# Patient Record
Sex: FEMALE | Race: WHITE | NOT HISPANIC OR LATINO | Employment: FULL TIME | URBAN - METROPOLITAN AREA
[De-identification: names, ages, dates, MRNs, and addresses within clinical notes are randomized per-mention and may not be internally consistent; named-entity substitution may affect disease eponyms.]

---

## 2017-02-27 ENCOUNTER — GENERIC CONVERSION - ENCOUNTER (OUTPATIENT)
Dept: OTHER | Facility: OTHER | Age: 37
End: 2017-02-27

## 2017-03-13 ENCOUNTER — ALLSCRIPTS OFFICE VISIT (OUTPATIENT)
Dept: OTHER | Facility: OTHER | Age: 37
End: 2017-03-13

## 2017-03-13 DIAGNOSIS — R41.3 OTHER AMNESIA: ICD-10-CM

## 2017-03-13 DIAGNOSIS — Z00.00 ENCOUNTER FOR GENERAL ADULT MEDICAL EXAMINATION WITHOUT ABNORMAL FINDINGS: ICD-10-CM

## 2017-03-23 ENCOUNTER — TRANSCRIBE ORDERS (OUTPATIENT)
Dept: ADMINISTRATIVE | Facility: HOSPITAL | Age: 37
End: 2017-03-23

## 2017-03-23 DIAGNOSIS — R41.3 MEMORY LOSS: Primary | ICD-10-CM

## 2017-03-28 ENCOUNTER — HOSPITAL ENCOUNTER (OUTPATIENT)
Dept: CT IMAGING | Facility: CLINIC | Age: 37
Discharge: HOME | End: 2017-03-28
Payer: COMMERCIAL

## 2017-03-28 ENCOUNTER — GENERIC CONVERSION - ENCOUNTER (OUTPATIENT)
Dept: OTHER | Facility: OTHER | Age: 37
End: 2017-03-28

## 2017-03-28 DIAGNOSIS — R41.3 OTHER AMNESIA: ICD-10-CM

## 2017-03-28 LAB
A/G RATIO (HISTORICAL): 1.7 (ref 1.2–2.2)
ALBUMIN SERPL BCP-MCNC: 4.5 G/DL (ref 3.5–5.5)
ALP SERPL-CCNC: 58 IU/L (ref 39–117)
ALT SERPL W P-5'-P-CCNC: 33 IU/L (ref 0–32)
AST SERPL W P-5'-P-CCNC: 32 IU/L (ref 0–40)
BASOPHILS # BLD AUTO: 0 X10E3/UL (ref 0–0.2)
BASOPHILS # BLD AUTO: 1 %
BILIRUB SERPL-MCNC: 0.6 MG/DL (ref 0–1.2)
BUN SERPL-MCNC: 18 MG/DL (ref 6–20)
BUN/CREA RATIO (HISTORICAL): 27 (ref 8–20)
CALCIUM SERPL-MCNC: 9.3 MG/DL (ref 8.7–10.2)
CHLORIDE SERPL-SCNC: 100 MMOL/L (ref 96–106)
CHOLEST SERPL-MCNC: 171 MG/DL (ref 100–199)
CO2 SERPL-SCNC: 25 MMOL/L (ref 18–29)
CREAT SERPL-MCNC: 0.67 MG/DL (ref 0.57–1)
DEPRECATED RDW RBC AUTO: 13.9 % (ref 12.3–15.4)
EGFR AFRICAN AMERICAN (HISTORICAL): 129 ML/MIN/1.73
EGFR-AMERICAN CALC (HISTORICAL): 112 ML/MIN/1.73
EOSINOPHIL # BLD AUTO: 0 X10E3/UL (ref 0–0.4)
EOSINOPHIL # BLD AUTO: 1 %
GLUCOSE SERPL-MCNC: 80 MG/DL (ref 65–99)
HCT VFR BLD AUTO: 37.6 % (ref 34–46.6)
HDLC SERPL-MCNC: 73 MG/DL
HGB BLD-MCNC: 12.4 G/DL (ref 11.1–15.9)
IMM.GRANULOCYTES (CD4/8) (HISTORICAL): 0 %
IMM.GRANULOCYTES (CD4/8) (HISTORICAL): 0 X10E3/UL (ref 0–0.1)
LDL/HDL RATIO (HISTORICAL): 1.2 RATIO UNITS (ref 0–3.2)
LDLC SERPL CALC-MCNC: 89 MG/DL (ref 0–99)
LYMPHOCYTES # BLD AUTO: 1.6 X10E3/UL (ref 0.7–3.1)
LYMPHOCYTES # BLD AUTO: 43 %
MCH RBC QN AUTO: 30 PG (ref 26.6–33)
MCHC RBC AUTO-ENTMCNC: 33 G/DL (ref 31.5–35.7)
MCV RBC AUTO: 91 FL (ref 79–97)
MONOCYTES # BLD AUTO: 0.2 X10E3/UL (ref 0.1–0.9)
MONOCYTES (HISTORICAL): 6 %
NEUTROPHILS # BLD AUTO: 1.7 X10E3/UL (ref 1.4–7)
NEUTROPHILS # BLD AUTO: 49 %
PLATELET # BLD AUTO: 188 X10E3/UL (ref 150–379)
POTASSIUM SERPL-SCNC: 4.7 MMOL/L (ref 3.5–5.2)
RBC (HISTORICAL): 4.13 X10E6/UL (ref 3.77–5.28)
SODIUM SERPL-SCNC: 138 MMOL/L (ref 134–144)
TOT. GLOBULIN, SERUM (HISTORICAL): 2.6 G/DL (ref 1.5–4.5)
TOTAL PROTEIN (HISTORICAL): 7.1 G/DL (ref 6–8.5)
TRIGL SERPL-MCNC: 47 MG/DL (ref 0–149)
VLDLC SERPL CALC-MCNC: 9 MG/DL (ref 5–40)
WBC # BLD AUTO: 3.6 X10E3/UL (ref 3.4–10.8)

## 2017-03-28 PROCEDURE — 70450 CT HEAD/BRAIN W/O DYE: CPT

## 2017-03-29 ENCOUNTER — GENERIC CONVERSION - ENCOUNTER (OUTPATIENT)
Dept: OTHER | Facility: OTHER | Age: 37
End: 2017-03-29

## 2017-03-29 LAB — TSH SERPL DL<=0.05 MIU/L-ACNC: 1.02 UIU/ML (ref 0.45–4.5)

## 2017-09-18 ENCOUNTER — GENERIC CONVERSION - ENCOUNTER (OUTPATIENT)
Dept: OTHER | Facility: OTHER | Age: 37
End: 2017-09-18

## 2017-12-02 ENCOUNTER — OFFICE VISIT (OUTPATIENT)
Dept: URGENT CARE | Facility: CLINIC | Age: 37
End: 2017-12-02
Payer: COMMERCIAL

## 2017-12-02 PROCEDURE — 99213 OFFICE O/P EST LOW 20 MIN: CPT

## 2017-12-05 NOTE — PROGRESS NOTES
Assessment  1  Acute URI (465 9) (J06 9)    Discussion/Summary  Discussion Summary: At least 6 glasses of water or juice a day  Steam treatments by bending over the sink are helpful  Afrin nasal spray for facial congestion  Vaporizer bedroom  Motrin or Aleve for fever chills or aches  Chief Complaint    1  Cold Symptoms  Chief Complaint Free Text Note Form: For 1 5 weeks c/o sinus pressure headaches productive cough and post nasal drip  Taking OTC medications with no relief  History of Present Illness  HPI: See    Hospital Based Practices Required Assessment:  Pain Assessment  the patient states they do not have pain  (on a scale of 0 to 10, the patient rates the pain at 0 )  Abuse And Domestic Violence Screen   Yes, the patient is safe at home  -- The patient states no one is hurting them  Depression And Suicide Screen  No, the patient has not had thoughts of hurting themself  No, the patient has not felt depressed in the past 7 days  Prefered Language is  [place]  Primary Language is  English  Review of Systems  Focused-Female:  ENT: as noted in HPI  Respiratory: as noted in HPI  Active Problems  1  Amnesia (780 93) (R41 3)   2  Chronic pain syndrome (338 4) (G89 4)   3  Depressive disorder (311) (F32 9)   4  Irritable bowel syndrome (564 1) (K58 9)   5  Postlaminectomy syndrome, lumbar (722 83) (M96 1)    Past Medical History  1  History of Anxiety disorder (300 00) (F41 9)   2  History of Bronchitis (490) (J40)   3  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)   4  History of Chest pain (786 50) (R07 9)   5  History of Conjunctivitis (372 30) (H10 9)   6  History of Elbow pain (719 42) (M25 529)   7  History of being hospitalized (V13 9) (Z92 89)   8  History of Leg pain (729 5) (M79 606)   9  History of Malrotation cecum (751 4) (Q43 3)   10  History of Muscle contraction headache (307 81) (G44 209)   11  History of Sciatica, right   12   History of Viral syndrome (079 99) (B34 9)  Active Problems And Past Medical History Reviewed: The active problems and past medical history were reviewed and updated today  Family History  Father    1  Family history of hypertension (V17 49) (Z82 49)  Brother    2  Family history of epilepsy (V17 2) (Z82 0)  Grandmother    3  Family history of Heart attack   4  Family history of Scleroderma   5  Family history of Stroke Syndrome (V17 1)  Grandfather    6  Family history of Heart attack  Family History    7  Family history of Cardiac disease    Social History   · Activities   · Cultural background   · Dental care, regularly   · Educational Level - Has High School Diploma   · Former smoker (H84 82) (X52 414)   · Lives with family   · Living Situation: Supportive and safe   · Marital History - Currently    · Never Drank Alcohol   · Never Used Drugs   · No advance directives (V49 89) (Z78 9)   · Occupation:   · Primary spoken language English   · Sexually Active  Social History Reviewed: The social history was reviewed and updated today  Surgical History    1  History of Back Surgery   2  History of  Section   3  History of Cholecystectomy   4  History of Gallbladder Surgery   5  History of Laparoscopic Appendectomy   6  History of Surgical Lysis Of Intestinal Adhesions    Current Meds   1  ALPRAZolam 0 5 MG Oral Tablet; TAKE 1 TABLET TWICE DAILY AS NEEDED; Therapy: 08GJV4786 to (Evaluate:2016); Last Rx:84Xwz1911 Ordered   2  DULoxetine HCl - 30 MG Oral Capsule Delayed Release Particles; TAKE 1 CAPSULE Daily take with 60 mg capsule TOTAL DAILY DOSE :90 mg; Therapy: 63VOU7893 to (Evaluate:46Ten9365)  Requested for: 13GRK2545; Last Rx:2017 Ordered   3  DULoxetine HCl - 60 MG Oral Capsule Delayed Release Particles; take 1 capsule by mouth once daily; Therapy: 56GMH8470 to (Evaluate:2018)  Requested for: 19SIS2756; Last Rx:69Tge8476 Ordered   4  Multi-Vitamin Oral Tablet;  Therapy: (Recorded:2016) to Recorded    Allergies    1  Biaxin XL TB24   2  Effexor TABS   3  Sulfa Drugs    Vitals  Signs   Recorded: 93Zxd1187 03:36PM   Temperature: 98 6 F  Heart Rate: 72  Respiration: 18  Systolic: 905  Diastolic: 65  O2 Saturation: 99    Physical Exam   Eyes  Conjunctiva and lids: No swelling, erythema or discharge  Ears, Nose, Mouth, and Throat  External inspection of ears and nose: Normal    Otoscopic examination: Tympanic membranes translucent with normal light reflex  Canals patent without erythema  Nasal mucosa, septum, and turbinates: Normal without edema or erythema  Oropharynx: Normal with no erythema, edema, exudate or lesions  Pulmonary  Auscultation of lungs: Clear to auscultation  Cardiovascular  Auscultation of heart: Normal rate and rhythm, normal S1 and S2, without murmurs         Signatures   Electronically signed by : VIELKA Gamboa ; Dec  2 2017  3:57PM EST                       (Author)

## 2018-01-11 NOTE — RESULT NOTES
Verified Results  * CT HEAD WO CONTRAST [id number] 08:15AM Cristy Nichols Order Number: [id number]    - Patient Instructions: To schedule this appointment, please contact Central Scheduling at [phone]  Test Name Result Flag Reference   CT HEAD WO CONTRAST (Report)     CT BRAIN - WITHOUT CONTRAST     INDICATION: Dizzy spells for the last few weeks  COMPARISON: 10/15/2013     TECHNIQUE: CT examination of the brain was performed  In addition to axial images, coronal reformatted images were created and submitted for interpretation  Radiation dose length product (DLP) for this visit: 1007 32 mGy-cm   This examination, like all CT scans performed in the [facility], was performed utilizing techniques to minimize radiation dose exposure, including the use of    iterative reconstruction and automated exposure control  IMAGE QUALITY: Diagnostic  FINDINGS:      PARENCHYMA: No intracranial mass, mass effect or midline shift  No CT signs of acute infarction  There is no parenchymal hemorrhage  VENTRICLES AND EXTRA-AXIAL SPACES: Normal for patient's age  VISUALIZED ORBITS AND PARANASAL SINUSES: Unremarkable  CALVARIUM AND EXTRACRANIAL SOFT TISSUES:  Normal        IMPRESSION:     No acute intracranial abnormality  Workstation performed: [id number]     Signed by:    Sandra Crowley MD   3/28/17

## 2018-01-13 NOTE — RESULT NOTES
Verified Results  (1) CBC/PLT/DIFF [id number] 08:48AM Marsha Moseley     Test Name Result Flag Reference   WBC 3 6 x10E3/uL  3 4-10 8   RBC 4 13 x10E6/uL  3 77-5 28   Hemoglobin 12 4 g/dL  11 1-15 9   Hematocrit 37 6 %  34 0-46  6   MCV 91 fL  79-97   MCH 30 0 pg  26 6-33 0   MCHC 33 0 g/dL  31 5-35 7   RDW 13 9 %  12 3-15 4   Platelets 371 A27L1/BK  150-379   Neutrophils 49 %     Lymphs 43 %     Monocytes 6 %     Eos 1 %     Basos 1 %     Neutrophils (Absolute) 1 7 x10E3/uL  1 4-7 0   Lymphs (Absolute) 1 6 x10E3/uL  0 7-3 1   Monocytes(Absolute) 0 2 x10E3/uL  0 1-0 9   Eos (Absolute) 0 0 x10E3/uL  0 0-0 4   Baso (Absolute) 0 0 x10E3/uL  0 0-0 2   Immature Granulocytes 0 %     Immature Grans (Abs) 0 0 x10E3/uL  0 0-0 1

## 2018-01-17 NOTE — PROGRESS NOTES
Assessment    1  Encounter for preventive health examination (V70 0) (Z00 00)   2  Depressive disorder (311) (F32 9)   3  Amnesia (780 93) (R41 3)    Plan  Amnesia    · * CT HEAD WO CONTRAST; Status:Need Information - Financial Authorization; Requested for:13Mar2017;    · EEG AWAKE (ROUTINE); Status:Hold For - Scheduling; Requested for:13Mar2017; Health Maintenance    · (1) CBC/PLT/DIFF; Status:Active; Requested for:13Mar2017;    · (1) COMPREHENSIVE METABOLIC PANEL; Status:Active; Requested for:13Mar2017;    · (1) LIPID PANEL FASTING W DIRECT LDL REFLEX; Status:Active; Requested  for:13Mar2017;    · (1) TSH; Status:Active; Requested for:13Mar2017;     Discussion/Summary    Studies ordered to eval for neuro problems, CT head and EEG ? absence seizures    no changes to regimen  The patient was counseled regarding instructions for management  Possible side effects of new medications were reviewed with the patient/guardian today  Chief Complaint  PT here for a yearly check up  -requesting lab slip  History of Present Illness  , Adult Female: The patient is being seen for a health maintenance evaluation  The last health maintenance visit was 1 yr year(s) ago  General Health: The patient's health since the last visit is described as good  Lifestyle:  She consumes a diverse and healthy diet  She exercises regularly  She does not use tobacco    Screening:   HPI: feeling pretty good in general  some occ bouts of depression  has had episodes of "loss of consciousness"     does feel as if xanax helps for anxiety  Review of Systems    Constitutional: No fever, no chills, feels well, no tiredness, no recent weight gain or weight loss  Eyes: No complaints of eye pain, no red eyes, no eyesight problems, no discharge, no dry eyes, no itching of eyes  ENT: no complaints of earache, no loss of hearing, no nose bleeds, no nasal discharge, no sore throat, no hoarseness     Cardiovascular: No complaints of slow heart rate, no fast heart rate, no chest pain, no palpitations, no leg claudication, no lower extremity edema  Respiratory: No complaints of shortness of breath, no wheezing, no cough, no SOB on exertion, no orthopnea, no PND  Gastrointestinal: No complaints of abdominal pain, no constipation, no nausea or vomiting, no diarrhea, no bloody stools  Genitourinary: No complaints of dysuria, no incontinence, no pelvic pain, no dysmenorrhea, no vaginal discharge or bleeding  Musculoskeletal: No complaints of arthralgias, no myalgias, no joint swelling or stiffness, no limb pain or swelling  Integumentary: No complaints of skin rash or lesions, no itching, no skin wounds, no breast pain or lump  Neurological: No complaints of headache, no confusion, no convulsions, no numbness, no dizziness or fainting, no tingling, no limb weakness, no difficulty walking  Psychiatric: Not suicidal, no sleep disturbance, no anxiety or depression, no change in personality, no emotional problems  Endocrine: No complaints of proptosis, no hot flashes, no muscle weakness, no deepening of the voice, no feelings of weakness  Hematologic/Lymphatic: No complaints of swollen glands, no swollen glands in the neck, does not bleed easily, does not bruise easily  Active Problems    1  Chronic pain syndrome (338 4) (G89 4)   2  Depressive disorder (311) (F32 9)   3  Irritable bowel syndrome (564 1) (K58 9)   4   Postlaminectomy syndrome, lumbar (722 83) (M96 1)    Past Medical History    · History of Anxiety disorder (300 00) (F41 9)   · History of Bronchitis (490) (J40)   · 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)   · History of Chest pain (786 50) (R07 9)   · History of Conjunctivitis (372 30) (H10 9)   · History of Elbow pain (719 42) (M25 529)   · History of being hospitalized (V13 9) (Z92 89)   · History of Leg pain (729 5) (M79 606)   · History of Malrotation cecum (751 4) (Q43 3)   · History of Muscle contraction headache (307 81) (G44 209)   · History of Sciatica, right   · History of Viral syndrome (079 99) (B34 9)    Surgical History    · History of Back Surgery   · History of  Section   · History of Cholecystectomy   · History of Gallbladder Surgery   · History of Laparoscopic Appendectomy   · History of Surgical Lysis Of Intestinal Adhesions    Family History  Father    · Family history of hypertension (V17 49) (Z82 49)  Brother    · Family history of epilepsy (V17 2) (Z82 0)  Grandmother    · Family history of Heart attack   · Family history of Scleroderma   · Family history of Stroke Syndrome (V17 1)  Grandfather    · Family history of Heart attack  Family History    · Family history of Cardiac disease    Social History    · Activities   · Participates in activities outside the home-yes, sedentary/light      Participates in activities inside the home-yes, Moderate   · Cultural background   · NON-   · Dental care, regularly   · Educational Level - Has [employer] Diploma   · Former smoker (S71 85) (L01 840)   · QUIT IN    · Lives with family   · Living Situation: Supportive and safe   · Marital History - Currently    · Never Drank Alcohol   · Never Used Drugs   · No advance directives (V49 89) (Z78 9)   · Occupation:   · hairstylist   · Primary spoken language English   · Sexually Active    Current Meds   1  ALPRAZolam 0 5 MG Oral Tablet; TAKE 1 TABLET TWICE DAILY AS NEEDED; Therapy: 32QVF7900 to (Evaluate:2016); Last Rx:2015 Ordered   2  DULoxetine HCl - 30 MG Oral Capsule Delayed Release Particles; take 1 capsule by   mouth once daily WITH 60MG  TO MAKE 90 MG; Therapy: 92IMI0506 to (Evaluate:65Zmd4606)  Requested for: 30Syk4483; Last   Rx:17Bff1306 Ordered   3  DULoxetine HCl - 60 MG Oral Capsule Delayed Release Particles; take 1 capsule by   mouth once daily; Therapy: 18RJR7209 to (Evaluate:2017)  Requested for: 98Zpy2570; Last   Rx:40Jux5757 Ordered   4  Multi-Vitamin Oral Tablet; Therapy: (Recorded:57Mlu8477) to Recorded    Allergies    1  Biaxin XL TB24   2  Effexor TABS   3  Sulfa Drugs    Vitals   Recorded: 72CPF7911 06:23PM   Heart Rate 72   Systolic 461, LUE, Sitting   Diastolic 68, LUE, Sitting   Height 5 ft 5 in   Weight 136 lb    BMI Calculated 22 63   BSA Calculated 1 68     Physical Exam    Constitutional   General appearance: No acute distress, well appearing and well nourished  Eyes   Conjunctiva and lids: No swelling, erythema or discharge  Pupils and irises: Equal, round, reactive to light  Ophthalmoscopic examination: Normal fundi and optic discs  Ears, Nose, Mouth, and Throat   External inspection of ears and nose: Normal     Otoscopic examination: Tympanic membranes translucent with normal light reflex  Canals patent without erythema  Hearing: Normal     Nasal mucosa, septum, and turbinates: Normal without edema or erythema  Lips, teeth, and gums: Normal, good dentition  Oropharynx: Normal with no erythema, edema, exudate or lesions  Neck   Neck: Supple, symmetric, trachea midline, no masses  Thyroid: Normal, no thyromegaly  Pulmonary   Respiratory effort: No increased work of breathing or signs of respiratory distress  Percussion of chest: Normal     Palpation of chest: Normal     Auscultation of lungs: Clear to auscultation  Cardiovascular   Palpation of heart: Normal PMI, no thrills  Auscultation of heart: Normal rate and rhythm, normal S1 and S2, no murmurs  Carotid pulses: 2+ bilaterally  Abdominal aorta: Normal     Femoral pulses: 2+ bilaterally  Pedal pulses: 2+ bilaterally  Examination of extremities for edema and/or varicosities: Normal     Chest   Breasts: Normal, no dimpling or skin changes appreciated  Palpation of breasts and axillae: Normal, no masses palpated  Abdomen   Abdomen: Non-tender, no masses  Liver and spleen: No hepatomegaly or splenomegaly      Examination for hernias: No hernia appreciated  Anus, perineum, and rectum: Normal sphincter tone, no masses, no prolapse  Stool sample for occult blood: Negative  Genitourinary   External genitalia and vagina: Normal, no lesions appreciated  Urethra: Normal, no discharge  Bladder: Not distended, no tenderness  Cervix: Normal, no lesions, Pap obtained  Uterus: Normal size, no tenderness, no masses  Adnexa/Parametria: Normal, no masses or tenderness  Lymphatic   Palpation of lymph nodes in neck: No lymphadenopathy  Palpation of lymph nodes in axillae: No lymphadenopathy  Palpation of lymph nodes in groin: No lymphadenopathy  Palpation of lymph nodes in other areas: No lymphadenopathy  Musculoskeletal   Gait and station: Normal     Digits and nails: Normal without clubbing or cyanosis  Joints, bones, and muscles: Normal     Range of motion: Normal     Stability: Normal     Muscle strength/tone: Normal     Skin   Skin and subcutaneous tissue: Normal without rashes or lesions  Palpation of skin and subcutaneous tissue: Normal turgor  Neurologic   Cranial nerves: Cranial nerves II-XII intact  Reflexes: 2+ and symmetric  Sensation: No sensory loss  Psychiatric   Judgment and insight: Normal     Orientation to person, place, and time: Normal     Recent and remote memory: Intact      Mood and affect: Normal        Signatures   Electronically signed by : VIELKA Hidalgo ; Mar 13 2017  6:48PM EST                       (Author)

## 2018-01-22 VITALS
WEIGHT: 136 LBS | DIASTOLIC BLOOD PRESSURE: 68 MMHG | SYSTOLIC BLOOD PRESSURE: 118 MMHG | HEIGHT: 65 IN | BODY MASS INDEX: 22.66 KG/M2 | HEART RATE: 72 BPM

## 2018-01-23 VITALS
OXYGEN SATURATION: 99 % | HEART RATE: 72 BPM | TEMPERATURE: 98.6 F | DIASTOLIC BLOOD PRESSURE: 65 MMHG | SYSTOLIC BLOOD PRESSURE: 125 MMHG | RESPIRATION RATE: 18 BRPM

## 2018-02-15 DIAGNOSIS — F32.A DEPRESSION, UNSPECIFIED DEPRESSION TYPE: Primary | ICD-10-CM

## 2018-02-15 RX ORDER — DULOXETIN HYDROCHLORIDE 30 MG/1
CAPSULE, DELAYED RELEASE ORAL
Qty: 30 CAPSULE | Refills: 1 | Status: SHIPPED | OUTPATIENT
Start: 2018-02-15 | End: 2018-04-16 | Stop reason: SDUPTHER

## 2018-03-14 ENCOUNTER — TRANSCRIBE ORDERS (OUTPATIENT)
Dept: FAMILY MEDICINE CLINIC | Facility: HOSPITAL | Age: 38
End: 2018-03-14

## 2018-03-14 DIAGNOSIS — L98.9 SKIN LESION: Primary | ICD-10-CM

## 2018-04-15 PROBLEM — Z00.00 HEALTH CARE MAINTENANCE: Status: ACTIVE | Noted: 2018-04-15

## 2018-04-15 PROBLEM — F41.9 ANXIETY: Status: ACTIVE | Noted: 2018-04-15

## 2018-04-15 PROBLEM — F32.A DEPRESSION: Status: ACTIVE | Noted: 2018-04-15

## 2018-04-15 RX ORDER — ALPRAZOLAM 0.5 MG/1
1 TABLET ORAL 2 TIMES DAILY PRN
COMMUNITY
Start: 2015-12-14 | End: 2018-08-09

## 2018-04-15 RX ORDER — DULOXETIN HYDROCHLORIDE 60 MG/1
CAPSULE, DELAYED RELEASE ORAL
Refills: 0 | COMMUNITY
Start: 2018-03-20 | End: 2018-08-09

## 2018-04-15 RX ORDER — DULOXETIN HYDROCHLORIDE 30 MG/1
CAPSULE, DELAYED RELEASE ORAL
COMMUNITY
Start: 2015-12-14 | End: 2018-08-09

## 2018-04-15 NOTE — PROGRESS NOTES
Assessment/Plan:    Anxiety  Stable on meds    Depression  Stable on meds    Patient Active Problem List   Diagnosis    Anxiety    Depression     No orders of the defined types were placed in this encounter  Diagnoses and all orders for this visit:    Anxiety    Depression, unspecified depression type    Health care maintenance    Other orders  -     ALPRAZolam (XANAX) 0 5 mg tablet; Take 1 tablet by mouth 2 (two) times a day as needed  -     DULoxetine (CYMBALTA) 30 mg delayed release capsule; Take by mouth  -     DULoxetine (CYMBALTA) 60 mg delayed release capsule;   -     CBC and differential; Future  -     Comprehensive metabolic panel; Future  -     Lipid Panel with Direct LDL reflex; Future  -     TSH, 3rd generation; Future          Subjective:      Patient ID: Pat Lafleur is a 43 y o  female  Here for [facility]  Wants to taper dymbalta to 60 mg daiy  The following portions of the patient's history were reviewed and updated as appropriate: allergies, current medications, past family history, past medical history, past social history, past surgical history and problem list     Review of Systems   Constitutional: Negative for fatigue and fever  HENT: Negative for hearing loss  Eyes: Negative for visual disturbance  Respiratory: Negative for cough, chest tightness, shortness of breath and wheezing  Cardiovascular: Negative for chest pain, palpitations and leg swelling  Gastrointestinal: Negative for abdominal pain, diarrhea and nausea  Genitourinary: Negative for dysuria and hematuria  Musculoskeletal: Negative for arthralgias  Neurological: Negative for dizziness, numbness and headaches  Psychiatric/Behavioral: Negative for confusion and dysphoric mood  All other systems reviewed and are negative          Objective:      Current Outpatient Prescriptions:     ALPRAZolam (XANAX) 0 5 mg tablet, Take 1 tablet by mouth 2 (two) times a day as needed, Disp: , Rfl:     DULoxetine (CYMBALTA) 30 mg delayed release capsule, Take by mouth, Disp: , Rfl:     DULoxetine (CYMBALTA) 30 mg delayed release capsule, take 1 capsule by mouth once daily (WITH 60 MG  FOR TOTAL DAILY DOSE OF 90 MG ), Disp: 30 capsule, Rfl: 1    DULoxetine (CYMBALTA) 60 mg delayed release capsule, , Disp: , Rfl: 0    HYDROcodone-acetaminophen (NORCO) 5-325 mg per tablet, Take 1-2 tablets by mouth every 6 (six) hours as needed for moderate pain for up to 31 doses  Max Daily Amount: 8 tablets, Disp: 30 tablet, Rfl: 0     Physical Exam   Constitutional: She is oriented to person, place, and time  She appears well-developed and well-nourished  Eyes: Pupils are equal, round, and reactive to light  Neck: Normal range of motion  Neck supple  No thyromegaly present  Cardiovascular: Normal rate, regular rhythm, normal heart sounds and intact distal pulses  No murmur heard  Pulmonary/Chest: Effort normal and breath sounds normal  She has no wheezes  She has no rales  Abdominal: Soft  Bowel sounds are normal  There is no tenderness  Musculoskeletal: Normal range of motion  She exhibits no edema, tenderness or deformity  Lymphadenopathy:     She has no cervical adenopathy  Neurological: She is alert and oriented to person, place, and time  She has normal reflexes  Skin: Skin is warm and dry  Psychiatric: She has a normal mood and affect  Vitals reviewed

## 2018-04-16 ENCOUNTER — OFFICE VISIT (OUTPATIENT)
Dept: FAMILY MEDICINE CLINIC | Facility: HOSPITAL | Age: 38
End: 2018-04-16
Payer: COMMERCIAL

## 2018-04-16 VITALS
SYSTOLIC BLOOD PRESSURE: 110 MMHG | HEART RATE: 75 BPM | DIASTOLIC BLOOD PRESSURE: 70 MMHG | HEIGHT: 65 IN | BODY MASS INDEX: 22.74 KG/M2 | WEIGHT: 136.5 LBS | OXYGEN SATURATION: 99 %

## 2018-04-16 DIAGNOSIS — Z00.00 HEALTH CARE MAINTENANCE: ICD-10-CM

## 2018-04-16 DIAGNOSIS — F32.A DEPRESSION, UNSPECIFIED DEPRESSION TYPE: ICD-10-CM

## 2018-04-16 DIAGNOSIS — F41.9 ANXIETY: Primary | ICD-10-CM

## 2018-04-16 PROCEDURE — 99395 PREV VISIT EST AGE 18-39: CPT | Performed by: INTERNAL MEDICINE

## 2018-04-16 RX ORDER — DULOXETIN HYDROCHLORIDE 30 MG/1
CAPSULE, DELAYED RELEASE ORAL
Qty: 30 CAPSULE | Refills: 1 | Status: SHIPPED | OUTPATIENT
Start: 2018-04-16 | End: 2018-06-21 | Stop reason: SDUPTHER

## 2018-04-16 NOTE — PATIENT INSTRUCTIONS
Your visit today was an annual wellness visit  At this visit the doctor discusses with you routine preventive health measures, reviews your medication history, reviews your lab test and other screenings, or orders the appropriate tests needed  Immunizations are also reviewed and updated if needed  This is the time to be sure that you are doing everything you should to stay healthy! This visit is generally not considered the time to address any new issues or to make changes to the regimen for your chronic conditions unless this is felt to be important by the doctor  If tests or screenings have been ordered, please be sure to obtain them in a timely fashion and the office will contact you with results  If desired, can taper cymbalta to 60 mg daily for 1 month, then 60 mg every other day for 1 month, then 30 mg daiy for 1 month, then 30 mg every other day for 1 month, then 30 mg every 4th day for one month, then stop

## 2018-05-01 LAB
ALBUMIN SERPL-MCNC: 4.2 G/DL (ref 3.5–5.5)
ALBUMIN/GLOB SERPL: 1.7 {RATIO} (ref 1.2–2.2)
ALP SERPL-CCNC: 47 IU/L (ref 39–117)
ALT SERPL-CCNC: 25 IU/L (ref 0–32)
AST SERPL-CCNC: 22 IU/L (ref 0–40)
BASOPHILS # BLD AUTO: 0 X10E3/UL (ref 0–0.2)
BASOPHILS NFR BLD AUTO: 1 %
BILIRUB SERPL-MCNC: 0.7 MG/DL (ref 0–1.2)
BUN SERPL-MCNC: 17 MG/DL (ref 6–20)
BUN/CREAT SERPL: 23 (ref 9–23)
CALCIUM SERPL-MCNC: 8.9 MG/DL (ref 8.7–10.2)
CHLORIDE SERPL-SCNC: 102 MMOL/L (ref 96–106)
CHOLEST SERPL-MCNC: 157 MG/DL (ref 100–199)
CO2 SERPL-SCNC: 24 MMOL/L (ref 18–29)
CREAT SERPL-MCNC: 0.74 MG/DL (ref 0.57–1)
EOSINOPHIL # BLD AUTO: 0.1 X10E3/UL (ref 0–0.4)
EOSINOPHIL NFR BLD AUTO: 2 %
ERYTHROCYTE [DISTWIDTH] IN BLOOD BY AUTOMATED COUNT: 13.6 % (ref 12.3–15.4)
GLOBULIN SER-MCNC: 2.5 G/DL (ref 1.5–4.5)
GLUCOSE SERPL-MCNC: 88 MG/DL (ref 65–99)
HCT VFR BLD AUTO: 36.7 % (ref 34–46.6)
HDLC SERPL-MCNC: 68 MG/DL
HGB BLD-MCNC: 11.6 G/DL (ref 11.1–15.9)
IMM GRANULOCYTES # BLD: 0 X10E3/UL (ref 0–0.1)
IMM GRANULOCYTES NFR BLD: 0 %
LDLC SERPL CALC-MCNC: 78 MG/DL (ref 0–99)
LDLC/HDLC SERPL: 1.1 RATIO (ref 0–3.2)
LYMPHOCYTES # BLD AUTO: 1.7 X10E3/UL (ref 0.7–3.1)
LYMPHOCYTES NFR BLD AUTO: 44 %
MCH RBC QN AUTO: 29.4 PG (ref 26.6–33)
MCHC RBC AUTO-ENTMCNC: 31.6 G/DL (ref 31.5–35.7)
MCV RBC AUTO: 93 FL (ref 79–97)
MONOCYTES # BLD AUTO: 0.4 X10E3/UL (ref 0.1–0.9)
MONOCYTES NFR BLD AUTO: 11 %
NEUTROPHILS # BLD AUTO: 1.6 X10E3/UL (ref 1.4–7)
NEUTROPHILS NFR BLD AUTO: 42 %
PLATELET # BLD AUTO: 173 X10E3/UL (ref 150–379)
POTASSIUM SERPL-SCNC: 4.5 MMOL/L (ref 3.5–5.2)
PROT SERPL-MCNC: 6.7 G/DL (ref 6–8.5)
RBC # BLD AUTO: 3.94 X10E6/UL (ref 3.77–5.28)
SL AMB EGFR AFRICAN AMERICAN: 118 ML/MIN/1.73
SL AMB EGFR NON AFRICAN AMERICAN: 102 ML/MIN/1.73
SL AMB VLDL CHOLESTEROL CALC: 11 MG/DL (ref 5–40)
SODIUM SERPL-SCNC: 140 MMOL/L (ref 134–144)
TRIGL SERPL-MCNC: 53 MG/DL (ref 0–149)
TSH SERPL DL<=0.005 MIU/L-ACNC: 1.58 UIU/ML (ref 0.45–4.5)
WBC # BLD AUTO: 3.7 X10E3/UL (ref 3.4–10.8)

## 2018-06-21 ENCOUNTER — TELEPHONE (OUTPATIENT)
Dept: FAMILY MEDICINE CLINIC | Facility: HOSPITAL | Age: 38
End: 2018-06-21

## 2018-06-21 DIAGNOSIS — F32.A DEPRESSION, UNSPECIFIED DEPRESSION TYPE: ICD-10-CM

## 2018-06-21 RX ORDER — DULOXETIN HYDROCHLORIDE 30 MG/1
CAPSULE, DELAYED RELEASE ORAL
Qty: 30 CAPSULE | Refills: 1 | Status: SHIPPED | OUTPATIENT
Start: 2018-06-21 | End: 2018-08-09

## 2018-06-21 NOTE — TELEPHONE ENCOUNTER
Called PT - the Cymbalta is capsules - she was wondering if she could do 30 mg every other day - please advise

## 2018-06-21 NOTE — TELEPHONE ENCOUNTER
Left message for PT - OK to do every other day - and that she could stop after 3 weeks if she desires

## 2018-08-09 ENCOUNTER — OFFICE VISIT (OUTPATIENT)
Dept: OBGYN CLINIC | Facility: CLINIC | Age: 38
End: 2018-08-09
Payer: COMMERCIAL

## 2018-08-09 ENCOUNTER — APPOINTMENT (OUTPATIENT)
Dept: RADIOLOGY | Facility: CLINIC | Age: 38
End: 2018-08-09
Payer: COMMERCIAL

## 2018-08-09 VITALS
HEART RATE: 70 BPM | SYSTOLIC BLOOD PRESSURE: 123 MMHG | BODY MASS INDEX: 23.39 KG/M2 | DIASTOLIC BLOOD PRESSURE: 72 MMHG | WEIGHT: 137 LBS | HEIGHT: 64 IN

## 2018-08-09 DIAGNOSIS — M25.522 PAIN IN LEFT ELBOW: ICD-10-CM

## 2018-08-09 DIAGNOSIS — M77.12 LEFT LATERAL EPICONDYLITIS: Primary | ICD-10-CM

## 2018-08-09 PROCEDURE — 99203 OFFICE O/P NEW LOW 30 MIN: CPT | Performed by: ORTHOPAEDIC SURGERY

## 2018-08-09 PROCEDURE — 73080 X-RAY EXAM OF ELBOW: CPT

## 2018-08-09 RX ORDER — DIPHENOXYLATE HYDROCHLORIDE AND ATROPINE SULFATE 2.5; .025 MG/1; MG/1
1 TABLET ORAL DAILY
COMMUNITY

## 2018-08-09 NOTE — PROGRESS NOTES
Assessment:     1  Left lateral epicondylitis        Plan:  The patient was seen and examined by Dr Chalino Marino and myself  Problem List Items Addressed This Visit        Musculoskeletal and Integument    Left lateral epicondylitis - Primary     Findings and treatment options were discussed the patient  X-rays were reviewed with the patient  Discussed condition and prognosis  Recommend formal occupational therapy  She was prescribed a wrist brace to use at work along with the elbow strap  She is to continue taking anti-inflammatories  Discussed importance of modifying her activities  She is to avoid lifting with that arm, especially with the palm down  Discussed that this condition can take 9-12 months to resolve  Also briefly discussed that if she is not feeling improvement with conservative treatment, she may need to cut back her hours as a  to rest her elbow  She is to come back for follow-up if there is no improvement in 2-3 months  All questions were answered to her satisfaction  Relevant Orders    XR elbow 3+ vw left    Ambulatory referral to Occupational Therapy    Durable Medical Equipment         Subjective:     Patient ID: Fredy De León is a 43 y o  female  Chief Complaint: This is a right-hand-dominant 26-year-old female complaining of left elbow pain for the past 3 months  The pain began after doing repetitive yard work  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  She has had no improvement over the past month  The pain is localized over the lateral aspect of the elbow and radiates down her forearm  She tried using an elbow strap with no relief  She has been taking NSAIDs intermittently with minimal relief  No other treatment  She denies any history of similar symptoms in that elbow  Patient intake form was reviewed today         Allergy:  Allergies   Allergen Reactions    Biaxin [Clarithromycin] GI Intolerance    Sulfa Antibiotics     Venlafaxine GI Intolerance and Vomiting     Category: Allergy; Medications:  all current active meds have been reviewed  Past Medical History:  Past Medical History:   Diagnosis Date    Anxiety disorder     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HCC)     Malrotation cecum     Muscle contraction headache     Sciatica, right side      Past Surgical History:  Past Surgical History:   Procedure Laterality Date    APPENDECTOMY      extensive lysis of adhesions, MAYURI's procedure (divisions of MAYURI's bands) detorsion of vulvulus, widening of mesenteric pedicle, cecopeexy, appendectomy       BACK SURGERY Right 2013    SF: L4-L5 hemilaminectomy for discectomy  Use of the microscope for microdissection, Use og 40mg of depo-medrol though thee exiting right L5 nerve root  Onset:13     SECTION      x2    CHOLECYSTECTOMY      GALLBLADDER SURGERY      GV, Onset:     LAPAROSCOPIC LYSIS INTESTINAL ADHESIONS      onset: 16    LUMBAR DISC SURGERY      [place] LAP,DIAGNOSTIC ABDOMEN N/A 2016    Procedure: LAPAROSCOPY DIAGNOSTIC, EXTENSIVE LYSIS OF ADHESIONS, MAYURI'S PROCEDURE(DIVISION OF MAYURI'S BANDS,DETORSION OF VOLVULUS, WIDENING OF MESENTERIC PEDICLE, CECOPEXY, APPENDECTOMY); Surgeon: Sofi Ayala MD;  [facility];  Service: General    TUBAL LIGATION       Family History:  Family History   Problem Relation Age of Onset    Hypertension Father     Seizures Brother         epilepsy    Heart disease Family     Heart attack Other     Scleroderma Other     Stroke Other 78        stroke syndrome    Heart attack Other      Social History:  History   Alcohol Use No     History   Drug Use No     History   Smoking Status    Former Smoker    Quit date:    Smokeless Tobacco    Never Used     Review of Systems   Constitutional: Negative  HENT: Negative  Eyes: Negative  Respiratory: Negative  Cardiovascular: Negative  Gastrointestinal: Negative      Endocrine: Negative  Genitourinary: Negative  Musculoskeletal: Positive for arthralgias, joint swelling and myalgias  Skin: Negative  Allergic/Immunologic: Negative  Neurological: Negative  Hematological: Negative  Psychiatric/Behavioral: Negative  Objective:  BP Readings from Last 1 Encounters:   08/09/18 123/72      Wt Readings from Last 1 Encounters:   08/09/18 62 1 kg (137 lb)      BMI:   Estimated body mass index is 23 52 kg/m² as calculated from the following:    Height as of this encounter: 5' 4" (1 626 m)  Weight as of this encounter: 62 1 kg (137 lb)  BSA:   Estimated body surface area is 1 67 meters squared as calculated from the following:    Height as of this encounter: 5' 4" (1 626 m)  Weight as of this encounter: 62 1 kg (137 lb)  Physical Exam   Constitutional: She is oriented to person, place, and time  She appears well-developed  HENT:   Head: Normocephalic and atraumatic  Eyes: EOM are normal  Pupils are equal, round, and reactive to light  Neck: Neck supple  Neurological: She is alert and oriented to person, place, and time  Skin: Skin is warm  Psychiatric: She has a normal mood and affect  Nursing note and vitals reviewed  Right Elbow Exam   Right elbow exam is normal       Left Elbow Exam     Tenderness   The patient is experiencing tenderness in the lateral epicondyle  Range of Motion   The patient has normal left elbow ROM  Muscle Strength   The patient has normal left elbow strength  Tests Varus: negative  Valgus: negative  Tinel's Sign (cubital tunnel): negative    Other   Erythema: absent  Scars: absent  Sensation: normal  Pulse: present    Comments:  Pain in the elbow with resisted wrist extension            I have personally reviewed pertinent films in PACS  X-rays left elbow reveal no abnormalities

## 2018-08-09 NOTE — ASSESSMENT & PLAN NOTE
Findings and treatment options were discussed the patient  X-rays were reviewed with the patient  Discussed condition and prognosis  Recommend formal occupational therapy  She was prescribed a wrist brace to use at work along with the elbow strap  She is to continue taking anti-inflammatories  Discussed importance of modifying her activities  She is to avoid lifting with that arm, especially with the palm down  Discussed that this condition can take 9-12 months to resolve  Also briefly discussed that if she is not feeling improvement with conservative treatment, she may need to cut back her hours as a  to rest her elbow  She is to come back for follow-up if there is no improvement in 2-3 months  All questions were answered to her satisfaction

## 2018-08-27 ENCOUNTER — OFFICE VISIT (OUTPATIENT)
Dept: FAMILY MEDICINE CLINIC | Facility: HOSPITAL | Age: 38
End: 2018-08-27
Payer: COMMERCIAL

## 2018-08-27 VITALS
SYSTOLIC BLOOD PRESSURE: 122 MMHG | HEART RATE: 80 BPM | HEIGHT: 64 IN | BODY MASS INDEX: 23.64 KG/M2 | WEIGHT: 138.5 LBS | OXYGEN SATURATION: 99 % | DIASTOLIC BLOOD PRESSURE: 68 MMHG

## 2018-08-27 DIAGNOSIS — F32.A DEPRESSION, UNSPECIFIED DEPRESSION TYPE: Primary | ICD-10-CM

## 2018-08-27 PROBLEM — Z00.00 HEALTH CARE MAINTENANCE: Status: RESOLVED | Noted: 2018-04-15 | Resolved: 2018-08-27

## 2018-08-27 PROBLEM — M77.12 LEFT LATERAL EPICONDYLITIS: Status: RESOLVED | Noted: 2018-08-09 | Resolved: 2018-08-27

## 2018-08-27 PROCEDURE — 99213 OFFICE O/P EST LOW 20 MIN: CPT | Performed by: INTERNAL MEDICINE

## 2018-08-27 PROCEDURE — 3008F BODY MASS INDEX DOCD: CPT | Performed by: INTERNAL MEDICINE

## 2018-08-27 RX ORDER — DULOXETIN HYDROCHLORIDE 30 MG/1
CAPSULE, DELAYED RELEASE ORAL
COMMUNITY
Start: 2018-06-21 | End: 2018-08-27

## 2018-08-27 RX ORDER — DULOXETIN HYDROCHLORIDE 30 MG/1
30 CAPSULE, DELAYED RELEASE ORAL DAILY
Qty: 30 CAPSULE | Refills: 3 | Status: SHIPPED | OUTPATIENT
Start: 2018-08-27 | End: 2018-12-26 | Stop reason: SDUPTHER

## 2018-08-27 NOTE — PATIENT INSTRUCTIONS
You were seen for a follow up of chronic medical problems today  Be sure to make any changes to your medication as discussed by your doctor  If blood tests or other testing was ordered, be sure to obtain this at the time requested by the doctor  Our office will call you with the results of your tests once they arrive in our office  Resume cymbalta at 30 mg daily  Can increase to 60 mg is needed  Re evluate in 2 months  Trial of melatonin for sleep

## 2018-08-27 NOTE — PROGRESS NOTES
Assessment/Plan:    Depression  Trying to taper off cymbalta and having SE of anxiety  After discussion, will resume cymbalta at 30 mg daily  Can us xanax prn until this is effective  Patient Active Problem List   Diagnosis    Anxiety    Depression     No orders of the defined types were placed in this encounter  Diagnoses and all orders for this visit:    Depression, unspecified depression type  -     DULoxetine (CYMBALTA) 30 mg delayed release capsule; Take 1 capsule (30 mg total) by mouth daily    Other orders  -     Discontinue: DULoxetine (CYMBALTA) 30 mg delayed release capsule; Take 1 tab every other day -prn          Subjective:      Patient ID: Bard Lopez is a 43 y o  female  SE of anxiety as she tapers SSRI med  She has been off this med for about 2 weeks  Having anxiety and some crying  The following portions of the patient's history were reviewed and updated as appropriate: allergies, current medications, past family history, past medical history, past social history, past surgical history and problem list     Review of Systems   Constitutional: Negative for fatigue and fever  HENT: Negative for hearing loss  Eyes: Negative for visual disturbance  Respiratory: Negative for cough, chest tightness, shortness of breath and wheezing  Cardiovascular: Negative for chest pain, palpitations and leg swelling  Gastrointestinal: Negative for abdominal pain, diarrhea and nausea  Genitourinary: Negative for dysuria and hematuria  Musculoskeletal: Negative for arthralgias  Neurological: Negative for dizziness, numbness and headaches  Psychiatric/Behavioral: Negative for confusion and dysphoric mood  All other systems reviewed and are negative          Objective:      Current Outpatient Prescriptions:     multivitamin (THERAGRAN) TABS, Take 1 tablet by mouth daily, Disp: , Rfl:     DULoxetine (CYMBALTA) 30 mg delayed release capsule, Take 1 capsule (30 mg total) by mouth daily, Disp: 30 capsule, Rfl: 3     Physical Exam   Constitutional: She is oriented to person, place, and time  She appears well-developed and well-nourished  Eyes: Pupils are equal, round, and reactive to light  Neck: Normal range of motion  Neck supple  No thyromegaly present  Cardiovascular: Normal rate, regular rhythm, normal heart sounds and intact distal pulses  No murmur heard  Pulmonary/Chest: Effort normal and breath sounds normal  She has no wheezes  She has no rales  Abdominal: Soft  Bowel sounds are normal  There is no tenderness  Musculoskeletal: Normal range of motion  She exhibits no edema, tenderness or deformity  Lymphadenopathy:     She has no cervical adenopathy  Neurological: She is alert and oriented to person, place, and time  She has normal reflexes  Skin: Skin is warm and dry  Psychiatric: She has a normal mood and affect  Nursing note and vitals reviewed

## 2018-08-27 NOTE — ASSESSMENT & PLAN NOTE
Trying to taper off cymbalta and having SE of anxiety  After discussion, will resume cymbalta at 30 mg daily  Can us xanax prn until this is effective

## 2018-09-25 ENCOUNTER — TRANSCRIBE ORDERS (OUTPATIENT)
Dept: NEUROSURGERY | Facility: CLINIC | Age: 38
End: 2018-09-25

## 2018-09-25 ENCOUNTER — TRANSCRIBE ORDERS (OUTPATIENT)
Dept: FAMILY MEDICINE CLINIC | Facility: HOSPITAL | Age: 38
End: 2018-09-25

## 2018-09-25 DIAGNOSIS — M54.50 LOW BACK PAIN: Primary | ICD-10-CM

## 2018-09-25 DIAGNOSIS — M51.26 OTHER INTERVERTEBRAL DISC DISPLACEMENT, LUMBAR REGION: ICD-10-CM

## 2018-09-25 DIAGNOSIS — M54.16 RADICULOPATHY OF LUMBAR REGION: ICD-10-CM

## 2018-09-25 DIAGNOSIS — G89.29 OTHER CHRONIC PAIN: ICD-10-CM

## 2018-09-25 DIAGNOSIS — M54.50 LOWER BACK PAIN: Primary | ICD-10-CM

## 2018-10-11 ENCOUNTER — OFFICE VISIT (OUTPATIENT)
Dept: OBGYN CLINIC | Facility: CLINIC | Age: 38
End: 2018-10-11
Payer: COMMERCIAL

## 2018-10-11 VITALS
WEIGHT: 138.8 LBS | BODY MASS INDEX: 23.7 KG/M2 | DIASTOLIC BLOOD PRESSURE: 70 MMHG | SYSTOLIC BLOOD PRESSURE: 105 MMHG | HEART RATE: 69 BPM | HEIGHT: 64 IN

## 2018-10-11 DIAGNOSIS — G57.02 PIRIFORMIS SYNDROME OF LEFT SIDE: Primary | ICD-10-CM

## 2018-10-11 PROCEDURE — 99213 OFFICE O/P EST LOW 20 MIN: CPT | Performed by: ORTHOPAEDIC SURGERY

## 2018-10-11 NOTE — PROGRESS NOTES
Assessment:     1  Piriformis syndrome of left side        Plan:  The patient was seen and examined by Dr Love Flower and myself  Problem List Items Addressed This Visit        Nervous and Auditory    Piriformis syndrome of left side - Primary     Findings and treatment options were discussed with the patient  Recommend continuing physical therapy and massage therapy since it is providing relief  Discussed importance of doing stretches on consistent basis on her own, especially after working out  Continue he and NSAIDs as needed  If she is not feeling improvement the next 6-8 weeks, she is to return for follow-up  All questions were answered to patient's satisfaction  Subjective:     Patient ID: Ar Cabral is a 43 y o  female  Chief Complaint: This is a 21-year-old female complaining of left-sided buttock pain for the past month  She states she 1st felt some pain in her lumbar spine when she got up from a seating position from her couch 1 month ago  The pain then localized the left buttock with some pain radiating down the posterior aspect of her thigh to her knee  She does have occasional numbness and tingling radiating down her left leg as well  She has been going to physical therapy and massage therapy and has felt improvement  She has most of her pain when she lays down at night  She has a history of a lumbar discectomy by Dr Benigno Flowers for right-sided radiculopathy in the past that helped  No other treatment  We last saw her for left elbow lateral epicondylitis and she has been attending occupational therapy  Symptoms have resolved  Allergy:  Allergies   Allergen Reactions    Biaxin [Clarithromycin] GI Intolerance and Other (See Comments)    Sulfa Antibiotics     Sulfasalazine     Venlafaxine GI Intolerance, Vomiting and Other (See Comments)     Category: Allergy; Category: Allergy;       Medications:  all current active meds have been reviewed  Past Medical History:  Past Medical History:   Diagnosis Date    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[address] )      Past Surgical History:  Past Surgical History:   Procedure Laterality Date    APPENDECTOMY      extensive lysis of adhesions, MAYURI's procedure (divisions of MAYURI's bands) detorsion of vulvulus, widening of mesenteric pedicle, cecopeexy, appendectomy       BACK SURGERY Right 2013    SF: L4-L5 hemilaminectomy for discectomy  Use of the microscope for microdissection, Use og 40mg of depo-medrol though thee exiting right L5 nerve root  Onset:13     SECTION      x2    CHOLECYSTECTOMY      GALLBLADDER SURGERY      GV, Onset:     LAPAROSCOPIC LYSIS INTESTINAL ADHESIONS      onset: 16    LUMBAR DISC SURGERY      [place] LAP,DIAGNOSTIC ABDOMEN N/A 2016    Procedure: LAPAROSCOPY DIAGNOSTIC, EXTENSIVE LYSIS OF ADHESIONS, MAYURI'S PROCEDURE(DIVISION OF MAYURI'S BANDS,DETORSION OF VOLVULUS, WIDENING OF MESENTERIC PEDICLE, CECOPEXY, APPENDECTOMY); Surgeon: Shona Man MD;  Location: [facility] OR;  Service: General    TUBAL LIGATION       Family History:  Family History   Problem Relation Age of Onset    Hypertension Father     Seizures Brother         epilepsy    Heart disease Family     Heart attack Other     Scleroderma Other     Stroke Other 78        stroke syndrome    Heart attack Other      Social History:  History   Alcohol Use No     History   Drug Use No     History   Smoking Status    Former Smoker    Quit date:    Smokeless Tobacco    Never Used     Review of Systems   Constitutional: Negative  HENT: Negative  Eyes: Negative  Respiratory: Negative  Cardiovascular: Negative  Gastrointestinal: Negative  Endocrine: Negative  Genitourinary: Negative  Musculoskeletal: Positive for back pain and myalgias  Skin: Negative  Allergic/Immunologic: Negative  Neurological: Positive for numbness  Hematological: Negative  Psychiatric/Behavioral: Negative            Objective:  BP Readings from Last 1 Encounters:   10/11/18 105/70      Wt Readings from Last 1 Encounters:   10/11/18 63 kg (138 lb 12 8 oz)      BMI:   Estimated body mass index is 23 82 kg/m² as calculated from the following:    Height as of this encounter: 5' 4" (1 626 m)  Weight as of this encounter: 63 kg (138 lb 12 8 oz)  BSA:   Estimated body surface area is 1 68 meters squared as calculated from the following:    Height as of this encounter: 5' 4" (1 626 m)  Weight as of this encounter: 63 kg (138 lb 12 8 oz)  Physical Exam   Constitutional: She is oriented to person, place, and time  She appears well-developed  HENT:   Head: Normocephalic and atraumatic  Eyes: Conjunctivae and EOM are normal    Neck: Neck supple  Neurological: She is alert and oriented to person, place, and time  Skin: Skin is warm  Psychiatric: She has a normal mood and affect  Nursing note and vitals reviewed  Back Exam     Tenderness   Back tenderness location: Left piriformis  Range of Motion   The patient has normal back ROM  Muscle Strength   The patient has normal back strength  Tests   Straight leg raise right: negative  Straight leg raise left: negativeLeft straight leg raise test: Feel stretching over posterior thigh      Reflexes   Patellar: normal  Achilles: normal    Other   Toe Walk: normal  Heel Walk: normal  Sensation: normal  Gait: normal   Erythema: no back redness  Scars: present

## 2018-10-11 NOTE — ASSESSMENT & PLAN NOTE
Findings and treatment options were discussed with the patient  Recommend continuing physical therapy and massage therapy since it is providing relief  Discussed importance of doing stretches on consistent basis on her own, especially after working out  Continue he and NSAIDs as needed  If she is not feeling improvement the next 6-8 weeks, she is to return for follow-up  All questions were answered to patient's satisfaction

## 2018-10-15 ENCOUNTER — TELEPHONE (OUTPATIENT)
Dept: NEUROSURGERY | Facility: CLINIC | Age: 38
End: 2018-10-15

## 2018-10-22 ENCOUNTER — OFFICE VISIT (OUTPATIENT)
Dept: FAMILY MEDICINE CLINIC | Facility: HOSPITAL | Age: 38
End: 2018-10-22
Payer: COMMERCIAL

## 2018-10-22 VITALS
BODY MASS INDEX: 23.73 KG/M2 | HEART RATE: 84 BPM | WEIGHT: 139 LBS | SYSTOLIC BLOOD PRESSURE: 102 MMHG | HEIGHT: 64 IN | DIASTOLIC BLOOD PRESSURE: 68 MMHG

## 2018-10-22 DIAGNOSIS — F32.A DEPRESSION, UNSPECIFIED DEPRESSION TYPE: Primary | ICD-10-CM

## 2018-10-22 DIAGNOSIS — F41.9 ANXIETY: ICD-10-CM

## 2018-10-22 PROCEDURE — 99213 OFFICE O/P EST LOW 20 MIN: CPT | Performed by: INTERNAL MEDICINE

## 2018-10-22 PROCEDURE — 3008F BODY MASS INDEX DOCD: CPT | Performed by: INTERNAL MEDICINE

## 2018-10-22 PROCEDURE — 1036F TOBACCO NON-USER: CPT | Performed by: INTERNAL MEDICINE

## 2018-10-22 NOTE — PROGRESS NOTES
Subjective:   Chief Complaint   Patient presents with    Follow-up     feeling well on Cymbalta  Patient ID: Diego Mcburney is a 43 y o  female  Here for a foloow up  Doing much better on low dose cymbalta  Encouraged to continue on this  The following portions of the patient's history were reviewed and updated as appropriate: allergies, current medications, past family history, past medical history, past social history, past surgical history and problem list     Review of Systems   Constitutional: Negative for fatigue and fever  HENT: Negative for hearing loss  Eyes: Negative for visual disturbance  Respiratory: Negative for cough, chest tightness, shortness of breath and wheezing  Cardiovascular: Negative for chest pain, palpitations and leg swelling  Gastrointestinal: Negative for abdominal pain, diarrhea and nausea  Genitourinary: Negative for dysuria and hematuria  Musculoskeletal: Negative for arthralgias  Neurological: Negative for dizziness, numbness and headaches  Psychiatric/Behavioral: Negative for confusion and dysphoric mood  All other systems reviewed and are negative  Current Outpatient Prescriptions on File Prior to Visit   Medication Sig Dispense Refill    DULoxetine (CYMBALTA) 30 mg delayed release capsule Take 1 capsule (30 mg total) by mouth daily 30 capsule 3    multivitamin (THERAGRAN) TABS Take 1 tablet by mouth daily       No current facility-administered medications on file prior to visit  Objective:  Vitals:    10/22/18 0957   BP: 102/68   Pulse: 84   Weight: 63 kg (139 lb)   Height: 5' 4" (1 626 m)      Physical Exam   Constitutional: She is oriented to person, place, and time  She appears well-developed and well-nourished  Eyes: Pupils are equal, round, and reactive to light  Neck: Normal range of motion  Neck supple  No thyromegaly present     Cardiovascular: Normal rate, regular rhythm, normal heart sounds and intact distal pulses  No murmur heard  Pulmonary/Chest: Effort normal and breath sounds normal  She has no wheezes  She has no rales  Abdominal: Soft  Bowel sounds are normal  There is no tenderness  Musculoskeletal: Normal range of motion  She exhibits no edema, tenderness or deformity  Lymphadenopathy:     She has no cervical adenopathy  Neurological: She is alert and oriented to person, place, and time  She has normal reflexes  Skin: Skin is warm and dry  Psychiatric: She has a normal mood and affect  Nursing note and vitals reviewed  Assessment/Plan:    No problem-specific Assessment & Plan notes found for this encounter         Diagnoses and all orders for this visit:    Depression, unspecified depression type    Anxiety

## 2018-12-26 DIAGNOSIS — F32.A DEPRESSION, UNSPECIFIED DEPRESSION TYPE: ICD-10-CM

## 2018-12-27 RX ORDER — DULOXETIN HYDROCHLORIDE 30 MG/1
CAPSULE, DELAYED RELEASE ORAL
Qty: 30 CAPSULE | Refills: 3 | Status: SHIPPED | OUTPATIENT
Start: 2018-12-27 | End: 2019-04-22

## 2019-04-22 ENCOUNTER — OFFICE VISIT (OUTPATIENT)
Dept: FAMILY MEDICINE CLINIC | Facility: HOSPITAL | Age: 39
End: 2019-04-22
Payer: COMMERCIAL

## 2019-04-22 VITALS
SYSTOLIC BLOOD PRESSURE: 100 MMHG | HEIGHT: 64 IN | BODY MASS INDEX: 23.73 KG/M2 | HEART RATE: 75 BPM | WEIGHT: 139 LBS | DIASTOLIC BLOOD PRESSURE: 74 MMHG

## 2019-04-22 DIAGNOSIS — F41.9 ANXIETY: Primary | ICD-10-CM

## 2019-04-22 DIAGNOSIS — Z00.00 HEALTH CARE MAINTENANCE: ICD-10-CM

## 2019-04-22 DIAGNOSIS — F32.A DEPRESSION, UNSPECIFIED DEPRESSION TYPE: ICD-10-CM

## 2019-04-22 PROBLEM — G57.02 PIRIFORMIS SYNDROME OF LEFT SIDE: Status: RESOLVED | Noted: 2018-10-11 | Resolved: 2019-04-22

## 2019-04-22 PROCEDURE — 99214 OFFICE O/P EST MOD 30 MIN: CPT | Performed by: INTERNAL MEDICINE

## 2019-04-22 PROCEDURE — 3008F BODY MASS INDEX DOCD: CPT | Performed by: INTERNAL MEDICINE

## 2019-04-22 PROCEDURE — 1036F TOBACCO NON-USER: CPT | Performed by: INTERNAL MEDICINE

## 2019-04-22 RX ORDER — DULOXETIN HYDROCHLORIDE 60 MG/1
60 CAPSULE, DELAYED RELEASE ORAL DAILY
Qty: 90 CAPSULE | Refills: 3 | Status: SHIPPED | OUTPATIENT
Start: 2019-04-22 | End: 2019-07-22 | Stop reason: ALTCHOICE

## 2019-04-22 RX ORDER — ALPRAZOLAM 0.25 MG/1
0.25 TABLET ORAL DAILY PRN
Qty: 20 TABLET | Refills: 1 | Status: SHIPPED | OUTPATIENT
Start: 2019-04-22 | End: 2019-07-22 | Stop reason: ALTCHOICE

## 2019-04-23 DIAGNOSIS — F32.A DEPRESSION, UNSPECIFIED DEPRESSION TYPE: ICD-10-CM

## 2019-04-23 RX ORDER — DULOXETIN HYDROCHLORIDE 30 MG/1
CAPSULE, DELAYED RELEASE ORAL
Qty: 30 CAPSULE | Refills: 3 | Status: SHIPPED | OUTPATIENT
Start: 2019-04-23 | End: 2019-08-18 | Stop reason: SDUPTHER

## 2019-04-30 ENCOUNTER — TELEPHONE (OUTPATIENT)
Dept: FAMILY MEDICINE CLINIC | Facility: HOSPITAL | Age: 39
End: 2019-04-30

## 2019-05-21 LAB
ALBUMIN SERPL-MCNC: 4.3 G/DL (ref 3.5–5.5)
ALBUMIN/GLOB SERPL: 2 {RATIO} (ref 1.2–2.2)
ALP SERPL-CCNC: 46 IU/L (ref 39–117)
ALT SERPL-CCNC: 21 IU/L (ref 0–32)
AST SERPL-CCNC: 22 IU/L (ref 0–40)
BASOPHILS # BLD AUTO: 0 X10E3/UL (ref 0–0.2)
BASOPHILS NFR BLD AUTO: 1 %
BILIRUB SERPL-MCNC: 0.6 MG/DL (ref 0–1.2)
BUN SERPL-MCNC: 17 MG/DL (ref 6–24)
BUN/CREAT SERPL: 24 (ref 9–23)
CALCIUM SERPL-MCNC: 9.2 MG/DL (ref 8.7–10.2)
CHLORIDE SERPL-SCNC: 103 MMOL/L (ref 96–106)
CHOLEST SERPL-MCNC: 159 MG/DL (ref 100–199)
CO2 SERPL-SCNC: 23 MMOL/L (ref 20–29)
CREAT SERPL-MCNC: 0.72 MG/DL (ref 0.57–1)
EOSINOPHIL # BLD AUTO: 0 X10E3/UL (ref 0–0.4)
EOSINOPHIL NFR BLD AUTO: 1 %
ERYTHROCYTE [DISTWIDTH] IN BLOOD BY AUTOMATED COUNT: 13.3 % (ref 12.3–15.4)
GLOBULIN SER-MCNC: 2.1 G/DL (ref 1.5–4.5)
GLUCOSE SERPL-MCNC: 89 MG/DL (ref 65–99)
HCT VFR BLD AUTO: 37.1 % (ref 34–46.6)
HDLC SERPL-MCNC: 66 MG/DL
HGB BLD-MCNC: 12.1 G/DL (ref 11.1–15.9)
IMM GRANULOCYTES # BLD: 0 X10E3/UL (ref 0–0.1)
IMM GRANULOCYTES NFR BLD: 0 %
LDLC SERPL CALC-MCNC: 84 MG/DL (ref 0–99)
LDLC/HDLC SERPL: 1.3 RATIO (ref 0–3.2)
LYMPHOCYTES # BLD AUTO: 1.9 X10E3/UL (ref 0.7–3.1)
LYMPHOCYTES NFR BLD AUTO: 45 %
MCH RBC QN AUTO: 29.7 PG (ref 26.6–33)
MCHC RBC AUTO-ENTMCNC: 32.6 G/DL (ref 31.5–35.7)
MCV RBC AUTO: 91 FL (ref 79–97)
MONOCYTES # BLD AUTO: 0.4 X10E3/UL (ref 0.1–0.9)
MONOCYTES NFR BLD AUTO: 9 %
NEUTROPHILS # BLD AUTO: 1.8 X10E3/UL (ref 1.4–7)
NEUTROPHILS NFR BLD AUTO: 44 %
PLATELET # BLD AUTO: 202 X10E3/UL (ref 150–450)
POTASSIUM SERPL-SCNC: 4.5 MMOL/L (ref 3.5–5.2)
PROT SERPL-MCNC: 6.4 G/DL (ref 6–8.5)
RBC # BLD AUTO: 4.07 X10E6/UL (ref 3.77–5.28)
SL AMB EGFR AFRICAN AMERICAN: 121 ML/MIN/1.73
SL AMB EGFR NON AFRICAN AMERICAN: 105 ML/MIN/1.73
SL AMB VLDL CHOLESTEROL CALC: 9 MG/DL (ref 5–40)
SODIUM SERPL-SCNC: 138 MMOL/L (ref 134–144)
TRIGL SERPL-MCNC: 47 MG/DL (ref 0–149)
TSH SERPL DL<=0.005 MIU/L-ACNC: 1.43 UIU/ML (ref 0.45–4.5)
WBC # BLD AUTO: 4.1 X10E3/UL (ref 3.4–10.8)

## 2019-07-22 ENCOUNTER — OFFICE VISIT (OUTPATIENT)
Dept: FAMILY MEDICINE CLINIC | Facility: HOSPITAL | Age: 39
End: 2019-07-22
Payer: COMMERCIAL

## 2019-07-22 VITALS
DIASTOLIC BLOOD PRESSURE: 68 MMHG | WEIGHT: 138 LBS | SYSTOLIC BLOOD PRESSURE: 128 MMHG | HEIGHT: 64 IN | BODY MASS INDEX: 23.56 KG/M2 | HEART RATE: 86 BPM | RESPIRATION RATE: 16 BRPM

## 2019-07-22 DIAGNOSIS — F41.9 ANXIETY: Primary | ICD-10-CM

## 2019-07-22 DIAGNOSIS — F32.A DEPRESSION, UNSPECIFIED DEPRESSION TYPE: ICD-10-CM

## 2019-07-22 DIAGNOSIS — F33.2 SEVERE EPISODE OF RECURRENT MAJOR DEPRESSIVE DISORDER, WITHOUT PSYCHOTIC FEATURES (HCC): ICD-10-CM

## 2019-07-22 PROBLEM — F33.9 DEPRESSION, MAJOR, RECURRENT (HCC): Status: ACTIVE | Noted: 2018-04-15

## 2019-07-22 PROCEDURE — 99213 OFFICE O/P EST LOW 20 MIN: CPT | Performed by: PHYSICIAN ASSISTANT

## 2019-07-22 RX ORDER — CITALOPRAM 10 MG/1
10 TABLET ORAL DAILY
Qty: 30 TABLET | Refills: 5 | Status: SHIPPED | OUTPATIENT
Start: 2019-07-22 | End: 2019-07-26

## 2019-07-22 RX ORDER — DOXYCYCLINE HYCLATE 150 MG/1
TABLET, DELAYED RELEASE ORAL
Refills: 0 | COMMUNITY
Start: 2019-06-03 | End: 2019-07-22 | Stop reason: ALTCHOICE

## 2019-07-22 RX ORDER — CLONAZEPAM 0.5 MG/1
0.5 TABLET ORAL 2 TIMES DAILY PRN
Qty: 30 TABLET | Refills: 2 | Status: SHIPPED | OUTPATIENT
Start: 2019-07-22 | End: 2019-08-12

## 2019-07-22 NOTE — PROGRESS NOTES
Assessment/Plan:         Diagnoses and all orders for this visit:    Anxiety  -     clonazePAM (KlonoPIN) 0 5 mg tablet; Take 1 tablet (0 5 mg total) by mouth 2 (two) times a day as needed for seizures  -     citalopram (CeleXA) 10 mg tablet; Take 1 tablet (10 mg total) by mouth daily    Depression, major, recurrent  -     clonazePAM (KlonoPIN) 0 5 mg tablet; Take 1 tablet (0 5 mg total) by mouth 2 (two) times a day as needed for seizures  -     citalopram (CeleXA) 10 mg tablet; Take 1 tablet (10 mg total) by mouth daily          Subjective:      Patient ID: Brett Granger is a 35 y o  female  35year old white female presents with c/o increased anxiety, worse past few weeks;  feels heavy in chest area, could not breath last week  Random, middle of the night, panic attacks, mood fluctuations  Was on vacation last week, has a camper at [facility], hates being alone, was alone at the beach with kids, but caused  Increased anxiety  Patient has been a , very busy schedule;  Trying to take more vacations  Recently purchased CBD oil, takes Xanax on occasion  Presents with daughter  Review of Systems   Constitutional: Positive for appetite change  Respiratory: Positive for shortness of breath  Negative for cough  Gets SOB with stress/anxiety  Gastrointestinal: Positive for abdominal pain and nausea  Negative for constipation, diarrhea and vomiting  Musculoskeletal: Positive for back pain, myalgias, neck pain and neck stiffness  Negative for arthralgias  Psychiatric/Behavioral: Positive for agitation, decreased concentration, dysphoric mood and suicidal ideas  Negative for self-injury and sleep disturbance  The patient is nervous/anxious  Earlier in the week, had suicidal thoughts, tired of feeling the way patient does  No plan              Objective:      /68   Pulse 86   Resp 16   Ht 5' 4" (1 626 m)   Wt 62 6 kg (138 lb)   BMI 23 69 kg/m² Physical Exam   Constitutional: She is oriented to person, place, and time  She appears well-developed and well-nourished  No distress  Cardiovascular: Normal rate, regular rhythm and normal heart sounds  Pulmonary/Chest: Effort normal and breath sounds normal  No stridor  No respiratory distress  She has no wheezes  She has no rales  Musculoskeletal: Normal range of motion  She exhibits no edema, tenderness or deformity  Neurological: She is alert and oriented to person, place, and time  Skin: She is not diaphoretic  Psychiatric: Her behavior is normal  Judgment and thought content normal    Crying, frustrated, upset  Nursing note and vitals reviewed

## 2019-07-26 ENCOUNTER — OFFICE VISIT (OUTPATIENT)
Dept: FAMILY MEDICINE CLINIC | Facility: HOSPITAL | Age: 39
End: 2019-07-26
Payer: COMMERCIAL

## 2019-07-26 VITALS
HEART RATE: 98 BPM | DIASTOLIC BLOOD PRESSURE: 68 MMHG | HEIGHT: 64 IN | SYSTOLIC BLOOD PRESSURE: 132 MMHG | WEIGHT: 137.4 LBS | BODY MASS INDEX: 23.46 KG/M2

## 2019-07-26 DIAGNOSIS — F41.0 PANIC ATTACKS: ICD-10-CM

## 2019-07-26 DIAGNOSIS — F41.9 ANXIETY: Primary | ICD-10-CM

## 2019-07-26 PROCEDURE — 99214 OFFICE O/P EST MOD 30 MIN: CPT | Performed by: INTERNAL MEDICINE

## 2019-07-26 RX ORDER — DULOXETIN HYDROCHLORIDE 60 MG/1
60 CAPSULE, DELAYED RELEASE ORAL DAILY
Qty: 30 CAPSULE | Refills: 5 | Status: SHIPPED | OUTPATIENT
Start: 2019-07-26 | End: 2020-01-13 | Stop reason: SDUPTHER

## 2019-07-26 RX ORDER — ALPRAZOLAM 0.5 MG/1
TABLET ORAL
COMMUNITY
End: 2019-07-26

## 2019-07-26 NOTE — PROGRESS NOTES
Assessment/Plan:       Diagnoses and all orders for this visit:    Anxiety  -     DULoxetine (CYMBALTA) 60 mg delayed release capsule; Take 1 capsule (60 mg total) by mouth daily This will be used with 30mg CAP    Panic attacks  -     Ambulatory referral to [facility]; Future    Other orders  -     Discontinue: ALPRAZolam (XANAX) 0 5 mg tablet; Take by mouth daily at bedtime as needed for anxiety          All of the above diagnoses have been assessed  Additional COMMENTS/PLAN: Will refer to [facility] for counseling  Will go back to 90 mg Cymbalta and use Klonopin for breakthrough sxs  I have stopped the Celexa that was prescribed by my [de-identified] assistant  I spent approximately 30 minutes mostly of counseling  Subjective:      Patient ID: Ja Bains is a 35 y o  female  HPI     Has been dealing with panic attacks for number years  Has been working with Dr Barajas   Has been primarily on Cymbalta, up to as high as 90 milligrams daily but has taper down to 30 milligrams daily  She has had some decompensation recently and was in the office earlier this week  Has been using breakthrough this Xanax for symptoms  There has been a change in her medicines adding citalopram and Klonopin, which she has not done  Has been on these meds for 15 years and mother was Bipolar  The following portions of the patient's history were revised and updated as appropriate: Problem list, allergies, med list, FH, SH, Past medical and surgical histories      Current Outpatient Medications   Medication Sig Dispense Refill    DULoxetine (CYMBALTA) 30 mg delayed release capsule take 1 capsule by mouth once daily 30 capsule 3    multivitamin (THERAGRAN) TABS Take 1 tablet by mouth daily      BACILLUS COAGULANS-INULIN PO Take by mouth      clonazePAM (KlonoPIN) 0 5 mg tablet Take 1 tablet (0 5 mg total) by mouth 2 (two) times a day as needed for seizures (Patient not taking: Reported on 7/26/2019) 30 tablet 2    DULoxetine (CYMBALTA) 60 mg delayed release capsule Take 1 capsule (60 mg total) by mouth daily This will be used with 30mg CAP 30 capsule 5     No current facility-administered medications for this visit  Review of Systems   Constitutional: Negative  Objective:    /68   Pulse 98   Ht 5' 4" (1 626 m)   Wt 62 3 kg (137 lb 6 4 oz)   BMI 23 58 kg/m²     BP Readings from Last 3 Encounters:   07/26/19 132/68   07/22/19 128/68   04/22/19 100/74                  Wt Readings from Last 3 Encounters:   07/26/19 62 3 kg (137 lb 6 4 oz)   07/22/19 62 6 kg (138 lb)   04/22/19 63 kg (139 lb)         Physical Exam   Cardiovascular: Normal rate and regular rhythm  Pulmonary/Chest: Effort normal and breath sounds normal    Musculoskeletal: She exhibits no edema  Vitals reviewed  No visits with results within 2 Week(s) from this visit     Latest known visit with results is:   Office Visit on 04/22/2019   Component Date Value Ref Range Status    White Blood Cell Count 05/20/2019 4 1  3 4 - 10 8 x10E3/uL Final    Red Blood Cell Count 05/20/2019 4 07  3 77 - 5 28 x10E6/uL Final    Hemoglobin 05/20/2019 12 1  11 1 - 15 9 g/dL Final    HCT 05/20/2019 37 1  34 0 - 46 6 % Final    MCV 05/20/2019 91  79 - 97 fL Final    MCH 05/20/2019 29 7  26 6 - 33 0 pg Final    MCHC 05/20/2019 32 6  31 5 - 35 7 g/dL Final    RDW 05/20/2019 13 3  12 3 - 15 4 % Final    Platelet Count 15/40/1167 202  150 - 450 x10E3/uL Final                  **Please note reference interval change**    Neutrophils 05/20/2019 44  Not Estab  % Final    Lymphocytes 05/20/2019 45  Not Estab  % Final    Monocytes 05/20/2019 9  Not Estab  % Final    Eosinophils 05/20/2019 1  Not Estab  % Final    Basophils PCT 05/20/2019 1  Not Estab  % Final    Neutrophils (Absolute) 05/20/2019 1 8  1 4 - 7 0 x10E3/uL Final    Lymphocytes (Absolute) 05/20/2019 1 9  0 7 - 3 1 x10E3/uL Final    Monocytes (Absolute) 05/20/2019 0 4 0 1 - 0 9 x10E3/uL Final    Eosinophils (Absolute) 05/20/2019 0 0  0 0 - 0 4 x10E3/uL Final    Basophils ABS 05/20/2019 0 0  0 0 - 0 2 x10E3/uL Final    Immature Granulocytes 05/20/2019 0  Not Estab  % Final    Immature Granulocytes (Absolute) 05/20/2019 0 0  0 0 - 0 1 x10E3/uL Final    Glucose, Random 05/20/2019 89  65 - 99 mg/dL Final    BUN 05/20/2019 17  6 - 24 mg/dL Final    Creatinine 05/20/2019 0 72  0 57 - 1 00 mg/dL Final    eGFR Non African American 05/20/2019 105  >59 mL/min/1 73 Final    eGFR  05/20/2019 121  >59 mL/min/1 73 Final    SL AMB BUN/CREATININE RATIO 05/20/2019 24* 9 - 23 Final    Sodium 05/20/2019 138  134 - 144 mmol/L Final    Potassium 05/20/2019 4 5  3 5 - 5 2 mmol/L Final    Chloride 05/20/2019 103  96 - 106 mmol/L Final    CO2 05/20/2019 23  20 - 29 mmol/L Final    CALCIUM 05/20/2019 9 2  8 7 - 10 2 mg/dL Final    Protein, Total 05/20/2019 6 4  6 0 - 8 5 g/dL Final    Albumin 05/20/2019 4 3  3 5 - 5 5 g/dL Final    Globulin, Total 05/20/2019 2 1  1 5 - 4 5 g/dL Final    Albumin/Globulin Ratio 05/20/2019 2 0  1 2 - 2 2 Final    TOTAL BILIRUBIN 05/20/2019 0 6  0 0 - 1 2 mg/dL Final    Alk Phos Isoenzymes 05/20/2019 46  39 - 117 IU/L Final    AST 05/20/2019 22  0 - 40 IU/L Final    ALT 05/20/2019 21  0 - 32 IU/L Final    Cholesterol, Total 05/20/2019 159  100 - 199 mg/dL Final    Triglycerides 05/20/2019 47  0 - 149 mg/dL Final    HDL 05/20/2019 66  >39 mg/dL Final    VLDL Cholesterol Calculated 05/20/2019 9  5 - 40 mg/dL Final    LDL Direct 05/20/2019 84  0 - 99 mg/dL Final    LDl/HDL Ratio 05/20/2019 1 3  0 0 - 3 2 ratio Final    Comment:                                     LDL/HDL Ratio                                              Men  Women                                1/2 Avg  Risk  1 0    1 5                                    Avg Risk  3 6    3 2                                 2X Avg  Risk  6 2    5 0                                 3X Avg Risk  8 0    6 1      TSH 05/20/2019 1 430  0 450 - 4 500 uIU/mL Final         Fuad Harris MD

## 2019-07-26 NOTE — PATIENT INSTRUCTIONS
Stop the citalopram    Use Klonopin for breakthrough symptoms that his Xanax      Go to 60 milligrams of Cymbalta for 1 week then 90 milligrams

## 2019-08-11 ENCOUNTER — TELEPHONE (OUTPATIENT)
Dept: OTHER | Facility: OTHER | Age: 39
End: 2019-08-11

## 2019-08-11 NOTE — TELEPHONE ENCOUNTER
Letty Irving 1980  CONFIDENTIALTY NOTICE: This fax transmission is intended only for the addressee  It contains information that is legally privileged,  confidential or otherwise protected from use or disclosure  If you are not the intended recipient, you are strictly prohibited from reviewing,  disclosing, copying using or disseminating any of this information or taking any action in reliance on or regarding this information  If you have  received this fax in error, please notify us immediately by telephone so that we can arrange for its return to us  Page:   Call Id: [id number]  Health Call  Standard Call Report  Health Call  Patient Name: Letty Irving  Gender: Female  : 1980  Age: 35 Y 6 M 8 D  Return Phone  Number: [phone] (Home)  Address: [address] [place] [address]/[facility]/Zip: [address] [place] [id number]  Practice Name: [facility]  Practice Charged:  Physician:  Consuelo Marr Name:  Relationship To  Patient: Self  Return Phone Number: [phone] (Home)  Presenting Problem: "My anxiety is very bad today "  Service Type: Triage  Charged Service 1: Hanna Oliveira U  38  Name and  Number:  Nurse Assessment  Nurse: Mariama Lubin RN, Balaji Everett Date/Time: 2019 9:51:22 AM  Type of assessment required:  ---General (Adult or Child)  Duration of Current S/S  ---Ongoing  Location/Radiation  ---Anxiety  Temperature (F) and route:  ---Denies fever  Symptom Specific Meds (Dose/Time):  ---Cymbalta 90 mg / LD: 0900  Other S/S  ---Woke up this morning feeling very anxious  This has been an ongoing issue  States  she just came back from vacation for about a week now and all of a sudden felt anxious  all over again  States she hates being away from her   Is always afraid that  something could or is gonna happen to her  Denies the feeling of wanting to hurt  herself  Has started to feel sick to her stomach and nauseated due to the anxiety    Pain Scale on scale of 1-10, 10 being the worst:  ---No pain  Symptom progression:  ---worse  Intake and Output  Corinne Kulp 1980  CONFIDENTIALTY NOTICE: This fax transmission is intended only for the addressee  It contains information that is legally privileged,  confidential or otherwise protected from use or disclosure  If you are not the intended recipient, you are strictly prohibited from reviewing,  disclosing, copying using or disseminating any of this information or taking any action in reliance on or regarding this information  If you have  received this fax in error, please notify us immediately by telephone so that we can arrange for its return to us  Page: 2 of 2  Call Id: [id number]  Nurse Assessment  ---WNL / WNL  LMP/ Pregnancy:  ---n/a  Breastfeeding  ---No  Last Exam/Treatment:  ---7/26/2019 in the office for anxiety  Protocols  Protocol Title Nurse Date/Time  Anxiety and Panic Attack AMERICO Grewal, Gisele Brittle 8/11/2019 9:53:02 AM  Question Caller Affirmed  Disp  Time Disposition Final User  8/11/2019 10:12:59 AM [facility] [address], RN, Gisele Brittle  8/11/2019 10:13:24 AM RN Triaged Yes Nova Saha RN, [facility] Advice Given Per Protocol  SEE PHYSICIAN WITHIN 24 HOURS: * IF OFFICE WILL BE OPEN: You need to be seen within the next 24 hours  Call your  doctor when the office opens, and make an appointment  ALTERNATE DISPOSITION - CALL MENTAL HEALTH PROFESSIONAL  WITHIN 24 HOURS: If patient has a private psychiatrist, psychologist or counselor, recommend the caller speak with this mental health  professional in the next 24 hours  ALTERNATE DISPOSITION - COUNSELING SERVICES: If appropriate and available, recommend  caller seek mental health services at a local, outpatient clinic or center  Phone number: [phone]  HEALTHY LIFESTYLE BASICS:  * Sleep - Try to get sufficient amount of sleep  Most people need 7-8 hours of sleep each night   * Regular exercise will improve your  overall health, improve your mood, and is a simple method to reduce stress  * Eat a balanced healthy diet  AVOID TRIGGERS OF  ANXIETY: * Limit your alcohol consumption to no more than 2 drinks a day  After dinner drinking causes insomnia 3-4 hours after  falling asleep  * For smokers - Stop or reduce your smoking (Reason: nicotine is a stimulant) * Avoid diet pills (Reason: they act as  stimulants ) * Talk to your doctor before taking any herbal supplements (Reason: some have side effects) CALL BACK IF: * You feel  like harming yourself * You become worse  CARE ADVICE given per Anxiety and Panic Attack (Adult) guideline  Caller Understands: Yes  Caller Disagree/Comply: Comply  PreDisposition: Unsure  Comments  User: Khadijah Stanford RN Date/Time: 8/11/2019 10:13:20 AM  Already has an appointment scheduled for tomorrow with Dr Joanna Rudd

## 2019-08-12 ENCOUNTER — OFFICE VISIT (OUTPATIENT)
Dept: FAMILY MEDICINE CLINIC | Facility: HOSPITAL | Age: 39
End: 2019-08-12
Payer: COMMERCIAL

## 2019-08-12 VITALS
HEART RATE: 91 BPM | DIASTOLIC BLOOD PRESSURE: 70 MMHG | WEIGHT: 134.4 LBS | SYSTOLIC BLOOD PRESSURE: 120 MMHG | BODY MASS INDEX: 22.94 KG/M2 | HEIGHT: 64 IN

## 2019-08-12 DIAGNOSIS — F41.9 ANXIETY: ICD-10-CM

## 2019-08-12 DIAGNOSIS — F33.2 SEVERE EPISODE OF RECURRENT MAJOR DEPRESSIVE DISORDER, WITHOUT PSYCHOTIC FEATURES (HCC): Primary | ICD-10-CM

## 2019-08-12 DIAGNOSIS — R11.0 NAUSEA: ICD-10-CM

## 2019-08-12 PROBLEM — Z00.00 HEALTH CARE MAINTENANCE: Status: RESOLVED | Noted: 2019-04-22 | Resolved: 2019-08-12

## 2019-08-12 PROCEDURE — 99214 OFFICE O/P EST MOD 30 MIN: CPT | Performed by: INTERNAL MEDICINE

## 2019-08-12 RX ORDER — ALPRAZOLAM 0.5 MG/1
TABLET ORAL
Qty: 21 TABLET | Refills: 0 | Status: SHIPPED | OUTPATIENT
Start: 2019-08-12 | End: 2022-02-26 | Stop reason: SDUPTHER

## 2019-08-12 RX ORDER — BUSPIRONE HYDROCHLORIDE 5 MG/1
5 TABLET ORAL 2 TIMES DAILY
Qty: 60 TABLET | Refills: 5 | Status: SHIPPED | OUTPATIENT
Start: 2019-08-12 | End: 2019-09-17

## 2019-08-12 NOTE — PATIENT INSTRUCTIONS
At your visit today a new medication was started  Please fill the prescription as soon as able and start taking the new medication as discussed  If you have side effects which are significant, call our office for instructions or stop taking the medication until discussing with the doctor or nurse  This medication is intended to help address your symptoms or improve a medical condition  You should always allow a little time for complete effectiveness  If you have been asked to follow up with a visit in the near future, please schedule this soon  Add buspar twice a day and continue cymbalta  Can use xanax on as needed basis

## 2019-08-12 NOTE — PROGRESS NOTES
Subjective:   Chief Complaint   Patient presents with    Follow-up     anxiety is no better        Patient ID: Himanshu Dumont is a 35 y o  female  Still having issues with panic and anxiety  Did see dr Kendall Leija and was referred to [facility] for behavioral health counselling    Continues on cymbalta 90 mg for past 3 weeks and clonazepam feels too strong  The following portions of the patient's history were reviewed and updated as appropriate: allergies, current medications, past family history, past medical history, past social history, past surgical history and problem list     Review of Systems   Constitutional: Negative for fatigue and fever  HENT: Negative for hearing loss  Eyes: Negative for visual disturbance  Respiratory: Negative for cough, chest tightness, shortness of breath and wheezing  Cardiovascular: Negative for chest pain, palpitations and leg swelling  Gastrointestinal: Negative for abdominal pain, diarrhea and nausea  Genitourinary: Negative for dysuria and hematuria  Musculoskeletal: Negative for arthralgias  Neurological: Negative for dizziness, numbness and headaches  Psychiatric/Behavioral: Positive for agitation and dysphoric mood  Negative for confusion  The patient is nervous/anxious  All other systems reviewed and are negative          Current Outpatient Medications on File Prior to Visit   Medication Sig Dispense Refill    DULoxetine (CYMBALTA) 30 mg delayed release capsule take 1 capsule by mouth once daily 30 capsule 3    DULoxetine (CYMBALTA) 60 mg delayed release capsule Take 1 capsule (60 mg total) by mouth daily This will be used with 30mg CAP 30 capsule 5    multivitamin (THERAGRAN) TABS Take 1 tablet by mouth daily      BACILLUS COAGULANS-INULIN PO Take by mouth      clonazePAM (KlonoPIN) 0 5 mg tablet Take 1 tablet (0 5 mg total) by mouth 2 (two) times a day as needed for seizures (Patient not taking: Reported on 7/26/2019) 30 tablet 2     No current facility-administered medications on file prior to visit  Objective:  Vitals:    08/12/19 1029   BP: 120/70   Pulse: 91   Weight: 61 kg (134 lb 6 4 oz)   Height: 5' 4" (1 626 m)      Physical Exam   Constitutional: She is oriented to person, place, and time  She appears well-developed and well-nourished  Eyes: Pupils are equal, round, and reactive to light  Neck: Normal range of motion  Neck supple  No thyromegaly present  Cardiovascular: Normal rate, regular rhythm, normal heart sounds and intact distal pulses  No murmur heard  Pulmonary/Chest: Effort normal and breath sounds normal  She has no wheezes  She has no rales  Abdominal: Soft  Bowel sounds are normal  There is no tenderness  Musculoskeletal: Normal range of motion  She exhibits no edema, tenderness or deformity  Lymphadenopathy:     She has no cervical adenopathy  Neurological: She is alert and oriented to person, place, and time  She has normal reflexes  Skin: Skin is warm and dry  Psychiatric: She has a normal mood and affect  Nursing note and vitals reviewed  Assessment/Plan:    No problem-specific Assessment & Plan notes found for this encounter         Diagnoses and all orders for this visit:    Severe episode of recurrent major depressive disorder, without psychotic features ([facility] [address] )    Anxiety

## 2019-08-13 ENCOUNTER — APPOINTMENT (EMERGENCY)
Dept: RADIOLOGY | Facility: HOSPITAL | Age: 39
End: 2019-08-13
Payer: COMMERCIAL

## 2019-08-13 ENCOUNTER — HOSPITAL ENCOUNTER (EMERGENCY)
Facility: HOSPITAL | Age: 39
Discharge: HOME | End: 2019-08-14
Attending: EMERGENCY MEDICINE | Admitting: EMERGENCY MEDICINE
Payer: COMMERCIAL

## 2019-08-13 DIAGNOSIS — R07.89 ATYPICAL CHEST PAIN: Primary | ICD-10-CM

## 2019-08-13 LAB
ALBUMIN SERPL BCP-MCNC: 3.8 G/DL (ref 3.5–5)
ALBUMIN SERPL-MCNC: 4.3 G/DL (ref 3.5–5.5)
ALBUMIN/GLOB SERPL: 1.7 {RATIO} (ref 1.2–2.2)
ALP SERPL-CCNC: 51 IU/L (ref 39–117)
ALP SERPL-CCNC: 51 U/L (ref 46–116)
ALT SERPL W P-5'-P-CCNC: 19 U/L (ref 12–78)
ALT SERPL-CCNC: 15 IU/L (ref 0–32)
ANION GAP SERPL CALCULATED.3IONS-SCNC: 7 MMOL/L (ref 4–13)
AST SERPL W P-5'-P-CCNC: 16 U/L (ref 5–45)
AST SERPL-CCNC: 17 IU/L (ref 0–40)
BASOPHILS # BLD AUTO: 0 X10E3/UL (ref 0–0.2)
BASOPHILS # BLD AUTO: 0.03 THOUSANDS/ΜL (ref 0–0.1)
BASOPHILS NFR BLD AUTO: 1 %
BASOPHILS NFR BLD AUTO: 1 % (ref 0–1)
BILIRUB SERPL-MCNC: 0.4 MG/DL (ref 0–1.2)
BILIRUB SERPL-MCNC: 0.5 MG/DL (ref 0.2–1)
BUN SERPL-MCNC: 17 MG/DL (ref 5–25)
BUN SERPL-MCNC: 17 MG/DL (ref 6–24)
BUN/CREAT SERPL: 25 (ref 9–23)
CALCIUM SERPL-MCNC: 9 MG/DL (ref 8.3–10.1)
CALCIUM SERPL-MCNC: 9.2 MG/DL (ref 8.7–10.2)
CHLORIDE SERPL-SCNC: 103 MMOL/L (ref 100–108)
CHLORIDE SERPL-SCNC: 103 MMOL/L (ref 96–106)
CO2 SERPL-SCNC: 28 MMOL/L (ref 20–29)
CO2 SERPL-SCNC: 31 MMOL/L (ref 21–32)
CREAT SERPL-MCNC: 0.69 MG/DL (ref 0.57–1)
CREAT SERPL-MCNC: 0.75 MG/DL (ref 0.6–1.3)
DEPRECATED D DIMER PPP: <270 NG/ML (FEU)
EOSINOPHIL # BLD AUTO: 0 X10E3/UL (ref 0–0.4)
EOSINOPHIL # BLD AUTO: 0.04 THOUSAND/ΜL (ref 0–0.61)
EOSINOPHIL NFR BLD AUTO: 0 %
EOSINOPHIL NFR BLD AUTO: 1 % (ref 0–6)
ERYTHROCYTE [DISTWIDTH] IN BLOOD BY AUTOMATED COUNT: 12.4 % (ref 11.6–15.1)
ERYTHROCYTE [DISTWIDTH] IN BLOOD BY AUTOMATED COUNT: 13.1 % (ref 12.3–15.4)
GFR SERPL CREATININE-BSD FRML MDRD: 100 ML/MIN/1.73SQ M
GLOBULIN SER-MCNC: 2.6 G/DL (ref 1.5–4.5)
GLUCOSE SERPL-MCNC: 92 MG/DL (ref 65–99)
GLUCOSE SERPL-MCNC: 97 MG/DL (ref 65–140)
HCG INTACT+B SERPL-ACNC: <1 MIU/ML
HCT VFR BLD AUTO: 36.6 % (ref 34–46.6)
HCT VFR BLD AUTO: 36.8 % (ref 34.8–46.1)
HGB BLD-MCNC: 11.8 G/DL (ref 11.1–15.9)
HGB BLD-MCNC: 11.8 G/DL (ref 11.5–15.4)
IMM GRANULOCYTES # BLD AUTO: 0.01 THOUSAND/UL (ref 0–0.2)
IMM GRANULOCYTES # BLD: 0 X10E3/UL (ref 0–0.1)
IMM GRANULOCYTES NFR BLD AUTO: 0 % (ref 0–2)
IMM GRANULOCYTES NFR BLD: 0 %
LYMPHOCYTES # BLD AUTO: 1.49 THOUSANDS/ΜL (ref 0.6–4.47)
LYMPHOCYTES # BLD AUTO: 1.8 X10E3/UL (ref 0.7–3.1)
LYMPHOCYTES NFR BLD AUTO: 28 % (ref 14–44)
LYMPHOCYTES NFR BLD AUTO: 33 %
MAGNESIUM SERPL-MCNC: 1.7 MG/DL (ref 1.6–2.6)
MCH RBC QN AUTO: 29.2 PG (ref 26.6–33)
MCH RBC QN AUTO: 29.7 PG (ref 26.8–34.3)
MCHC RBC AUTO-ENTMCNC: 32.1 G/DL (ref 31.4–37.4)
MCHC RBC AUTO-ENTMCNC: 32.2 G/DL (ref 31.5–35.7)
MCV RBC AUTO: 91 FL (ref 79–97)
MCV RBC AUTO: 93 FL (ref 82–98)
MONOCYTES # BLD AUTO: 0.41 THOUSAND/ΜL (ref 0.17–1.22)
MONOCYTES # BLD AUTO: 0.5 X10E3/UL (ref 0.1–0.9)
MONOCYTES NFR BLD AUTO: 8 % (ref 4–12)
MONOCYTES NFR BLD AUTO: 9 %
NEUTROPHILS # BLD AUTO: 3.2 X10E3/UL (ref 1.4–7)
NEUTROPHILS # BLD AUTO: 3.27 THOUSANDS/ΜL (ref 1.85–7.62)
NEUTROPHILS NFR BLD AUTO: 57 %
NEUTS SEG NFR BLD AUTO: 62 % (ref 43–75)
NRBC BLD AUTO-RTO: 0 /100 WBCS
PLATELET # BLD AUTO: 193 THOUSANDS/UL (ref 149–390)
PLATELET # BLD AUTO: 225 X10E3/UL (ref 150–450)
PMV BLD AUTO: 11.4 FL (ref 8.9–12.7)
POTASSIUM SERPL-SCNC: 4.1 MMOL/L (ref 3.5–5.2)
POTASSIUM SERPL-SCNC: 4.2 MMOL/L (ref 3.5–5.3)
PROT SERPL-MCNC: 6.9 G/DL (ref 6.4–8.2)
PROT SERPL-MCNC: 6.9 G/DL (ref 6–8.5)
RBC # BLD AUTO: 3.97 MILLION/UL (ref 3.81–5.12)
RBC # BLD AUTO: 4.04 X10E6/UL (ref 3.77–5.28)
SL AMB EGFR AFRICAN AMERICAN: 126 ML/MIN/1.73
SL AMB EGFR NON AFRICAN AMERICAN: 109 ML/MIN/1.73
SODIUM SERPL-SCNC: 141 MMOL/L (ref 134–144)
SODIUM SERPL-SCNC: 141 MMOL/L (ref 136–145)
TROPONIN I SERPL-MCNC: <0.02 NG/ML
TSH SERPL DL<=0.005 MIU/L-ACNC: 0.59 UIU/ML (ref 0.45–4.5)
WBC # BLD AUTO: 5.25 THOUSAND/UL (ref 4.31–10.16)
WBC # BLD AUTO: 5.5 X10E3/UL (ref 3.4–10.8)

## 2019-08-13 PROCEDURE — 99283 EMERGENCY DEPT VISIT LOW MDM: CPT | Performed by: EMERGENCY MEDICINE

## 2019-08-13 PROCEDURE — 96360 HYDRATION IV INFUSION INIT: CPT

## 2019-08-13 PROCEDURE — 71045 X-RAY EXAM CHEST 1 VIEW: CPT

## 2019-08-13 PROCEDURE — 85379 FIBRIN DEGRADATION QUANT: CPT | Performed by: EMERGENCY MEDICINE

## 2019-08-13 PROCEDURE — 99285 EMERGENCY DEPT VISIT HI MDM: CPT

## 2019-08-13 PROCEDURE — 85025 COMPLETE CBC W/AUTO DIFF WBC: CPT | Performed by: EMERGENCY MEDICINE

## 2019-08-13 PROCEDURE — 83735 ASSAY OF MAGNESIUM: CPT | Performed by: EMERGENCY MEDICINE

## 2019-08-13 PROCEDURE — 36415 COLL VENOUS BLD VENIPUNCTURE: CPT | Performed by: EMERGENCY MEDICINE

## 2019-08-13 PROCEDURE — 84484 ASSAY OF TROPONIN QUANT: CPT | Performed by: EMERGENCY MEDICINE

## 2019-08-13 PROCEDURE — 80053 COMPREHEN METABOLIC PANEL: CPT | Performed by: EMERGENCY MEDICINE

## 2019-08-13 RX ORDER — ASPIRIN 81 MG/1
324 TABLET, CHEWABLE ORAL ONCE
Status: COMPLETED | OUTPATIENT
Start: 2019-08-13 | End: 2019-08-13

## 2019-08-13 RX ORDER — ALPRAZOLAM 0.25 MG/1
0.25 TABLET ORAL ONCE
Status: COMPLETED | OUTPATIENT
Start: 2019-08-13 | End: 2019-08-13

## 2019-08-13 RX ADMIN — ALPRAZOLAM 0.25 MG: 0.25 TABLET ORAL at 21:00

## 2019-08-13 RX ADMIN — SODIUM CHLORIDE 1000 ML: 0.9 INJECTION, SOLUTION INTRAVENOUS at 21:10

## 2019-08-13 RX ADMIN — ASPIRIN 81 MG 324 MG: 81 TABLET ORAL at 21:00

## 2019-08-14 VITALS
SYSTOLIC BLOOD PRESSURE: 116 MMHG | HEIGHT: 64 IN | HEART RATE: 67 BPM | RESPIRATION RATE: 19 BRPM | BODY MASS INDEX: 22.96 KG/M2 | WEIGHT: 134.48 LBS | TEMPERATURE: 97.6 F | DIASTOLIC BLOOD PRESSURE: 58 MMHG | OXYGEN SATURATION: 97 %

## 2019-08-14 LAB — TROPONIN I SERPL-MCNC: <0.02 NG/ML

## 2019-08-14 NOTE — ED PROVIDER NOTES
History  Chief Complaint   Patient presents with    Chest Pain     chest pain began 1915  pain in center of chest and radiates towards back  pt also being treated for anxiety and panic attacks  took 1/2 xanax prior to ed arrival     34 yo F presents to ED with central sharp CP that started while she was at work ()  No heavy exertion  Has had dull pain before with her panic attacks, but never anything like this  Felt sweaty and nauseaus coming over  Took xanax to try to help, didn't help  + family history in grandparents, nobody young  Former smoker  No drugs/etoh  No leg pain/swelling/estrogens/travel  History provided by:  Patient and medical records   used: No    Chest Pain   Pain location:  L chest  Pain quality: sharp    Pain radiates to:  Upper back  Pain radiates to the back: yes    Pain severity:  Moderate  Onset quality:  Sudden  Timing:  Constant  Progression:  Unchanged  Chronicity:  New  Context: stress    Relieved by:  Nothing  Worsened by:  Deep breathing  Associated symptoms: shortness of breath    Associated symptoms: no abdominal pain, no back pain, no cough, no dizziness, no dysphagia, no fatigue, no fever, no headache, no nausea, no palpitations and not vomiting    Risk factors: no prior DVT/PE        Prior to Admission Medications   Prescriptions Last Dose Informant Patient Reported? Taking?    ALPRAZolam (XANAX) 0 5 mg tablet   No No   Sig: Take one tablet q12 h prn anxiety   DULoxetine (CYMBALTA) 30 mg delayed release capsule   No No   Sig: take 1 capsule by mouth once daily   DULoxetine (CYMBALTA) 60 mg delayed release capsule   No No   Sig: Take 1 capsule (60 mg total) by mouth daily This will be used with 30mg CAP   busPIRone (BUSPAR) 5 mg tablet   No No   Sig: Take 1 tablet (5 mg total) by mouth 2 (two) times a day for 30 days   multivitamin (THERAGRAN) TABS   Yes No   Sig: Take 1 tablet by mouth daily      Facility-Administered Medications: None Past Medical History:   Diagnosis Date    Anxiety     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[address] )        Past Surgical History:   Procedure Laterality Date    APPENDECTOMY      extensive lysis of adhesions, MAYURI's procedure (divisions of MAYURI's bands) detorsion of vulvulus, widening of mesenteric pedicle, cecopeexy, appendectomy       BACK SURGERY Right 2013    SF: L4-L5 hemilaminectomy for discectomy  Use of the microscope for microdissection, Use og 40mg of depo-medrol though thee exiting right L5 nerve root  Onset:13     SECTION      x2    CHOLECYSTECTOMY      GALLBLADDER SURGERY      [facility], Onset:     LAPAROSCOPIC LYSIS INTESTINAL ADHESIONS      onset: 15    LUMBAR DISC SURGERY      NM LAP,DIAGNOSTIC ABDOMEN N/A 2016    Procedure: LAPAROSCOPY DIAGNOSTIC, EXTENSIVE LYSIS OF ADHESIONS, MAYURI'S PROCEDURE(DIVISION OF MAYURI'S BANDS,DETORSION OF VOLVULUS, WIDENING OF MESENTERIC PEDICLE, CECOPEXY, APPENDECTOMY); Surgeon: Ulices Hill MD;  Location: [facility] OR;  Service: General    TUBAL LIGATION         Family History   Problem Relation Age of Onset    Hypertension Father     Seizures Brother         epilepsy    Heart disease Family     Heart attack Other     Scleroderma Other     Stroke Other 78        stroke syndrome    Heart attack Other     Mental illness Mother         bipolar/anxiety    Substance Abuse Neg Hx      I have reviewed and agree with the history as documented  Social History     Tobacco Use    Smoking status: Former Smoker     Last attempt to quit: 2009     Years since quitting: 10 6    Smokeless tobacco: Never Used   Substance Use Topics    Alcohol use: Not Currently     Frequency: Monthly or less     Drinks per session: 1 or 2     Binge frequency: Never    Drug use: No        Review of Systems   Constitutional: Negative for appetite change, chills, fatigue and fever     HENT: Negative for congestion, ear pain, rhinorrhea, sore throat, trouble swallowing and voice change  Eyes: Negative for pain and visual disturbance  Respiratory: Positive for shortness of breath  Negative for cough and chest tightness  Cardiovascular: Positive for chest pain  Negative for palpitations and leg swelling  Gastrointestinal: Negative for abdominal pain, blood in stool, constipation, diarrhea, nausea and vomiting  Genitourinary: Negative for difficulty urinating and hematuria  Musculoskeletal: Negative for back pain, neck pain and neck stiffness  Skin: Negative for rash  Neurological: Negative for dizziness, syncope, speech difficulty, light-headedness and headaches  Psychiatric/Behavioral: Negative for confusion and suicidal ideas  Physical Exam  Physical Exam   Constitutional: She is oriented to person, place, and time  She appears well-developed and well-nourished  No distress  HENT:   Head: Normocephalic and atraumatic  Right Ear: External ear normal    Left Ear: External ear normal    Nose: Nose normal    Mouth/Throat: Oropharynx is clear and moist    Eyes: Pupils are equal, round, and reactive to light  Conjunctivae and EOM are normal  Right eye exhibits no discharge  Left eye exhibits no discharge  No scleral icterus  Neck: Normal range of motion  Neck supple  No tracheal deviation present  Cardiovascular: Normal rate, regular rhythm, normal heart sounds and intact distal pulses  Exam reveals no gallop and no friction rub  No murmur heard  Pulmonary/Chest: Effort normal and breath sounds normal  No stridor  No respiratory distress  She exhibits no tenderness  Abdominal: Soft  Bowel sounds are normal  There is no tenderness  There is no rebound and no guarding  Musculoskeletal: Normal range of motion  She exhibits no edema or deformity  Right lower leg: She exhibits no tenderness and no edema  Left lower leg: She exhibits no tenderness and no edema  Lymphadenopathy:     She has no cervical adenopathy     Neurological: She is alert and oriented to person, place, and time  No cranial nerve deficit or sensory deficit  Coordination normal    Skin: Skin is warm and dry  No rash noted  She is not diaphoretic  Psychiatric: She has a normal mood and affect  Her behavior is normal    Nursing note and vitals reviewed        Vital Signs  ED Triage Vitals [08/13/19 2008]   Temperature Pulse Respirations Blood Pressure SpO2   97 6 °F (36 4 °C) (!) 107 18 138/68 100 %      Temp Source Heart Rate Source Patient Position - Orthostatic VS BP Location FiO2 (%)   Tympanic Monitor Sitting Left arm --      Pain Score       8           Vitals:    08/13/19 2200 08/13/19 2215 08/13/19 2330 08/13/19 2345   BP: 124/61 114/58 116/58    Pulse: 78 64 62 63   Patient Position - Orthostatic VS:             Visual Acuity      ED Medications  Medications   sodium chloride 0 9 % bolus 1,000 mL (0 mL Intravenous Stopped 8/13/19 2229)   aspirin chewable tablet 324 mg (324 mg Oral Given 8/13/19 2100)   ALPRAZolam (XANAX) tablet 0 25 mg (0 25 mg Oral Given 8/13/19 2100)       Diagnostic Studies  Results Reviewed     Procedure Component Value Units Date/Time    Troponin I [[id number]]  (Normal) Collected:  08/13/19 2320    Lab Status:  Final result Specimen:  Blood from Arm, Right Updated:  08/14/19 0008     Troponin I <0 02 ng/mL     D-dimer, quantitative [[id number]]  (Normal) Collected:  08/13/19 2112    Lab Status:  Final result Specimen:  Blood from Arm, Right Updated:  08/13/19 2223     D-Dimer, Quant <270 ng/ml (FEU)     Narrative:       No clots    Troponin I [[id number]]  (Normal) Collected:  08/13/19 2112    Lab Status:  Final result Specimen:  Blood from Arm, Right Updated:  08/13/19 2145     Troponin I <0 02 ng/mL     Comprehensive metabolic panel [[id number]] Collected:  08/13/19 2112    Lab Status:  Final result Specimen:  Blood from Arm, Right Updated:  08/13/19 2142     Sodium 141 mmol/L      Potassium 4 2 mmol/L      Chloride 103 mmol/L      CO2 31 mmol/L ANION GAP 7 mmol/L      BUN 17 mg/dL      Creatinine 0 75 mg/dL      Glucose 97 mg/dL      Calcium 9 0 mg/dL      AST 16 U/L      ALT 19 U/L      Alkaline Phosphatase 51 U/L      Total Protein 6 9 g/dL      Albumin 3 8 g/dL      Total Bilirubin 0 50 mg/dL      eGFR 100 ml/min/1 73sq m     Narrative:       National Kidney Disease Foundation guidelines for Chronic Kidney Disease (CKD):     Stage 1 with normal or high GFR (GFR > 90 mL/min/1 73 square meters)    Stage 2 Mild CKD (GFR = 60-89 mL/min/1 73 square meters)    Stage 3A Moderate CKD (GFR = 45-59 mL/min/1 73 square meters)    Stage 3B Moderate CKD (GFR = 30-44 mL/min/1 73 square meters)    Stage 4 Severe CKD (GFR = 15-29 mL/min/1 73 square meters)    Stage 5 End Stage CKD (GFR <15 mL/min/1 73 square meters)  Note: GFR calculation is accurate only with a steady state creatinine    Magnesium [[id number]]  (Normal) Collected:  08/13/19 2112    Lab Status:  Final result Specimen:  Blood from Arm, Right Updated:  08/13/19 2142     Magnesium 1 7 mg/dL     CBC and differential [[id number]] Collected:  08/13/19 2112    Lab Status:  Final result Specimen:  Blood from Arm, Right Updated:  08/13/19 2121     WBC 5 25 Thousand/uL      RBC 3 97 Million/uL      Hemoglobin 11 8 g/dL      Hematocrit 36 8 %      MCV 93 fL      MCH 29 7 pg      MCHC 32 1 g/dL      RDW 12 4 %      MPV 11 4 fL      Platelets 017 Thousands/uL      nRBC 0 /100 WBCs      Neutrophils Relative 62 %      Immat GRANS % 0 %      Lymphocytes Relative 28 %      Monocytes Relative 8 %      Eosinophils Relative 1 %      Basophils Relative 1 %      Neutrophils Absolute 3 27 Thousands/µL      Immature Grans Absolute 0 01 Thousand/uL      Lymphocytes Absolute 1 49 Thousands/µL      Monocytes Absolute 0 41 Thousand/µL      Eosinophils Absolute 0 04 Thousand/µL      Basophils Absolute 0 03 Thousands/µL                  XR chest 1 view portable   ED Interpretation by Yemi Santo MD (08/13 2137)   No acute findings  Final Result by Eliu Ibarra MD (08/13 2201)      No acute cardiopulmonary disease  Workstation performed: [id number]                    Procedures  ECG 12 Lead Documentation Only  Date/Time: 8/13/2019 8:28 PM  Performed by: Angel Resendiz MD  Authorized by: Angel Resendiz MD     Indications / Diagnosis:  Cp  ECG reviewed by me, the ED Provider: yes    Patient location:  ED  Previous ECG:     Previous ECG:  Unavailable    Comparison to cardiac monitor: Yes    Interpretation:     Interpretation: normal    Rate:     ECG rate:  70    ECG rate assessment: normal    Rhythm:     Rhythm: sinus rhythm    Ectopy:     Ectopy: none    QRS:     QRS axis:  Normal    QRS intervals:  Normal  Conduction:     Conduction: normal    ST segments:     ST segments:  Normal  T waves:     T waves: normal             ED Course  ED Course as of Aug 14 0018   Tue Aug 13, 2019   2202 Labs benign, dimer pend  2223 Dimer neg        2226 Pt feeling improved, cp resolved  Will check 2nd trop, if neg, d/c home to f/u with PCP  Wed Aug 14, 2019   0015 2nd trop neg  Pt still feels well  Will d/c home with rx for stress test  RTED instructions given  Pt agrees with plan               HEART Risk Score      Most Recent Value   History  0 Filed at: 08/13/2019 2133   ECG  0 Filed at: 08/13/2019 2133   Age  0 Filed at: 08/13/2019 2133   Risk Factors  1 Filed at: 08/13/2019 2133   Troponin  0 Filed at: 08/13/2019 2133   Heart Score Risk Calculator   History  0 Filed at: 08/13/2019 2133   ECG  0 Filed at: 08/13/2019 2133   Age  0 Filed at: 08/13/2019 2133   Risk Factors  1 Filed at: 08/13/2019 2133   Troponin  0 Filed at: 08/13/2019 2133   HEART Score  1 Filed at: 08/13/2019 2133   HEART Score  1 Filed at: 08/13/2019 2133                      [place]' Criteria for PE      Most Recent Value   Arthur' Criteria for PE   Clinical signs and symptoms of DVT  0 Filed at: 08/13/2019 2135   PE is primary diagnosis or equally likely  0 Filed at: 08/13/2019 2135   HR >100  1 5 Filed at: 08/13/2019 2135   Immobilization at least 3 days or Surgery in the previous 4 weeks  0 Filed at: 08/13/2019 2135   Previous, objectively diagnosed PE or DVT  0 Filed at: 08/13/2019 2135   Hemoptysis  0 Filed at: 08/13/2019 2135   Malignancy with treatment within 6 months or palliative  0 Filed at: 08/13/2019 2135   Wells' Criteria Total  1 5 Filed at: 08/13/2019 2135            [facility]  Number of Diagnoses or Management Options  Atypical chest pain: new and requires workup     Amount and/or Complexity of Data Reviewed  Clinical lab tests: ordered and reviewed  Tests in the radiology section of CPT®: ordered and reviewed  Tests in the medicine section of CPT®: ordered and reviewed  Review and summarize past medical records: yes  Independent visualization of images, tracings, or specimens: yes    Risk of Complications, Morbidity, and/or Mortality  Presenting problems: moderate  Diagnostic procedures: low  Management options: low    Patient Progress  Patient progress: improved      Disposition  Final diagnoses:   Atypical chest pain     Time reflects when diagnosis was documented in both MDM as applicable and the Disposition within this note     Time User Action Codes Description Comment    8/14/2019 12:12 AM Anat Ear Add [R07 89] Atypical chest pain       ED Disposition     ED Disposition Condition Date/Time Comment    Discharge Stable Wed Aug 14, 2019 12:12 AM Samir Breaux discharge to home/self care              Follow-up Information     Follow up With Specialties Details Why Contact Info Additional Information    Flory Perdomo MD Internal Medicine Schedule an appointment as soon as possible for a visit    Jud[facility]   [id number] [address] Emergency Department Emergency Medicine  If symptoms worsen [facility]  [address] [place] [address] [address] ED, [phone] [address], [place], [address]          Patient's Medications   Discharge Prescriptions    No medications on file     Outpatient Discharge Orders   Stress test only, exercise   Standing Status: Future Standing Exp   Date: 08/14/23       ED Provider  Electronically Signed by           Kandi Schirmer, MD  08/14/19 5971

## 2019-08-14 NOTE — ED NOTES
Pt discharged from ED after reviewing instructions  Pt verbalized understanding of follow up care for worsening chest pain and denies having chest pain at departure        Thomas Ramirez RN  08/14/19 0027

## 2019-08-15 ENCOUNTER — VBI (OUTPATIENT)
Dept: ADMINISTRATIVE | Facility: OTHER | Age: 39
End: 2019-08-15

## 2019-08-16 NOTE — TELEPHONE ENCOUNTER
Ja Bains    ED Visit Information     Ed visit date: 8/13/2019  Diagnosis Description: Atypical chest pain  In Network? Yes [facility]  Discharge status: Home  Discharged with meds ? No  Number of ED visits to date: 1  ED Severity:n/a     Outreach Information    Outreach successful: Yes 1  Date letter mailed:n/a  Date Kamron Diaz Coordination    Follow up appointment with pcp: yes Qa scheduled appt 8/19/2019  Transportation issues ? No    Value [employer] type:  7 Day Outreach  [facility] PCP:  Yes  Transportation:  Friend/Family Transport  Called PCP first?:  No  Feels able to call PCP for urgent problems ?:  Yes  Understands what emergencies can be handled by PCP ?:  Yes  Ever any problems getting appointment with PCP for minor emergency/urgency problems?:  No  Practice Contacted Patient ?:  No  Pt had ED follow up with pcp/staff ?:  No    Seen for follow-up out of network ?:  No  Reason Patient went to ED instead of Urgent Care or PCP?:  Perceived Severity of Illness  Urgent care Education?:  No  08/15/2019 02:00 PM Phone ([facility]) Hu Reyes (Self) [phone] (H)   Left Message  Unable to reach patient regarding recent ED visit on 8/13 for Atypical chest pain  Patient was discharged without medication and advised to follow up with PCP  2nd attempt will be made on 8/16 08/16/2019 09:58 AM Phone ([facility]) Hu Reyes (Self) [phone] (H)  Return Call  Personal communication with patient regarding recent ED visit on 8/13 for Atypical chest pain  Patient was discharged without medication and advised to follow up with PCP  Patient stated that she is not feeling better  She was recently seen by PCP and understand that medication given takes time but requested schedule a follow up appt with Dr Karl Pal  I was able to schedule her on 8/19 at 1015 a m  Patient was advised to arrive 15 min prior to appt time per office protocol   Patient does not meet [facility] criteria  She is aware of PCP after hour on-call service due to use in the past  She noted that she spoken with a very knowledge nurse that was able help calm her  Patient is aware of her nearest [address] [place] [address] urgent care facility and what conditions may be treated there

## 2019-08-18 DIAGNOSIS — F32.A DEPRESSION, UNSPECIFIED DEPRESSION TYPE: ICD-10-CM

## 2019-08-19 ENCOUNTER — OFFICE VISIT (OUTPATIENT)
Dept: FAMILY MEDICINE CLINIC | Facility: HOSPITAL | Age: 39
End: 2019-08-19
Payer: COMMERCIAL

## 2019-08-19 VITALS
SYSTOLIC BLOOD PRESSURE: 100 MMHG | DIASTOLIC BLOOD PRESSURE: 64 MMHG | WEIGHT: 135 LBS | OXYGEN SATURATION: 99 % | HEART RATE: 76 BPM | BODY MASS INDEX: 23.05 KG/M2 | HEIGHT: 64 IN

## 2019-08-19 DIAGNOSIS — F41.0 PANIC ATTACKS: Primary | ICD-10-CM

## 2019-08-19 DIAGNOSIS — K44.9 SLIDING HIATAL HERNIA: ICD-10-CM

## 2019-08-19 DIAGNOSIS — F41.9 ANXIETY: ICD-10-CM

## 2019-08-19 PROCEDURE — 3008F BODY MASS INDEX DOCD: CPT | Performed by: INTERNAL MEDICINE

## 2019-08-19 PROCEDURE — 1036F TOBACCO NON-USER: CPT | Performed by: INTERNAL MEDICINE

## 2019-08-19 PROCEDURE — 99214 OFFICE O/P EST MOD 30 MIN: CPT | Performed by: INTERNAL MEDICINE

## 2019-08-19 RX ORDER — DULOXETIN HYDROCHLORIDE 30 MG/1
CAPSULE, DELAYED RELEASE ORAL
Qty: 30 CAPSULE | Refills: 3 | Status: SHIPPED | OUTPATIENT
Start: 2019-08-19 | End: 2019-12-12 | Stop reason: SDUPTHER

## 2019-08-19 NOTE — PROGRESS NOTES
Subjective:   Chief Complaint   Patient presents with    Follow-up     ER/Anxiety, chest pains         Patient ID: Keven Adams is a 35 y o  female  Follow up after ER visit with severe panic attack  Symptoms seem likely related to hiatal hernia  Now resolved and feeling better  The following portions of the patient's history were reviewed and updated as appropriate: allergies, current medications, past family history, past medical history, past social history, past surgical history and problem list     Review of Systems   Respiratory: Negative for chest tightness and shortness of breath  Cardiovascular: Positive for palpitations  Negative for chest pain  Neurological: Positive for light-headedness and headaches  Psychiatric/Behavioral: Positive for agitation and dysphoric mood  The patient is nervous/anxious  All other systems reviewed and are negative  Current Outpatient Medications on File Prior to Visit   Medication Sig Dispense Refill    ALPRAZolam (XANAX) 0 5 mg tablet Take one tablet q12 h prn anxiety 21 tablet 0    busPIRone (BUSPAR) 5 mg tablet Take 1 tablet (5 mg total) by mouth 2 (two) times a day for 30 days 60 tablet 5    DULoxetine (CYMBALTA) 30 mg delayed release capsule take 1 capsule by mouth once daily 30 capsule 3    DULoxetine (CYMBALTA) 60 mg delayed release capsule Take 1 capsule (60 mg total) by mouth daily This will be used with 30mg CAP 30 capsule 5    multivitamin (THERAGRAN) TABS Take 1 tablet by mouth daily      Probiotic Product (PROBIOTIC-10 PO) Take by mouth       No current facility-administered medications on file prior to visit  Objective:  Vitals:    08/19/19 1010   BP: 100/64   Pulse: 76   SpO2: 99%   Weight: 61 2 kg (135 lb)   Height: 5' 4" (1 626 m)      Physical Exam   Constitutional: She is oriented to person, place, and time  She appears well-developed and well-nourished  Eyes: Pupils are equal, round, and reactive to light  Neck: Normal range of motion  Neck supple  No thyromegaly present  Cardiovascular: Normal rate, regular rhythm, normal heart sounds and intact distal pulses  No murmur heard  Pulmonary/Chest: Effort normal and breath sounds normal  She has no wheezes  She has no rales  Abdominal: Soft  Bowel sounds are normal  There is no tenderness  Musculoskeletal: Normal range of motion  She exhibits no edema, tenderness or deformity  Lymphadenopathy:     She has no cervical adenopathy  Neurological: She is alert and oriented to person, place, and time  She has normal reflexes  Skin: Skin is warm and dry  Psychiatric: She has a normal mood and affect  Nursing note and vitals reviewed  Assessment/Plan:    Sliding hiatal hernia  If persists with symptoms, will consider GI evaluation       Diagnoses and all orders for this visit:    Panic attacks    Anxiety    Sliding hiatal hernia    Other orders  -     Probiotic Product (PROBIOTIC-10 PO);  Take by mouth

## 2019-08-19 NOTE — PATIENT INSTRUCTIONS
Hiatal Hernia   WHAT YOU NEED TO KNOW:   A hiatal hernia is a condition that causes part of your stomach to bulge through the hiatus (small opening) in your diaphragm  The part of the stomach may move up and down, or it may get trapped above the diaphragm  DISCHARGE INSTRUCTIONS:   Return to the emergency department if:   · You have severe abdominal pain  · You try to vomit but nothing comes out (retching)  · You have severe chest pain and sudden trouble breathing  · Your bowel movements are black or bloody  · Your vomit looks like coffee grounds or has blood in it  Contact your healthcare provider if:   · Your symptoms are getting worse  · You have nausea, and you are vomiting  · You are losing weight without trying  · You have questions or concerns about your condition or care  Medicines:   · Medicines  may be given to relieve heartburn symptoms  These medicines help to decrease or block stomach acid  You may also be given medicines that help to tighten the esophageal sphincter  · Take your medicine as directed  Contact your healthcare provider if you think your medicine is not helping or if you have side effects  Tell him or her if you are allergic to any medicine  Keep a list of the medicines, vitamins, and herbs you take  Include the amounts, and when and why you take them  Bring the list or the pill bottles to follow-up visits  Carry your medicine list with you in case of an emergency  Follow up with your healthcare provider as directed:  Write down your questions so you remember to ask them during your visits  Self care:   · Avoid foods that make your symptoms worse  These may include spicy foods, fruit juices, alcohol, caffeine, chocolate, and mint  · Eat several small meals during the day  Small meals give your stomach less food to digest     · Avoid lying down and bending forward after you eat  Do not eat meals 2 to 3 hours before bedtime   This decreases your risk for reflux  · Maintain a healthy weight  If you are overweight, weight loss may help relieve your symptoms  · Sleep with your head elevated  at least 6 inches  · Do not smoke  Smoking can increase your symptoms of heartburn  © 2017 [address] [employer] Information is for End User's use only and may not be sold, redistributed or otherwise used for commercial purposes  All illustrations and images included in CareNotes® are the copyrighted property of A [employer] M [employer]  or Montrell Medina  The above information is an  only  It is not intended as medical advice for individual conditions or treatments  Talk to your doctor, nurse or pharmacist before following any medical regimen to see if it is safe and effective for you

## 2019-08-21 LAB
ATRIAL RATE: 70 BPM
P AXIS: 83 DEGREES
PR INTERVAL: 140 MS
QRS AXIS: 78 DEGREES
QRSD INTERVAL: 96 MS
QT INTERVAL: 400 MS
QTC INTERVAL: 432 MS
T WAVE AXIS: 76 DEGREES
VENTRICULAR RATE: 70 BPM

## 2019-08-21 PROCEDURE — 93010 ELECTROCARDIOGRAM REPORT: CPT | Performed by: INTERNAL MEDICINE

## 2019-08-27 ENCOUNTER — HOSPITAL ENCOUNTER (OUTPATIENT)
Dept: NON INVASIVE DIAGNOSTICS | Facility: CLINIC | Age: 39
Discharge: HOME | End: 2019-08-27
Payer: COMMERCIAL

## 2019-08-27 DIAGNOSIS — R07.89 ATYPICAL CHEST PAIN: ICD-10-CM

## 2019-08-27 PROCEDURE — 93017 CV STRESS TEST TRACING ONLY: CPT

## 2019-08-27 PROCEDURE — 93018 CV STRESS TEST I&R ONLY: CPT | Performed by: INTERNAL MEDICINE

## 2019-08-27 PROCEDURE — 93016 CV STRESS TEST SUPVJ ONLY: CPT | Performed by: INTERNAL MEDICINE

## 2019-08-28 LAB
CHEST PAIN STATEMENT: NORMAL
MAX DIASTOLIC BP: 70 MMHG
MAX HEART RATE: 162 BPM
MAX PREDICTED HEART RATE: 180 BPM
MAX. SYSTOLIC BP: 148 MMHG
PROTOCOL NAME: NORMAL
REASON FOR TERMINATION: NORMAL
TARGET HR FORMULA: NORMAL
TEST INDICATION: NORMAL
TIME IN EXERCISE PHASE: NORMAL

## 2019-09-17 ENCOUNTER — SOCIAL WORK (OUTPATIENT)
Dept: BEHAVIORAL/MENTAL HEALTH CLINIC | Facility: CLINIC | Age: 39
End: 2019-09-17
Payer: COMMERCIAL

## 2019-09-17 ENCOUNTER — OFFICE VISIT (OUTPATIENT)
Dept: FAMILY MEDICINE CLINIC | Facility: HOSPITAL | Age: 39
End: 2019-09-17
Payer: COMMERCIAL

## 2019-09-17 VITALS
HEIGHT: 64 IN | WEIGHT: 137.4 LBS | SYSTOLIC BLOOD PRESSURE: 108 MMHG | OXYGEN SATURATION: 99 % | DIASTOLIC BLOOD PRESSURE: 66 MMHG | BODY MASS INDEX: 23.46 KG/M2 | HEART RATE: 96 BPM

## 2019-09-17 DIAGNOSIS — F33.2 SEVERE EPISODE OF RECURRENT MAJOR DEPRESSIVE DISORDER, WITHOUT PSYCHOTIC FEATURES (HCC): Primary | ICD-10-CM

## 2019-09-17 DIAGNOSIS — F41.0 SEVERE ANXIETY WITH PANIC: Primary | ICD-10-CM

## 2019-09-17 DIAGNOSIS — F41.0 PANIC ATTACKS: ICD-10-CM

## 2019-09-17 DIAGNOSIS — F41.9 ANXIETY: ICD-10-CM

## 2019-09-17 PROCEDURE — 99213 OFFICE O/P EST LOW 20 MIN: CPT | Performed by: INTERNAL MEDICINE

## 2019-09-17 PROCEDURE — 3008F BODY MASS INDEX DOCD: CPT | Performed by: INTERNAL MEDICINE

## 2019-09-17 PROCEDURE — 90834 PSYTX W PT 45 MINUTES: CPT | Performed by: SOCIAL WORKER

## 2019-09-17 RX ORDER — BUSPIRONE HYDROCHLORIDE 5 MG/1
5 TABLET ORAL 3 TIMES DAILY
Qty: 90 TABLET | Refills: 5 | Status: SHIPPED | OUTPATIENT
Start: 2019-09-17 | End: 2020-01-02 | Stop reason: SDUPTHER

## 2019-09-17 NOTE — PSYCH
Assessment/Plan:      Diagnoses and all orders for this visit:    Severe anxiety with panic        Subjective:     Patient ID: Mary Benoit is a 35 y o  female  HPI     12:45pm-2:00pm    (D) Corinne attended her first psychotherapy session with this writer today, at the recommendation of Dr Roel Mirandaa Rishi presents as a 35year old, , heterosexual, , female, reporting a long standing history of symptoms of anxiety for the past 16 years  Corinne reports that her symptoms worsened over the past month, after she came back from vacation with her family, and reported that she was unable to identify a specific trigger  Corinne reports that over the past week and a half her symptoms have increased, specifically after a 12year old girl was found who committed suicide by hanging in her town  Corinne reported that this 12year old girl and her mother 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 and reports that she saw the girl every day  In addition to this, Samuel Rishi reports that her  works as a , and his partner was the one that found the girls body hanging  Maria R Poncho reports that her brother-in-law has been struggling with anxiety as well  In addition to this, Corinne reports that (6) years ago when her mother passed away, Samuel Blackwell is the one that found her body after she had passed, after her mother didn't come to her son's sporting event, didn't answer the phone for roughly (3) days  Corinne reports that her relationship with her mother fluctuated and reported that she struggled with bipolar disorder and alcoholism, and her death was ruled as an overdose, and Corinne reported it is believed to be accidental; however, reports that he wonders  Corinne reports that she currently lives with her  and her (2) children in [place], [place]  Corinne reports that she has been legally  for the past (13) years   Samuel Blackwell denies having a history of divorce  Corinne reports having (2) children (1) son named Sylvain Beaulieu who is 15years old, and (3) daughter named Donald Hill who is 8years old  Corinne confirms that she currently has an active 's licenses car  Corinne confirms that her PCP is through [facility] and reports that she sees Dr Mehdi Estrada as a provider in the office  Corinne reports having the following medical issues: seasonal allergies  Aurora Abdi denies having a history of any seizures at this time  Aurora Abdi confirms that her health insurance is through [employer] through her 's employer  Corinne confirms having active RX coverage with this and reports using [facility] in [place] as a local pharmacy  Corinne denies that she currently has any active mental health providers including psychiatric medication management, therapy, community case management, or peer supports  Aurora Abdi denies having a history of any inpatient behavioral health treatment or any inpatient drug or alcohol rehab  Corinne reports having a history of outpatient therapy services through [address] [place] [address] [address] in [place], [place] and then when they closed she went to [place], [place] and denies remembering the name of the therapist; however, reports that it was a female  Aurorasunshine Abdi denies having a history of any psychiatry services or any drug or alcohol services  Corinne reports that currently she is prescribed the following psychiatry medications: cymbalta 90mg daily, buspar 5mg TID, and xanax 0 5mg PRN and these are prescribed by PCP  Roxanna reports that she has (1) brother and reports that their relationship is positive  Corinne reports that her natural supports consist of her  and her father  Corinne reports having the following family mental health and drug and alcohol addiction: mother diagnosed with bipolar and anxiety and struggled with alcoholism, maternal grandfather struggled with depression, and her father struggles with depression and anxiety  Corinne reports that she identifies as Flahertyjimi Galloway in regards to Restoration and spiritual beliefs  Corinne reports that she graduated from high school in 1997 from [employer]  Corinne reports that she completed some college classes through [employer] and reports that she didn't finish  Corinne reports that she then went to [employer] at [employer]- reporting that she finished in 2000  Corinne reports that she is currently self-employed through [employer] reporting that she works full-time  Corinne denies having any past or current legal issues and denies that she is currently under the supervision of probation or parole  Corinne denies having a legal POA, legal guardian, mental health advanced directive, rep-payee, living will  Corinne reports that her  owns (2) hand guns and denies that they are locked and secured and reports that he has a hunting gun that is locked and secured  Corinne denies that they are a risk factor for her  Alma Mabel denies that she requires any forms of ambulatory assistance  Alma Monroe denies having any current issues related to drugs and alcohol and reports having a history of drinking more excessively when she was younger, and denies having a history of drug abuse  Alma Monroe denies that she smokes cigarettes reporting that she quit smoking roughly 5 years ago  Corinne denies that she is a   Alma Monroe denies having a history of or currently experiencing any forms of physical, verbal, emotional, or sexual abuse  Alma Monroe denies having a history of any self-injurious behaviors and denies having a history of any suicide attempts  Corinne reports having a history of being diagnosed with generalized anxiety disorder  This writer provided psychoeducation  Discussed ongoing skill use including coping skills, grounding techniques, distress tolerance skills, and distraction skills to self-manage symptoms more effectively   Discussed the importance of limits and boundaries and increasing effective forms of communication to strengthen interpersonal relationships  (P) Corinne plans to download the calm application on her phone and utilize deep breathing techniques  Corinne plans to explore distress tolerance skills and sensory related skills  Corinne plans to follow up with this writer for ongoing psychotherapy services, requesting on a monthly basis at this time  This writer plans to continue to work on building rapport with [employer]  Corinne plans to work on prioritizing her mental health needs  Corinne plans to work on setting healthy limits and boundaries  Roxanna plans to work on increasing effective forms of communication to strengthen interpersonal relationships  Corinne plans to observe, monitor, and track, symptoms, cycles, and stressors to further explore how triggers impact overall symptom presentation  Roxanna plans to work on identifying, implementing, and utilizing effective coping skills, grounding techniques, distraction techniques, and distress tolerance skills to self-manage symptoms more effectively  Corinne plans to outreach for additional support as needed  This writer will continue to monitor and support Corinne throughout the psychotherapy process  Review of Systems      Objective:     Physical Exam      (A) Corinne presented as alert and oriented x3  Corinne presented with good eye contact  Corinnes speech presented at a normal rate, volume, and rhythm  Eli Huddleston presented as forward thinking and was able to identify and discuss various future plans and reasons to live  Eli Hudldeston denies that she is experiencing any evident or immediate risk factors for self-harm, SI, or HI  Corinne denies that she experiences any perceptual disturbances and this writer did not observe Corinne responding to any internal stimuli during session   Eli Huddleston denies that she experiences any symptoms of frank, any grandiose thinking, any impulsivity, or an elevated or hyperactive moods  Corinne does not appear to be endorsing criteria for frank at this time  Corinne denies that she struggles with any issues related to falling asleep at night  Corinne reports that in the past month she struggled with waking up throughout the night and had difficulty falling back to sleep, reporting that she was having panic attacks at night  Corinne reports that she averages roughly (7) hours of sleep a night  Corinne describes herself as having a poor appetite  Corinne's mood presented as depressed and anxious and her affect appeared to be congruent  Corinne describes symptoms of worrying, nervousness, anxiety, panic, panic attacks- reporting that her last panic attack was roughly (3) weeks ago  Corinne describe symptoms of sadness, depression, obsessive, intrusive, ruminating, and repetitive thoughts  Aurorasunshine Abdi presents with symptoms of grief in relation to the loss of her mother and different forms of symbolic loss

## 2019-09-17 NOTE — PATIENT INSTRUCTIONS
You were seen for a follow up of chronic medical problems today  Be sure to make any changes to your medication as discussed by your doctor  If blood tests or other testing was ordered, be sure to obtain this at the time requested by the doctor  Our office will call you with the results of your tests once they arrive in our office  Increase buspar to three times a day

## 2019-09-17 NOTE — PROGRESS NOTES
Subjective:   Chief Complaint   Patient presents with    Follow-up     go over medications         Patient ID: Noam Fitch is a 35 y o  female  Review of meds and follow up of panic artacks  The following portions of the patient's history were reviewed and updated as appropriate: allergies, current medications, past family history, past medical history, past social history, past surgical history and problem list     Review of Systems   Constitutional: Negative for fatigue and fever  HENT: Negative for hearing loss  Eyes: Negative for visual disturbance  Respiratory: Negative for cough, chest tightness, shortness of breath and wheezing  Cardiovascular: Negative for chest pain, palpitations and leg swelling  Gastrointestinal: Negative for abdominal pain, diarrhea and nausea  Genitourinary: Negative for dysuria and hematuria  Musculoskeletal: Negative for arthralgias  Neurological: Negative for dizziness, numbness and headaches  Psychiatric/Behavioral: Positive for dysphoric mood  Negative for confusion  The patient is nervous/anxious  All other systems reviewed and are negative  Current Outpatient Medications on File Prior to Visit   Medication Sig Dispense Refill    ALPRAZolam (XANAX) 0 5 mg tablet Take one tablet q12 h prn anxiety 21 tablet 0    busPIRone (BUSPAR) 5 mg tablet Take 1 tablet (5 mg total) by mouth 2 (two) times a day for 30 days 60 tablet 5    DULoxetine (CYMBALTA) 30 mg delayed release capsule take 1 capsule by mouth once daily 30 capsule 3    DULoxetine (CYMBALTA) 60 mg delayed release capsule Take 1 capsule (60 mg total) by mouth daily This will be used with 30mg CAP 30 capsule 5    multivitamin (THERAGRAN) TABS Take 1 tablet by mouth daily      Probiotic Product (PROBIOTIC-10 PO) Take by mouth       No current facility-administered medications on file prior to visit          Objective:  Vitals:    09/17/19 1138   BP: 108/66   Pulse: 96   SpO2: 99% Weight: 62 3 kg (137 lb 6 4 oz)   Height: 5' 4" (1 626 m)      Physical Exam   Constitutional: She is oriented to person, place, and time  She appears well-developed and well-nourished  Eyes: Pupils are equal, round, and reactive to light  Neck: Normal range of motion  Neck supple  No thyromegaly present  Cardiovascular: Normal rate, regular rhythm, normal heart sounds and intact distal pulses  No murmur heard  Pulmonary/Chest: Effort normal and breath sounds normal  She has no wheezes  She has no rales  Abdominal: Soft  Bowel sounds are normal  There is no tenderness  Musculoskeletal: Normal range of motion  She exhibits no edema, tenderness or deformity  Lymphadenopathy:     She has no cervical adenopathy  Neurological: She is alert and oriented to person, place, and time  She has normal reflexes  Skin: Skin is warm and dry  Psychiatric: She has a normal mood and affect  Nursing note and vitals reviewed  Assessment/Plan:    No problem-specific Assessment & Plan notes found for this encounter         Diagnoses and all orders for this visit:    Severe episode of recurrent major depressive disorder, without psychotic features (HCC)    Panic attacks

## 2019-10-17 RX ORDER — BUSPIRONE HYDROCHLORIDE 5 MG/1
5 TABLET ORAL
COMMUNITY
Start: 2019-09-17 | End: 2019-10-21 | Stop reason: SDUPTHER

## 2019-10-18 ENCOUNTER — SOCIAL WORK (OUTPATIENT)
Dept: BEHAVIORAL/MENTAL HEALTH CLINIC | Facility: CLINIC | Age: 39
End: 2019-10-18
Payer: COMMERCIAL

## 2019-10-18 DIAGNOSIS — F41.0 SEVERE ANXIETY WITH PANIC: Primary | ICD-10-CM

## 2019-10-18 PROCEDURE — 90834 PSYTX W PT 45 MINUTES: CPT | Performed by: SOCIAL WORKER

## 2019-10-18 NOTE — PSYCH
Assessment/Plan:      Diagnoses and all orders for this visit:    Severe anxiety with panic        Subjective:     Patient ID: Robin Andres is a 35 y o  female  HPI     1:55pm-2:45pm     (D) Corinne attended her follow up psychotherapy session with this writer today  Corinne reports that since her last psychotherapy session she has noticed a decrease in the intensity of her mental health symptoms of worrying, nervousness, anxiety, panic, sadness, and depression  Corinne reports that she has noticed an increase in appetite  Corinne reports that she has been leaning into natural supports and increasing routine behaviors  Corinne communicated that she has been reading the book, "Rhythms of Renewal " Corinne also reports that she has been going for daily walks, reading the bible, talking with Tamera Razo friends, journaling, and focusing on Episcopalian/ spiritual beliefs  Corinne reports that she downloaded the menstrual tracker on her phone and noticed an increase in symptoms (4) days prior to her cycle, identifying herself as feeling triggered  Corinne reports that she has been able to eat more; however, reports that things aren't as appealing to her to eat as they were before  Corinne describes nervous energy surrounding driving, [place], until her  is able to make it down the following day  Corinne spent time discussing her anticipatory anxiety, fear of her  dying, or her decisions resulting in a trauma occurring  Corinne also spent time discussing her mother's passing, and trauma related to this  Corinne discussed difficulty with remembering to take her medications 3x a day   Discussed and brainstormed ways to address these barriers- developed a plan to have her take her medications when she wakes up in the morning, packing the others in her lunch box to take at work, and then taking her medication after dinner- developing routine and consistency surrounding this  Corinne also plans to review this with her PCP Monday at her follow up appointment  This writer and Corinne processed this at length  Provided ongoing psychoeducation  Discussed ongoing skill use to self-manage symptoms  Discussed limits and boundaries  Discussed, reviewed, and modeled effective forms of communication to address interpersonal relationship stressors  (P) Corinne plans to continue to work on being present in the moment, going on walks daily, reading, and journaling  Corinne plans to follow up with this writer for ongoing psychotherapy sessions and outreach for additional support as needed  Corinne plans to continue to implement effective forms of communication to address ongoing interpersonal relationship stressors  Corinne plans to continue to assert herself and advocate for herself  Corinne plans to continue to work ahead in relation to triggers by planning  Corinne plans to continue to monitor and track her moods and utilize ongoing skills to self-manage symptoms  Corinne plans to continue to track her menstrual cycle with a tracker application on her phone, and utilize PRN medication 4 days before her cycle to address increased symptoms during this time  Corinne plans to continue to work on setting healthy limits and boundaries  Review of Systems      Objective:     Physical Exam      (A) Corinne continues to present as future oriented and forward thinking, denies that she is experiencing any symptoms of self-harm, SI, or HI  Corinne describes ongoing anticipatory anxiety in relation to psychosocial stressors  Corinnes mood presented as anxious and depressed and her affect appeared to be congruent and tearful at times  Corinne describes a decrease in the intensity of her symptoms over the past month  Corinne reports having experienced a positive increase in her appetite   Corinne does describes ongoing symptoms of nervousness, worrying, and anxiety- decrease in panic symptoms

## 2019-10-21 ENCOUNTER — OFFICE VISIT (OUTPATIENT)
Dept: FAMILY MEDICINE CLINIC | Facility: HOSPITAL | Age: 39
End: 2019-10-21
Payer: COMMERCIAL

## 2019-10-21 VITALS
WEIGHT: 139 LBS | HEART RATE: 98 BPM | DIASTOLIC BLOOD PRESSURE: 70 MMHG | HEIGHT: 64 IN | BODY MASS INDEX: 23.73 KG/M2 | SYSTOLIC BLOOD PRESSURE: 122 MMHG

## 2019-10-21 DIAGNOSIS — Z23 NEED FOR VACCINATION: Primary | ICD-10-CM

## 2019-10-21 DIAGNOSIS — R07.89 MUSCULOSKELETAL CHEST PAIN: ICD-10-CM

## 2019-10-21 DIAGNOSIS — K21.9 GASTROESOPHAGEAL REFLUX DISEASE WITHOUT ESOPHAGITIS: ICD-10-CM

## 2019-10-21 PROCEDURE — 99214 OFFICE O/P EST MOD 30 MIN: CPT | Performed by: INTERNAL MEDICINE

## 2019-10-21 RX ORDER — PANTOPRAZOLE SODIUM 40 MG/1
40 TABLET, DELAYED RELEASE ORAL DAILY
Qty: 30 TABLET | Refills: 1 | Status: SHIPPED | OUTPATIENT
Start: 2019-10-21 | End: 2019-11-11 | Stop reason: ALTCHOICE

## 2019-10-21 NOTE — PROGRESS NOTES
Assessment/Plan:       Diagnoses and all orders for this visit:    Need for vaccination  -     influenza vaccine, [id number], quadrivalent, 0 5 mL, preservative-free, for adult and pediatric patients 6 mos+ (AFLURIA, FLUARIX, FLULAVAL, FLUZONE)    Gastroesophageal reflux disease without esophagitis  -     FL UGI w/ air routine; Future  -     pantoprazole (PROTONIX) 40 mg tablet; Take 1 tablet (40 mg total) by mouth daily    Musculoskeletal chest pain          All of the above diagnoses have been assessed  Additional COMMENTS/PLAN:  It appears the patient has a musculoskeletal component of chest pain however I do believe she also has GERD  Will do an upper GI for completeness and empirically treat with Protonix for 4 weeks to see how her symptoms jonathon or not  Subjective:      Patient ID: Giacomo Hensley is a 35 y o  female  HPI     Current regimen is doing well  On cymbalta 90 and Buspar TID  Xanax for breakthrough  Has certain triggers that set her off  Like premenstrual      CP-was in ER for  This  Had W/u  Has some sharp pains that triggers this pain  Sometimes this happens after eating  Sometimes has acid feeling in back of throat and change in taste  Does get occais back pain  Feels that food gets stuck  The following portions of the patient's history were revised and updated as appropriate: Problem list, allergies, med list, FH, SH, Past medical and surgical histories      Current Outpatient Medications   Medication Sig Dispense Refill    ALPRAZolam (XANAX) 0 5 mg tablet Take one tablet q12 h prn anxiety 21 tablet 0    busPIRone (BUSPAR) 5 mg tablet Take 1 tablet (5 mg total) by mouth 3 (three) times a day 90 tablet 5    DULoxetine (CYMBALTA) 30 mg delayed release capsule take 1 capsule by mouth once daily 30 capsule 3    DULoxetine (CYMBALTA) 60 mg delayed release capsule Take 1 capsule (60 mg total) by mouth daily This will be used with 30mg CAP 30 capsule 5    multivitamin (THERAGRAN) TABS Take 1 tablet by mouth daily      Probiotic Product (PROBIOTIC-10 PO) Take by mouth      pantoprazole (PROTONIX) 40 mg tablet Take 1 tablet (40 mg total) by mouth daily 30 tablet 1     No current facility-administered medications for this visit  Review of Systems   All other systems reviewed and are negative  Objective:    /70 (BP Location: Left arm, Patient Position: Sitting, Cuff Size: Standard)   Pulse 98   Ht 5' 4" (1 626 m)   Wt 63 kg (139 lb)   BMI 23 86 kg/m²     BP Readings from Last 3 Encounters:   10/21/19 122/70   09/17/19 108/66   08/19/19 100/64                  Wt Readings from Last 3 Encounters:   10/21/19 63 kg (139 lb)   09/17/19 62 3 kg (137 lb 6 4 oz)   08/19/19 61 2 kg (135 lb)         Physical Exam   Constitutional: She appears well-developed and well-nourished  Neck: No JVD present  Cardiovascular: Normal rate and regular rhythm  Patient clearly has reproducible tenderness along the right costochondral junction  Pulmonary/Chest: Effort normal and breath sounds normal    Abdominal: Soft  Bowel sounds are normal  There is no tenderness  Musculoskeletal: She exhibits no edema  Vitals reviewed  No visits with results within 2 Week(s) from this visit  Latest known visit with results is:   Hospital Outpatient Visit on 08/27/2019   Component Date Value Ref Range Status    Protocol Name 08/27/2019 [facility]   Final    Time In Exercise Phase 08/27/2019 00:12:00   Final    MAX   SYSTOLIC BP 47/26/6988 843  mmHg Final    Max Diastolic Bp 02/80/8510 70  mmHg Final    Max Heart Rate 08/27/2019 162  BPM Final    Max Predicted Heart Rate 08/27/2019 180  BPM Final    Reason for Termination 08/27/2019 Fatigue   Final    Test Indication 08/27/2019 CHEST DISCOMFORT, PALPITATIONS   Final    Target Hr Formular 08/27/2019 (220 - Age)*100%   Final    Chest Pain Statement 08/27/2019 none   Final         Clearence MD Sancho

## 2019-10-24 ENCOUNTER — SOCIAL WORK (OUTPATIENT)
Dept: BEHAVIORAL/MENTAL HEALTH CLINIC | Facility: CLINIC | Age: 39
End: 2019-10-24
Payer: COMMERCIAL

## 2019-10-24 DIAGNOSIS — F41.0 SEVERE ANXIETY WITH PANIC: Primary | ICD-10-CM

## 2019-10-24 PROCEDURE — 90834 PSYTX W PT 45 MINUTES: CPT | Performed by: SOCIAL WORKER

## 2019-10-24 NOTE — PSYCH
Assessment/Plan:      Diagnoses and all orders for this visit:    Severe anxiety with panic        Subjective:     Patient ID: Erin Berg is a 35 y o  female  HPI     9:58am-10:48am     This writer received an e-mail for [employer] @ 7:37am, Eli Huddleston reported that she got my e-mail from the business cards  Corinne reported that she needed an appointment due to struggling with increased anxiety and panic  This writer have support staff contact Eli Huddleston and fit her in for a 9:00am appointment today  (D) Corinne attended a last minute available psychotherapy session with this writer today  Roxanna reported that since her last session last Friday, on Monday she had a PCP appointment with another PCP as her PCP needed to call out  Corinne reported that the PCP believes that some of the medical symptoms she is experiencing is acid reflux and has follow up testing scheduled for this coming Monday  Corinne reported that she had been worried about this, and reported that on Monday she took the Prilosec, threw her back out, and felt light headed and anxious  Corinne reports that she hasn't been eating much due to stomach issues and reported that she hasn't been having an appetite  Corinne reported that she went to work that day and had increased anxiety and panic and typical skills that are effective for her did not have the same affect  Corinne reported that she has additional stressors in relation to her father losing his home, as a result of his financial stressors, resulting in him most likely needing to move in with Corinne Corinne reported that she and her  are comfortable with this and want to help him; however, are going back and forth with adding an addition onto their house verses buying and building a new house with a new addition  Corinne reported that her father and brother have ongoing interpersonal relationship stressors and she often feels in the middle of them   Corinne reports that her  believes that this greatly impacts her symptoms  Corinne describes herself as experiencing nervousness, worrying, anxiety, panic, sadness, depression, and thoughts that she would be better off dead; however, denying any thoughts of self-harm, SI, or HI- reporting that her feeling this way has more to do with not wanting to be experiencing the symptoms that she has  Corinne reported that when she experiences elevated anxiety, she struggles with taking her full xanax PRN and reports that she cuts them up in 1/4 of the pills and takes them  Corinne acknowledges that this isn't helping her symptoms and describes some distorted thinking in relation to this  Corinne describes herself as being busy at work, at home, and not feeling like she has time for herself  Corinne reported that starting in April, 2019 she is only going to be working every other Saturday and 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, and reports experiencing guilt surrounding this  This writer and Corinne processed this at length  This writer provided ongoing psychoeducation  Discussed ongoing skill use, deep breathing techniques, etc  Discussed, reviewed, and modeled effective forms of communication to address ongoing interpersonal relationship stressors  (P) This writer completed a psychiatry consult with Dr Constance Michelle to review medications and notes and provide a medication recommendation to her PCP  This writer also placed a referral for [facility], requesting that they call her to schedule an appointment for a psychiatric evaluation and ongoing psychiatric medication management  Corinne declines other referrals for additional levels of support due to her work schedule and not being able to commit to this at this time  Corinne plans to follow up with this writer for ongoing psychotherapy sessions and outreach for additional support as needed   Corinne plans to continue to work on being present in the moment, utilizing deep breathing techniques, implementing meditation/ mindfulness skills, and continue to journal  Corinne plans to continue to implement effective forms of communication  Corinne plans to continue to assert herself and advocate for herself  Corinne plans to continue to work on being present in the moment, and utilizing deep breathing techniques  Corinne plans to continue to monitor and track her moods and utilize ongoing skills to self-manage symptoms  Corinne plans to continue to work on setting healthy limits and boundaries  Review of Systems      Objective:     Physical Exam      (A) Corinne continues to present as future oriented and forward thinking, denies that she is experiencing any symptoms of self-harm, SI, or HI  Corinne does report feeling like she would be better off dead rather than experiencing these symptoms; however, acknowledges that she would never act upon this, follow through with this, and expresses a strong desire to live  Corinne was able to verbally contract for safety and agreed to outreach for additional support as needed  Corinne describes ongoing anticipatory anxiety in relation to psychosocial stressors and cognitive distortions  Corinnes mood presented as anxious and depressed and her affect appeared to be congruent and tearful  Luis German presents with symptoms of nervousness, worrying, anxiety, and panic  Corinne describes herself as feeling tired and having little energy  Corinne acknowledges ongoing grief in relation to the loss of her mother  Luis German is questioning if her symptoms could possibly be menstrual cycle/ possible early menopause  Requested that she follow up with her PCP and/or OBGYN for further evaluation of this

## 2019-10-28 ENCOUNTER — HOSPITAL ENCOUNTER (OUTPATIENT)
Dept: RADIOLOGY | Facility: HOSPITAL | Age: 39
Discharge: HOME | End: 2019-10-28
Attending: INTERNAL MEDICINE
Payer: COMMERCIAL

## 2019-10-28 DIAGNOSIS — K21.9 GASTROESOPHAGEAL REFLUX DISEASE WITHOUT ESOPHAGITIS: ICD-10-CM

## 2019-10-28 PROCEDURE — 74246 X-RAY XM UPR GI TRC 2CNTRST: CPT

## 2019-11-11 ENCOUNTER — OFFICE VISIT (OUTPATIENT)
Dept: FAMILY MEDICINE CLINIC | Facility: HOSPITAL | Age: 39
End: 2019-11-11
Payer: COMMERCIAL

## 2019-11-11 VITALS
HEART RATE: 77 BPM | HEIGHT: 64 IN | BODY MASS INDEX: 23.73 KG/M2 | SYSTOLIC BLOOD PRESSURE: 96 MMHG | WEIGHT: 139 LBS | DIASTOLIC BLOOD PRESSURE: 64 MMHG

## 2019-11-11 DIAGNOSIS — F41.0 SEVERE ANXIETY WITH PANIC: ICD-10-CM

## 2019-11-11 DIAGNOSIS — F33.2 SEVERE EPISODE OF RECURRENT MAJOR DEPRESSIVE DISORDER, WITHOUT PSYCHOTIC FEATURES (HCC): ICD-10-CM

## 2019-11-11 DIAGNOSIS — K21.9 GASTROESOPHAGEAL REFLUX DISEASE WITHOUT ESOPHAGITIS: Primary | ICD-10-CM

## 2019-11-11 PROBLEM — R07.89 MUSCULOSKELETAL CHEST PAIN: Status: RESOLVED | Noted: 2019-10-21 | Resolved: 2019-11-11

## 2019-11-11 PROBLEM — K44.9 SLIDING HIATAL HERNIA: Status: RESOLVED | Noted: 2019-08-19 | Resolved: 2019-11-11

## 2019-11-11 PROCEDURE — 1036F TOBACCO NON-USER: CPT | Performed by: INTERNAL MEDICINE

## 2019-11-11 PROCEDURE — 99214 OFFICE O/P EST MOD 30 MIN: CPT | Performed by: INTERNAL MEDICINE

## 2019-11-11 NOTE — PROGRESS NOTES
Subjective:   Chief Complaint   Patient presents with    Follow-up        Patient ID: Sandra Larkin is a 35 y o  female  Feels she is doing better on the current med regimen  Is seeing counsellor through [address] [facility]  GI testing was WNL and she is not on PPI  The following portions of the patient's history were reviewed and updated as appropriate: allergies, current medications, past family history, past medical history, past social history, past surgical history and problem list     Review of Systems   Constitutional: Negative for fatigue and fever  HENT: Negative for hearing loss  Eyes: Negative for visual disturbance  Respiratory: Negative for cough, chest tightness, shortness of breath and wheezing  Cardiovascular: Negative for chest pain, palpitations and leg swelling  Gastrointestinal: Negative for abdominal pain, diarrhea and nausea  Genitourinary: Negative for dysuria and hematuria  Musculoskeletal: Negative for arthralgias  Neurological: Negative for dizziness, numbness and headaches  Psychiatric/Behavioral: Negative for confusion and dysphoric mood  All other systems reviewed and are negative          Current Outpatient Medications on File Prior to Visit   Medication Sig Dispense Refill    ALPRAZolam (XANAX) 0 5 mg tablet Take one tablet q12 h prn anxiety 21 tablet 0    busPIRone (BUSPAR) 5 mg tablet Take 1 tablet (5 mg total) by mouth 3 (three) times a day 90 tablet 5    DULoxetine (CYMBALTA) 30 mg delayed release capsule take 1 capsule by mouth once daily 30 capsule 3    DULoxetine (CYMBALTA) 60 mg delayed release capsule Take 1 capsule (60 mg total) by mouth daily This will be used with 30mg CAP 30 capsule 5    multivitamin (THERAGRAN) TABS Take 1 tablet by mouth daily      Probiotic Product (PROBIOTIC-10 PO) Take by mouth      [DISCONTINUED] pantoprazole (PROTONIX) 40 mg tablet Take 1 tablet (40 mg total) by mouth daily (Patient not taking: Reported on 11/11/2019) 30 tablet 1     No current facility-administered medications on file prior to visit  Objective:  Vitals:    11/11/19 1048   BP: 96/64   Pulse: 77   Weight: 63 kg (139 lb)   Height: 5' 4" (1 626 m)      Physical Exam   Constitutional: She is oriented to person, place, and time  She appears well-developed and well-nourished  Eyes: Pupils are equal, round, and reactive to light  Neck: Normal range of motion  Neck supple  No thyromegaly present  Cardiovascular: Normal rate, regular rhythm, normal heart sounds and intact distal pulses  No murmur heard  Pulmonary/Chest: Effort normal and breath sounds normal  She has no wheezes  She has no rales  Abdominal: Soft  Bowel sounds are normal  There is no tenderness  Musculoskeletal: Normal range of motion  She exhibits no edema, tenderness or deformity  Lymphadenopathy:     She has no cervical adenopathy  Neurological: She is alert and oriented to person, place, and time  She has normal reflexes  Skin: Skin is warm and dry  Psychiatric: She has a normal mood and affect  Nursing note and vitals reviewed  Assessment/Plan:    No problem-specific Assessment & Plan notes found for this encounter         Diagnoses and all orders for this visit:    Gastroesophageal reflux disease without esophagitis    Severe anxiety with panic    Severe episode of recurrent major depressive disorder, without psychotic features ([facility] [address] )

## 2019-11-18 ENCOUNTER — SOCIAL WORK (OUTPATIENT)
Dept: BEHAVIORAL/MENTAL HEALTH CLINIC | Facility: CLINIC | Age: 39
End: 2019-11-18
Payer: COMMERCIAL

## 2019-11-18 DIAGNOSIS — F41.0 SEVERE ANXIETY WITH PANIC: Primary | ICD-10-CM

## 2019-11-18 PROCEDURE — 90834 PSYTX W PT 45 MINUTES: CPT | Performed by: SOCIAL WORKER

## 2019-11-18 NOTE — PSYCH
Assessment/Plan:      Diagnoses and all orders for this visit:    Severe anxiety with panic        Subjective:     Patient ID: Raf Lynch is a 35 y o  female  HPI     9:59am-11:00am    (D) Corinne attended her follow up psychotherapy session with this writer today  Corinne reports that since her last psychotherapy session, she has noticed a decrease in the intensity of her anxiety symptoms, reporting that she has been taking her psychiatric medication as prescribed more consistently  Corinne reports at times she has noticed some irritability; however, reports that this was around her menstrual cycle  Corinne spent the session discussing various psychosocial stressors in relation to her and her  considering moving into another house verses building another house, in order to have Corinne's father move in with them  Corinne reports that her father now has to be out of his house by 02/09/20, reporting that there is a firm date  Corinne discussed other psychosocial stressors related to her son recognizing that he is struggling with anxiety, and Corinne's response to this and getting him connected with services at school  Corinne describes fluctuating symptoms of anxiety in relation to psychosocial stressors and reviewed ongoing skills that she has used to address and target symptom presentation  This writer and Corinne processed all of this at length  Provided ongoing psychoeducation  Discussed ongoing skill use to self-manage symptoms  Discussed limits and boundaries  Discussed, reviewed, and modeled effective forms of communication  (P) Corinne plans to continue to work on structuring her schedule, managing her time, and prioritizing tasks and responsibilities  Corinne plans to continue to implement prayer into her routine, as she identifies this as a helpful skill for her  Corinne plans to continue to prioritize her mental health and medical needs   Corinne plans to continue to implement limits and boundaries, assert herself, and advocate for herself  Corinne plans to develop a list of pros and cons in relation to moving into another house or building a house and outweigh the risks verses the benefits  Corinne plans to continue to monitor and track her moods and utilize appropriate and effective skills to self-manage symptoms  Corinne plans to continue to increase the use of effective forms of communication to address ongoing interpersonal relationship stressors  Corinne plans to continue to ask for help when needed  Corinne plans to outreach for additional support as needed  Review of Systems      Objective:     Physical Exam      (A) Corinne presented as forward thinking and was able to identify and discuss various future plans and reasons to live  Corinne plans to continue to deny that she is experiencing any evident or immediate risk factors for self-harm, SI, or HI  Corinnes mood presented as anxious and her affect appeared to be congruent  Amber Larson presents with ongoing anticipatory anxiety in relation to psychosocial stressors, and interpersonal relationship stressors  Amber Larson has noted a decrease in worrying, nervousness, anxiety, and panic, despite experiencing ongoing symptoms, reporting that the intensity has decreased over time

## 2019-12-05 ENCOUNTER — TELEPHONE (OUTPATIENT)
Dept: FAMILY MEDICINE CLINIC | Facility: HOSPITAL | Age: 39
End: 2019-12-05

## 2019-12-05 NOTE — TELEPHONE ENCOUNTER
Ok to order xray of R hip and lumbar spine  Then I suggest PT rather than pain management  Lets see what xrays show

## 2019-12-05 NOTE — TELEPHONE ENCOUNTER
Pt called c/o of low back pain/r hip pain  Pt had back surg 2013  She is wondering if xrays could be ordered and referral to pain management can be put in epic   Pt was just in for appt with can on 11/11/19  [phone]

## 2019-12-09 ENCOUNTER — TELEPHONE (OUTPATIENT)
Dept: FAMILY MEDICINE CLINIC | Facility: HOSPITAL | Age: 39
End: 2019-12-09

## 2019-12-09 DIAGNOSIS — M25.551 PAIN IN RIGHT HIP: Primary | ICD-10-CM

## 2019-12-09 DIAGNOSIS — M54.50 RIGHT LOW BACK PAIN, UNSPECIFIED CHRONICITY, UNSPECIFIED WHETHER SCIATICA PRESENT: ICD-10-CM

## 2019-12-09 NOTE — TELEPHONE ENCOUNTER
----- Message from Awilda Cabral MD sent at 12/9/2019 10:36 AM EST -----  Regarding: FW: Non-Urgent Medical Question  Contact: [phone]   Please call patient  OK to refer to pain management, can give phone #  No need for xray and they can decide if imaging with MRI or other is needed  might also want to try the comp spine program for PT as this has helped many patients      ----- Message -----  From: Cristina Andrade  Sent: 12/9/2019   8:31 AM EST  To: Awilda Cabral MD  Subject: FW: Non-Urgent Medical Question                      ----- Message -----  From: Robin Andres  Sent: 12/6/2019   8:20 PM EST  To: [facility]  Subject: Non-Urgent Medical Question                      Good evening Dr Stefanie Gonzales,   I was just wondering if you got the message I had called about getting a referral to [facility]  My back and hip have really been bothering me more and more  It's always achy and sore  It's been on and off for months but more on then anything over the last few weeks  I've had massages and gone to the chiropractor as well  I just haven't gotten much relief  I went to pain management before I had surgery back surgery  I was hoping to see what they would suggest I could do  Maybe injections at the area of pain  When I had them before it seemed to help  I just don't want to take any pain meds  I did that before there and I just don't want any more medication  I also wasn't sure if I need an X-ray or even an mri first to get in for just an appt    let me know what you think and if that's something you can pass through without me having to come in     Thank you so much!!  Candis Espinoza

## 2019-12-12 DIAGNOSIS — K21.9 GASTROESOPHAGEAL REFLUX DISEASE WITHOUT ESOPHAGITIS: ICD-10-CM

## 2019-12-12 DIAGNOSIS — F32.A DEPRESSION, UNSPECIFIED DEPRESSION TYPE: ICD-10-CM

## 2019-12-12 RX ORDER — DULOXETIN HYDROCHLORIDE 30 MG/1
CAPSULE, DELAYED RELEASE ORAL
Qty: 30 CAPSULE | Refills: 0 | Status: SHIPPED | OUTPATIENT
Start: 2019-12-12 | End: 2020-01-13

## 2019-12-14 RX ORDER — PANTOPRAZOLE SODIUM 40 MG/1
TABLET, DELAYED RELEASE ORAL
Qty: 30 TABLET | Refills: 0 | Status: SHIPPED | OUTPATIENT
Start: 2019-12-14 | End: 2020-10-12

## 2019-12-16 ENCOUNTER — SOCIAL WORK (OUTPATIENT)
Dept: BEHAVIORAL/MENTAL HEALTH CLINIC | Facility: CLINIC | Age: 39
End: 2019-12-16
Payer: COMMERCIAL

## 2019-12-16 DIAGNOSIS — F41.0 SEVERE ANXIETY WITH PANIC: Primary | ICD-10-CM

## 2019-12-16 PROCEDURE — 90834 PSYTX W PT 45 MINUTES: CPT | Performed by: SOCIAL WORKER

## 2019-12-16 NOTE — PSYCH
Assessment/Plan:      Diagnoses and all orders for this visit:    Severe anxiety with panic        Subjective:     Patient ID: Mika Tidwell is a 35 y o  female  HPI     7:52am-5:50am      (D)  Corinne attended her follow-up psychotherapy appointment  Corinne reports a decrease in symptoms, reporting that her medication has been effective because she has been consistent in taking her medications at the same times daily  Corinne reports she has been using her coping skill of journaling as needed, and reports that she has been using her coping skills of praying and her Bible True  Corinne reports that she was able to make an appointment at [facility] for an intake session for her son to receive therapy and potentially psychiatry services to manage his anxiety  Corinne reports that her son sees his guidance counselor regularly, reporting that the school has a certain number of sessions that he is able to attend  Corinne reports that her sons is isolating and spending most of his time in his room  Corinne reports that she tries to bring her son out to spend time with his family  Corinne reports that as of last week, his report card had 2 Ds and an F, reporting he has missed and not completed assignments  Corinne reports that she had a meeting with his IEP team, reporting that he is diagnosed with a comprehension learning disorder and reports that he struggles with attention  Corinne reports that she grounded her son over the weekend, and reports that told him to work hard to bring his grades up  Corinne reports that her son was able to bring two of his grades up to a C, reporting that she feels frustrated because she knows he is capable  Corinne reports that her son has a lack of motivation to complete his schoolwork, reporting that she believes his girlfriend is a distraction to him  Corinne reports that she was able to set hard limits and boundaries related to getting his schoolwork done  Corinne reports that they decided to put a deposit on the house they were looking at, reporting that her son has not gone with them when they looked at the house, and reports that her son was surprised they were going to be moving to a development  This writer and Corinne processed this at length  Corinne reports that her son and  have been arguing frequently and "butting heads "  Corinne reports that she has confronted her  about his behavior towards his son, reporting that her  reacts defensively  Corinne reports that of the two houses they were looking at, the one house was looking to be more expensive than initially thought  Corinne reports that she and her  have decided to try to purchase the other house, reporting that she will be putting a deposit on the house today  Corinne reports that she is selling her house to her sister and brother-in-law, reporting that this process has been less stressful as a result  Corinne reports that her Father will be moving into their basement in the new house, reporting her Father will be out of his current place in February  Corinne reports that her dogs are a source of stress for her, reporting anticipatory anxiety related to her Father's dogs potentially moving in with him  Corinne reports that she has considered giving up her dogs, but reports that her children were upset when the idea was brought up  Corinne reports feeling symptoms of guilt at the thought of giving up her dogs  This writer provided psychoeducation about different types of puppy training options  Corinne and this writer processed this at length  Corinne reports that her work has been busy recently, and reports that she is planning to reduce her hours during the summer time  Corinne reports that she was able to effectively communicate with an employee, and reports that this had an positive effect  Provided ongoing psychoeducation  Discussed skill use    Discussed, modeled, and reviewed effective forms of communication  (P) Corinne plans to attend her next psychotherapy appointment  Corinne plans to continue monitoring her cycles, symptoms, and stressors  Corinne plans to review and prepare her son for an intake session at [facility]  Corinne plans to bring her son's IEP plan to the intake session at [facility]  Corinne plans to continue completing all of the requirements for buying a new house  Corinne plans to consider using puppy training options for her dogs  Corinne plans on continuing to use effective forms of communication  Negrita Monzon continues to plans to use coping skills  Corinne plans to continue to utilize deep breathing techniques, being present in the moment, utilizing mindfulness/ meditation, praying, and journaling as needed  Corinne plans to continue to work through anticipatory anxiety in relation to confrontation with others, specifically at work, and 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  Corinne plans to continue setting healthy limits and boundaries  Corinne plans to outreach to natural supports as needed  Review of Systems      Objective:     Physical Exam      (A) Corinne's mood presented as somewhat anxious  Corinne's affect appeared to be congruent  Corinne describes decreasing symptoms of anxiety and depression  Corinne describes a fluctuating mood, and describes anticipatory anxiety related to experiencing an increase in symptoms again  Corinne was alert and oriented x3  Corinne presents as future-oriented and forward-thinking  Corinne denies SI, HI, and SH at this time  Corinne does not meet criteria of frank for this time  This note was written by JUSTIN Husain Candidate, reviewed, supervised, and approved by JUSTIN Slater, [facility]

## 2020-01-02 DIAGNOSIS — F41.9 ANXIETY: ICD-10-CM

## 2020-01-02 RX ORDER — BUSPIRONE HYDROCHLORIDE 5 MG/1
5 TABLET ORAL 3 TIMES DAILY
Qty: 90 TABLET | Refills: 5 | Status: SHIPPED | OUTPATIENT
Start: 2020-01-02 | End: 2020-07-07

## 2020-01-09 ENCOUNTER — TELEPHONE (OUTPATIENT)
Dept: BEHAVIORAL/MENTAL HEALTH CLINIC | Facility: CLINIC | Age: 40
End: 2020-01-09

## 2020-01-09 NOTE — TELEPHONE ENCOUNTER
This writer spoke with Woodrow Corey who reported that she needed to cancel her next two appointments due to other appointments  Reviewed availability, and offered sessions  Corinne scheduled for 02/21/20 @ [facility], as she reports she is not able to come in earlier with the times available  Will continue to monitor for a potential earlier opening that accomodates Corinne's schedule

## 2020-01-10 DIAGNOSIS — F32.A DEPRESSION, UNSPECIFIED DEPRESSION TYPE: ICD-10-CM

## 2020-01-10 DIAGNOSIS — F41.9 ANXIETY: ICD-10-CM

## 2020-01-10 RX ORDER — DULOXETIN HYDROCHLORIDE 60 MG/1
CAPSULE, DELAYED RELEASE ORAL
Qty: 30 CAPSULE | Refills: 0 | OUTPATIENT
Start: 2020-01-10

## 2020-01-13 RX ORDER — DULOXETIN HYDROCHLORIDE 60 MG/1
60 CAPSULE, DELAYED RELEASE ORAL DAILY
Qty: 30 CAPSULE | Refills: 5 | Status: SHIPPED | OUTPATIENT
Start: 2020-01-13 | End: 2020-07-07

## 2020-01-13 RX ORDER — DULOXETIN HYDROCHLORIDE 30 MG/1
CAPSULE, DELAYED RELEASE ORAL
Qty: 30 CAPSULE | Refills: 0 | Status: SHIPPED | OUTPATIENT
Start: 2020-01-13 | End: 2020-02-10

## 2020-01-20 ENCOUNTER — SOCIAL WORK (OUTPATIENT)
Dept: BEHAVIORAL/MENTAL HEALTH CLINIC | Facility: CLINIC | Age: 40
End: 2020-01-20
Payer: COMMERCIAL

## 2020-01-20 DIAGNOSIS — F33.2 SEVERE EPISODE OF RECURRENT MAJOR DEPRESSIVE DISORDER, WITHOUT PSYCHOTIC FEATURES (HCC): Primary | ICD-10-CM

## 2020-01-20 DIAGNOSIS — F41.0 SEVERE ANXIETY WITH PANIC: ICD-10-CM

## 2020-01-20 PROCEDURE — 90834 PSYTX W PT 45 MINUTES: CPT | Performed by: SOCIAL WORKER

## 2020-01-20 NOTE — PSYCH
Assessment/Plan:      Diagnoses and all orders for this visit:    Severe episode of recurrent major depressive disorder, without psychotic features ([facility] [address] )    Severe anxiety with panic        Subjective:     Patient ID: Rosetta Najera is a 35 y o  female  HPI     11:04am-11:55am      (D) Corinne attended her follow-up psychotherapy appointment  Corinne reports that since her last session she met with contractors to discuss specific features and details about her new house  Corinne reports she and her  had a plan going into designing their house  Corinne reports that she has been trusting the process, reporting everything has fallen into place  Corinne reports they are going to break ground on the new house this week, reporting the time frame given to them has been about 4-6 months to get the house built  Corinne reports that she and her family cleared the basement to make room for a bed and dresser for her father to live in the basement  Corinne reports that there has been some uncertainty as to the exact time her father will be required to leave when his house foreclosure  Corinne reports that she and her father communicated about not bringing the dogs with him when he moves in  Corinne reports that her father told her he was not going to post about re-homing his dogs on Facebook, but reported that he posted about them anyway  Corinne reports that she felt frustrated about his post, reporting that she felt people were being judgmental without knowing the whole story  Corinne reports that her  brought up her father and finances "as a joke," reporting that this caused her to panic about not having enough finances with her move  Corinne reports that her father will be paying them rent when he lives with them  Corinne reports that she informed her coworkers about buying a new house, reporting that her coworkers responded positively to this    Corinne reports that her father informed her brother that he is going to be moving in with her and her family  Corinne reports that her brother responded positively  Corinne reports that her brother texted her the next day "Good luck with Dad," which she reports being unsure how to take  Corinne reports that she was able to effectively respond to her brother and reports that he has not responded to her  Corinne reports that her son is seeing a therapist at [facility]  Corinne reports that this therapist informed her that she needed to follow through with her discipline and consequences she gives to her son  Corinne reports that her  has also told her to be more consistent, reporting that she "feels bad" when she gives him consequences  Corinne reports that she is able to identify this is an issue she needs to work on  This writer and Corinne processed this at length  Corinne reports that her son has been on a Faith retreat in [place] for the past week, reporting that he will be coming home today  Corinne reports that her son had anticipatory anxiety about attending the retreat, reporting that he has since told them that he is glad he went  Corinne reports that her son has told her that he has been having difficulty sleeping  This writer and Corinne processed this at length  Corinne reports that she was able to effectively communicate to her son about depression and suicidal ideation  Corinne reports that her son told her that he would talk to someone if he ever had suicidal ideations  Corinne reports that her son does not open up to his father about his feelings, emotions, and mental health the way he opens up to her  Corinne discussed her own symptoms of nervousness, worrying, and anxiety in relation to this  This writer and Corinne processed this at length  Corinne reports that her medication has been helpful in managing her symptoms and stressful  Provided ongoing psychoeducation    Discussed coping skill use, particularly praying and going to [employer]  Discussed, modeled, and reviewed effective forms of communication  Discussed healthy limits and boundaries  (P) Corinne plans to attend her next follow-up psychotherapy appointment  Corinne plans to continue working with her  and father to prepare for her father moving in with them  Corinne plans to continue using effective forms of communication with her son about mental health and positive coping skills  Corinne plans to lean into natural supports, pray, and implement deep breathing techniques, and meditation/ mindfulness techniques  Corinne plans to continue setting healthy limits and boundaries  Corinne plans to continue using varying skills of grounding techniques, distress tolerance skills, coping skills, and distraction techniques  Corinne plans to reach out to natural supports as needed  Review of Systems      Objective:     Physical Exam      (A) Corinne's mood presented as somewhat depressed and anxious  Corinne's affect appeared to be congruent  Corinne described a decrease in symptoms of sadness, worrying, nervousness, anxiety, and irritability  Corinne's speech presented at a normal rate, volume, and rhythm  Corinne appeared to be alert and oriented x3  Corinne denies SI, HI, and SH at this time  Corinne does not appear to be endorsing criteria for frank at this time  Corinne reports that she notices that prior to her menstrual cycle, her symptoms increase  Discussed planning and preparing with working ahead in relation to this  This note was written by Diamond ANDERSON, Candidate  This note was reviewed and approved by JUSTIN Navarrete, LCSW

## 2020-02-10 DIAGNOSIS — F32.A DEPRESSION, UNSPECIFIED DEPRESSION TYPE: ICD-10-CM

## 2020-02-10 RX ORDER — DULOXETIN HYDROCHLORIDE 30 MG/1
CAPSULE, DELAYED RELEASE ORAL
Qty: 30 CAPSULE | Refills: 0 | Status: SHIPPED | OUTPATIENT
Start: 2020-02-10 | End: 2020-03-16

## 2020-02-21 ENCOUNTER — SOCIAL WORK (OUTPATIENT)
Dept: BEHAVIORAL/MENTAL HEALTH CLINIC | Facility: CLINIC | Age: 40
End: 2020-02-21
Payer: COMMERCIAL

## 2020-02-21 DIAGNOSIS — F41.0 SEVERE ANXIETY WITH PANIC: Primary | ICD-10-CM

## 2020-02-21 DIAGNOSIS — F33.0 MILD EPISODE OF RECURRENT MAJOR DEPRESSIVE DISORDER (HCC): ICD-10-CM

## 2020-02-21 PROCEDURE — 1036F TOBACCO NON-USER: CPT | Performed by: SOCIAL WORKER

## 2020-02-21 PROCEDURE — 90834 PSYTX W PT 45 MINUTES: CPT | Performed by: SOCIAL WORKER

## 2020-02-21 NOTE — PSYCH
Assessment/Plan:      Diagnoses and all orders for this visit:    Severe anxiety with panic    Mild episode of recurrent major depressive disorder (HCC)        Subjective:     Patient ID: Chikis العلي is a 38 y o  female  HPI     12:42pm-1:49pm     (D) Corinne attended her follow up psychotherapy session with this writer today  Corinne reports that since her last session, she has noticed fluctuating symptoms of sadness, some depression, worrying, anxiety, and nervousness at times  Corinne describes psychosocial stressors related to the loss of her friend's mother from cancer, who was a client of Corinne and described feeling triggered with grief reaction, to the loss of her own mother  Processed this at length  Corinne also discussed that her father moved in with her and her family (2) weeks ago and reports that overall this is going well; however, has had some issues medically, reporting that he was sick with bronchitis, which resulted in further testing with an ultrasound of his heart, where they discovered that fluid in his lungs and fluid around his heart, resulting in learning that 50% of his heart is not working properly  Corinne reports that she is going to take him to a cardiology appointment on Monday  Corinne describes increased fluctuating symptoms surrounding this, feeling stressed out and overwhelmed  Corinne also reports that they house will be move in ready in 2 5 months and she hasn't packed yet  Corinne described her feelings and emotions in relation to this, along with her joshua, and challenges in relation to this  Corinne described her own anticipatory anxiety and fear of dying, others dying around her, and losing other people she loves  Discussed and processed this at length  Provided ongoing psychoeucation  Reviewed ongoing skills  Discussed limits and boundaries  Modeled effective forms of communication during session       (P) Corinne plans to continue to practice her joshua, pray, utilize deep breathing, and journaling  Corinne plans to continue to utilize effective skills to self-manage symptoms  Corinne plans to work on talking about and expressing her feelings to her natural supports while asserting herself, setting healthy limits and boundaries, utilizing ongoing skills to self-manage symptoms  Corinne plans to continue on prioritizing her mental health needs at this time  Corinne plans to continue to work on utilizing ongoing forms of effective communication  Corinne plans to continue to monitor and track her symptoms, cycles, and stressors to further explore how stressors and triggers impact overall symptom presentation  Corinne plans to outreach for additional support as needed  Review of Systems      Objective:     Physical Exam      (A) Corinnes speech presented at a normal rate, volume, and rhythm  Corinne presented with good eye contact  Corinne presented as alert and oriented x3  Laronjason Witt continues to present as future oriented and demonstrated forward thinking during session  Nayelyhayes Witt continues to deny that she is experiencing any evident or immediate risk factors for self-harm, SI, or HI  Corinnes mood presented as depressed and anxious and her affect appeared to be congruent and tearful at times  Corinne describes fluctuating symptoms of sadness, depression, worrying, and anxiety; however, reports that she has been able to manage this with skills  Corinne describes ongoing symptoms of grief

## 2020-03-09 ENCOUNTER — SOCIAL WORK (OUTPATIENT)
Dept: BEHAVIORAL/MENTAL HEALTH CLINIC | Facility: CLINIC | Age: 40
End: 2020-03-09
Payer: COMMERCIAL

## 2020-03-09 DIAGNOSIS — F41.0 SEVERE ANXIETY WITH PANIC: ICD-10-CM

## 2020-03-09 DIAGNOSIS — F33.0 MILD EPISODE OF RECURRENT MAJOR DEPRESSIVE DISORDER (HCC): Primary | ICD-10-CM

## 2020-03-09 PROCEDURE — 1036F TOBACCO NON-USER: CPT | Performed by: SOCIAL WORKER

## 2020-03-09 PROCEDURE — 90834 PSYTX W PT 45 MINUTES: CPT | Performed by: SOCIAL WORKER

## 2020-03-09 NOTE — PSYCH
Assessment/Plan:      Diagnoses and all orders for this visit:    Mild episode of recurrent major depressive disorder ([address] )    Severe anxiety with panic        Subjective:     Patient ID: Chikis العلي is a 38 y o  female  HPI     11:04am-11:55am      (D) Corinne attended her follow-up psychotherapy session  Corinne reports that her symptoms have been improving  Corinne reports that her father's medical appointment went well and reports feeling that she felt comfortable with the physician she met with to discuss her Father's health  Corinne reports that she was told he has some fluid in his lung, which is what is impacting the fluid around his heart  Corinne reports that she was told that it is possible that her father has COPD, reporting he would need to see a specialist to confirm  Corinne reports that there have been times when she has been frustrated with her father over "little stuff", reporting that he has not been cleaning up after himself  Corinne reports that her father smokes marijuana and cigarettes, reporting that he usually smokes outside, but reports that sometimes they return home and the house smells like marijuana, and reports having conversations in relation to this  Corinne reports that when she confronted her father, he informed her that he did not notice the smell  Corinne reports feeling frustrated that her father is not aware of this  Corinne reports that the smell of marijuana brings back negative memories of trying it herself, reporting that she had a negative experience that increased her anxiety  This writer and Corinne processed this at length  Corinne reports that they will be visiting the new home she and her  are building, today to look at the [facility] [address] and ensure the layout is acceptable  Corinne reports that they will have a settlement in April    Corinne reports that they have been worried about their finances, reporting that they were calculating their expenses and what they still want to add to their house  This writer and Corinne processed this at length  Corinne describes increased anxiety on Saturdays at work at times, reporting she feels "jittery," and her hands shake  Corinne reports that on Saturdays her work is very busy  Corinne reports that she has been frustrated with her son and his failing grades  Corinne reports that her son does not want to listen to her when she tells him to do his work  Corinne reports that she reached out to his , reporting that she hears back from the teacher  Corinne reports that she was informed that her son needs to put more effort in his work  Corinne reports that on Saturday, she received notification that he was in danger of failing two of his classes  Corinne reports she subsequently reached out to his teachers, , and principal about his struggles  Corinne reports that only one of his teachers reached out in response  Corinne reports that she wants to schedule a meeting in person with his teachers, , , and principal to work together to see how to help her son  Corinne reports that she asked her son to tell her what he cares about, reporting that he was unable to give her an answer  Corinne reports feeling concerned about her son, feeling upset that she does not know how to help him  This writer and Corinne processed this at length  Provided ongoing psychoeducation  Discussed effective coping skill use  Discussed setting healthy limits and boundaries  Discussed, modeled, and reviewed effective forms of communication  (P) Corinne plans to attend her follow up psychotherapy session  Corinne plans to work on being present in the moment, actively practicing meditation/ mindfulness, while utilizing deep breathing techniques  Corinne plans to speak to her son's therapist today about her concerns about his recent struggles    Corinne plans to speak to his therapist and his teachers at school to have the [facility] ADHD assessment scale administered to her son to see if he has ADHD, inattentive type  Corinne plans to schedule an appointment with her son's PCP to get his blood checked in order to rule out medical issues  Corinne plans to communicate with her father about her concerns about his marijuana use, by expressing her feelings and using effective forms of communication  Corinne plans to consider discussing with her father the financial issues with their new house and what he is paying for rent  Corinne plans to continue using effective forms of communication  Corinne plans to continue setting healthy limits and boundaries  Corinne plans to continue using effective coping skills to manage fluctuating symptoms  Corinne plans to prioritize her mental health and needs  Corinne plans to reach out to natural supports as needed  Review of Systems      Objective:     Physical Exam      (A) Corinne's mood presented as somewhat depressed and anxious  Corinne's affect appeared to be congruent  Corinne describes symptoms of worrying, nervousness, anxiety, irritability, and poor frustration tolerance  Corinne describes decreasing symptoms of sadness and depression  Corinne appeared to be alert and oriented x3  Corinne's speech presented at a normal rate, volume, and rhythm  Corinne appeared to be future-oriented and forward thinking, discussing future plans during session  Corinne denies any evident or imminent risk of self-harm, SI, or HI at this time  Corinne does not appear to be endorsing criteria for frank at this time  This note was written by Alana Blackburn, JUSTIN candidate, and supervised, reviewed, and approved by Mira ANDERSON, LCSW

## 2020-03-15 DIAGNOSIS — F32.A DEPRESSION, UNSPECIFIED DEPRESSION TYPE: ICD-10-CM

## 2020-03-16 RX ORDER — DULOXETIN HYDROCHLORIDE 30 MG/1
CAPSULE, DELAYED RELEASE ORAL
Qty: 30 CAPSULE | Refills: 0 | Status: SHIPPED | OUTPATIENT
Start: 2020-03-16 | End: 2020-04-13

## 2020-03-30 ENCOUNTER — TELEPHONE (OUTPATIENT)
Dept: BEHAVIORAL/MENTAL HEALTH CLINIC | Facility: CLINIC | Age: 40
End: 2020-03-30

## 2020-04-01 ENCOUNTER — TELEMEDICINE (OUTPATIENT)
Dept: BEHAVIORAL/MENTAL HEALTH CLINIC | Facility: CLINIC | Age: 40
End: 2020-04-01
Payer: COMMERCIAL

## 2020-04-01 DIAGNOSIS — F41.0 SEVERE ANXIETY WITH PANIC: ICD-10-CM

## 2020-04-01 DIAGNOSIS — F33.0 MILD EPISODE OF RECURRENT MAJOR DEPRESSIVE DISORDER (HCC): Primary | ICD-10-CM

## 2020-04-01 PROCEDURE — 90837 PSYTX W PT 60 MINUTES: CPT | Performed by: SOCIAL WORKER

## 2020-04-13 DIAGNOSIS — F32.A DEPRESSION, UNSPECIFIED DEPRESSION TYPE: ICD-10-CM

## 2020-04-13 RX ORDER — DULOXETIN HYDROCHLORIDE 30 MG/1
CAPSULE, DELAYED RELEASE ORAL
Qty: 30 CAPSULE | Refills: 5 | Status: SHIPPED | OUTPATIENT
Start: 2020-04-13 | End: 2020-10-07

## 2020-04-20 ENCOUNTER — TELEMEDICINE (OUTPATIENT)
Dept: BEHAVIORAL/MENTAL HEALTH CLINIC | Facility: CLINIC | Age: 40
End: 2020-04-20
Payer: COMMERCIAL

## 2020-04-20 DIAGNOSIS — F41.0 SEVERE ANXIETY WITH PANIC: ICD-10-CM

## 2020-04-20 DIAGNOSIS — F33.0 MILD EPISODE OF RECURRENT MAJOR DEPRESSIVE DISORDER (HCC): Primary | ICD-10-CM

## 2020-04-20 PROCEDURE — 90837 PSYTX W PT 60 MINUTES: CPT | Performed by: SOCIAL WORKER

## 2020-05-11 ENCOUNTER — OFFICE VISIT (OUTPATIENT)
Dept: FAMILY MEDICINE CLINIC | Facility: HOSPITAL | Age: 40
End: 2020-05-11
Payer: COMMERCIAL

## 2020-05-11 VITALS
OXYGEN SATURATION: 98 % | HEART RATE: 91 BPM | WEIGHT: 145.6 LBS | TEMPERATURE: 98.1 F | DIASTOLIC BLOOD PRESSURE: 72 MMHG | BODY MASS INDEX: 24.86 KG/M2 | HEIGHT: 64 IN | SYSTOLIC BLOOD PRESSURE: 110 MMHG

## 2020-05-11 DIAGNOSIS — F41.0 SEVERE ANXIETY WITH PANIC: Primary | ICD-10-CM

## 2020-05-11 DIAGNOSIS — K21.9 GASTROESOPHAGEAL REFLUX DISEASE WITHOUT ESOPHAGITIS: ICD-10-CM

## 2020-05-11 DIAGNOSIS — F33.0 MILD EPISODE OF RECURRENT MAJOR DEPRESSIVE DISORDER (HCC): ICD-10-CM

## 2020-05-11 PROCEDURE — 1036F TOBACCO NON-USER: CPT | Performed by: INTERNAL MEDICINE

## 2020-05-11 PROCEDURE — 3008F BODY MASS INDEX DOCD: CPT | Performed by: INTERNAL MEDICINE

## 2020-05-11 PROCEDURE — 99213 OFFICE O/P EST LOW 20 MIN: CPT | Performed by: INTERNAL MEDICINE

## 2020-05-18 ENCOUNTER — TELEMEDICINE (OUTPATIENT)
Dept: BEHAVIORAL/MENTAL HEALTH CLINIC | Facility: CLINIC | Age: 40
End: 2020-05-18
Payer: COMMERCIAL

## 2020-05-18 DIAGNOSIS — F41.0 SEVERE ANXIETY WITH PANIC: ICD-10-CM

## 2020-05-18 DIAGNOSIS — F33.0 MILD EPISODE OF RECURRENT MAJOR DEPRESSIVE DISORDER (HCC): Primary | ICD-10-CM

## 2020-05-18 PROCEDURE — 90837 PSYTX W PT 60 MINUTES: CPT | Performed by: SOCIAL WORKER

## 2020-06-22 ENCOUNTER — TELEMEDICINE (OUTPATIENT)
Dept: BEHAVIORAL/MENTAL HEALTH CLINIC | Facility: CLINIC | Age: 40
End: 2020-06-22
Payer: COMMERCIAL

## 2020-06-22 DIAGNOSIS — F33.0 MILD EPISODE OF RECURRENT MAJOR DEPRESSIVE DISORDER (HCC): ICD-10-CM

## 2020-06-22 DIAGNOSIS — F41.0 SEVERE ANXIETY WITH PANIC: Primary | ICD-10-CM

## 2020-06-22 PROCEDURE — 90837 PSYTX W PT 60 MINUTES: CPT | Performed by: SOCIAL WORKER

## 2020-07-07 DIAGNOSIS — F41.9 ANXIETY: ICD-10-CM

## 2020-07-07 RX ORDER — DULOXETIN HYDROCHLORIDE 60 MG/1
CAPSULE, DELAYED RELEASE ORAL
Qty: 30 CAPSULE | Refills: 5 | Status: SHIPPED | OUTPATIENT
Start: 2020-07-07 | End: 2021-01-02

## 2020-07-07 RX ORDER — BUSPIRONE HYDROCHLORIDE 5 MG/1
TABLET ORAL
Qty: 90 TABLET | Refills: 5 | Status: SHIPPED | OUTPATIENT
Start: 2020-07-07 | End: 2021-01-02

## 2020-07-20 ENCOUNTER — TELEMEDICINE (OUTPATIENT)
Dept: BEHAVIORAL/MENTAL HEALTH CLINIC | Facility: CLINIC | Age: 40
End: 2020-07-20
Payer: COMMERCIAL

## 2020-07-20 DIAGNOSIS — F33.0 MILD EPISODE OF RECURRENT MAJOR DEPRESSIVE DISORDER (HCC): Primary | ICD-10-CM

## 2020-07-20 DIAGNOSIS — F41.0 SEVERE ANXIETY WITH PANIC: ICD-10-CM

## 2020-07-20 PROCEDURE — 90834 PSYTX W PT 45 MINUTES: CPT | Performed by: SOCIAL WORKER

## 2020-07-20 PROCEDURE — 1036F TOBACCO NON-USER: CPT | Performed by: SOCIAL WORKER

## 2020-07-20 NOTE — PSYCH
Virtual Regular Visit    Assessment/Plan:    Problem List Items Addressed This Visit        Other    Depression, major, recurrent ([facility] [address] ) - Primary    Severe anxiety with panic        Reason for visit is   Chief Complaint   Patient presents with    Virtual Regular Visit      Encounter provider Wicho Guthrie    Provider located at [address] [address] [phone]    Recent Visits  No visits were found meeting these conditions  Showing recent visits within past 7 days and meeting all other requirements     Today's Visits  Date Type Provider Dept   07/20/20 Telemedicine [facility]o Guthrie [facility]   Showing today's visits and meeting all other requirements     Future Appointments  No visits were found meeting these conditions  Showing future appointments within next 150 days and meeting all other requirements        The patient was identified by name and date of birth  Bob Bernabe was informed that this is a telemedicine visit and that the visit is being conducted through [address] and patient was informed that this is not a secure, HIPAA-complaint platform  She agrees to proceed     My office door was closed  No one else was in the room  She acknowledged consent and understanding of privacy and security of the video platform  The patient has agreed to participate and understands they can discontinue the visit at any time  Patient is aware this is a billable service  Subjective  Bob Bernabe is a 38 y o  female        HPI     Past Medical History:   Diagnosis Date    Anxiety     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[facility] [address] )        Past Surgical History:   Procedure Laterality Date    APPENDECTOMY      extensive lysis of adhesions, MAYURI's procedure (divisions of MAYURI's bands) detorsion of vulvulus, widening of mesenteric pedicle, cecopeexy, appendectomy       BACK SURGERY Right 2013    SF: L4-L5 hemilaminectomy for discectomy  Use of the microscope for microdissection, Use og 40mg of depo-medrol though thee exiting right L5 nerve root  Onset:13     SECTION      x2    CHOLECYSTECTOMY      GALLBLADDER SURGERY      GV, Onset:     LAPAROSCOPIC LYSIS INTESTINAL ADHESIONS      onset: 15    LUMBAR DISC SURGERY      [facility] LAP,DIAGNOSTIC ABDOMEN N/A 2016    Procedure: LAPAROSCOPY DIAGNOSTIC, EXTENSIVE LYSIS OF ADHESIONS, MAYURI'S PROCEDURE(DIVISION OF MAYURI'S BANDS,DETORSION OF VOLVULUS, WIDENING OF MESENTERIC PEDICLE, CECOPEXY, APPENDECTOMY); Surgeon: Sal Hathaway MD;  Location:  MAIN OR;  Service: General    TUBAL LIGATION         Current Outpatient Medications   Medication Sig Dispense Refill    ALPRAZolam (XANAX) 0 5 mg tablet Take one tablet q12 h prn anxiety 21 tablet 0    busPIRone (BUSPAR) 5 mg tablet take 1 tablet by mouth three times a day 90 tablet 5    DULoxetine (CYMBALTA) 30 mg delayed release capsule take 1 capsule by mouth once daily 30 capsule 5    DULoxetine (CYMBALTA) 60 mg delayed release capsule take 1 capsule by mouth once daily WITH 30 MG  30 capsule 5    multivitamin (THERAGRAN) TABS Take 1 tablet by mouth daily      pantoprazole (PROTONIX) 40 mg tablet take 1 tablet by mouth once daily (Patient not taking: Reported on 2020) 30 tablet 0    Probiotic Product (PROBIOTIC-10 PO) Take by mouth       No current facility-administered medications for this visit  Allergies   Allergen Reactions    Biaxin [Clarithromycin] GI Intolerance and Other (See Comments)    Sulfa Antibiotics     Sulfasalazine     Venlafaxine GI Intolerance, Vomiting and Other (See Comments)     Category: Allergy; Category: Allergy; Review of Systems    Video Exam    There were no vitals filed for this visit  Physical Exam     (D) Corinne attended her follow up psychotherapy session today   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, and discussed changes that they have had to make to take additional safety measures they have to take in order to stay open, and stressors related to this  Corinne discussed some interpersonal relationship stressors at work, and processed this, identifying that she struggles with conflict at times  Corinne reports that she has been putting in extra hours at work, to get caught up from the pandemic  Corinne reports that she is scheduled to go on vacation next week, and is looking forward to this  Corinne reports that her bank is still investigating her debt card getting hacked, and was able to get her money back  Corinne reports that since her last session, her son started taking zoloft 50mg and reports that she has noticed positive changes in her son, despite recognizing that he has been more tired  Corinne describes some stressors with her father who is living with them, reporting that he consumes alcohol a lot, reporting that when he comes home from work, and on the weekends, he drinks a lot  Corinne reports that she address this with her , and described her father as becoming defensive about this  Corinne spent time discussing her father's history with marijuana and alcohol, from a very young age  Corinne and this writer processed this at length  Modeled effective forms of communication  Discussed ongoing skills  Reviewed limits and boundaries  Provided ongoing psychoeducation  (A) Corinne presented as forward thinking, demonstrated by her discussing upcoming plans, and identified as reasons to live  Corinne denies any evident or immediate risk factors for self-harm, SI, or HI  Corinne presented as alert and oriented x3  Corinne presented with good eye contact  Corinne's speech presented at a normal rate, volume, and rhythm  Corinne denies any symptoms of grandiose thinking, impulsivity, elevated/ hyperactive moods, or frank   Corinne does not appear to be endorsing criteria for frank at this time  Corinne describes a decrease in the intensity and frequency of symptoms since her last session  Corinne's mood presented as anxious and her affect appeared to be congruent  Corinne describes some symptoms of nervousness, worrying, and anxiety  Corinne describes herself as feeling tired and having little energy  (P) Corinne plans to complete the Sonny Co-Dependency Assessment Inventory and bring it to her next session to further explore co-dependency characteristics  Corinne plans to work on addressing interpersonal relationship stressors with effective forms of communication  Corinne plans to sit down with her father and have a conversation with him, to discuss expectations and concerns  Corinne plans to continue to explore unmet needs, ask for what she needs, and express her feelings  Corinne plans to prioritize her mental health needs  Corinne plans to implement ongoing limits and boundaries  Corinne plans to lean into her natural supports  Corinne plans to actively be present in the moment  Corinne plans to increase deep breathing techniques  Corinne plans to continue to lean into her joshua  Corinne plans to implement ongoing coping skills, distraction skills, distraction skills, and grounding techniques to self-manage symptoms  Corinne plans to continue to structure her schedule, and increase balance  Corinne plans to practice radical acceptance  Corinne plans to outreach for additional support as needed  I spent 45 minutes directly with the patient during this visit  VIRTUAL VISIT DISCLAIMER    Corinne S Niles Reilly acknowledges that she has consented to an online visit or consultation  She understands that the online visit is based solely on information provided by her, and that, in the absence of a face-to-face physical evaluation by the physician, the diagnosis she receives is both limited and provisional in terms of accuracy and completeness   This is not intended to replace a full medical face-to-face evaluation by the physician  Corinne S Kulp understands and accepts these terms

## 2020-08-17 ENCOUNTER — TELEMEDICINE (OUTPATIENT)
Dept: BEHAVIORAL/MENTAL HEALTH CLINIC | Facility: CLINIC | Age: 40
End: 2020-08-17
Payer: COMMERCIAL

## 2020-08-17 DIAGNOSIS — F33.0 MILD EPISODE OF RECURRENT MAJOR DEPRESSIVE DISORDER (HCC): Primary | ICD-10-CM

## 2020-08-17 DIAGNOSIS — F41.0 SEVERE ANXIETY WITH PANIC: ICD-10-CM

## 2020-08-17 PROCEDURE — 90834 PSYTX W PT 45 MINUTES: CPT | Performed by: SOCIAL WORKER

## 2020-09-02 ENCOUNTER — TELEPHONE (OUTPATIENT)
Dept: BEHAVIORAL/MENTAL HEALTH CLINIC | Facility: CLINIC | Age: 40
End: 2020-09-02

## 2020-09-28 ENCOUNTER — TELEMEDICINE (OUTPATIENT)
Dept: BEHAVIORAL/MENTAL HEALTH CLINIC | Facility: CLINIC | Age: 40
End: 2020-09-28
Payer: COMMERCIAL

## 2020-09-28 DIAGNOSIS — F41.0 SEVERE ANXIETY WITH PANIC: ICD-10-CM

## 2020-09-28 DIAGNOSIS — F33.0 MILD EPISODE OF RECURRENT MAJOR DEPRESSIVE DISORDER (HCC): Primary | ICD-10-CM

## 2020-09-28 PROCEDURE — 90834 PSYTX W PT 45 MINUTES: CPT | Performed by: SOCIAL WORKER

## 2020-09-28 NOTE — PSYCH
Virtual Regular Visit    Assessment/Plan:    Problem List Items Addressed This Visit        Other    Depression, major, recurrent ([facility] [address] ) - Primary    Severe anxiety with panic        Reason for visit is   Chief Complaint   Patient presents with    Virtual Regular Visit      Encounter provider Cammie Elizalde    Provider located at [address] [address] [phone]    Recent Visits  No visits were found meeting these conditions  Showing recent visits within past 7 days and meeting all other requirements     Today's Visits  Date Type Provider Dept   09/28/20 Telemedicine Cammie Elizalde [facility]   Showing today's visits and meeting all other requirements     Future Appointments  No visits were found meeting these conditions  Showing future appointments within next 150 days and meeting all other requirements      The patient was identified by name and date of birth  Prakash Navarrete was informed that this is a telemedicine visit and that the visit is being conducted through [employer] [address] and patient was informed that this is not a secure, HIPAA-complaint platform  She agrees to proceed     My office door was closed  No one else was in the room  She acknowledged consent and understanding of privacy and security of the video platform  The patient has agreed to participate and understands they can discontinue the visit at any time  Patient is aware this is a billable service  Subjective  Prakash Navarrete is a 38 y o  female      HPI     Past Medical History:   Diagnosis Date    Anxiety     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[facility] [address] )        Past Surgical History:   Procedure Laterality Date    APPENDECTOMY      extensive lysis of adhesions, MAYURI's procedure (divisions of MAYURI's bands) detorsion of vulvulus, widening of mesenteric pedicle, cecopeexy, appendectomy       BACK SURGERY Right 2013    SF: L4-L5 hemilaminectomy for discectomy  Use of the microscope for microdissection, Use og 40mg of depo-medrol though thee exiting right L5 nerve root  Onset:13     SECTION      x2    CHOLECYSTECTOMY      GALLBLADDER SURGERY      GV, Onset:     LAPAROSCOPIC LYSIS INTESTINAL ADHESIONS      onset: 16    LUMBAR DISC SURGERY      [place] LAP,DIAGNOSTIC ABDOMEN N/A 2016    Procedure: LAPAROSCOPY DIAGNOSTIC, EXTENSIVE LYSIS OF ADHESIONS, MAYURI'S PROCEDURE(DIVISION OF MAYURI'S BANDS,DETORSION OF VOLVULUS, WIDENING OF MESENTERIC PEDICLE, CECOPEXY, APPENDECTOMY); Surgeon: Gardiner Sicard, MD;  Location:  MAIN OR;  Service: General    TUBAL LIGATION         Current Outpatient Medications   Medication Sig Dispense Refill    ALPRAZolam (XANAX) 0 5 mg tablet Take one tablet q12 h prn anxiety 21 tablet 0    busPIRone (BUSPAR) 5 mg tablet take 1 tablet by mouth three times a day 90 tablet 5    DULoxetine (CYMBALTA) 30 mg delayed release capsule take 1 capsule by mouth once daily 30 capsule 5    DULoxetine (CYMBALTA) 60 mg delayed release capsule take 1 capsule by mouth once daily WITH 30 MG  30 capsule 5    multivitamin (THERAGRAN) TABS Take 1 tablet by mouth daily      pantoprazole (PROTONIX) 40 mg tablet take 1 tablet by mouth once daily (Patient not taking: Reported on 2020) 30 tablet 0    Probiotic Product (PROBIOTIC-10 PO) Take by mouth       No current facility-administered medications for this visit  Allergies   Allergen Reactions    Biaxin [Clarithromycin] GI Intolerance and Other (See Comments)    Sulfa Antibiotics     Sulfasalazine     Venlafaxine GI Intolerance, Vomiting and Other (See Comments)     Category: Allergy; Category: Allergy; Review of Systems    Video Exam    There were no vitals filed for this visit  Physical Exam     (D) Corinne attended her follow up psychotherapy session today   Corinne reports that since her last session, she has struggled with fluctuating symptoms in relation to interpersonal relationship stressors  Corinne reported that since her last session, she disciplined her son by taking his phone away, resulting in her son running away from the home  Corinne reports that her and her  had to get the police involved, and reports that she eventually found her son at [place] where her mother is buried  Discussed and processed this  Corinne also spent time discussing interpersonal relationship stressors between her and her father, and an argument they got into, triggering past feelings of her upbringing, the dynamic of their relationship, along with his relationship with her mother  Discussed ongoing co-dependency characteristics, and Corinne communicated that she continues to read, Co-Dependent No More  Corinne and this writer processed this at length  Provided ongoing psychoeducation  Discussed ongoing skills  Modeled effective forms of communication  Reviewed limits and boundaries  (A) Corinne's speech presented at a normal rate, volume, and rhythm  Corinne presented as alert and oriented x3  Corinne presented with good eye contact  Corinne denies any evident or immediate risk factors for self-harm, SI, or HI  Corinne presented as forward thinking during session, discussed upcoming plans, and identified reasons to live  Corinne denies any symptoms of frank, impulsivity, hyperactive/ elevated moods, or grandiose thinking  Corinne does not appear to be endorsing criteria for frank at this time  Corinne describes fluctuating symptoms of poor frustration tolerance, irritability, and some anger in relation to interpersonal relationship stressors  Corinne describes symptoms of sadness, depression, and lower moods  Corinne describes symptoms of nervousness, worrying, and elevated symptoms of anxiety   Corinne's mood presented as anxious and depressed and her affect appeared to be tearful throughout the session  (P) Corinne plans to continue to read Co-Dependant No More, and highly areas where she feels resonates with her, to bring back to session to discuss and process in attempts to gain further self-awareness in relation to this  Corinne plans to specifically identify a plan to sit down with her father, have a conversation where she is able to express her feelings, utilize effective forms of communication, and discuss/ review expectations with the ongoing adjustment of living together  Corinne plans to continue to work with her son's mental health providers, and explore family therapy services through her son's provider's office  Corinne plans to actively take time for herself  Corinne plans to utilize ongoing self-care, and prioritize her needs  Corinne plans to continue to go for walks, structure her schedule, increase balance, utilize mindfulness, meditation, and deep breathing techniques  Corinne plans to ask for what she needs and lean into her natural supports, and joshua community  Corinne plans to target fluctuating symptoms with ongoing coping skills, distraction techniques, distress tolerance skills, and grounding skills, while actively practicing radical acceptance, and utilizing opposite action skills  Corinne plans to outreach for additional support as needed  I spent 70 minutes directly with the patient during this visit  VIRTUAL VISIT DISCLAIMER    Corinne S Meri Alex acknowledges that she has consented to an online visit or consultation  She understands that the online visit is based solely on information provided by her, and that, in the absence of a face-to-face physical evaluation by the physician, the diagnosis she receives is both limited and provisional in terms of accuracy and completeness  This is not intended to replace a full medical face-to-face evaluation by the physician  Corinne S Kulp understands and accepts these terms

## 2020-10-07 DIAGNOSIS — F32.A DEPRESSION, UNSPECIFIED DEPRESSION TYPE: ICD-10-CM

## 2020-10-07 RX ORDER — DULOXETIN HYDROCHLORIDE 30 MG/1
CAPSULE, DELAYED RELEASE ORAL
Qty: 30 CAPSULE | Refills: 5 | Status: SHIPPED | OUTPATIENT
Start: 2020-10-07 | End: 2021-04-05

## 2020-10-12 ENCOUNTER — OFFICE VISIT (OUTPATIENT)
Dept: FAMILY MEDICINE CLINIC | Facility: HOSPITAL | Age: 40
End: 2020-10-12
Payer: COMMERCIAL

## 2020-10-12 VITALS
HEIGHT: 64 IN | DIASTOLIC BLOOD PRESSURE: 60 MMHG | OXYGEN SATURATION: 99 % | TEMPERATURE: 98 F | BODY MASS INDEX: 25.13 KG/M2 | SYSTOLIC BLOOD PRESSURE: 100 MMHG | HEART RATE: 80 BPM | WEIGHT: 147.2 LBS

## 2020-10-12 DIAGNOSIS — F41.0 SEVERE ANXIETY WITH PANIC: ICD-10-CM

## 2020-10-12 DIAGNOSIS — Z79.899 ENCOUNTER FOR LONG-TERM CURRENT USE OF MEDICATION: ICD-10-CM

## 2020-10-12 DIAGNOSIS — Z13.220 SCREENING, LIPID: ICD-10-CM

## 2020-10-12 DIAGNOSIS — Z13.29 SCREENING FOR THYROID DISORDER: ICD-10-CM

## 2020-10-12 DIAGNOSIS — K21.9 GASTROESOPHAGEAL REFLUX DISEASE WITHOUT ESOPHAGITIS: ICD-10-CM

## 2020-10-12 DIAGNOSIS — Z13.0 SCREENING, ANEMIA, DEFICIENCY, IRON: ICD-10-CM

## 2020-10-12 DIAGNOSIS — F33.0 MILD EPISODE OF RECURRENT MAJOR DEPRESSIVE DISORDER (HCC): Primary | ICD-10-CM

## 2020-10-12 DIAGNOSIS — Z00.00 HEALTH CARE MAINTENANCE: ICD-10-CM

## 2020-10-12 PROBLEM — F41.9 ANXIETY: Status: RESOLVED | Noted: 2018-04-15 | Resolved: 2020-10-12

## 2020-10-12 PROCEDURE — 99396 PREV VISIT EST AGE 40-64: CPT | Performed by: INTERNAL MEDICINE

## 2020-10-12 PROCEDURE — 1036F TOBACCO NON-USER: CPT | Performed by: INTERNAL MEDICINE

## 2020-10-19 ENCOUNTER — TELEMEDICINE (OUTPATIENT)
Dept: BEHAVIORAL/MENTAL HEALTH CLINIC | Facility: CLINIC | Age: 40
End: 2020-10-19
Payer: COMMERCIAL

## 2020-10-19 DIAGNOSIS — F33.0 MILD EPISODE OF RECURRENT MAJOR DEPRESSIVE DISORDER (HCC): Primary | ICD-10-CM

## 2020-10-19 DIAGNOSIS — F41.0 SEVERE ANXIETY WITH PANIC: ICD-10-CM

## 2020-10-19 PROCEDURE — 90834 PSYTX W PT 45 MINUTES: CPT | Performed by: SOCIAL WORKER

## 2020-10-20 LAB
ALBUMIN SERPL-MCNC: 4.1 G/DL (ref 3.8–4.8)
ALBUMIN/GLOB SERPL: 1.6 {RATIO} (ref 1.2–2.2)
ALP SERPL-CCNC: 54 IU/L (ref 39–117)
ALT SERPL-CCNC: 21 IU/L (ref 0–32)
AST SERPL-CCNC: 22 IU/L (ref 0–40)
BASOPHILS # BLD AUTO: 0 X10E3/UL (ref 0–0.2)
BASOPHILS NFR BLD AUTO: 1 %
BILIRUB SERPL-MCNC: 0.4 MG/DL (ref 0–1.2)
BUN SERPL-MCNC: 16 MG/DL (ref 6–24)
BUN/CREAT SERPL: 22 (ref 9–23)
CALCIUM SERPL-MCNC: 9.2 MG/DL (ref 8.7–10.2)
CHLORIDE SERPL-SCNC: 103 MMOL/L (ref 96–106)
CHOLEST SERPL-MCNC: 177 MG/DL (ref 100–199)
CO2 SERPL-SCNC: 26 MMOL/L (ref 20–29)
CREAT SERPL-MCNC: 0.73 MG/DL (ref 0.57–1)
EOSINOPHIL # BLD AUTO: 0.1 X10E3/UL (ref 0–0.4)
EOSINOPHIL NFR BLD AUTO: 1 %
ERYTHROCYTE [DISTWIDTH] IN BLOOD BY AUTOMATED COUNT: 12 % (ref 11.7–15.4)
GLOBULIN SER-MCNC: 2.5 G/DL (ref 1.5–4.5)
GLUCOSE SERPL-MCNC: 94 MG/DL (ref 65–99)
HCT VFR BLD AUTO: 35.8 % (ref 34–46.6)
HDLC SERPL-MCNC: 66 MG/DL
HGB BLD-MCNC: 11.7 G/DL (ref 11.1–15.9)
IMM GRANULOCYTES # BLD: 0 X10E3/UL (ref 0–0.1)
IMM GRANULOCYTES NFR BLD: 0 %
LDLC SERPL CALC-MCNC: 101 MG/DL (ref 0–99)
LDLC/HDLC SERPL: 1.5 RATIO (ref 0–3.2)
LYMPHOCYTES # BLD AUTO: 2.1 X10E3/UL (ref 0.7–3.1)
LYMPHOCYTES NFR BLD AUTO: 44 %
MCH RBC QN AUTO: 29.8 PG (ref 26.6–33)
MCHC RBC AUTO-ENTMCNC: 32.7 G/DL (ref 31.5–35.7)
MCV RBC AUTO: 91 FL (ref 79–97)
MONOCYTES # BLD AUTO: 0.4 X10E3/UL (ref 0.1–0.9)
MONOCYTES NFR BLD AUTO: 9 %
NEUTROPHILS # BLD AUTO: 2.2 X10E3/UL (ref 1.4–7)
NEUTROPHILS NFR BLD AUTO: 45 %
PLATELET # BLD AUTO: 217 X10E3/UL (ref 150–450)
POTASSIUM SERPL-SCNC: 3.9 MMOL/L (ref 3.5–5.2)
PROT SERPL-MCNC: 6.6 G/DL (ref 6–8.5)
RBC # BLD AUTO: 3.93 X10E6/UL (ref 3.77–5.28)
SL AMB EGFR AFRICAN AMERICAN: 118 ML/MIN/1.73
SL AMB EGFR NON AFRICAN AMERICAN: 103 ML/MIN/1.73
SL AMB VLDL CHOLESTEROL CALC: 10 MG/DL (ref 5–40)
SODIUM SERPL-SCNC: 140 MMOL/L (ref 134–144)
TRIGL SERPL-MCNC: 48 MG/DL (ref 0–149)
TSH SERPL DL<=0.005 MIU/L-ACNC: 1.52 UIU/ML (ref 0.45–4.5)
WBC # BLD AUTO: 4.9 X10E3/UL (ref 3.4–10.8)

## 2020-11-23 ENCOUNTER — TELEMEDICINE (OUTPATIENT)
Dept: BEHAVIORAL/MENTAL HEALTH CLINIC | Facility: CLINIC | Age: 40
End: 2020-11-23
Payer: COMMERCIAL

## 2020-11-23 DIAGNOSIS — F41.0 SEVERE ANXIETY WITH PANIC: ICD-10-CM

## 2020-11-23 DIAGNOSIS — F33.0 MILD EPISODE OF RECURRENT MAJOR DEPRESSIVE DISORDER (HCC): Primary | ICD-10-CM

## 2020-11-23 PROCEDURE — 90834 PSYTX W PT 45 MINUTES: CPT | Performed by: SOCIAL WORKER

## 2020-12-04 ENCOUNTER — TELEMEDICINE (OUTPATIENT)
Dept: BEHAVIORAL/MENTAL HEALTH CLINIC | Facility: CLINIC | Age: 40
End: 2020-12-04
Payer: COMMERCIAL

## 2020-12-04 DIAGNOSIS — F41.0 SEVERE ANXIETY WITH PANIC: ICD-10-CM

## 2020-12-04 DIAGNOSIS — F33.0 MILD EPISODE OF RECURRENT MAJOR DEPRESSIVE DISORDER (HCC): Primary | ICD-10-CM

## 2020-12-04 PROCEDURE — 90837 PSYTX W PT 60 MINUTES: CPT | Performed by: SOCIAL WORKER

## 2020-12-14 ENCOUNTER — TELEMEDICINE (OUTPATIENT)
Dept: BEHAVIORAL/MENTAL HEALTH CLINIC | Facility: CLINIC | Age: 40
End: 2020-12-14
Payer: COMMERCIAL

## 2020-12-14 DIAGNOSIS — F41.0 SEVERE ANXIETY WITH PANIC: Primary | ICD-10-CM

## 2020-12-14 PROCEDURE — 90834 PSYTX W PT 45 MINUTES: CPT | Performed by: SOCIAL WORKER

## 2021-01-01 DIAGNOSIS — F41.9 ANXIETY: ICD-10-CM

## 2021-01-02 RX ORDER — DULOXETIN HYDROCHLORIDE 60 MG/1
CAPSULE, DELAYED RELEASE ORAL
Qty: 30 CAPSULE | Refills: 5 | Status: SHIPPED | OUTPATIENT
Start: 2021-01-02 | End: 2021-06-30

## 2021-01-02 RX ORDER — BUSPIRONE HYDROCHLORIDE 5 MG/1
TABLET ORAL
Qty: 90 TABLET | Refills: 5 | Status: SHIPPED | OUTPATIENT
Start: 2021-01-02 | End: 2021-06-30

## 2021-01-07 ENCOUNTER — TELEPHONE (OUTPATIENT)
Dept: OTHER | Facility: OTHER | Age: 41
End: 2021-01-07

## 2021-01-07 NOTE — TELEPHONE ENCOUNTER
[phone]/Angelika Breaux/HUEY 1980/ Patient had exposure with covid positive patient  Al this time not showing any symptoms

## 2021-01-08 DIAGNOSIS — Z20.822 EXPOSURE TO COVID-19 VIRUS: Primary | ICD-10-CM

## 2021-01-08 PROCEDURE — 99441 PR PHYS/QHP TELEPHONE EVALUATION 5-10 MIN: CPT | Performed by: INTERNAL MEDICINE

## 2021-01-08 NOTE — PROGRESS NOTES
COVID-19 Virtual Visit   Note from on-call January 7, 2021    Assessment/Plan:    Problem List Items Addressed This Visit     None         Disposition:     I referred patient to one of our centralized sites for a COVID-19 swab  Patient is to call if she develops any symptoms in the interim if not I would wait to do the swab on Monday as it will be 5 days from her exposure to a co-worker returned as positive    I have spent 7 minutes directly with the patient  Greater than 50% of this time was spent in counseling/coordination of care regarding: risks and benefits of treatment options and instructions for management  Instructed her that current CDC guidelines is 10 days of quarantine with potential exposure-if test is negative then 7 days quarantine is current recommendations       Encounter provider Jaky Castaneda DO    Provider located at [address] [place] [address]    Recent Visits  No visits were found meeting these conditions  Showing recent visits within past 7 days and meeting all other requirements     Future Appointments  No visits were found meeting these conditions  Showing future appointments within next 150 days and meeting all other requirements        Patient agrees to participate in a virtual check in via telephone or video visit instead of presenting to the office to address urgent/immediate medical needs  Patient is aware this is a billable service  After connecting through Telephone, the patient was identified by name and date of birth  Merna Amaya was informed that this was a telemedicine visit and that the exam was being conducted confidentially over secure lines  My office door was closed  No one else was in the room  Merna Amaya acknowledged consent and understanding of privacy and security of the telemedicine visit   I informed the patient that I have reviewed her record in Epic and presented the opportunity for her to ask any questions regarding the visit today  The patient agreed to participate  Subjective:   Sandra Larkin is a 38 y o  female who is concerned about COVID-19  Patient is currently asymptomatic  Patient denies fever, chills, fatigue, malaise, congestion, rhinorrhea, sore throat, anosmia, loss of taste, cough, shortness of breath, chest tightness, abdominal pain, nausea, vomiting, diarrhea, myalgias and headaches  Date of exposure: 1/6/2021    Exposure:   Contact with a person who is under investigation (PUI) for or who is positive for COVID-19 within the last 14 days?: Yes    Hospitalized recently for fever and/or lower respiratory symptoms?: No      Currently a healthcare worker that is involved in direct patient care?: No      Works in a special setting where the risk of COVID-19 transmission may be high? (this may include long-term care, correctional and residential facilities; homeless shelters; assisted-living facilities and group homes ): No      Resident in a special setting where the risk of COVID-19 transmission may be high? (this may include long-term care, correctional and residential facilities; homeless shelters; assisted-living facilities and group homes ): No      Patient reports that her co-worker had traveled to [place] and then had a positive test this week  She works in close quarters with her -although they were wearing masks  No current symptoms    Patient does report that she works closely with the public and on learning of her exposure returned home January 7, 2021    No results found for: Sang Rios, [address]  Past Medical History:   Diagnosis Date    Anxiety     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[facility] [address] )      Past Surgical History:   Procedure Laterality Date    APPENDECTOMY      extensive lysis of adhesions, MAYURI's procedure (divisions of MAYURI's bands) detorsion of vulvulus, widening of mesenteric pedicle, cecopeexy, appendectomy  BACK SURGERY Right 2013    SF: L4-L5 hemilaminectomy for discectomy  Use of the microscope for microdissection, Use og 40mg of depo-medrol though thee exiting right L5 nerve root  Onset:13     SECTION      x2    CHOLECYSTECTOMY      GALLBLADDER SURGERY      GV, Onset:     LAPAROSCOPIC LYSIS INTESTINAL ADHESIONS      onset: 16    LUMBAR DISC SURGERY      [place] LAP,DIAGNOSTIC ABDOMEN N/A 2016    Procedure: LAPAROSCOPY DIAGNOSTIC, EXTENSIVE LYSIS OF ADHESIONS, MAYURI'S PROCEDURE(DIVISION OF MAYURI'S BANDS,DETORSION OF VOLVULUS, WIDENING OF MESENTERIC PEDICLE, CECOPEXY, APPENDECTOMY); Surgeon: Lore Rosenthal MD;  Location:  MAIN OR;  Service: General    TUBAL LIGATION       Current Outpatient Medications   Medication Sig Dispense Refill    ALPRAZolam (XANAX) 0 5 mg tablet Take one tablet q12 h prn anxiety 21 tablet 0    busPIRone (BUSPAR) 5 mg tablet take 1 tablet by mouth three times a day 90 tablet 5    DULoxetine (CYMBALTA) 30 mg delayed release capsule take 1 capsule by mouth once daily 30 capsule 5    DULoxetine (CYMBALTA) 60 mg delayed release capsule take 1 capsule by mouth once daily WITH 30 MG  30 capsule 5    multivitamin (THERAGRAN) TABS Take 1 tablet by mouth daily      Probiotic Product (PROBIOTIC-10 PO) Take by mouth       No current facility-administered medications for this visit  Allergies   Allergen Reactions    Biaxin [Clarithromycin] GI Intolerance and Other (See Comments)    Sulfa Antibiotics     Sulfasalazine     Venlafaxine GI Intolerance, Vomiting and Other (See Comments)     Category: Allergy; Category: Allergy; Review of Systems   Constitutional: Negative for chills, fatigue and fever  HENT: Negative for congestion, rhinorrhea and sore throat  Respiratory: Negative for cough, chest tightness and shortness of breath  Gastrointestinal: Negative for abdominal pain, diarrhea, nausea and vomiting     Musculoskeletal: Negative for myalgias  Neurological: Negative for headaches  Objective: There were no vitals filed for this visit  Physical Exam  VIRTUAL VISIT DISCLAIMER    Corinne S Jeanne Ridges acknowledges that she has consented to an online visit or consultation  She understands that the online visit is based solely on information provided by her, and that, in the absence of a face-to-face physical evaluation by the physician, the diagnosis she receives is both limited and provisional in terms of accuracy and completeness  This is not intended to replace a full medical face-to-face evaluation by the physician  Corinne S Kulp understands and accepts these terms

## 2021-01-11 ENCOUNTER — TELEMEDICINE (OUTPATIENT)
Dept: BEHAVIORAL/MENTAL HEALTH CLINIC | Facility: CLINIC | Age: 41
End: 2021-01-11
Payer: COMMERCIAL

## 2021-01-11 DIAGNOSIS — F33.0 MILD EPISODE OF RECURRENT MAJOR DEPRESSIVE DISORDER (HCC): ICD-10-CM

## 2021-01-11 DIAGNOSIS — F41.0 SEVERE ANXIETY WITH PANIC: Primary | ICD-10-CM

## 2021-01-11 DIAGNOSIS — Z20.822 EXPOSURE TO COVID-19 VIRUS: ICD-10-CM

## 2021-01-11 PROCEDURE — 90837 PSYTX W PT 60 MINUTES: CPT | Performed by: SOCIAL WORKER

## 2021-01-11 PROCEDURE — U0005 INFEC AGEN DETEC AMPLI PROBE: HCPCS | Performed by: INTERNAL MEDICINE

## 2021-01-11 PROCEDURE — U0003 INFECTIOUS AGENT DETECTION BY NUCLEIC ACID (DNA OR RNA); SEVERE ACUTE RESPIRATORY SYNDROME CORONAVIRUS 2 (SARS-COV-2) (CORONAVIRUS DISEASE [COVID-19]), AMPLIFIED PROBE TECHNIQUE, MAKING USE OF HIGH THROUGHPUT TECHNOLOGIES AS DESCRIBED BY CMS-2020-01-R: HCPCS | Performed by: INTERNAL MEDICINE

## 2021-01-11 NOTE — PSYCH
Virtual Regular Visit    Assessment/Plan:    Problem List Items Addressed This Visit        Other    Depression, major, recurrent ([facility] [address] )    Severe anxiety with panic - Primary        Reason for visit is   Chief Complaint   Patient presents with    Virtual Regular Visit      Encounter provider Alice Hernandez    Provider located at [address] [address] [phone]    Recent Visits  No visits were found meeting these conditions  Showing recent visits within past 7 days and meeting all other requirements     Today's Visits  Date Type Provider Dept   01/11/21 Telemedicine Alice Hernandez [facility]   Showing today's visits and meeting all other requirements     Future Appointments  No visits were found meeting these conditions  Showing future appointments within next 150 days and meeting all other requirements      The patient was identified by name and date of birth  Aguila Ku was informed that this is a telemedicine visit and that the visit is being conducted through [facility] [address] and patient was informed that this is not a secure, HIPAA-compliant platform  She agrees to proceed  My office door was closed  No one else was in the room  She acknowledged consent and understanding of privacy and security of the video platform  The patient has agreed to participate and understands they can discontinue the visit at any time  *This note was not shared with the patient due to this being a psychotherapy note  *    Patient is aware this is a billable service  Subjective  Aguila Ku is a 38 y o  female      HPI     Past Medical History:   Diagnosis Date    Anxiety     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[facility] [address] )        Past Surgical History:   Procedure Laterality Date    APPENDECTOMY      extensive lysis of adhesions, MAYURI's procedure (divisions of MAYURI's bands) detorsion of vulvulus, widening of mesenteric pedicle, cecopeexy, appendectomy       BACK SURGERY Right 2013    SF: L4-L5 hemilaminectomy for discectomy  Use of the microscope for microdissection, Use og 40mg of depo-medrol though thee exiting right L5 nerve root  Onset:13     SECTION      x2    CHOLECYSTECTOMY      GALLBLADDER SURGERY      [facility], Onset:     LAPAROSCOPIC LYSIS INTESTINAL ADHESIONS      onset: 16    LUMBAR DISC SURGERY      [place] LAP,DIAGNOSTIC ABDOMEN N/A 2016    Procedure: LAPAROSCOPY DIAGNOSTIC, EXTENSIVE LYSIS OF ADHESIONS, MAYURI'S PROCEDURE(DIVISION OF MAYURI'S BANDS,DETORSION OF VOLVULUS, WIDENING OF MESENTERIC PEDICLE, CECOPEXY, APPENDECTOMY); Surgeon: Yuliana Yusuf MD;  Location: [facility] OR;  Service: General    TUBAL LIGATION       Current Outpatient Medications   Medication Sig Dispense Refill    ALPRAZolam (XANAX) 0 5 mg tablet Take one tablet q12 h prn anxiety 21 tablet 0    busPIRone (BUSPAR) 5 mg tablet take 1 tablet by mouth three times a day 90 tablet 5    DULoxetine (CYMBALTA) 30 mg delayed release capsule take 1 capsule by mouth once daily 30 capsule 5    DULoxetine (CYMBALTA) 60 mg delayed release capsule take 1 capsule by mouth once daily WITH 30 MG  30 capsule 5    multivitamin (THERAGRAN) TABS Take 1 tablet by mouth daily      Probiotic Product (PROBIOTIC-10 PO) Take by mouth       No current facility-administered medications for this visit  Allergies   Allergen Reactions    Biaxin [Clarithromycin] GI Intolerance and Other (See Comments)    Sulfa Antibiotics     Sulfasalazine     Venlafaxine GI Intolerance, Vomiting and Other (See Comments)     Category: Allergy; Category: Allergy; Review of Systems    Video Exam    There were no vitals filed for this visit  Physical Exam     (D) Corinne attended her follow up psychotherapy session today  Corinne reports that since her last session, she has noticed fluctuating symptoms   Corinne reports that she has struggled over the past week she saw on her cameras that her father went out to their garage and grabbed (3) beers in a matter of (15) minutes  Corinne reports that she sent her father a text message in relation to this, reporting that he has a problem, and needs to get help otherwise he can no longer live there  Corinne reports that she also reports smelling marijuana on him at times  Roxanna reports feeling frustrated and overwhelmed with her father's addiction, and the history of addiction with both of her parents  Corinne describes herself as wanting to set limits and boundaries with her father, and have him move out, but struggling with doing this  Corinne reports that she is finding herself getting upset over little things that her father does, describing herself as feeling resentful, and desiring control  Corinne reports that she is having fluctuating symptoms surrounding the second wave, the coronavirus, and the global pandemic  Corinne reports that she 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, that she reports had a rapid COVID test that was negative, and then reports that later the results came back and it was positive  Corinne reported that she outreached to [facility] right away, and reviewed the situation, and reported that she didn't have to close her business, reporting that they were both wearing masks, and taking precautions  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, 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, and didn't close, and 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 at her shop  Corinne reports that she stayed open for the entire week, and then called her PCP today, who recommended that she get tested and quarantine  Corinne reports that she went over and got tested this morning  Corinne reports feeling shame, guilt, and vulnerability   Corinne reports fearing that she didn't make the right decision, and describes symptoms of anticipatory anxiety  This writer and Corinne processed this at length  Reviewed limits and boundaries  Discussed ongoing skills  Provided ongoing psychoeducation  Modeled effective forms of communication  (A) Corinne describes symptoms of feeling nervous, anxious, and on edge, worrying too much about certain things, and becoming easily annoyed and irritable at times  Corinne reports feeling symptoms of feeling down, depressed, and hopeless  Corinne's mood presented as anxious and depressed and her affect appeared to be congruent and tearful at times  Corinne reports feeling overwhelmed at times  Corinne's speech presented at a normal rate, volume, and rhythm  Corinne presented as alert and oriented x3  Corinne presented with good eye contact  Charolett Boards does not appear to be endorsing criteria for frank, and denies symptoms of hyperactive/ elevated moods, frank, grandiose thinking, or impulsivity  Corinne denies any evident or immediate risk factors for self-harm, SI, or HI  Charolett Boards presented as future oriented, discussed upcoming plans, and identified reasons to live  Corinne describes herself as feeling angry towards her father, and reports that she has been ignoring him, reporting feeling uncomfortable in her own home  (P) Corinne plans to continue to work with her PCP in relation to ongoing testing and test results from the COVID-19 test  Corinne plans to follow CDC guidelines and the health departments recommendations in relation to making decisions  Corinne plans to consider having family session with her and her father  Corinne plans to continue to express her needs, ask for what she needs, and continue to utilize healthy and effective forms of communication to address ongoing interpersonal relationship stressors  Corinne plans to continue to identify ongoing ways to implement limits and boundaries, take time for herself, and work on being present in the moment   Corinne plans to lean into natural supports and her joshua, track gratitude, and read devotions  Corinne plans to target fluctuating symptoms with varying skills  Corinne plans to utilize PRN medication as appropriate  Corinne plans to practice radical acceptance, outweigh risks verses benefits, and making informed decisions  Corinne plans to increase positive self-talk, have compassion for herself, and challenge negative thinking traps and cognitive distortions, deciphering between facts verses feelings  Corinne plans to outreach for additional support as needed  I spent 60 minutes directly with the patient during this visit  VIRTUAL VISIT DISCLAIMER    Corinne S Elwood Opal acknowledges that she has consented to an online visit or consultation  She understands that the online visit is based solely on information provided by her, and that, in the absence of a face-to-face physical evaluation by the physician, the diagnosis she receives is both limited and provisional in terms of accuracy and completeness  This is not intended to replace a full medical face-to-face evaluation by the physician  Corinne S Kulp understands and accepts these terms

## 2021-01-12 LAB — SARS-COV-2 RNA SPEC QL NAA+PROBE: NOT DETECTED

## 2021-02-08 ENCOUNTER — TELEMEDICINE (OUTPATIENT)
Dept: BEHAVIORAL/MENTAL HEALTH CLINIC | Facility: CLINIC | Age: 41
End: 2021-02-08
Payer: COMMERCIAL

## 2021-02-08 DIAGNOSIS — F33.0 MILD EPISODE OF RECURRENT MAJOR DEPRESSIVE DISORDER (HCC): ICD-10-CM

## 2021-02-08 DIAGNOSIS — F41.0 SEVERE ANXIETY WITH PANIC: Primary | ICD-10-CM

## 2021-02-08 PROCEDURE — 90834 PSYTX W PT 45 MINUTES: CPT | Performed by: SOCIAL WORKER

## 2021-02-08 NOTE — PSYCH
Virtual Regular Visit    Assessment/Plan:    Problem List Items Addressed This Visit        Other    Depression, major, recurrent ([facility] [address] )    Severe anxiety with panic - Primary        Reason for visit is   Chief Complaint   Patient presents with    Virtual Regular Visit      Encounter provider Chantel Pineda    Provider located at [address] [address]  [phone]    Recent Visits  No visits were found meeting these conditions  Showing recent visits within past 7 days and meeting all other requirements     Today's Visits  Date Type Provider Dept   02/08/21 Telemedicine Chantel Pineda [facility]   Showing today's visits and meeting all other requirements     Future Appointments  No visits were found meeting these conditions  Showing future appointments within next 150 days and meeting all other requirements      The patient was identified by name and date of birth  Arnulfo Ross was informed that this is a telemedicine visit and that the visit is being conducted through [facility] and patient was informed that this is not a secure, HIPAA-compliant platform  She agrees to proceed  My office door was closed  No one else was in the room  She acknowledged consent and understanding of privacy and security of the video platform  The patient has agreed to participate and understands they can discontinue the visit at any time  *This note was not shared with the patient due to it being a psychotherapy note  *     Patient is aware this is a billable service  Subjective  Arnulfo Ross is a 42 y o  female        HPI     Past Medical History:   Diagnosis Date    Anxiety     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[facility] [address] )        Past Surgical History:   Procedure Laterality Date    APPENDECTOMY      extensive lysis of adhesions, MAYURI's procedure (divisions of MAYURI's bands) detorsion of vulvulus, widening of mesenteric pedicle, cecopeexy, appendectomy       BACK SURGERY Right 2013    SF: L4-L5 hemilaminectomy for discectomy  Use of the microscope for microdissection, Use og 40mg of depo-medrol though thee exiting right L5 nerve root  Onset:13     SECTION      x2    CHOLECYSTECTOMY      GALLBLADDER SURGERY      [facility], Onset:     LAPAROSCOPIC LYSIS INTESTINAL ADHESIONS      onset: 16    LUMBAR DISC SURGERY      [place] LAP,DIAGNOSTIC ABDOMEN N/A 2016    Procedure: LAPAROSCOPY DIAGNOSTIC, EXTENSIVE LYSIS OF ADHESIONS, MAYURI'S PROCEDURE(DIVISION OF MAYURI'S BANDS,DETORSION OF VOLVULUS, WIDENING OF MESENTERIC PEDICLE, CECOPEXY, APPENDECTOMY); Surgeon: Estrella Garcia MD;  Location:  MAIN OR;  Service: General    TUBAL LIGATION         Current Outpatient Medications   Medication Sig Dispense Refill    ALPRAZolam (XANAX) 0 5 mg tablet Take one tablet q12 h prn anxiety 21 tablet 0    busPIRone (BUSPAR) 5 mg tablet take 1 tablet by mouth three times a day 90 tablet 5    DULoxetine (CYMBALTA) 30 mg delayed release capsule take 1 capsule by mouth once daily 30 capsule 5    DULoxetine (CYMBALTA) 60 mg delayed release capsule take 1 capsule by mouth once daily WITH 30 MG  30 capsule 5    multivitamin (THERAGRAN) TABS Take 1 tablet by mouth daily      Probiotic Product (PROBIOTIC-10 PO) Take by mouth       No current facility-administered medications for this visit  Allergies   Allergen Reactions    Biaxin [Clarithromycin] GI Intolerance and Other (See Comments)    Sulfa Antibiotics     Sulfasalazine     Venlafaxine GI Intolerance, Vomiting and Other (See Comments)     Category: Allergy; Category: Allergy; Review of Systems    Video Exam    There were no vitals filed for this visit  Physical Exam     (D) Corinne attended her follow up psychotherapy session today   Corinne reports that since her last session, she has struggled with sleep over the past couple of days, reporting that she is waking up throughout the night  Corinne reports that after her last sessions he got tested for the coronavirus, and her results came back negative  Corinne reported relief in relation to this  Corinne reports that one of her co-worker's 's tested positive for the coronavirus  Corinne discussed stressors in relation to this  Corinne discussed anticipatory anxiety in relation to the COVID-19 vaccine, reporting that she decided that she doesn't want to get the vaccine and will not be vaccinated her children  Corinne discussed stressors related to colleagues and other people bringing this up to her, and reports anxiety surrounding this  Corinne discussed psychosocial stressors related to the coronavirus, the global pandemic, and the second wave  Corinne discussed stressors related to her father's struggle with alcoholism, finding empty beer cans in his room, hiding them in trash bags, and discussed frustration in relation to this  Corinne discussed feelings of anger, irritability, poor frustration tolerance, and feelings of hurt  Corinne discussed struggling with recently turning 42years old and getting older  Corinne discussed anxiety surrounding throwing a surprise party for her  and stressors related to the virus  Corinne and this writer processed this at length  Modeled effective forms of communication  Provided ongoing psychoeducation  Discussed ongoing skills  Reviewed limits and boundaries  (A) Corinne reports a decrease in the intensity of anxiety since her last session  Corinne reports becoming easily annoyed, reporting symptoms of irritability, and poor frustration tolerance  Corinne reports some symptoms of feeling down and depressed, reporting some sleep disturbances at night over the past several days  Corinne reports feeling tired and having little energy  Corinne describes symptoms of intrusive, ruminating, and repetitive thoughts   Corinne's mood presented as depressed, irritable, and anxious, and her affect appeared to be congruent and tearful at times  Corinne presented with good eye contact  Presented as alert and oriented x3  Speech presented at a normal rate, volume, and rhythm  Denies any evident or immediate risk factors for self-harm, SI, or HI  Presented as forward thinking, discussed upcoming plans, and identified reasons to live  Corinne does not appear to be endorsing criteria for frnak, reporting grandiose thinking, frank, hyperactive/ elevated moods  Corinne does not appear to be endorsing criteria for frank at this time  (P) Corinne plans to work on [employer], and working on developing a letter to her father to work on in therapy  Corinne plans to work on asking for what she needs, exploring unmet needs, and advocating for herself, and asserting herself  Corinne plans to utilize deep breathing techniques, and using mindfulness and meditation techniques  Corinne plans to prioritize her mental health symptoms, and utilize ongoing self-care  Corinne plans to utilize effective forms of communication to express herself, and address interpersonal relationship stressors  Corinne plans to target fluctuating symptoms with ongoing skills  Corinne plans to lean into natural supports  Corinne plans to identify ongoing ways to implement limits and boundaries  Corinne plans to practice radical acceptance, outweighing risks verses benefits in order to make informed decisions, and focus on what is in her control  Corinne plans to work on challenging negative thinking traps and cognitive distortions  Corinne plans to reframe from negative engagement with others, and increase active listening skills  Corinne plans to outreach for additional support as needed  I spent 50 minutes directly with the patient during this visit         VIRTUAL VISIT DISCLAIMER    Corinne S Myra Hiss acknowledges that she has consented to an online visit or consultation  She understands that the online visit is based solely on information provided by her, and that, in the absence of a face-to-face physical evaluation by the physician, the diagnosis she receives is both limited and provisional in terms of accuracy and completeness  This is not intended to replace a full medical face-to-face evaluation by the physician  Corinne S Kulp understands and accepts these terms

## 2021-02-26 ENCOUNTER — TELEPHONE (OUTPATIENT)
Dept: FAMILY MEDICINE CLINIC | Facility: HOSPITAL | Age: 41
End: 2021-02-26

## 2021-02-26 ENCOUNTER — TELEPHONE (OUTPATIENT)
Dept: BEHAVIORAL/MENTAL HEALTH CLINIC | Facility: CLINIC | Age: 41
End: 2021-02-26

## 2021-02-26 DIAGNOSIS — R11.0 NAUSEA: ICD-10-CM

## 2021-02-26 DIAGNOSIS — Z13.29 SCREENING FOR THYROID DISORDER: ICD-10-CM

## 2021-02-26 DIAGNOSIS — R14.0 ABDOMINAL DISTENTION: Primary | ICD-10-CM

## 2021-02-26 NOTE — TELEPHONE ENCOUNTER
This writer outreached to Eli Huddleston, as she e-mailed this writer  Discussed plan to put her on a cancellation list for next week to accommodate additional appointment for this month

## 2021-02-26 NOTE — TELEPHONE ENCOUNTER
I spoke with patient she is aware I faxed lab orders to [facility], she will call to schedule US DD    ----- Message from Shira Damian MD sent at 2/24/2021  4:01 PM EST -----  Regarding: FW: Non-Urgent Medical Question  Contact: [phone]  Please call patient  I would suggest we get some labs done and might also get an US of abdomen and pelvis, then schedule a visit here  If she agrees, please order cmp, tsh, amylase, lipase and US abdomen and pelvis  Dx wou;d be abdominal  distention    ----- Message -----  From: Neel Prior: 2/24/2021   3:12 PM EST  To: Shira Damian MD  Subject: FW: Non-Urgent Medical Question                    ----- Message -----  From: Tomeka Samantharaleigh  Sent: 2/24/2021   2:34 PM EST  To: [facility]  Subject: Non-Urgent Anam Cherry,  I wanted to send you an email just to see what you thought before I call and make an appt  The last 2 weeks I haven't been feeling myself  I've been very run down, more tired then normal, sleeping more then usual  I also was two weeks late with my period  Occasional headaches, just blah feeling  I did finally get my period today so I'm not sure if this is all hormonal related or if something else is going on  About 3 weeks ago I woke up in the middle of the night with severe abdominal pains, tonny felt like when my intestines were twisted  I threw up most of the night and still had the pain till I finally was so exhausted I fell asleep  I was very nauseous the next few days but it got better  So not sure if that was a stomach bug or something I ate  I had it happen on [address] as well but not as bad  So I'm not sure if I should schedule an appt with you or just get some blood work done  I did reach out to my therapist Duog Yoon and she said I should talk with you  Thank you for listening     Anaya Ramos

## 2021-02-27 LAB
ALBUMIN SERPL-MCNC: 4.1 G/DL (ref 3.8–4.8)
ALBUMIN/GLOB SERPL: 1.5 {RATIO} (ref 1.2–2.2)
ALP SERPL-CCNC: 55 IU/L (ref 39–117)
ALT SERPL-CCNC: 16 IU/L (ref 0–32)
AMYLASE SERPL-CCNC: 74 U/L (ref 31–110)
AST SERPL-CCNC: 21 IU/L (ref 0–40)
BILIRUB SERPL-MCNC: 0.3 MG/DL (ref 0–1.2)
BUN SERPL-MCNC: 19 MG/DL (ref 6–24)
BUN/CREAT SERPL: 25 (ref 9–23)
CALCIUM SERPL-MCNC: 9.6 MG/DL (ref 8.7–10.2)
CHLORIDE SERPL-SCNC: 102 MMOL/L (ref 96–106)
CO2 SERPL-SCNC: 27 MMOL/L (ref 20–29)
CREAT SERPL-MCNC: 0.77 MG/DL (ref 0.57–1)
GLOBULIN SER-MCNC: 2.7 G/DL (ref 1.5–4.5)
GLUCOSE SERPL-MCNC: 87 MG/DL (ref 65–99)
LIPASE SERPL-CCNC: 29 U/L (ref 14–72)
POTASSIUM SERPL-SCNC: 4.4 MMOL/L (ref 3.5–5.2)
PROT SERPL-MCNC: 6.8 G/DL (ref 6–8.5)
SL AMB EGFR AFRICAN AMERICAN: 110 ML/MIN/1.73
SL AMB EGFR NON AFRICAN AMERICAN: 96 ML/MIN/1.73
SODIUM SERPL-SCNC: 139 MMOL/L (ref 134–144)
TSH SERPL DL<=0.005 MIU/L-ACNC: 1.25 UIU/ML (ref 0.45–4.5)

## 2021-03-01 ENCOUNTER — TELEMEDICINE (OUTPATIENT)
Dept: BEHAVIORAL/MENTAL HEALTH CLINIC | Facility: CLINIC | Age: 41
End: 2021-03-01
Payer: COMMERCIAL

## 2021-03-01 DIAGNOSIS — F41.0 SEVERE ANXIETY WITH PANIC: ICD-10-CM

## 2021-03-01 DIAGNOSIS — F33.0 MILD EPISODE OF RECURRENT MAJOR DEPRESSIVE DISORDER (HCC): Primary | ICD-10-CM

## 2021-03-01 PROCEDURE — 90837 PSYTX W PT 60 MINUTES: CPT | Performed by: SOCIAL WORKER

## 2021-03-01 NOTE — PSYCH
Virtual Regular Visit    Assessment/Plan:    Problem List Items Addressed This Visit        Other    Depression, major, recurrent ([facility] [address] ) - Primary    Severe anxiety with panic        Reason for visit is   Chief Complaint   Patient presents with    Virtual Regular Visit      Encounter provider Siria Morejon    Provider located at [address]  [address] [address]  [phone]    Recent Visits  Date Type Provider Dept   02/26/21 Telephone Siria Morejon [facility] [address] [facility] recent visits within past 7 days and meeting all other requirements     Today's Visits  Date Type Provider Dept   03/01/21 Telemedicine Siria Morejon [facility]   Showing today's visits and meeting all other requirements     Future Appointments  No visits were found meeting these conditions  Showing future appointments within next 150 days and meeting all other requirements      The patient was identified by name and date of birth  Navdeep Quezada was informed that this is a telemedicine visit and that the visit is being conducted through [facility] [address] and patient was informed that this is not a secure, HIPAA-compliant platform  She agrees to proceed  My office door was closed  No one else was in the room  She acknowledged consent and understanding of privacy and security of the video platform  The patient has agreed to participate and understands they can discontinue the visit at any time  *This note was not shared with he patient due to it being a psychotherapy note  *     Patient is aware this is a billable service  Subjective  Navdeep Quezada is a 42 y o  female        HPI     Past Medical History:   Diagnosis Date    Anxiety     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[facility] [address] )        Past Surgical History:   Procedure Laterality Date    APPENDECTOMY      extensive lysis of adhesions, MAYURI's procedure (divisions of MAYURI's bands) detorsion of vulvulus, widening of mesenteric pedicle, cecopeexy, appendectomy       BACK SURGERY Right 2013    SF: L4-L5 hemilaminectomy for discectomy  Use of the microscope for microdissection, Use og 40mg of depo-medrol though thee exiting right L5 nerve root  Onset:13     SECTION      x2    CHOLECYSTECTOMY      GALLBLADDER SURGERY      [facility], Onset:     LAPAROSCOPIC LYSIS INTESTINAL ADHESIONS      onset: 16    LUMBAR DISC SURGERY      [place] LAP,DIAGNOSTIC ABDOMEN N/A 2016    Procedure: LAPAROSCOPY DIAGNOSTIC, EXTENSIVE LYSIS OF ADHESIONS, MAYURI'S PROCEDURE(DIVISION OF MAYURI'S BANDS,DETORSION OF VOLVULUS, WIDENING OF MESENTERIC PEDICLE, CECOPEXY, APPENDECTOMY); Surgeon: Charity Fragoso MD;  Location: [facility] OR;  Service: General    TUBAL LIGATION         Current Outpatient Medications   Medication Sig Dispense Refill    ALPRAZolam (XANAX) 0 5 mg tablet Take one tablet q12 h prn anxiety 21 tablet 0    busPIRone (BUSPAR) 5 mg tablet take 1 tablet by mouth three times a day 90 tablet 5    DULoxetine (CYMBALTA) 30 mg delayed release capsule take 1 capsule by mouth once daily 30 capsule 5    DULoxetine (CYMBALTA) 60 mg delayed release capsule take 1 capsule by mouth once daily WITH 30 MG  30 capsule 5    multivitamin (THERAGRAN) TABS Take 1 tablet by mouth daily      Probiotic Product (PROBIOTIC-10 PO) Take by mouth       No current facility-administered medications for this visit  Allergies   Allergen Reactions    Biaxin [Clarithromycin] GI Intolerance and Other (See Comments)    Sulfa Antibiotics     Sulfasalazine     Venlafaxine GI Intolerance, Vomiting and Other (See Comments)     Category: Allergy; Category: Allergy; Review of Systems    Video Exam    There were no vitals filed for this visit  Physical Exam     (D) Corinne contacted this writer requesting an earlier follow up appointment today   This writer accommodated this  Corinne reports that since her last session, she has noticed an increase in symptom presentation  Corinne discussed ongoing interpersonal relationship stressors between her and her father, reporting that her father continues to struggle with alcoholism, and describes him as being in denial, and Valentina Welch describes this as triggering for her  Corinne discussed struggling with medical related issues, reporting that she is working with her PCP, and was (2) weeks late on her period, and having stomach pain, and sleep disturbances  Corinne reports that she had labwork done, and everything came back normal, and reports that she has an ultrasound scheduled for next week  Corinne reports that over the past (2) weeks, she had been feeling more depressed, and feeling tired and having little energy  Corinne discussed feeling triggered in relation to past trauma, growing up with both of her parents struggling with alcoholism  This writer and Corinne processed this at length  Discussed ongoing skills  Modeled effective forms of communication  Reviewed limits and boundaries  Provided ongoing psychoeducation  (A) Corinne describes herself as struggling with feeling, down, depressed, feeling tired, having little energy, sleeping more, and having lack of motivation  Corinne's mood presented as depressed and anxious and her affect appeared to be flat  Corinne describes fluctuating symptoms of poor frustration tolerance, and irritability  Corinne denies any evident or immediate risk factors for self-harm, SI, or HI  Corinne presented as forward thinking during session  Denies any symptoms of frank, grandiose thinking, impulsivity, or hyperactive/ elevated moods  Does not appear to be endorsing criteria for frank at this time  Presented with good eye contact  Presented as alert and oriented x3  Speech presented at a normal rate, volume, and rhythm   Corinne reports little interest and pleasure in doing things, and reports feeling bad about herself at times  (P) Corinne plans to sit down with her  and have a conversation regarding her needs, what she wants, and outweighing risks verses benefits in setting limits and boundaries, and following through with them, specifically in relation to her father  Corinne plans to continue to follow up with her PCP in relation to medical issues  Corinne plans to prioritize her needs, actively take time for herself, and utilizing self-care  Corinne plans to explore Al-Anon services, supports, and resources  Corinne plans to take time for herself  Corinne plans to assert herself, advocate for herself, and utilize healthy and effective forms of communication to address interpersonal relationship stressors  Corinne plans to utilize ongoing skills to continue to address ongoing symptom presentation  Corinne plans to implement radical acceptance, outweighing risks verses benefits in order to make informed decisions  Corinne plans lean into natural supports  Corinne plans to work on decreasing judgments towards herself, having more compassion for herself, validating her feelings, increasing the use of positive self-talk, and challenging negative cognitive distortions, and negative thinking traps  Corinne plans to outreach for additional support as needed  I spent 55 minutes directly with the patient during this visit  VIRTUAL VISIT DISCLAIMER    Corinne S Donald Hammans acknowledges that she has consented to an online visit or consultation  She understands that the online visit is based solely on information provided by her, and that, in the absence of a face-to-face physical evaluation by the physician, the diagnosis she receives is both limited and provisional in terms of accuracy and completeness  This is not intended to replace a full medical face-to-face evaluation by the physician  Corinne S Kulp understands and accepts these terms

## 2021-03-08 ENCOUNTER — HOSPITAL ENCOUNTER (OUTPATIENT)
Dept: ULTRASOUND IMAGING | Facility: CLINIC | Age: 41
Discharge: HOME | End: 2021-03-08
Payer: COMMERCIAL

## 2021-03-08 DIAGNOSIS — R14.0 ABDOMINAL DISTENTION: ICD-10-CM

## 2021-03-08 PROCEDURE — 76830 TRANSVAGINAL US NON-OB: CPT

## 2021-03-08 PROCEDURE — 76856 US EXAM PELVIC COMPLETE: CPT

## 2021-03-08 PROCEDURE — 76700 US EXAM ABDOM COMPLETE: CPT

## 2021-03-16 ENCOUNTER — TELEPHONE (OUTPATIENT)
Dept: FAMILY MEDICINE CLINIC | Facility: HOSPITAL | Age: 41
End: 2021-03-16

## 2021-03-16 NOTE — TELEPHONE ENCOUNTER
I saw Dr Miyk Lerner message , both of these results were already  handled as we received them  I also called patient today to make sure she did in fact receive the messages and she confirmed that she did , it was taken care of DD---- Message from Harrison Hilario MD sent at 3/14/2021  6:54 PM EDT -----  Regarding: FW: Test Results Question  Contact: [phone]  When I get a result or message, I respond to the patient that same day with a result note  The US abd has a result note from me, sent to [facility] and looks like patient was never called  This is upsetting to me, and poor service to the patient  As for the US pelvis, it was just reported on Friday, patient can be informed that it is normal as well  The findings reported are incidental and not a concern  Please call her today   ----- Message -----  From: Leonardo Mcnair  Sent: 3/12/2021   8:28 AM EDT  To: Harrison Hilario MD  Subject: FW: Test Results Question                          ----- Message -----  From: Navdeep Quezada  Sent: 3/12/2021   7:37 AM EST  To: [facility]  Subject: Test Results Question                            I have a question about US ABDOMEN COMPLETE resulted on 3/9/21, 2:44 PM     I also had a pelvic ultrasound that day too and haven't heard back about that one yet  Do you have those results as well?    Thanks   Eli Huddleston

## 2021-04-04 DIAGNOSIS — F32.A DEPRESSION, UNSPECIFIED DEPRESSION TYPE: ICD-10-CM

## 2021-04-05 ENCOUNTER — TELEMEDICINE (OUTPATIENT)
Dept: BEHAVIORAL/MENTAL HEALTH CLINIC | Facility: CLINIC | Age: 41
End: 2021-04-05
Payer: COMMERCIAL

## 2021-04-05 DIAGNOSIS — F41.0 SEVERE ANXIETY WITH PANIC: ICD-10-CM

## 2021-04-05 DIAGNOSIS — F33.0 MILD EPISODE OF RECURRENT MAJOR DEPRESSIVE DISORDER (HCC): Primary | ICD-10-CM

## 2021-04-05 PROCEDURE — 90834 PSYTX W PT 45 MINUTES: CPT | Performed by: SOCIAL WORKER

## 2021-04-05 RX ORDER — DULOXETIN HYDROCHLORIDE 30 MG/1
CAPSULE, DELAYED RELEASE ORAL
Qty: 30 CAPSULE | Refills: 5 | Status: SHIPPED | OUTPATIENT
Start: 2021-04-05 | End: 2021-08-13

## 2021-04-05 NOTE — PSYCH
Virtual Regular Visit    Assessment/Plan:    Problem List Items Addressed This Visit        Other    Depression, major, recurrent ([facility] [address] ) - Primary    Severe anxiety with panic        Reason for visit is   Chief Complaint   Patient presents with    Virtual Regular Visit      Encounter provider Som Falcon    Provider located at [address] [id number] [phone]    Recent Visits  No visits were found meeting these conditions  Showing recent visits within past 7 days and meeting all other requirements     Today's Visits  Date Type Provider Dept   04/05/21 Telemedicine Som Falcon [facility]   Showing today's visits and meeting all other requirements     Future Appointments  No visits were found meeting these conditions  Showing future appointments within next 150 days and meeting all other requirements      The patient was identified by name and date of birth  Ana Sanchez was informed that this is a telemedicine visit and that the visit is being conducted through [address] [place] [address] and patient was informed that this is not a secure, HIPAA-compliant platform  She agrees to proceed  My office door was closed  No one else was in the room  She acknowledged consent and understanding of privacy and security of the video platform  The patient has agreed to participate and understands they can discontinue the visit at any time  *This note was not shared with the patient as a result of this being a psychotherapy note  *     Patient is aware this is a billable service  Subjective  Ana Sanchez is a 42 y o  female      HPI     Past Medical History:   Diagnosis Date    Anxiety     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[facility] [address] )        Past Surgical History:   Procedure Laterality Date    APPENDECTOMY      extensive lysis of adhesions, MAYURI's procedure (divisions of MAYURI's bands) detorsion of vulvulus, widening of mesenteric pedicle, cecopeexy, appendectomy       BACK SURGERY Right 2013    SF: L4-L5 hemilaminectomy for discectomy  Use of the microscope for microdissection, Use og 40mg of depo-medrol though thee exiting right L5 nerve root  Onset:13     SECTION      x2    CHOLECYSTECTOMY      GALLBLADDER SURGERY      [facility], Onset:     LAPAROSCOPIC LYSIS INTESTINAL ADHESIONS      onset: 16    LUMBAR DISC SURGERY      OR LAP,DIAGNOSTIC ABDOMEN N/A 2016    Procedure: LAPAROSCOPY DIAGNOSTIC, EXTENSIVE LYSIS OF ADHESIONS, MAYURI'S PROCEDURE(DIVISION OF MAYURI'S BANDS,DETORSION OF VOLVULUS, WIDENING OF MESENTERIC PEDICLE, CECOPEXY, APPENDECTOMY); Surgeon: Raisa Ochoa MD;  [facility]: [facility] OR;  Service: General    TUBAL LIGATION         Current Outpatient Medications   Medication Sig Dispense Refill    ALPRAZolam (XANAX) 0 5 mg tablet Take one tablet q12 h prn anxiety 21 tablet 0    busPIRone (BUSPAR) 5 mg tablet take 1 tablet by mouth three times a day 90 tablet 5    DULoxetine (CYMBALTA) 30 mg delayed release capsule take 1 capsule by mouth once daily 30 capsule 5    DULoxetine (CYMBALTA) 60 mg delayed release capsule take 1 capsule by mouth once daily WITH 30 MG  30 capsule 5    multivitamin (THERAGRAN) TABS Take 1 tablet by mouth daily      Probiotic Product (PROBIOTIC-10 PO) Take by mouth       No current facility-administered medications for this visit  Allergies   Allergen Reactions    Biaxin [Clarithromycin] GI Intolerance and Other (See Comments)    Sulfa Antibiotics     Sulfasalazine     Venlafaxine GI Intolerance, Vomiting and Other (See Comments)     Category: Allergy; Category: Allergy; Review of Systems    Video Exam    There were no vitals filed for this visit  Physical Exam     (D) Corinne attended her follow up psychotherapy session today   Corinne reports that since her last session, she has struggled with avoidance in relation to her father who lives with her, and his addiction  Corinne describes herself as struggling with her self-worth being  to both of her parents addiction  Corinne describes herself as having some distance with her father, and being angry, resentful, and depression in relation to her father's ongoing struggle with alcoholism  Corinne reports that she has been distancing herself from him, yet recognizing that she is doing this to protect herself  Corinne described a history of growing up with alcoholic parents, describing her mother as hiding her addiction, where she reports her father has been more public about his relationship with alcohol  Corinne describes herself as feeling frustrated, wanting to have a healthy relationship with her father, yet believing this is not possible  Corinne discussed her joshua in relation to this, including listening to podcasts on forgiveness, and struggling to forgive her father  Corinne describes being upset with herself for not listening to others, allowing him to move in, and blaming herself  Corinne described struggles with negative thinking traps, describing beliefs that her parents didn't and don't love her enough to be sober and respect her boundaries  Corinne and this writer processed this at length  Provided ongoing psychoeducation  Discussed ongoing skills  Reviewed limits and boundaries  Modeled effective forms of communication  (A) Corinne's speech presented at a normal rate, volume, and rhythm  Corinne presented with good eye contact  Corinne presented as alert and oriented x3  Corinne denies any symptoms of grandiose thinking, impulsivity, hyperactive/ elevated moods, or frank  Corinne does not appear to be endorsing criteria for frank at this time  Corinne denies any evident or immediate risk factors for self-harm, SI, or HI  Corinne presented as future oriented during session   Corinne's mood presented as depressed and slightly anxious and her affect appeared to be congruent, and tearful at times  Corinne describes worrying about different things, having some trouble relaxing  Corinne reports symptoms of becoming easily annoyed, reporting irritability, and poor frustration tolerance  Corinne reports feeling tired and having little energy, and reports some lower moods, sadness, and depression  (P) Corinne plans to outweigh risks verses benefits when it comes to managing her relationship with her father, implementing ongoing boundaries, and limits, and continuing to ask for what she needs  Corinne plans to actively work to challenge negative thinking traps, specifically in relation to black and white thinking, decipher between facts and feelings, and utilize reality testing  Corinne plans to continue to increase self-awareness in relation to cognitive distortions  Corinne plans to continue to lean into her natural supports and joshua  Corinne plans to advocate for assert, assert herself, and express herself by utilizing effective forms of communication to target interpersonal relationship stressors  Corinne plans to implement radical acceptance, focusing on what is in her control, giving herself credit, validating herself, and demonstrating compassion towards herself  Corinne plans to utilize ongoing self-care, take time for herself, prioritize her mental health and medical needs, and be present in the moment  Corinne plans to out reach for additional support as needed  I spent 55 minutes directly with the patient during this visit  VIRTUAL VISIT DISCLAIMER    Corinne S Palmira Sarna acknowledges that she has consented to an online visit or consultation  She understands that the online visit is based solely on information provided by her, and that, in the absence of a face-to-face physical evaluation by the physician, the diagnosis she receives is both limited and provisional in terms of accuracy and completeness   This is not intended to replace a full medical face-to-face evaluation by the physician  Corinne S Kulp understands and accepts these terms

## 2021-04-12 ENCOUNTER — OFFICE VISIT (OUTPATIENT)
Dept: FAMILY MEDICINE CLINIC | Facility: HOSPITAL | Age: 41
End: 2021-04-12
Payer: COMMERCIAL

## 2021-04-12 VITALS
HEART RATE: 86 BPM | BODY MASS INDEX: 25.44 KG/M2 | OXYGEN SATURATION: 98 % | SYSTOLIC BLOOD PRESSURE: 110 MMHG | HEIGHT: 64 IN | DIASTOLIC BLOOD PRESSURE: 60 MMHG | WEIGHT: 149 LBS

## 2021-04-12 DIAGNOSIS — Z79.899 ENCOUNTER FOR LONG-TERM CURRENT USE OF MEDICATION: ICD-10-CM

## 2021-04-12 DIAGNOSIS — F33.0 MILD EPISODE OF RECURRENT MAJOR DEPRESSIVE DISORDER (HCC): Primary | ICD-10-CM

## 2021-04-12 DIAGNOSIS — F41.0 SEVERE ANXIETY WITH PANIC: ICD-10-CM

## 2021-04-12 PROCEDURE — 99214 OFFICE O/P EST MOD 30 MIN: CPT | Performed by: INTERNAL MEDICINE

## 2021-04-12 NOTE — PROGRESS NOTES
Subjective:   No chief complaint on file  Patient ID: Ana Sanchez is a 42 y o  female  Presents to follow up on chronic medication use and evaluation of results of ongoing counselling  General health is good  Seeing counsellor regularly  Feels this is very helpful and is glad to continue this  The following portions of the patient's history were reviewed and updated as appropriate: allergies, current medications, past family history, past medical history, past social history, past surgical history and problem list     Review of Systems   Constitutional: Negative for fatigue and fever  HENT: Negative for hearing loss  Eyes: Negative for visual disturbance  Respiratory: Negative for cough, chest tightness, shortness of breath and wheezing  Cardiovascular: Negative for chest pain, palpitations and leg swelling  Gastrointestinal: Negative for abdominal pain, diarrhea and nausea  Genitourinary: Negative for dysuria and hematuria  Musculoskeletal: Negative for arthralgias  Neurological: Negative for dizziness, numbness and headaches  Psychiatric/Behavioral: Negative for confusion and dysphoric mood  All other systems reviewed and are negative  Current Outpatient Medications on File Prior to Visit   Medication Sig Dispense Refill    ALPRAZolam (XANAX) 0 5 mg tablet Take one tablet q12 h prn anxiety 21 tablet 0    busPIRone (BUSPAR) 5 mg tablet take 1 tablet by mouth three times a day 90 tablet 5    DULoxetine (CYMBALTA) 30 mg delayed release capsule take 1 capsule by mouth once daily 30 capsule 5    DULoxetine (CYMBALTA) 60 mg delayed release capsule take 1 capsule by mouth once daily WITH 30 MG  30 capsule 5    multivitamin (THERAGRAN) TABS Take 1 tablet by mouth daily      Probiotic Product (PROBIOTIC-10 PO) Take by mouth       No current facility-administered medications on file prior to visit  Objective: There were no vitals filed for this visit  Physical Exam  Vitals signs and nursing note reviewed  Constitutional:       General: She is not in acute distress  Neck:      Musculoskeletal: Normal range of motion  Cardiovascular:      Rate and Rhythm: Normal rate and regular rhythm  Pulmonary:      Effort: Pulmonary effort is normal       Breath sounds: Normal breath sounds  Musculoskeletal: Normal range of motion  Skin:     Findings: No rash  Neurological:      Mental Status: She is alert and oriented to person, place, and time  Psychiatric:         Thought Content: Thought content normal          BMI Counseling: There is no height or weight on file to calculate BMI  The BMI is above normal  Nutrition recommendations include encouraging healthy choices of fruits and vegetables  Exercise recommendations include moderate physical activity 150 minutes/week  No pharmacotherapy was ordered  Assessment/Plan:    No problem-specific Assessment & Plan notes found for this encounter         Diagnoses and all orders for this visit:    Mild episode of recurrent major depressive disorder ([facility] [address] )    Encounter for long-term current use of medication    Severe anxiety with panic

## 2021-05-03 ENCOUNTER — TELEMEDICINE (OUTPATIENT)
Dept: BEHAVIORAL/MENTAL HEALTH CLINIC | Facility: CLINIC | Age: 41
End: 2021-05-03
Payer: COMMERCIAL

## 2021-05-03 DIAGNOSIS — F33.0 MILD EPISODE OF RECURRENT MAJOR DEPRESSIVE DISORDER (HCC): Primary | ICD-10-CM

## 2021-05-03 DIAGNOSIS — F41.0 SEVERE ANXIETY WITH PANIC: ICD-10-CM

## 2021-05-03 PROCEDURE — 90834 PSYTX W PT 45 MINUTES: CPT | Performed by: SOCIAL WORKER

## 2021-05-03 NOTE — PSYCH
Virtual Regular Visit    Assessment/Plan:    Problem List Items Addressed This Visit        Other    Depression, major, recurrent ([facility] [address] ) - Primary    Severe anxiety with panic        Goals addressed in session: Follow up psychotherapy session  Reason for visit is   Chief Complaint   Patient presents with    Virtual Regular Visit        Encounter provider Roula Bustamante    Provider located at [address] [address] [phone]      Recent Visits  No visits were found meeting these conditions  Showing recent visits within past 7 days and meeting all other requirements     Today's Visits  Date Type Provider Dept   05/03/21 Telemedicine Roula [facility]ustamante [facility]   Showing today's visits and meeting all other requirements     Future Appointments  No visits were found meeting these conditions  Showing future appointments within next 150 days and meeting all other requirements      The patient was identified by name and date of birth  Tamela Charter was informed that this is a telemedicine visit and that the visit is being conducted through [employer] [address] and patient was informed that this is not a secure, HIPAA-compliant platform  She agrees to proceed  My office door was closed  No one else was in the room  She acknowledged consent and understanding of privacy and security of the video platform  The patient has agreed to participate and understands they can discontinue the visit at any time  *This note was not shared with the patient as a result of this being a psychotherapy note  *     Patient is aware this is a billable service  Subjective  Tamela Charter is a 42 y o  female        HPI     Past Medical History:   Diagnosis Date    Anxiety     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[facility] [address] )        Past Surgical History:   Procedure Laterality Date    APPENDECTOMY      extensive lysis of adhesions, MAYURI's procedure (divisions of MAYURI's bands) detorsion of vulvulus, widening of mesenteric pedicle, cecopeexy, appendectomy       BACK SURGERY Right 2013    SF: L4-L5 hemilaminectomy for discectomy  Use of the microscope for microdissection, Use og 40mg of depo-medrol though thee exiting right L5 nerve root  Onset:13     SECTION      x2    CHOLECYSTECTOMY      GALLBLADDER SURGERY      [facility], Onset:     LAPAROSCOPIC LYSIS INTESTINAL ADHESIONS      onset: 16    LUMBAR DISC SURGERY      [place] LAP,DIAGNOSTIC ABDOMEN N/A 2016    Procedure: LAPAROSCOPY DIAGNOSTIC, EXTENSIVE LYSIS OF ADHESIONS, MAYURI'S PROCEDURE(DIVISION OF MAYURI'S BANDS,DETORSION OF VOLVULUS, WIDENING OF MESENTERIC PEDICLE, CECOPEXY, APPENDECTOMY); Surgeon: Yue Carney MD;  Location:  MAIN OR;  Service: General    TUBAL LIGATION         Current Outpatient Medications   Medication Sig Dispense Refill    ALPRAZolam (XANAX) 0 5 mg tablet Take one tablet q12 h prn anxiety 21 tablet 0    busPIRone (BUSPAR) 5 mg tablet take 1 tablet by mouth three times a day 90 tablet 5    DULoxetine (CYMBALTA) 30 mg delayed release capsule take 1 capsule by mouth once daily 30 capsule 5    DULoxetine (CYMBALTA) 60 mg delayed release capsule take 1 capsule by mouth once daily WITH 30 MG  30 capsule 5    multivitamin (THERAGRAN) TABS Take 1 tablet by mouth daily      Probiotic Product (PROBIOTIC-10 PO) Take by mouth       No current facility-administered medications for this visit  Allergies   Allergen Reactions    Biaxin [Clarithromycin] GI Intolerance and Other (See Comments)    Sulfa Antibiotics     Sulfasalazine     Venlafaxine GI Intolerance, Vomiting and Other (See Comments)     Category: Allergy; Category: Allergy; Review of Systems    Video Exam    There were no vitals filed for this visit  Physical Exam     (D) Corinne attended her follow up psychotherapy session today   Corinne reported that since her last session, she has noticed a slight decrease in the intensity and frequency of her symptoms  Corinne reports that she has been working on practicing radical acceptance, in relation to interpersonal relationship stressors between her, and her father  Corinne reports that she has been increasing self-awareness in relation to her father struggling with alcoholism, and identifying alcoholism as a disease  Corinne reported that she hasn't forgiven her father, and still desires him to be accountable; however, isn't allowing his choices and behaviors impact her day to day functioning, describing her as detaching somewhat  Corinne discussed 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, discussing the loans she needed to take out to keep her business afloat  Corinne reports that she recently put in an application to have these loans forgiven  Corinne discussed feeling hopeful in relation to this  Corinne discussed stressors related to the coronavirus, the global pandemic, and the COVID-19 vaccine, and anxiety in relation to this  Corinne discussed feeling like everyone is talking about the vaccine, at work, and in her personal life  Corinne reports not being comfortable with getting the vaccine, feeling like others are judging her  Corinne discussed some stressors related to her son, reporting that initially her sone came out to her, thinking that he was bi-sexual, and reports that more recently he has been leaning more towards being attracted with men  Corinne reported that initially uncomfortable in relation to this, and reports that her  is uncomfortable with this  Corinne reported that she has been clear and consistent with her son that she loves him and accepts him for who he is  Corinne reported that her son now has a group of friends that are supportive of him   Corinne reported that she recently read her son's text messages about her son dating another boy from another school district  Corinne reported that her son invited his boyfriend over to the house, and reports that she met him  Corinne reported that she liked him, and was happy to meet him  Corinne reported struggling with grief in relation to symbolic loss  Corinne reported that she and her  have been in communication in relation to their son identifying as paez  Corinne reported that her  is struggling with this  Corinne reported that this past weekend they had a birthday party for their son, and reported that her son posted a picture on social media of him kissing his boyfriend  Corinne reported that some family outreached to them in relation to this  Corinne and this writer processed this at length  Provided ongoing psychoeducation  Reviewed limits and boundaries  Modeled effective forms of communication  Discussed ongoing skills  (A) Corinne presented as alert and oriented x3  Corinne presented with good eye contact  Corinne's speech presented at a normal rate, volume, and rhythm  Keyanna Shahid denies any symptoms of frank at this time  Corinne denies any symptoms of impulsivity, grandiose thinking, frank, or hyperactive/ elevated moods at this time  Corinne's mood presented as anxious, and slightly down, and her affect appeared to be congruent and tearful at times  Corinne describes symptoms of feeling nervous, anxious, and on edge  Corinne describes some symptoms of irritability, poor frustration tolerance, and becoming easily annoyed  Corinne describes feeling tired and having little energy  Corinne reports feeling fidgety and restless  (P) Corinne plans to continue to work on working through symptoms of grief in relation to symbolic loss  Corinne plans to continue to work on effectively communicating with her , and processing this, while supporting one another   Corinne plans to work on asserting herself, advocating for herself, and targeting interpersonal relationship stressors, while asking for what she needs  Corinne plans to work on continuing to identify ongoing ways to implement limits and boundaries, prioritizing her needs, actively work on being present in the moment, and utilizing deep breathing techniques  Corinne plans to utilize ongoing skills to address fluctuating symptoms  Corinne plans to increase self-awareness in relation to cognitive distortions, and negative thinking traps, and increase the use of positive self-talk  Corinne plans to demonstrate compassion for herself, validate herself, and give herself credit  Corinne plans to decrease judgement towards herself  Corinne plans to outreach for additional support as needed  I spent 50 minutes directly with the patient during this visit  VIRTUAL VISIT DISCLAIMER    Corinne S Meldon Boeck acknowledges that she has consented to an online visit or consultation  She understands that the online visit is based solely on information provided by her, and that, in the absence of a face-to-face physical evaluation by the physician, the diagnosis she receives is both limited and provisional in terms of accuracy and completeness  This is not intended to replace a full medical face-to-face evaluation by the physician  Corinne S Kulp understands and accepts these terms

## 2021-05-17 ENCOUNTER — TELEMEDICINE (OUTPATIENT)
Dept: BEHAVIORAL/MENTAL HEALTH CLINIC | Facility: CLINIC | Age: 41
End: 2021-05-17
Payer: COMMERCIAL

## 2021-05-17 DIAGNOSIS — F33.0 MILD EPISODE OF RECURRENT MAJOR DEPRESSIVE DISORDER (HCC): Primary | ICD-10-CM

## 2021-05-17 DIAGNOSIS — F41.0 SEVERE ANXIETY WITH PANIC: ICD-10-CM

## 2021-05-17 PROCEDURE — 90834 PSYTX W PT 45 MINUTES: CPT | Performed by: SOCIAL WORKER

## 2021-05-17 NOTE — PSYCH
Virtual Regular Visit    Assessment/Plan:    Problem List Items Addressed This Visit        Other    Depression, major, recurrent ([facility] [address] [address] ) - Primary    Severe anxiety with panic        Goals addressed in session: Follow up psychotherapy session  Reason for visit is   Chief Complaint   Patient presents with    Virtual Regular Visit      Encounter provider Wicho Guthrie    Provider located at [address] [address] [phone]    Recent Visits  No visits were found meeting these conditions  Showing recent visits within past 7 days and meeting all other requirements     Today's Visits  Date Type Provider Dept   05/17/21 Telemedicine Wicho Guthrie [facility]   Showing today's visits and meeting all other requirements     Future Appointments  No visits were found meeting these conditions  Showing future appointments within next 150 days and meeting all other requirements      The patient was identified by name and date of birth  Bob Bernabe was informed that this is a telemedicine visit and that the visit is being conducted through [facility] and patient was informed that this is not a secure, HIPAA-compliant platform  She agrees to proceed  My office door was closed  No one else was in the room  She acknowledged consent and understanding of privacy and security of the video platform  The patient has agreed to participate and understands they can discontinue the visit at any time  *This note was not shared with the patient as a result of this being a psychotherapy note  *     Patient is aware this is a billable service  Subjective  Bob Bernabe is a 42 y o  female      HPI     Past Medical History:   Diagnosis Date    Anxiety     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[facility] [address] )      Past Surgical History:   Procedure Laterality Date    APPENDECTOMY      extensive lysis of adhesions, MAYURI's procedure (divisions of MAYURI's bands) detorsion of vulvulus, widening of mesenteric pedicle, cecopeexy, appendectomy       BACK SURGERY Right 2013    SF: L4-L5 hemilaminectomy for discectomy  Use of the microscope for microdissection, Use og 40mg of depo-medrol though thee exiting right L5 nerve root  Onset:13     SECTION      x2    CHOLECYSTECTOMY      GALLBLADDER SURGERY      [facility], Onset:     LAPAROSCOPIC LYSIS INTESTINAL ADHESIONS      onset: 16    LUMBAR DISC SURGERY      [place] LAP,DIAGNOSTIC ABDOMEN N/A 2016    Procedure: LAPAROSCOPY DIAGNOSTIC, EXTENSIVE LYSIS OF ADHESIONS, MAYURI'S PROCEDURE(DIVISION OF MAYURI'S BANDS,DETORSION OF VOLVULUS, WIDENING OF MESENTERIC PEDICLE, CECOPEXY, APPENDECTOMY); Surgeon: Philip Orona MD;  Location:  MAIN OR;  Service: General    TUBAL LIGATION       Current Outpatient Medications   Medication Sig Dispense Refill    ALPRAZolam (XANAX) 0 5 mg tablet Take one tablet q12 h prn anxiety 21 tablet 0    busPIRone (BUSPAR) 5 mg tablet take 1 tablet by mouth three times a day 90 tablet 5    DULoxetine (CYMBALTA) 30 mg delayed release capsule take 1 capsule by mouth once daily 30 capsule 5    DULoxetine (CYMBALTA) 60 mg delayed release capsule take 1 capsule by mouth once daily WITH 30 MG  30 capsule 5    multivitamin (THERAGRAN) TABS Take 1 tablet by mouth daily      Probiotic Product (PROBIOTIC-10 PO) Take by mouth       No current facility-administered medications for this visit  Allergies   Allergen Reactions    Biaxin [Clarithromycin] GI Intolerance and Other (See Comments)    Sulfa Antibiotics     Sulfasalazine     Venlafaxine GI Intolerance, Vomiting and Other (See Comments)     Category: Allergy; Category: Allergy; Review of Systems    Video Exam    There were no vitals filed for this visit  Physical Exam     (D) Corinne outreached to this writer and requested an earlier psychotherapy session  Corinne attended her follow up psychotherapy session today  Corinne reports that since her last session, she has struggled with interpersonal relationship stressors between her and her father  Corinne reported that she noticed that her father was eating her pickles that were in the fridge, that she reports having specifically put a note on them, asking him not to eat them  Corinne reported that she addressed this, reporting that they got into an argument  Corinne reported that she feels like her father doesn't respect her, value her, or listen to her  Corinne reported that they had been communicating primarily through notes and/or text messages, reporting that she isn't fully comfortable discussing stressors with him in person  Corinne reported that she left her father a note about getting off their cell phone plan, and he responded with a note stating that he would do this, but would no longer would be able to pay for the cleaning lady in order to be able to save money to move out  Corinne reported that her father reports that he has cut back with his alcohol, and reports that she doesn't believe that he can  Corinne reported that she wants her father to move out, and fearing that she is no tosure that she wants to have a relationship with him moving forward  Corinne discussed past trauma in relation to her upbringing and being raised by (2) alcoholic parents  Corinne and this write processed this at length  Provided ongoing psychoeducation  Discussed ongoing skills  Modeled effective forms of communication  Reviewed limits and boundaries  (A) Corinne presented with good eye contact  Corinne's speech presented at a normal rate, volume, and rhythm  Corinne presented as alert and oriented x3  Corinne denies any evident or immediate risk factors for self-harm, SI, or HI  Corinne presented as forward thinking during session   Corinne denies any symptoms of frank, grandiose thinking, impulsivity, or hyperactive/ elevated moods  Corinne does not appear to be endorsing criteria for frank at this time  Corinne's mood presented as anxious, and depressed, and her affect appeared to be congruent, and tearful  Corinne reports worrying too much about different things, and feeling nervous, anxious, and on edge  Corinne describes symptoms of poor frustration tolerance, irritability, and becoming easily annoyed  Corinne describes feeling tired and having little energy, reporting some lower moods of sadness, and depression  Corinne reports feeling bad about herself, and feeling like she is letting herself, and her family down  (P) Corinne plans to work on considering having a family session  Corinne plans to work on developing a list of expectations that she has set for her brother, when it comes to respecting limits and boundaries, that [place]  Corinne plans to outweigh risks verses benefits in order to make informed decisions  Corinne plans to implement and practice radical acceptance, focusing on what is in her control, verses what is not in her control  Corinne plans to lean into natural supports  Corinne plans to continue to target fluctuating symptoms with ongoing grounding techniques, distress tolerance skills, distraction techniques, and coping skills  Corinne plans to continue to work on identifying, associating, and expressing her feelings, asserting herself, advocating for herself, and utilize healthy and effective forms of communication to target ongoing interpersonal relationship stressors  Corinne plans to decrease judgements towards herself, have compassion for herself, and increase the use of positive self-talk  Corinne plans to challenge negative core beliefs, negative thinking traps, and decipher between facts and feelings, with the use of reality testing  Corinne plans to work on increasing the use of self-care  Corinne plans to outreach for additional support as needed       I spent 55 minutes directly with the patient during this visit  9:45am-10:40am   VIRTUAL VISIT DISCLAIMER    Corinne S Kulp acknowledges that she has consented to an online visit or consultation  She understands that the online visit is based solely on information provided by her, and that, in the absence of a face-to-face physical evaluation by the physician, the diagnosis she receives is both limited and provisional in terms of accuracy and completeness  This is not intended to replace a full medical face-to-face evaluation by the physician  Corinne S Kulp understands and accepts these terms

## 2021-06-07 ENCOUNTER — TELEMEDICINE (OUTPATIENT)
Dept: BEHAVIORAL/MENTAL HEALTH CLINIC | Facility: CLINIC | Age: 41
End: 2021-06-07
Payer: COMMERCIAL

## 2021-06-07 DIAGNOSIS — F41.0 SEVERE ANXIETY WITH PANIC: ICD-10-CM

## 2021-06-07 DIAGNOSIS — F33.0 MILD EPISODE OF RECURRENT MAJOR DEPRESSIVE DISORDER (HCC): Primary | ICD-10-CM

## 2021-06-07 PROCEDURE — 90834 PSYTX W PT 45 MINUTES: CPT | Performed by: SOCIAL WORKER

## 2021-06-07 NOTE — PSYCH
Virtual Regular Visit    Assessment/Plan:    Problem List Items Addressed This Visit        Other    Depression, major, recurrent ([facility] [address] ) - Primary    Severe anxiety with panic        Goals addressed in session: Follow up psychotherapy session today  Reason for visit is   Chief Complaint   Patient presents with    Virtual Regular Visit      Encounter provider J Luis Snowden    Provider located at [address] [id number] [phone]    Recent Visits  No visits were found meeting these conditions  Showing recent visits within past 7 days and meeting all other requirements     Today's Visits  Date Type Provider Dept   06/07/21 Telemedicine J Luis Snowden [facility]   Showing today's visits and meeting all other requirements     Future Appointments  No visits were found meeting these conditions  Showing future appointments within next 150 days and meeting all other requirements      The patient was identified by name and date of birth  Thea Jordan was informed that this is a telemedicine visit and that the visit is being conducted through [address] and patient was informed that this is not a secure, HIPAA-compliant platform  She agrees to proceed  My office door was closed  No one else was in the room  She acknowledged consent and understanding of privacy and security of the video platform  The patient has agreed to participate and understands they can discontinue the visit at any time  *This note was not shared with the patient as a result of this being a psychotherapy note  *     Patient is aware this is a billable service  Subjective  Thea Jordan is a 42 y o  female        HPI     Past Medical History:   Diagnosis Date    Anxiety     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[facility] [address] )        Past Surgical History:   Procedure Laterality Date    APPENDECTOMY      extensive lysis of adhesions, MAYURI's procedure (divisions of MAYURI's bands) detorsion of vulvulus, widening of mesenteric pedicle, cecopeexy, appendectomy       BACK SURGERY Right 2013    SF: L4-L5 hemilaminectomy for discectomy  Use of the microscope for microdissection, Use og 40mg of depo-medrol though thee exiting right L5 nerve root  Onset:13     SECTION      x2    CHOLECYSTECTOMY      GALLBLADDER SURGERY      [facility], Onset:     LAPAROSCOPIC LYSIS INTESTINAL ADHESIONS      onset: 16    LUMBAR DISC SURGERY      [place] LAP,DIAGNOSTIC ABDOMEN N/A 2016    Procedure: LAPAROSCOPY DIAGNOSTIC, EXTENSIVE LYSIS OF ADHESIONS, MAYURI'S PROCEDURE(DIVISION OF MAYURI'S BANDS,DETORSION OF VOLVULUS, WIDENING OF MESENTERIC PEDICLE, CECOPEXY, APPENDECTOMY); Surgeon: Lauro Chavez MD;  Location: [facility] OR;  Service: General    TUBAL LIGATION         Current Outpatient Medications   Medication Sig Dispense Refill    ALPRAZolam (XANAX) 0 5 mg tablet Take one tablet q12 h prn anxiety 21 tablet 0    busPIRone (BUSPAR) 5 mg tablet take 1 tablet by mouth three times a day 90 tablet 5    DULoxetine (CYMBALTA) 30 mg delayed release capsule take 1 capsule by mouth once daily 30 capsule 5    DULoxetine (CYMBALTA) 60 mg delayed release capsule take 1 capsule by mouth once daily WITH 30 MG  30 capsule 5    multivitamin (THERAGRAN) TABS Take 1 tablet by mouth daily      Probiotic Product (PROBIOTIC-10 PO) Take by mouth       No current facility-administered medications for this visit  Allergies   Allergen Reactions    Biaxin [Clarithromycin] GI Intolerance and Other (See Comments)    Sulfa Antibiotics     Sulfasalazine     Venlafaxine GI Intolerance, Vomiting and Other (See Comments)     Category: Allergy; Category: Allergy; Review of Systems    Video Exam    There were no vitals filed for this visit  Physical Exam     (D) Corinne attended her follow up psychotherapy session today   Corinne reports that since her last session, she decided against having a family meeting with her father  Corinne reported that she was struggling with the idea of having a family meeting, reporting anxiety surrounding this  Corinne reported that she feels that her father will never change, and reports feeling like having a healthy relationship with him is off the table  Corinne reported that there was an incident that had taken place, reporting that her son told her and her  that he overheard his grandfather talking to a woman on the phone, discussing paying a woman for some form of sexual activity- that she reports being unclear about this  Corinne reported that she was uncomfortable with addressing this with him in person, and decided to text her father while she was at work  Corinne reported that she confronted her father via text message, and told him that he has until 06/30/21 to find another place to live  Corinne reported that the more she learns about her father, the more distance she wants to have from him  Jose Armando Medrano reported that her brother recently told her that her father has a history of using drugs, in addition to his alcoholism  Corinne reported feeling uncomfortable in relation to this, and wanting to set limits and boundaries with her father  Corinne reported that in addition to this, her brother struggles with epilepsy  Corinne reported that she learned that her brother wasn't taking his medication over the past month, and then had two back to back seizures this past week  Corinne reported that she watched her nephews and discussed anxiety surrounding this  Corinne discussed past trauma in relation to her relationship with her mother, her struggle with alcoholism, and her death  Corinne and this writer processed this at length  Discussed ongoing skills  Reviewed limits and boundaries  Modeled effective forms of communication  Provided ongoing psychoeducation       (A) Corinne denies any symptoms of frank at this time  Corinne denies any evident or immediate risk factors for self-harm, SI, or HI  Corinne presented as future oriented during session  Corinne presented with good eye contact  Corinne presented as alert and oriented x3  Corinne's speech presented at a normal rate, volume, and rhythm  Corinne's mood presented as anxious, and irritable, and her affect appeared to be congruent and tearful at times  Corinne describes symptoms of guilt in relation to this  Corinne reports fearing as if something awful might happen, reporting feeling nervous, anxious, and on edge  Corinne reports worrying too much about different things  Corinne reports symptoms of irritability, poor frustration tolerance, and becoming easily annoyed  Corinne reported feeling tired and having little energy, and reports some lower moods of sadness, and depression  (P) Corinne plans to work on increasing effective forms of communication in the moment, and working on addressing interpersonal relationship conflicts in person, rather than through text messages  Corinne plans to work on advocating for herself, asserting herself, identifying, associating, and expressing her feelings  Corinne plans to work on challenging cognitive distortions, and negative thinking traps  Corinne plans to continue to decipher between facts and feelings, and replace negative thoughts, with positive self-talk  Corinne plans to utilize ongoing self-care, take time for herself, and work on being present in the moment  Corinne plans to continue to structure her schedule, and have balance within her life  Corinne plans to continue to address ongoing symptoms with skill use  Corinne plans to lean into her natural supports  Corinne plans to continue to identify ongoing ways to implement limits and boundaries  Corinne plans to work on increasing the use of deep breathing techniques, and practice mindfulness and meditation techniques   Corinne plans to outreach for additional support as needed  I spent 45 minutes directly with the patient during this visit  10:45am-11:30am  VIRTUAL VISIT DISCLAIMER    Corinne S Kulp acknowledges that she has consented to an online visit or consultation  She understands that the online visit is based solely on information provided by her, and that, in the absence of a face-to-face physical evaluation by the physician, the diagnosis she receives is both limited and provisional in terms of accuracy and completeness  This is not intended to replace a full medical face-to-face evaluation by the physician  Corinne S Kulp understands and accepts these terms

## 2021-06-30 DIAGNOSIS — F41.9 ANXIETY: ICD-10-CM

## 2021-06-30 RX ORDER — BUSPIRONE HYDROCHLORIDE 5 MG/1
TABLET ORAL
Qty: 90 TABLET | Refills: 5 | Status: SHIPPED | OUTPATIENT
Start: 2021-06-30 | End: 2021-12-29

## 2021-06-30 RX ORDER — DULOXETIN HYDROCHLORIDE 60 MG/1
CAPSULE, DELAYED RELEASE ORAL
Qty: 30 CAPSULE | Refills: 5 | Status: SHIPPED | OUTPATIENT
Start: 2021-06-30 | End: 2021-12-29

## 2021-07-12 ENCOUNTER — TELEMEDICINE (OUTPATIENT)
Dept: BEHAVIORAL/MENTAL HEALTH CLINIC | Facility: CLINIC | Age: 41
End: 2021-07-12
Payer: COMMERCIAL

## 2021-07-12 DIAGNOSIS — F41.0 SEVERE ANXIETY WITH PANIC: ICD-10-CM

## 2021-07-12 DIAGNOSIS — F33.0 MILD EPISODE OF RECURRENT MAJOR DEPRESSIVE DISORDER (HCC): Primary | ICD-10-CM

## 2021-07-12 PROCEDURE — 90834 PSYTX W PT 45 MINUTES: CPT | Performed by: SOCIAL WORKER

## 2021-07-12 NOTE — PSYCH
Virtual Regular Visit    Assessment/Plan:    Problem List Items Addressed This Visit        Other    Depression, major, recurrent ([facility] [address] ) - Primary    Severe anxiety with panic        Goals addressed in session: Follow up psychotherapy session  Reason for visit is   Chief Complaint   Patient presents with    Virtual Regular Visit      Encounter provider Fina Bradford    Provider located at [place] [address]  [address] [address] [phone]    Recent Visits  No visits were found meeting these conditions  Showing recent visits within past 7 days and meeting all other requirements  Today's Visits  Date Type Provider Dept   07/12/21 Telemedicine Fina Bradford [facility]   Showing today's visits and meeting all other requirements  Future Appointments  No visits were found meeting these conditions  Showing future appointments within next 150 days and meeting all other requirements     The patient was identified by name and date of birth  Camilo Momin was informed that this is a telemedicine visit and that the visit is being conducted through [address] [place] [address] Now and patient was informed that this is a secure, HIPAA-compliant platform  She agrees to proceed  My office door was closed  No one else was in the room  She acknowledged consent and understanding of privacy and security of the video platform  The patient has agreed to participate and understands they can discontinue the visit at any time  Patient is aware this is a billable service  Subjective  Camilo Momin is a 42 y o  female        HPI     Past Medical History:   Diagnosis Date    Anxiety     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[facility] [address] )        Past Surgical History:   Procedure Laterality Date    APPENDECTOMY      extensive lysis of adhesions, MAYURI's procedure (divisions of MAYURI's bands) detorsion of vulvulus, widening of mesenteric pedicle, cecopeexy, appendectomy       BACK SURGERY Right 2013    SF: L4-L5 hemilaminectomy for discectomy  Use of the microscope for microdissection, Use og 40mg of depo-medrol though thee exiting right L5 nerve root  Onset:13     SECTION      x2    CHOLECYSTECTOMY      GALLBLADDER SURGERY      [facility] Onset:     LAPAROSCOPIC LYSIS INTESTINAL ADHESIONS      onset: 16    LUMBAR DISC SURGERY      CT LAP,DIAGNOSTIC ABDOMEN N/A 2016    Procedure: LAPAROSCOPY DIAGNOSTIC, EXTENSIVE LYSIS OF ADHESIONS, MAYURI'S PROCEDURE(DIVISION OF MAYURI'S BANDS,DETORSION OF VOLVULUS, WIDENING OF MESENTERIC PEDICLE, CECOPEXY, APPENDECTOMY); Surgeon: Karina Mahajan MD;  Location:  MAIN OR;  Service: General    TUBAL LIGATION         Current Outpatient Medications   Medication Sig Dispense Refill    ALPRAZolam (XANAX) 0 5 mg tablet Take one tablet q12 h prn anxiety 21 tablet 0    busPIRone (BUSPAR) 5 mg tablet take 1 tablet by mouth three times a day 90 tablet 5    DULoxetine (CYMBALTA) 30 mg delayed release capsule take 1 capsule by mouth once daily 30 capsule 5    DULoxetine (CYMBALTA) 60 mg delayed release capsule TAKE 1 CAPSULE BY MOUTH ONCE DAILY WITH 30MG CAPSULE 30 capsule 5    multivitamin (THERAGRAN) TABS Take 1 tablet by mouth daily      Probiotic Product (PROBIOTIC-10 PO) Take by mouth       No current facility-administered medications for this visit  Allergies   Allergen Reactions    Biaxin [Clarithromycin] GI Intolerance and Other (See Comments)    Sulfa Antibiotics     Sulfasalazine     Venlafaxine GI Intolerance, Vomiting and Other (See Comments)     Category: Allergy; Category: Allergy; Review of Systems    Video Exam    There were no vitals filed for this visit  Physical Exam     (D) Corinne attended her follow up psychotherapy session   Corinne reports that since her last session, she has struggled with ongoing interpersonal relationship stressors between her and her father  Corinne reported that there was an incident that had taken place where she and her  had a conversation with her father, inquiring about a plan for him to move out  Corinne discussed and processed this at length  Corinne reported that this didn't go well, reporting that it escalated quickly, reporting that her father called her a, "Veramonika March," and described her as, "a sunshine that only cares about herself " Corinne discussed and processed this at length  Corinne discussed past family trauma, her mother's struggle with alcoholism, her father's current struggle with alcoholism, and substance abuse  Corinne discussed co-dependency characteristics, and trauma triggers  Corinne and this writer processed this at length  Provided ongoing psychoeducation  Modeled effective forms of communication  Discussed ongoing skills  Reviewed limits and boundaries  (A) Corinne describes symptoms of feeling nervous, anxious, and on edge, reporting some trouble relaxing, and reports symptoms of irritability, poor frustration tolerance, and becoming easily annoyed  Corinne reports feeling tired and having little energy  Corinne reports lower moods of sadness, and depression at times  Corinne's mood presented as depressed and anxious, and her affect appeared to be congruent and tearful throughout the session  Corinne denies any evident or immediate risk factors for self-harm, SI, or HI  Corinne denies any symptoms of frank  Corinne presented as alert and oriented x3  Corinne presented with good eye contact  Corinne's speech presented at a normal rate, volume, and rhythm  (P) Corinne plans to sit down and have a conversation with her father, identify, associate, and express her feelings, reestablish new boundaries with him, and ask for what she needs  Corinne plans to utilize healthy and effective forms of communication to target ongoing interpersonal relationship stressors   Corinne plans to lean into her natural supports  Corinne plans to utilize ongoing skills to target fluctuating symptoms  Corinne plans to structure her schedule, have balance, practice radical acceptance, outweigh risks verses benefits in order to make informed decisions, utilize opposite action skills, increase self-care, take time for herself, and utilize deep breathing techniques  Corinne plans to outreach for additional support as needed  I spent 55 minutes directly with the patient during this visit  2:45pm-3:40pm       VIRTUAL VISIT DISCLAIMER    Corinne S Kulp acknowledges that she has consented to an online visit or consultation  She understands that the online visit is based solely on information provided by her, and that, in the absence of a face-to-face physical evaluation by the physician, the diagnosis she receives is both limited and provisional in terms of accuracy and completeness  This is not intended to replace a full medical face-to-face evaluation by the physician  Corinne S Kulp understands and accepts these terms

## 2021-08-09 ENCOUNTER — TELEMEDICINE (OUTPATIENT)
Dept: BEHAVIORAL/MENTAL HEALTH CLINIC | Facility: CLINIC | Age: 41
End: 2021-08-09
Payer: COMMERCIAL

## 2021-08-09 DIAGNOSIS — F33.0 MILD EPISODE OF RECURRENT MAJOR DEPRESSIVE DISORDER (HCC): Primary | ICD-10-CM

## 2021-08-09 DIAGNOSIS — F41.0 SEVERE ANXIETY WITH PANIC: ICD-10-CM

## 2021-08-09 PROCEDURE — 90834 PSYTX W PT 45 MINUTES: CPT | Performed by: SOCIAL WORKER
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
## 2021-08-13 ENCOUNTER — OFFICE VISIT (OUTPATIENT)
Dept: FAMILY MEDICINE CLINIC | Facility: HOSPITAL | Age: 41
End: 2021-08-13
Payer: COMMERCIAL

## 2021-08-13 VITALS
DIASTOLIC BLOOD PRESSURE: 80 MMHG | RESPIRATION RATE: 16 BRPM | SYSTOLIC BLOOD PRESSURE: 142 MMHG | WEIGHT: 140 LBS | HEART RATE: 82 BPM | HEIGHT: 64 IN | TEMPERATURE: 98 F | OXYGEN SATURATION: 99 % | BODY MASS INDEX: 23.9 KG/M2

## 2021-08-13 DIAGNOSIS — F32.A DEPRESSION, UNSPECIFIED DEPRESSION TYPE: ICD-10-CM

## 2021-08-13 DIAGNOSIS — H92.02 ACUTE EAR PAIN, LEFT: ICD-10-CM

## 2021-08-13 DIAGNOSIS — H93.8X2 EAR FULLNESS, LEFT: Primary | ICD-10-CM

## 2021-08-13 PROCEDURE — 3008F BODY MASS INDEX DOCD: CPT | Performed by: STUDENT IN AN ORGANIZED HEALTH CARE EDUCATION/TRAINING PROGRAM

## 2021-08-13 PROCEDURE — 1036F TOBACCO NON-USER: CPT | Performed by: STUDENT IN AN ORGANIZED HEALTH CARE EDUCATION/TRAINING PROGRAM

## 2021-08-13 PROCEDURE — 99213 OFFICE O/P EST LOW 20 MIN: CPT | Performed by: STUDENT IN AN ORGANIZED HEALTH CARE EDUCATION/TRAINING PROGRAM

## 2021-08-13 PROCEDURE — 69209 REMOVE IMPACTED EAR WAX UNI: CPT | Performed by: STUDENT IN AN ORGANIZED HEALTH CARE EDUCATION/TRAINING PROGRAM

## 2021-08-13 RX ORDER — DULOXETIN HYDROCHLORIDE 30 MG/1
30 CAPSULE, DELAYED RELEASE ORAL DAILY
Qty: 30 CAPSULE | Refills: 5
Start: 2021-08-13 | End: 2021-10-04 | Stop reason: SDUPTHER

## 2021-08-13 NOTE — PROGRESS NOTES
[address] [facility],     Assessment/Plan:      Diagnosis ICD-10-CM Associated Orders   1  Ear fullness, left  H93 8X2 Ambulatory Referral to Otolaryngology   2  Acute ear pain, left  H92 02    3  Depression, unspecified depression type  F32 9 DULoxetine (CYMBALTA) 30 mg delayed release capsule      Referral provided today for ENT  Concern for ear pain, crackling, and fullness  Small pearly papule along 9 o clock region of her tympanic membrane   Return in about 2 months (around 10/13/2021) for Annual Wellness Visit   Patient may call or return to office with any questions or concerns  ___________________________________________________________________  Subjective:     Patient ID: Sarah Antunez is a 42 y o  female  HPI  Sarah Antunez is a 48-year-old healthy female complaining today of left ear fullness  She states this started 3 weeks ago, felt like a tickle, and has been getting worse  She also notes that she has a feeling of crackles in her left ear  Tried hydrogen peroxide and had one episode of dizziness  Occasionally painful, mild  Right ear normal      Anxious a bit about BP reading today  Normally never has high BP  The following portions of the patient's history were reviewed and updated as appropriate: allergies, current medications, past medical history and problem list     Review of Systems   Constitutional: Negative for chills, fatigue and fever  HENT: Positive for ear pain  Negative for ear discharge, tinnitus and trouble swallowing  Eyes: Negative for pain and redness  Respiratory: Negative for cough and shortness of breath  Musculoskeletal: Negative for neck pain and neck stiffness  Neurological: Positive for dizziness  Negative for light-headedness and headaches           Objective:      Vitals:    08/13/21 1523   BP: 142/80   Pulse: 82   Resp: 16   Temp: 98 °F (36 7 °C)   SpO2: 99%       Repeat blood pressure right arm 130/82     Physical Exam  Vitals reviewed  Constitutional:       Appearance: She is well-developed  HENT:      Head: Normocephalic and atraumatic  Right Ear: Tympanic membrane, ear canal and external ear normal  There is no impacted cerumen  Left Ear: External ear normal  There is no impacted cerumen  Ears:      Comments: Small pearly papule along tympanic membrane at 9 o clock position  Small brown hair strand going across canal, removed with ear flushing  Eyes:      General: No scleral icterus  Right eye: No discharge  Left eye: No discharge  Cardiovascular:      Rate and Rhythm: Normal rate  Pulmonary:      Effort: Pulmonary effort is normal  No respiratory distress  Breath sounds: No stridor  Musculoskeletal:      Cervical back: Normal range of motion  Skin:     General: Skin is warm and dry  Coloration: Skin is not pale  Neurological:      Mental Status: She is alert and oriented to person, place, and time  Psychiatric:         Mood and Affect: Mood normal          Behavior: Behavior normal          Thought Content: Thought content normal          Judgment: Judgment normal            Ear cerumen removal    Date/Time: 8/13/2021 4:02 PM  Performed by: Tanvir Dutta DO  Authorized by: Tanvir Dutta DO   Universal Protocol:  Procedure performed by: Charlotte Young)  Consent: Verbal consent obtained  Risks and benefits: risks, benefits and alternatives were discussed  Consent given by: patient  Time out: Immediately prior to procedure a "time out" was called to verify the correct patient, procedure, equipment, support staff and site/side marked as required    Patient understanding: patient states understanding of the procedure being performed  Patient consent: the patient's understanding of the procedure matches consent given  Procedure consent: procedure consent matches procedure scheduled  Site marked: the operative site was marked  Patient identity confirmed: verbally with patient      Patient location:  Clinic  Indications / Diagnosis:    Here strand in left ear, causing pain and tingling  Procedure details:     Location:  L ear    Procedure type: irrigation only      Approach:  External  Post-procedure details:     Complication:  None    Hearing quality:  Normal    Patient tolerance of procedure: Tolerated well, no immediate complications  Comments:       Small brown hair strand removed, and in fluid drained        Portions of the record may have been created with voice recognition software  Occasional wrong word or "sound alike" substitutions may have occurred due to the inherent limitations of voice recognition software  Please review the chart carefully and recognize, using context, where substitutions/typographical errors may have occurred

## 2021-08-13 NOTE — PATIENT INSTRUCTIONS
[address], [address], Throat: [phone]    [facility] [place], [address]

## 2021-08-26 DIAGNOSIS — Q15.9 EYE ABNORMALITIES: Primary | ICD-10-CM

## 2021-08-26 RX ORDER — OFLOXACIN 3 MG/ML
SOLUTION/ DROPS OPHTHALMIC
Qty: 10 ML | Refills: 0 | Status: SHIPPED | OUTPATIENT
Start: 2021-08-26 | End: 2021-10-18 | Stop reason: ALTCHOICE

## 2021-09-13 ENCOUNTER — TELEMEDICINE (OUTPATIENT)
Dept: BEHAVIORAL/MENTAL HEALTH CLINIC | Facility: CLINIC | Age: 41
End: 2021-09-13
Payer: COMMERCIAL

## 2021-09-13 DIAGNOSIS — F33.0 MILD EPISODE OF RECURRENT MAJOR DEPRESSIVE DISORDER (HCC): Primary | ICD-10-CM

## 2021-09-13 DIAGNOSIS — F41.0 SEVERE ANXIETY WITH PANIC: ICD-10-CM

## 2021-09-13 PROCEDURE — 90834 PSYTX W PT 45 MINUTES: CPT | Performed by: SOCIAL WORKER
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
## 2021-09-30 NOTE — PATIENT INSTRUCTIONS
Checked BP after Zan Maradiaga was at rest for 10 minutes.     [facility] Extended Vitals - Weight in Kg/Lb 9/30/2021 9/30/2021   /92 138/81   Pulse 56      Patient increased losartan to 25 mg twice daily on 9/15/21. He is also taking bisoprolol-hctz 5-6.25 one tablet qd. His home blood pressures are as follows since 9/15:    9/16 141/77, 140/77, 131/76  9/18 151/86, 136/80, 150/81  9/19 135/77, 133/76, 139/79  9/21 138/84, 138/79, 135/73  9/24 136/79, 140/81, 139/78  9/26 146/79, 138/78, 138/79  9/28 141/87, 135/76    Please see telephone encounter.    Please follow up in (1) month

## 2021-10-04 DIAGNOSIS — F32.A DEPRESSION, UNSPECIFIED DEPRESSION TYPE: ICD-10-CM

## 2021-10-04 RX ORDER — DULOXETIN HYDROCHLORIDE 30 MG/1
30 CAPSULE, DELAYED RELEASE ORAL DAILY
Qty: 30 CAPSULE | Refills: 5 | Status: SHIPPED | OUTPATIENT
Start: 2021-10-04 | End: 2021-12-06 | Stop reason: SDUPTHER

## 2021-10-11 ENCOUNTER — TELEMEDICINE (OUTPATIENT)
Dept: BEHAVIORAL/MENTAL HEALTH CLINIC | Facility: CLINIC | Age: 41
End: 2021-10-11
Payer: COMMERCIAL

## 2021-10-11 DIAGNOSIS — F41.0 SEVERE ANXIETY WITH PANIC: ICD-10-CM

## 2021-10-11 DIAGNOSIS — F33.0 MILD EPISODE OF RECURRENT MAJOR DEPRESSIVE DISORDER (HCC): Primary | ICD-10-CM

## 2021-10-11 PROCEDURE — 90834 PSYTX W PT 45 MINUTES: CPT | Performed by: SOCIAL WORKER

## 2021-10-18 ENCOUNTER — OFFICE VISIT (OUTPATIENT)
Dept: FAMILY MEDICINE CLINIC | Facility: HOSPITAL | Age: 41
End: 2021-10-18
Payer: COMMERCIAL

## 2021-10-18 VITALS
HEIGHT: 64 IN | SYSTOLIC BLOOD PRESSURE: 124 MMHG | WEIGHT: 140.4 LBS | DIASTOLIC BLOOD PRESSURE: 68 MMHG | BODY MASS INDEX: 23.97 KG/M2 | HEART RATE: 73 BPM | OXYGEN SATURATION: 97 %

## 2021-10-18 DIAGNOSIS — Z23 NEED FOR TETANUS, DIPHTHERIA, AND ACELLULAR PERTUSSIS (TDAP) VACCINE: ICD-10-CM

## 2021-10-18 DIAGNOSIS — Z11.59 NEED FOR HEPATITIS C SCREENING TEST: ICD-10-CM

## 2021-10-18 DIAGNOSIS — Z13.0 SCREENING FOR IRON DEFICIENCY ANEMIA: ICD-10-CM

## 2021-10-18 DIAGNOSIS — Z13.1 SCREENING FOR DIABETES MELLITUS: ICD-10-CM

## 2021-10-18 DIAGNOSIS — Z20.822 CLOSE EXPOSURE TO COVID-19 VIRUS: ICD-10-CM

## 2021-10-18 DIAGNOSIS — Z00.00 ANNUAL PHYSICAL EXAM: Primary | ICD-10-CM

## 2021-10-18 DIAGNOSIS — Z13.220 SCREENING FOR HYPERLIPIDEMIA: ICD-10-CM

## 2021-10-18 DIAGNOSIS — F33.2 SEVERE EPISODE OF RECURRENT MAJOR DEPRESSIVE DISORDER, WITHOUT PSYCHOTIC FEATURES (HCC): ICD-10-CM

## 2021-10-18 DIAGNOSIS — F33.0 MILD EPISODE OF RECURRENT MAJOR DEPRESSIVE DISORDER (HCC): ICD-10-CM

## 2021-10-18 PROCEDURE — 90471 IMMUNIZATION ADMIN: CPT

## 2021-10-18 PROCEDURE — 90715 TDAP VACCINE 7 YRS/> IM: CPT

## 2021-10-18 PROCEDURE — 3008F BODY MASS INDEX DOCD: CPT | Performed by: STUDENT IN AN ORGANIZED HEALTH CARE EDUCATION/TRAINING PROGRAM

## 2021-10-18 PROCEDURE — 99396 PREV VISIT EST AGE 40-64: CPT | Performed by: STUDENT IN AN ORGANIZED HEALTH CARE EDUCATION/TRAINING PROGRAM

## 2021-10-22 LAB
BUN SERPL-MCNC: 13 MG/DL (ref 6–24)
BUN/CREAT SERPL: 19 (ref 9–23)
CALCIUM SERPL-MCNC: 9.2 MG/DL (ref 8.7–10.2)
CHLORIDE SERPL-SCNC: 103 MMOL/L (ref 96–106)
CHOLEST SERPL-MCNC: 184 MG/DL (ref 100–199)
CHOLEST/HDLC SERPL: 3 RATIO (ref 0–4.4)
CO2 SERPL-SCNC: 26 MMOL/L (ref 20–29)
CREAT SERPL-MCNC: 0.68 MG/DL (ref 0.57–1)
GLUCOSE SERPL-MCNC: 88 MG/DL (ref 65–99)
HCV AB S/CO SERPL IA: <0.1 S/CO RATIO (ref 0–0.9)
HDLC SERPL-MCNC: 61 MG/DL
LDLC SERPL CALC-MCNC: 113 MG/DL (ref 0–99)
POTASSIUM SERPL-SCNC: 4.4 MMOL/L (ref 3.5–5.2)
SARS-COV-2 IGG SERPL QL IA: NEGATIVE
SL AMB EGFR AFRICAN AMERICAN: 125 ML/MIN/1.73
SL AMB EGFR NON AFRICAN AMERICAN: 108 ML/MIN/1.73
SL AMB VLDL CHOLESTEROL CALC: 10 MG/DL (ref 5–40)
SODIUM SERPL-SCNC: 140 MMOL/L (ref 134–144)
TRIGL SERPL-MCNC: 54 MG/DL (ref 0–149)

## 2021-11-08 ENCOUNTER — TELEMEDICINE (OUTPATIENT)
Dept: BEHAVIORAL/MENTAL HEALTH CLINIC | Facility: CLINIC | Age: 41
End: 2021-11-08
Payer: COMMERCIAL

## 2021-11-08 DIAGNOSIS — F41.0 SEVERE ANXIETY WITH PANIC: ICD-10-CM

## 2021-11-08 DIAGNOSIS — F33.0 MILD EPISODE OF RECURRENT MAJOR DEPRESSIVE DISORDER (HCC): Primary | ICD-10-CM

## 2021-11-08 PROCEDURE — 90834 PSYTX W PT 45 MINUTES: CPT | Performed by: SOCIAL WORKER

## 2021-11-24 DIAGNOSIS — R50.9 FEVER, UNSPECIFIED FEVER CAUSE: ICD-10-CM

## 2021-11-24 DIAGNOSIS — B34.9 VIRAL ILLNESS: ICD-10-CM

## 2021-11-24 DIAGNOSIS — R53.83 FATIGUE, UNSPECIFIED TYPE: Primary | ICD-10-CM

## 2021-11-26 ENCOUNTER — HOSPITAL ENCOUNTER (EMERGENCY)
Facility: HOSPITAL | Age: 41
Discharge: HOME | End: 2021-11-26
Attending: EMERGENCY MEDICINE | Admitting: EMERGENCY MEDICINE
Payer: COMMERCIAL

## 2021-11-26 ENCOUNTER — TELEMEDICINE (OUTPATIENT)
Dept: BEHAVIORAL/MENTAL HEALTH CLINIC | Facility: CLINIC | Age: 41
End: 2021-11-26
Payer: COMMERCIAL

## 2021-11-26 ENCOUNTER — TELEPHONE (OUTPATIENT)
Dept: FAMILY MEDICINE CLINIC | Facility: HOSPITAL | Age: 41
End: 2021-11-26

## 2021-11-26 VITALS
WEIGHT: 135 LBS | RESPIRATION RATE: 18 BRPM | OXYGEN SATURATION: 99 % | DIASTOLIC BLOOD PRESSURE: 58 MMHG | SYSTOLIC BLOOD PRESSURE: 111 MMHG | BODY MASS INDEX: 23.17 KG/M2 | HEART RATE: 93 BPM | TEMPERATURE: 100.2 F

## 2021-11-26 DIAGNOSIS — F33.0 MILD EPISODE OF RECURRENT MAJOR DEPRESSIVE DISORDER (HCC): Primary | ICD-10-CM

## 2021-11-26 DIAGNOSIS — U07.1 COVID-19: Primary | ICD-10-CM

## 2021-11-26 DIAGNOSIS — R11.2 NAUSEA AND VOMITING: ICD-10-CM

## 2021-11-26 DIAGNOSIS — R19.7 DIARRHEA: ICD-10-CM

## 2021-11-26 DIAGNOSIS — F41.0 SEVERE ANXIETY WITH PANIC: ICD-10-CM

## 2021-11-26 LAB
ALBUMIN SERPL BCP-MCNC: 3.1 G/DL (ref 3.5–5)
ALP SERPL-CCNC: 52 U/L (ref 46–116)
ALT SERPL W P-5'-P-CCNC: 28 U/L (ref 12–78)
ANION GAP SERPL CALCULATED.3IONS-SCNC: 8 MMOL/L (ref 4–13)
AST SERPL W P-5'-P-CCNC: 32 U/L (ref 5–45)
BASOPHILS # BLD AUTO: 0.01 THOUSANDS/ΜL (ref 0–0.1)
BASOPHILS NFR BLD AUTO: 0 % (ref 0–1)
BILIRUB SERPL-MCNC: 0.3 MG/DL (ref 0.2–1)
BUN SERPL-MCNC: 7 MG/DL (ref 5–25)
CALCIUM ALBUM COR SERPL-MCNC: 9.3 MG/DL (ref 8.3–10.1)
CALCIUM SERPL-MCNC: 8.6 MG/DL (ref 8.3–10.1)
CHLORIDE SERPL-SCNC: 104 MMOL/L (ref 100–108)
CO2 SERPL-SCNC: 29 MMOL/L (ref 21–32)
CREAT SERPL-MCNC: 0.59 MG/DL (ref 0.6–1.3)
EOSINOPHIL # BLD AUTO: 0 THOUSAND/ΜL (ref 0–0.61)
EOSINOPHIL NFR BLD AUTO: 0 % (ref 0–6)
ERYTHROCYTE [DISTWIDTH] IN BLOOD BY AUTOMATED COUNT: 13 % (ref 11.6–15.1)
GFR SERPL CREATININE-BSD FRML MDRD: 113 ML/MIN/1.73SQ M
GLUCOSE SERPL-MCNC: 117 MG/DL (ref 65–140)
HCT VFR BLD AUTO: 35.5 % (ref 34.8–46.1)
HGB BLD-MCNC: 11.1 G/DL (ref 11.5–15.4)
IMM GRANULOCYTES # BLD AUTO: 0 THOUSAND/UL (ref 0–0.2)
IMM GRANULOCYTES NFR BLD AUTO: 0 % (ref 0–2)
LIPASE SERPL-CCNC: 308 U/L (ref 73–393)
LYMPHOCYTES # BLD AUTO: 0.82 THOUSANDS/ΜL (ref 0.6–4.47)
LYMPHOCYTES NFR BLD AUTO: 30 % (ref 14–44)
MCH RBC QN AUTO: 29.1 PG (ref 26.8–34.3)
MCHC RBC AUTO-ENTMCNC: 31.3 G/DL (ref 31.4–37.4)
MCV RBC AUTO: 93 FL (ref 82–98)
MONOCYTES # BLD AUTO: 0.36 THOUSAND/ΜL (ref 0.17–1.22)
MONOCYTES NFR BLD AUTO: 13 % (ref 4–12)
NEUTROPHILS # BLD AUTO: 1.53 THOUSANDS/ΜL (ref 1.85–7.62)
NEUTS SEG NFR BLD AUTO: 57 % (ref 43–75)
NRBC BLD AUTO-RTO: 0 /100 WBCS
PLATELET # BLD AUTO: 135 THOUSANDS/UL (ref 149–390)
PMV BLD AUTO: 11.6 FL (ref 8.9–12.7)
POTASSIUM SERPL-SCNC: 3.7 MMOL/L (ref 3.5–5.3)
PROT SERPL-MCNC: 6.8 G/DL (ref 6.4–8.2)
RBC # BLD AUTO: 3.81 MILLION/UL (ref 3.81–5.12)
SODIUM SERPL-SCNC: 141 MMOL/L (ref 136–145)
WBC # BLD AUTO: 2.72 THOUSAND/UL (ref 4.31–10.16)

## 2021-11-26 PROCEDURE — 83690 ASSAY OF LIPASE: CPT | Performed by: PHYSICIAN ASSISTANT

## 2021-11-26 PROCEDURE — 99284 EMERGENCY DEPT VISIT MOD MDM: CPT | Performed by: PHYSICIAN ASSISTANT

## 2021-11-26 PROCEDURE — 96361 HYDRATE IV INFUSION ADD-ON: CPT

## 2021-11-26 PROCEDURE — 96374 THER/PROPH/DIAG INJ IV PUSH: CPT

## 2021-11-26 PROCEDURE — 36415 COLL VENOUS BLD VENIPUNCTURE: CPT | Performed by: PHYSICIAN ASSISTANT

## 2021-11-26 PROCEDURE — 99284 EMERGENCY DEPT VISIT MOD MDM: CPT

## 2021-11-26 PROCEDURE — 80053 COMPREHEN METABOLIC PANEL: CPT | Performed by: PHYSICIAN ASSISTANT

## 2021-11-26 PROCEDURE — 85025 COMPLETE CBC W/AUTO DIFF WBC: CPT | Performed by: PHYSICIAN ASSISTANT

## 2021-11-26 PROCEDURE — 90834 PSYTX W PT 45 MINUTES: CPT | Performed by: SOCIAL WORKER

## 2021-11-26 RX ORDER — METOCLOPRAMIDE 10 MG/1
10 TABLET ORAL EVERY 6 HOURS
Qty: 15 TABLET | Refills: 0 | Status: SHIPPED | OUTPATIENT
Start: 2021-11-26

## 2021-11-26 RX ORDER — METOCLOPRAMIDE HYDROCHLORIDE 5 MG/ML
10 INJECTION INTRAMUSCULAR; INTRAVENOUS ONCE
Status: COMPLETED | OUTPATIENT
Start: 2021-11-26 | End: 2021-11-26

## 2021-11-26 RX ORDER — ACETAMINOPHEN 325 MG/1
650 TABLET ORAL ONCE
Status: COMPLETED | OUTPATIENT
Start: 2021-11-26 | End: 2021-11-26

## 2021-11-26 RX ADMIN — SODIUM CHLORIDE 1000 ML: 0.9 INJECTION, SOLUTION INTRAVENOUS at 15:02

## 2021-11-26 RX ADMIN — ACETAMINOPHEN 650 MG: 325 TABLET, FILM COATED ORAL at 15:02

## 2021-11-26 RX ADMIN — METOCLOPRAMIDE 10 MG: 5 INJECTION, SOLUTION INTRAMUSCULAR; INTRAVENOUS at 15:02

## 2021-12-06 ENCOUNTER — TELEMEDICINE (OUTPATIENT)
Dept: BEHAVIORAL/MENTAL HEALTH CLINIC | Facility: CLINIC | Age: 41
End: 2021-12-06
Payer: COMMERCIAL

## 2021-12-06 DIAGNOSIS — F41.0 SEVERE ANXIETY WITH PANIC: ICD-10-CM

## 2021-12-06 DIAGNOSIS — F33.0 MILD EPISODE OF RECURRENT MAJOR DEPRESSIVE DISORDER (HCC): Primary | ICD-10-CM

## 2021-12-06 DIAGNOSIS — F32.A DEPRESSION, UNSPECIFIED DEPRESSION TYPE: ICD-10-CM

## 2021-12-06 PROCEDURE — 90834 PSYTX W PT 45 MINUTES: CPT | Performed by: SOCIAL WORKER

## 2021-12-06 RX ORDER — DULOXETIN HYDROCHLORIDE 30 MG/1
30 CAPSULE, DELAYED RELEASE ORAL DAILY
Qty: 30 CAPSULE | Refills: 5 | Status: SHIPPED | OUTPATIENT
Start: 2021-12-06 | End: 2022-02-26 | Stop reason: SDUPTHER

## 2021-12-29 DIAGNOSIS — F41.9 ANXIETY: ICD-10-CM

## 2021-12-29 RX ORDER — DULOXETIN HYDROCHLORIDE 60 MG/1
CAPSULE, DELAYED RELEASE ORAL
Qty: 30 CAPSULE | Refills: 5 | Status: SHIPPED | OUTPATIENT
Start: 2021-12-29 | End: 2022-06-28

## 2021-12-29 RX ORDER — BUSPIRONE HYDROCHLORIDE 5 MG/1
TABLET ORAL
Qty: 90 TABLET | Refills: 5 | Status: SHIPPED | OUTPATIENT
Start: 2021-12-29 | End: 2022-06-28

## 2022-01-17 ENCOUNTER — TELEPHONE (OUTPATIENT)
Dept: BEHAVIORAL/MENTAL HEALTH CLINIC | Facility: CLINIC | Age: 42
End: 2022-01-17

## 2022-01-24 ENCOUNTER — TELEMEDICINE (OUTPATIENT)
Dept: BEHAVIORAL/MENTAL HEALTH CLINIC | Facility: CLINIC | Age: 42
End: 2022-01-24
Payer: COMMERCIAL

## 2022-01-24 DIAGNOSIS — F41.0 SEVERE ANXIETY WITH PANIC: Primary | ICD-10-CM

## 2022-01-24 PROCEDURE — 90834 PSYTX W PT 45 MINUTES: CPT | Performed by: SOCIAL WORKER

## 2022-01-24 NOTE — PSYCH
Virtual Regular Visit    Verification of patient location: [place]  Patient is located in the following state in which I hold an active license [place]  Assessment/Plan:    Problem List Items Addressed This Visit        Other    Severe anxiety with panic - Primary        Goals addressed in session: Goal 1 Follow up psychotherapy session  Reason for visit is   Chief Complaint   Patient presents with    Virtual Regular Visit      Encounter provider Renate Keane    Provider located at [address] [address] [phone]    Recent Visits  Date Type Provider Dept   01/17/22 Telephone [facility]ate Keane [facility]   Showing recent visits within past 7 days and meeting all other requirements  Today's Visits  Date Type Provider Dept   01/24/22 [facility]medicine [facility]   Showing today's visits and meeting all other requirements  Future Appointments  No visits were found meeting these conditions  Showing future appointments within next 150 days and meeting all other requirements     The patient was identified by name and date of birth  Gila Tejada was informed that this is a telemedicine visit and that the visit is being conducted through [address] [place] [address] Now and patient was informed that this is a secure, HIPAA-compliant platform  She agrees to proceed  My office door was closed  No one else was in the room  She acknowledged consent and understanding of privacy and security of the video platform  The patient has agreed to participate and understands they can discontinue the visit at any time  Patient is aware this is a billable service  Subjective  Gila Tejada is a 42 y o  female      HPI     Past Medical History:   Diagnosis Date    Anxiety     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[facility])      Past Surgical History:   Procedure Laterality Date    APPENDECTOMY      extensive lysis of adhesions, MAYURI's procedure (divisions of MAYURI's bands) detorsion of vulvulus, widening of mesenteric pedicle, cecopeexy, appendectomy       BACK SURGERY Right 2013    SF: L4-L5 hemilaminectomy for discectomy  Use of the microscope for microdissection, Use og 40mg of depo-medrol though thee exiting right L5 nerve root  Onset:13     SECTION      x2    CHOLECYSTECTOMY      GALLBLADDER SURGERY      GV, Onset:     LAPAROSCOPIC LYSIS INTESTINAL ADHESIONS      onset: 16    LUMBAR DISC SURGERY      [place] LAP,DIAGNOSTIC ABDOMEN N/A 2016    Procedure: LAPAROSCOPY DIAGNOSTIC, EXTENSIVE LYSIS OF ADHESIONS, MAYURI'S PROCEDURE(DIVISION OF MAYURI'S BANDS,DETORSION OF VOLVULUS, WIDENING OF MESENTERIC PEDICLE, CECOPEXY, APPENDECTOMY); Surgeon: Jodi Hernandez MD;  Location:  MAIN OR;  Service: General    TUBAL LIGATION       Current Outpatient Medications   Medication Sig Dispense Refill    ALPRAZolam (XANAX) 0 5 mg tablet Take one tablet q12 h prn anxiety 21 tablet 0    busPIRone (BUSPAR) 5 mg tablet take 1 tablet by mouth three times a day 90 tablet 5    DULoxetine (CYMBALTA) 30 mg delayed release capsule Take 1 capsule (30 mg total) by mouth daily 30 capsule 5    DULoxetine (CYMBALTA) 60 mg delayed release capsule TAKE 1 CAPSULE BY MOUTH ONCE DAILY WITH 30MG CAPSULE 30 capsule 5    metoclopramide (Reglan) 10 mg tablet Take 1 tablet (10 mg total) by mouth every 6 (six) hours 15 tablet 0    multivitamin (THERAGRAN) TABS Take 1 tablet by mouth daily      Probiotic Product (PROBIOTIC-10 PO) Take by mouth 1 daily       No current facility-administered medications for this visit  Allergies   Allergen Reactions    Biaxin [Clarithromycin] GI Intolerance and Other (See Comments)    Sulfa Antibiotics     Sulfasalazine     Venlafaxine GI Intolerance, Vomiting and Other (See Comments)     Category: Allergy; Category: Allergy;       Review of Systems    Video Exam    There were no vitals filed for this visit  Physical Exam     (D) Corinne attended her follow up psychotherapy session today  Corinne reported that since her last session, she has noticed a slight decrease in her symptoms  Corinne reported that after her last session, her  got more sick as a result of COVID  Corinne reported that when her  was cleared to go back to work, and reported that when he went back to work, he was having a hard time breathing, and was coughing  Corinne reported that her 's blood oxygen level was at 90  Corinne reported that they were in contact with their PCP who was concerned about him having blood clots, and placed an order for a CT  Corinne reported that her son dropped him off for this test  Corinne reported that as a result of the testing, her  was diagnosed with COVID and pneumonia  Corinne reported that her  was prescribed an inhaler and steroids to treat both, and ended up needing to be off from work for another week  Corinne reported that throughout this time she struggled with elevated levels of anxiety  Corinne reported that going through the motions she was able to manage her anxiety, and reported that when things settled down, she realized how sick her  was, and fearful that he could have  from that  Corinne reported that she felt being upset with the emergency room staff when she had her  there the week before, feeling like her  had poor treatment in the emergency room, reporting that the ER physician never came into the room, and stood in the doorway  Corinne and this writer processed this  Corinne reported that her and her  continue to struggle with decision making in relation to wether or not they plan to get themselves and their children vaccinated, and processed this   Corinne reported that her father who lives with them, never got COVID to their knowledge, even though she reported that he got a cold around [place], and then tested negative  Corinne reported that things have improved between her and her father in relation to them living together, and their overall relationship  Corinne reported that on 01/16/22 her father wrote her a letter and left it in her bedroom  Corinne read the letter in session today  Corinne reported that she noticed that her father has cut back with drinking alcohol, and taking more accountability for his behaviors, acknowledging his alcoholism, and history of his behaviors  Corinne discussed past trauma within their relationship, her father struggling with alcoholism, and Corinne's struggle with co-dependency, while having a desire for boundaries  Corinne discussed a work related stressor in relation to [place], reporting that there was an incident at work with 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, and her stylist didn't want to  Corinne discussed interpersonal relationship stressors in relation to this, and her struggle with handling conflict  Corinne and this writer processed this at length  Provided ongoing psychoeducation  Discussed limits and boundaries  Reviewed ongoing skills  Provided ongoing psychoeducation  (A) Corinne presented as alert and oriented x3  Corinne presented with good eye contact  Corinne's speech presented at a normal rate, volume, and rhythm  Corinne denies any symptoms of frank  Corinne denies any evident or immediate risk factors for self-harm, SI, or HI  Corinne's mood presented as slightly anxious, and her affect appeared to be congruent  Corinne reported a reduction in the intensity and frequency of her symptoms, cycles, and stressors since her last session  Corinne describes symptoms of anticipatory anxiety, reporting feeling nervous, anxious, and on edge, having some trouble relaxing, and being fidgety and restless that it's hard to sit still   Corinne describes herself as struggling with shame, guilt, and vulnerability, and struggling with co-dependency characteristics  (P) Corinne plans to work on [address], Rational Mind, and [address], deciphering between facts and feelings, and continuing to work on increasing self-awareness in relation to cognitive distortions, core beliefs, and negative thinking traps  This writer e-mailed Arta Schlatter a worksheet on this  Corinne plans to work on challenging these in the moment, by honoring the Rational Mind, Emotional Mind, and using the combination of the both to function in [employer]  This writer e-mailed over Arta Schlatter a DBT [employer]  Corinne plans to decipher between facts and feelings with reality testing, challenging core beliefs, and negative thinking traps  Corinne plans to work on identifying triggers in the moment, and increase self-awareness in relation to this  Corinne plans to work on actively being mindful, and work on being present in the moment, while practicing meditation, and deep breathing techniques  Corinne plans to continue to target anticipatory anxiety and symptoms with sensory related skills, grounding techniques, distress tolerance skills, distraction techniques, and coping skills  Corinne plans to actively work on identifying, associating, and expressing his feelings, allowing herself to actually feel her emotions, honor them, and make space for them, while validating them  Corinne plans to work on decreasing judgement towards himself, and work on increasing self-compassion, while extending bogdan towards herself  Corinne plans to continue to work on expressing her unmet needs through healthy and effective forms of communication to target interpersonal relationship stressors  Corinne plans to engage in the use of self-care, take time for herself, and work on being present in the moment  Corinne plans implement the use of radical acceptance, focusing on what is in her control, verses what is not in her control   Corinne plans to continue to lean into her joshua, actively engage in prayer, monitoring and tracking gratitude  Corinne plans to increase positive affirmations, positive self-talk, and grounding statements  Corinne plans to implement the use of opposite action skills, and structure her schedule with balance, along with routine, and consistency  Corinne plans to continue to outweigh risks verses benefits in order to make informed decisions, aligning choices and decisions with what is in the best interest of her  Corinne plans to reach out for additional support as needed  I spent 50 minutes directly with the patient during this visit  9:20am-10:10am     VIRTUAL VISIT DISCLAIMER    Corinne S Kulp verbally agrees to participate in [employer]  Pt is aware that [employer] could be limited without vital signs or the ability to perform a full hands-on physical exam  Corinne S Kulp understands she or the provider may request at any time to terminate the video visit and request the patient to seek care or treatment in person

## 2022-02-07 ENCOUNTER — TELEMEDICINE (OUTPATIENT)
Dept: BEHAVIORAL/MENTAL HEALTH CLINIC | Facility: CLINIC | Age: 42
End: 2022-02-07
Payer: COMMERCIAL

## 2022-02-07 DIAGNOSIS — F33.0 MILD EPISODE OF RECURRENT MAJOR DEPRESSIVE DISORDER (HCC): Primary | ICD-10-CM

## 2022-02-07 DIAGNOSIS — F41.0 SEVERE ANXIETY WITH PANIC: ICD-10-CM

## 2022-02-07 PROCEDURE — 90834 PSYTX W PT 45 MINUTES: CPT | Performed by: SOCIAL WORKER

## 2022-02-07 NOTE — PSYCH
Virtual Regular Visit    Verification of patient location: [place]  Patient is located in the following state in which I hold an active license [place]  Assessment/Plan:    Problem List Items Addressed This Visit        Other    Depression, major, recurrent ([facility] [address] ) - Primary    Severe anxiety with panic        Goals addressed in session: Goal 1 Follow up psychotherapy session  Reason for visit is   Chief Complaint   Patient presents with    Virtual Regular Visit      Encounter provider Avinash Corona    Provider located at [address] [address] [phone]    Recent Visits  No visits were found meeting these conditions  Showing recent visits within past 7 days and meeting all other requirements  Today's Visits  Date Type Provider Dept   02/07/22 Telemedicine [facility]ash Corona [facility]   Showing today's visits and meeting all other requirements  Future Appointments  No visits were found meeting these conditions  Showing future appointments within next 150 days and meeting all other requirements     The patient was identified by name and date of birth  Ayden Osullivan was informed that this is a telemedicine visit and that the visit is being conducted through [address] [place] [address] Now and patient was informed that this is a secure, HIPAA-compliant platform  She agrees to proceed  My office door was closed  No one else was in the room  She acknowledged consent and understanding of privacy and security of the video platform  The patient has agreed to participate and understands they can discontinue the visit at any time  Patient is aware this is a billable service  Subjective  Ayden Osullivan is a 36 y o  female      HPI     Past Medical History:   Diagnosis Date    Anxiety     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)        Past Surgical History:   Procedure Laterality Date    APPENDECTOMY      extensive lysis of adhesions, MAYURI's procedure (divisions of MAYURI's bands) detorsion of vulvulus, widening of mesenteric pedicle, cecopeexy, appendectomy       BACK SURGERY Right 2013    SF: L4-L5 hemilaminectomy for discectomy  Use of the microscope for microdissection, Use og 40mg of depo-medrol though thee exiting right L5 nerve root  Onset:13     SECTION      x2    CHOLECYSTECTOMY      GALLBLADDER SURGERY      GV, Onset:     LAPAROSCOPIC LYSIS INTESTINAL ADHESIONS      onset: 16    LUMBAR DISC SURGERY      [place] LAP,DIAGNOSTIC ABDOMEN N/A 2016    Procedure: LAPAROSCOPY DIAGNOSTIC, EXTENSIVE LYSIS OF ADHESIONS, MAYURI'S PROCEDURE(DIVISION OF MAYURI'S BANDS,DETORSION OF VOLVULUS, WIDENING OF MESENTERIC PEDICLE, CECOPEXY, APPENDECTOMY); Surgeon: Flora Barton MD;  Location:  MAIN OR;  Service: General    TUBAL LIGATION         Current Outpatient Medications   Medication Sig Dispense Refill    ALPRAZolam (XANAX) 0 5 mg tablet Take one tablet q12 h prn anxiety 21 tablet 0    busPIRone (BUSPAR) 5 mg tablet take 1 tablet by mouth three times a day 90 tablet 5    DULoxetine (CYMBALTA) 30 mg delayed release capsule Take 1 capsule (30 mg total) by mouth daily 30 capsule 5    DULoxetine (CYMBALTA) 60 mg delayed release capsule TAKE 1 CAPSULE BY MOUTH ONCE DAILY WITH 30MG CAPSULE 30 capsule 5    metoclopramide (Reglan) 10 mg tablet Take 1 tablet (10 mg total) by mouth every 6 (six) hours 15 tablet 0    multivitamin (THERAGRAN) TABS Take 1 tablet by mouth daily      Probiotic Product (PROBIOTIC-10 PO) Take by mouth 1 daily       No current facility-administered medications for this visit  Allergies   Allergen Reactions    Biaxin [Clarithromycin] GI Intolerance and Other (See Comments)    Sulfa Antibiotics     Sulfasalazine     Venlafaxine GI Intolerance, Vomiting and Other (See Comments)     Category: Allergy; Category: Allergy;         Review of Systems    Video Exam    There were no vitals filed for this visit  Physical Exam     (D) Corinne attended her follow up psychotherapy session today  Corinne reported that since her last session, she has noticed that every other month she struggles with having a heavy menstrual, where she doesn't notice PMS symptoms, and reports that on the opposite month, she struggles with the PMS symptoms, and having a lighter menstrual cycle  Corinne reported that this month she struggled with the increased and elevated PMS symptoms, which she reports impacting her mental health symptoms  Corinne communicated that she notices elevated anxiety, worrying, intrusive thoughts, nervousness, depression, and feeling more sensitive during these times  Corinne reports feeling discouraged by this, identifying that during these time periods she knows that she should utilize her PRN medication, yet reframes from doing this, feeling like she doesn't want to be dependent on this  Corinne reported that since being in therapy, she has tracked her mental health symptoms in comparison to her menstrual cycle, and has more increased self-awareness in relation to this  Corinne reports not wanting to have her menstrual cycle anymore, and discussed a desire to work with her OBGYN in relation to this  Corinne reported that during this timeframe of her struggling with elevated symptoms, her sister-in-law reached out to her in relation to concerns regarding her brother's mental health  Corinne reported that her sister-in-law learned that Corinne's brother has continued to plaza, and reported learning that the spending and the gambling was worse than what anyone realized  Corinne reported that the family has concerns that her brother is struggling with an untreated bipolar disorder, making similar connections with her brother's symptoms, and symptoms her mother had   Corinne reported that her sister-in-law reached out to her for support, and reported that Corinne was triggered by this, feeling angry, upset, and overwhelmed by her brother  Corinne reported that in addition to this, her sister-in-law asked her not to share some information with her brother, leading her to be uncomfortable  Corinne reported that her sister-in-law made an appointment with an  and was considering a divorce at this time, and now they are trying to work things out  Corinne reports feeling triggered by this, describing herself as experiencing anger, frustration, and irritability, when further processing this, Corinne identified as sad, disappointed, triggered, overwhelmed, and fearful  Roxanna discussed transference with her brother, mother, and father, describing them as having them all struggled with some form of addiction, and mental health  Corinne discussed the trauma with the transference, and processed family trauma  Roxanna describes herself as struggling with co-dependency, functioning at extreme levels of black and white, leaning too far in or too far out, struggling to have balance, and function in [place]  Corinne and this writer processed this  Provided ongoing psychoeducation  Discussed ongoing skills  Reviewed limits and boundaries  Modeled effective forms of communication  (A) Corinne reports having an increase in symptom presentation since her last session  Corinne reports struggling with obsessive, intrusive, ruminating, and repetitive thoughts, reporting fearing as if something awful might happen, reporting increased crying episodes, worrying too much about different things, not being able to stop and control worrying, and feeling nervous, anxious, and on edge  Corinne reports symptoms of poor frustration tolerance, becoming easily annoyed, and reports irritability  Corinne reports feeling bad about herself, feeling like she is letting herself, and her family down   Corinne reports sleep disturbances, feeling tired and having little energy, and reports lower moods of sadness, and depression  Corinne's mood presented as depressed and anxious, and her affect appeared to be tearful  Corinne presented as alert and oriented x3  Corinne presented with good eye contact  Corinne's speech presented at a normal rate, volume, and rhythm  Corinne denies any symptoms of frank  Corinne denies any evident or immediate risk factors for self-harm, SI, or HI  (P) Corinne plans to work on setting boundaries with her sister-in-law, identifying her needs, and effectively express them with healthy forms of communication  Corinne plans to challenge co-dependency characteristics, sticking with limits, and prioritizing her needs over the needs of others  Corinne plans to increase self-awareness in relation to trauma triggers, connect them back to messages that she received throughout her upbringing to further make connections with negative thinking traps and core beliefs  Corinne plans to challenge these with DBT [facility]  Corinne plans to continue to work on breaking the cycle, implementing healthy patterns and behaviors, honoring her inner child needs, validating her feelings and emotions, while giving herself to make space for them  Corinne plans to utilize PRN medication as appropriate  Corinne plans to lean into natural supports, utilize self-care, work on being mindful and present in the moment  Corinne plans to work on journaling her thoughts, feelings, and emotions to process outside of therapy  Corinne plans to target ongoing symptoms with skills, and implement radical acceptance  Corinne plans to reach out for additional support as needed  I spent 55 minutes directly with the patient during this visit  10:45am-11:40am     VIRTUAL VISIT DISCLAIMER    Corinne S Namita verbally agrees to participate in [employer]   Pt is aware that Virtual Care Services could be limited without vital signs or the ability to perform a full hands-on physical Clemethunter Krishnamurthy understands she or the provider may request at any time to terminate the video visit and request the patient to seek care or treatment in person

## 2022-02-26 DIAGNOSIS — F41.9 ANXIETY: ICD-10-CM

## 2022-03-01 RX ORDER — ALPRAZOLAM 0.5 MG/1
TABLET ORAL
Qty: 21 TABLET | Refills: 0 | Status: SHIPPED | OUTPATIENT
Start: 2022-03-01

## 2022-03-07 ENCOUNTER — OFFICE VISIT (OUTPATIENT)
Dept: FAMILY MEDICINE CLINIC | Facility: HOSPITAL | Age: 42
End: 2022-03-07
Payer: COMMERCIAL

## 2022-03-07 ENCOUNTER — TELEMEDICINE (OUTPATIENT)
Dept: BEHAVIORAL/MENTAL HEALTH CLINIC | Facility: CLINIC | Age: 42
End: 2022-03-07
Payer: COMMERCIAL

## 2022-03-07 VITALS
WEIGHT: 139.6 LBS | OXYGEN SATURATION: 98 % | TEMPERATURE: 98 F | HEIGHT: 64 IN | DIASTOLIC BLOOD PRESSURE: 70 MMHG | SYSTOLIC BLOOD PRESSURE: 122 MMHG | HEART RATE: 85 BPM | BODY MASS INDEX: 23.83 KG/M2

## 2022-03-07 DIAGNOSIS — M25.511 CHRONIC RIGHT SHOULDER PAIN: Primary | ICD-10-CM

## 2022-03-07 DIAGNOSIS — F41.0 SEVERE ANXIETY WITH PANIC: Primary | ICD-10-CM

## 2022-03-07 DIAGNOSIS — G89.29 CHRONIC RIGHT SHOULDER PAIN: Primary | ICD-10-CM

## 2022-03-07 DIAGNOSIS — F33.0 MILD EPISODE OF RECURRENT MAJOR DEPRESSIVE DISORDER (HCC): ICD-10-CM

## 2022-03-07 PROCEDURE — 90834 PSYTX W PT 45 MINUTES: CPT | Performed by: SOCIAL WORKER

## 2022-03-07 PROCEDURE — 99213 OFFICE O/P EST LOW 20 MIN: CPT | Performed by: NURSE PRACTITIONER

## 2022-03-07 RX ORDER — MELOXICAM 15 MG/1
15 TABLET ORAL DAILY
Qty: 30 TABLET | Refills: 0 | Status: SHIPPED | OUTPATIENT
Start: 2022-03-07 | End: 2022-04-02 | Stop reason: SDUPTHER

## 2022-03-07 NOTE — PROGRESS NOTES
Assessment/Plan:     No problem-specific Assessment & Plan notes found for this encounter  Diagnoses and all orders for this visit:    Chronic right shoulder pain  Comments:  Ortho referral placed & appt made  Mobic RX'd, take additional tylenol if needed  Continue warm compresses/ice and exercise as tolerated  Orders:  -     Ambulatory Referral to Orthopedic Surgery; Future  -     meloxicam (MOBIC) 15 mg tablet; Take 1 tablet (15 mg total) by mouth daily          Subjective:      Patient ID: Jazmine Ventura is a 36 y o  female  Had injury a few years when dog pulled her right arm on the leash  Was not evaluated at that time  Reports occasional soreness to right shoulder since injury  Is a hairdresser, usually arms constantly at work  Right side of neck tightness that radiates to shoulder/shoulder blade area, causing burning pain  Has gotten a few massages with minimal relief  Pain and soreness with movement of arms above head, behind back and lifting arms to side, worsening over the last month  Denies any new injury  Has been using warm compresses, taking OTC motrin and decreasing weight exercises with minimal relief  Denies numbness/tingling to hand  Denies swelling  The following portions of the patient's history were reviewed and updated as appropriate: allergies, current medications, past family history, past medical history, past social history, past surgical history and problem list     Review of Systems   Respiratory: Negative  Cardiovascular: Negative  Gastrointestinal: Negative  Genitourinary: Negative  Musculoskeletal: Positive for arthralgias (pain to right anterior shoulder, near armpit with movement ), neck pain and neck stiffness (right sided neck/shoulder stiffness for about 1 month )  Negative for joint swelling  Skin: Negative  Neurological: Negative            Objective:      /70 (Patient Position: Sitting, Cuff Size: Standard)   Pulse 85   Temp 98 °F (36 7 °C) (Tympanic)   Ht 5' 4" (1 626 m)   Wt 63 3 kg (139 lb 9 6 oz)   SpO2 98%   BMI 23 96 kg/m²          Physical Exam  Vitals reviewed  Constitutional:       Appearance: Normal appearance  She is normal weight  HENT:      Head: Normocephalic and atraumatic  Right Ear: External ear normal       Left Ear: External ear normal    Eyes:      General: No scleral icterus  Conjunctiva/sclera: Conjunctivae normal    Cardiovascular:      Rate and Rhythm: Normal rate and regular rhythm  Pulses: Normal pulses  Heart sounds: Normal heart sounds  No murmur heard  Pulmonary:      Effort: Pulmonary effort is normal  No respiratory distress  Breath sounds: Normal breath sounds  No wheezing  Chest:      Chest wall: No tenderness  Musculoskeletal:         General: Tenderness (right anterior shoulder) present  No swelling or deformity  Normal range of motion  Cervical back: Normal range of motion and neck supple  Tenderness (right sided neck tenderness) present  Right lower leg: No edema  Left lower leg: No edema  Skin:     General: Skin is warm and dry  Capillary Refill: Capillary refill takes less than 2 seconds  Neurological:      General: No focal deficit present  Mental Status: She is alert and oriented to person, place, and time  Mental status is at baseline  Psychiatric:         Mood and Affect: Mood normal          Behavior: Behavior normal          Thought Content:  Thought content normal          Judgment: Judgment normal

## 2022-03-07 NOTE — PSYCH
Virtual Regular Visit    Verification of patient location: [place]  Patient is located in the following state in which I hold an active license [place]  Assessment/Plan:    Problem List Items Addressed This Visit        Other    Depression, major, recurrent ([facility] [address] )    Severe anxiety with panic - Primary        Goals addressed in session: Goal 1 Follow up psychotherapy session  Reason for visit is   Chief Complaint   Patient presents with    Virtual Regular Visit      Encounter provider Richelle Reis    Provider located at [address] [address] [phone]    Recent Visits  No visits were found meeting these conditions  Showing recent visits within past 7 days and meeting all other requirements  Today's Visits  Date Type Provider Dept   03/07/22 Telemedicine Richelle Reis [facility]   Showing today's visits and meeting all other requirements  Future Appointments  No visits were found meeting these conditions  Showing future appointments within next 150 days and meeting all other requirements     The patient was identified by name and date of birth  Harshil Medeiros was informed that this is a telemedicine visit and that the visit is being conducted through [address] [place] [address] Now and patient was informed that this is a secure, HIPAA-compliant platform  She agrees to proceed  My office door was closed  No one else was in the room  She acknowledged consent and understanding of privacy and security of the video platform  The patient has agreed to participate and understands they can discontinue the visit at any time  Patient is aware this is a billable service  Subjective  Harshil Medeiros is a 36 y o  female      HPI     Past Medical History:   Diagnosis Date    Anxiety     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[facility])      Past Surgical History:   Procedure Laterality Date    APPENDECTOMY      extensive lysis of adhesions, MAYURI's procedure (divisions of MAYURI's bands) detorsion of vulvulus, widening of mesenteric pedicle, cecopeexy, appendectomy       BACK SURGERY Right 2013    SF: L4-L5 hemilaminectomy for discectomy  Use of the microscope for microdissection, Use og 40mg of depo-medrol though thee exiting right L5 nerve root  Onset:13     SECTION      x2    CHOLECYSTECTOMY      GALLBLADDER SURGERY      GV, Onset:     LAPAROSCOPIC LYSIS INTESTINAL ADHESIONS      onset: 16    LUMBAR DISC SURGERY      [place] LAP,DIAGNOSTIC ABDOMEN N/A 2016    Procedure: LAPAROSCOPY DIAGNOSTIC, EXTENSIVE LYSIS OF ADHESIONS, MAYURI'S PROCEDURE(DIVISION OF MAYURI'S BANDS,DETORSION OF VOLVULUS, WIDENING OF MESENTERIC PEDICLE, CECOPEXY, APPENDECTOMY); Surgeon: Yael Riojas MD;  Location:  MAIN OR;  Service: General    TUBAL LIGATION       Current Outpatient Medications   Medication Sig Dispense Refill    ALPRAZolam (XANAX) 0 5 mg tablet Take one tablet q12 h prn anxiety 21 tablet 0    busPIRone (BUSPAR) 5 mg tablet take 1 tablet by mouth three times a day 90 tablet 5    DULoxetine (CYMBALTA) 30 mg delayed release capsule Take 1 capsule (30 mg total) by mouth daily 30 capsule 0    DULoxetine (CYMBALTA) 60 mg delayed release capsule TAKE 1 CAPSULE BY MOUTH ONCE DAILY WITH 30MG CAPSULE 30 capsule 5    metoclopramide (Reglan) 10 mg tablet Take 1 tablet (10 mg total) by mouth every 6 (six) hours 15 tablet 0    multivitamin (THERAGRAN) TABS Take 1 tablet by mouth daily      Probiotic Product (PROBIOTIC-10 PO) Take by mouth 1 daily       No current facility-administered medications for this visit  Allergies   Allergen Reactions    Biaxin [Clarithromycin] GI Intolerance and Other (See Comments)    Sulfa Antibiotics     Sulfasalazine     Venlafaxine GI Intolerance, Vomiting and Other (See Comments)     Category: Allergy; Category: Allergy;       Review of Systems    Video Exam    There were no vitals filed for this visit  Physical Exam     (D) Roxanna attended her follow up psychotherapy session today  Roxanna reported that since her last session, she has struggled with fluctuating symptoms  Corinne reported that she has struggled with medical related stressors, reporting that she has been having right shoulder pain  Corinne reported that a few years ago, she was walking the dog, and the dog yanked the lease, resulting in an injury  Corinne reported that it got better over time, and reports that more recently it has been bothering her  Corinne reported that she has an appointment with her PCP today, and is hoping to get a referral for an orthopedic doctor  Corinne reported past medical related trauma, having surgery on her back in 2013, after she slipped down the steps previously, and then later bent down in a way that triggered her back after her fall  Corinne reported that she her herniated discs, Cortizone shots/ pain management, physical therapy, etc  Coy Espino reported anticipatory in relation to her appointment today, she has had elevated anxiety, reporting that she doesn't have to do physical therapy  Corinne reported that she would have to go 3x week, and have a $40 00 co-pay each time  Corinne reported feeling like she doesn't have time for it  Corinne and this writer processed this  Corinne reported stressors related to her daughter, reporting that she has been struggling with raising her daughter, describing interpersonal relationship stressors  Corinne reported that her daughter said that she liked her father more than her, and reported that her daughter acts differently with her , than she does with her  Corinne reports feeling stressed out and overwhelmed in relation to this, describing shame, guilt, and vulnerability  Corinne described trauma triggers, and discussed and processed this   Corinne also reported that her son previously broke up with his boyfriend, and then starting dating girl  Corinne reported that her son has disclosed that he is sexually attracted to both boys and girls, and reports that her and her  feel better that he is dating a girl  Roxanna and this writer processed this at length  Corinne discussed some interpersonal relationship stressors between her brother, and her sister-in-law  Corinne reported that she implementing boundaries around this, and effectively express her feelings to her brother  Modeled effective forms of communication  Reviewed ongoing skills  Discussed limits and boundaries  Provided ongoing psychoeducation  (A) Corinne presented with good eye contact  Corinne presented as alert and oriented x3  Corinne's speech presented at a normal rate, volume, and rhythm  Corinne denies any evident or immediate risk factors for self-harm, SI, or HI  Corinne denies any symptoms of frank  Corinne's mood presented as depressed and anxious, and her affect appeared to be congruent and tearful at times  Corinne describes symptoms of worrying too much about different things, feeling nervous, anxious, and on edge, and reports symptoms of poor frustration tolerance, becoming easily annoyed, reporting irritability  Corinne reports lower moods of sadness, and depression, reporting little interest and pleasure in doing things  Corinne reports feeling tired and having little energy  Corinne reports feeling bad about herself, feeling like she is letting herself, and her family down  (P) Corinne plans to prioritize her mental health and her physical health needs, and follow up with her PCP this afternoon  Corinne plans to work on asking for what she needs, continuing to work on expressing her feelings, identifying, and associating them, by utilizing healthy and effective forms of communication  Corinne plans to increase comfort with discomfort, and sitting with her emotions, honoring them, and making space for them   Corinne plans to work on increasing self-compassion, and demonstrating bogdan towards herself  Corinne plans to work on giving herself credit  Corinne plans to work on decreasing judgement towards herself, and continue to work on challenging harmful core beliefs, negative thinking traps, and cognitive distortions, deciphering between facts and feelings  Corinne plans to lean into natural supports, work on actively being present in the moment, and being mindful  Corinne plans to engage in the use of self-care, and take time for herself  Corinne plans to utilize ongoing skills to address symptoms  Corinne plans to outweigh risks verses benefits in order to make informed decisions, and align choices and decisions with what is in the best interest of her  Corinne plans to implement the use of radical acceptance, and focus on what is in her control  Corinne plans to stick with boundaries, and limits  Corinne plans to utilize opposite action skills, and structure her schedule  Corinne plans to reach out for additional support as needed  I spent 45 minutes directly with the patient during this visit  10:45am-11:30am     VIRTUAL VISIT DISCLAIMER    Corinne S Kulp verbally agrees to participate in [employer]  Pt is aware that [employer] could be limited without vital signs or the ability to perform a full hands-on physical exam  Corinne S Kulp understands she or the provider may request at any time to terminate the video visit and request the patient to seek care or treatment in person

## 2022-03-14 NOTE — PATIENT INSTRUCTIONS
Recommend Klonopin instead of xanax, and to add Celexa 10 mg daily  Discussed coping mechanisms, info given on stress  Recommend counseling/therapy  Also vitamin D 2000 IU daily  Composite Graft Text: The defect edges were debeveled with a #15 scalpel blade.  Given the location of the defect, shape of the defect, the proximity to free margins and the fact the defect was full thickness a composite graft was deemed most appropriate.  The defect was outline and then transferred to the donor site.  A full thickness graft was then excised from the donor site. The graft was then placed in the primary defect, oriented appropriately and then sutured into place.  The secondary defect was then repaired using a primary closure.

## 2022-03-25 ENCOUNTER — OFFICE VISIT (OUTPATIENT)
Dept: OBGYN CLINIC | Facility: CLINIC | Age: 42
End: 2022-03-25
Payer: COMMERCIAL

## 2022-03-25 ENCOUNTER — APPOINTMENT (OUTPATIENT)
Dept: RADIOLOGY | Facility: CLINIC | Age: 42
End: 2022-03-25
Payer: COMMERCIAL

## 2022-03-25 VITALS
WEIGHT: 138.2 LBS | DIASTOLIC BLOOD PRESSURE: 76 MMHG | HEIGHT: 64 IN | SYSTOLIC BLOOD PRESSURE: 128 MMHG | BODY MASS INDEX: 23.6 KG/M2

## 2022-03-25 DIAGNOSIS — M25.511 ACUTE PAIN OF RIGHT SHOULDER: ICD-10-CM

## 2022-03-25 DIAGNOSIS — G89.29 CHRONIC RIGHT SHOULDER PAIN: Primary | ICD-10-CM

## 2022-03-25 DIAGNOSIS — M25.511 CHRONIC RIGHT SHOULDER PAIN: Primary | ICD-10-CM

## 2022-03-25 PROCEDURE — 3008F BODY MASS INDEX DOCD: CPT | Performed by: ORTHOPAEDIC SURGERY

## 2022-03-25 PROCEDURE — 1036F TOBACCO NON-USER: CPT | Performed by: ORTHOPAEDIC SURGERY

## 2022-03-25 PROCEDURE — 73030 X-RAY EXAM OF SHOULDER: CPT

## 2022-03-25 PROCEDURE — 99203 OFFICE O/P NEW LOW 30 MIN: CPT | Performed by: ORTHOPAEDIC SURGERY

## 2022-03-25 NOTE — PROGRESS NOTES
Ortho Sports Medicine Shoulder Visit     Assesment:  right shoulder Impingement versus possible labral tear    Plan:    Conservative treatment:    Ice to shoulder 1-2 times daily, for 20 minutes at a time  PT for ROM and strengthening to shoulder, rotator cuff, scapular stabilizers  Imaging: All imaging from today was reviewed by myself and explained to the patient  Injection:    No Injection planned at this time  Surgery:     No surgery is recommended at this point, continue with conservative treatment plan as noted  History of Present Illness: The patient is a 36 y o , right hand dominant female whose occupation is  , referred to me by their primary care physician, seen in clinic for consultation of right shoulder pain  The patient denies a history of diabetes  The patient denies a history of thyroid disorder  Pain is located anterior, lateral   The patient rates the pain as a 5/10  The pain has been present for 3 years  The patient sustained an injury on  About 3 years ago  The mechanism of injury was  A dog pulling her arm only she as well as 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  The pain is characterized as sharp, stabbing  The pain is present daily  Pain is improved by NSAIDS  Pain is aggravated by overhead activity  The patient has weakness  The patient denies numbness and tingling  The patient has tried rest, ice and NSAIDS  Shoulder Surgical History:  None    Past Medical, Social and Family History:  Past Medical History:   Diagnosis Date    Anxiety     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[address] )      Past Surgical History:   Procedure Laterality Date    APPENDECTOMY      extensive lysis of adhesions, MAYURI's procedure (divisions of MAYURI's bands) detorsion of vulvulus, widening of mesenteric pedicle, cecopeexy, appendectomy       BACK SURGERY Right 09/13/2013    SF: L4-L5 hemilaminectomy for discectomy   Use of the microscope for microdissection, Use og 40mg of depo-medrol though thee exiting right L5 nerve root  Onset:13     SECTION      x2    CHOLECYSTECTOMY      GALLBLADDER SURGERY      [facility], Onset:     LAPAROSCOPIC LYSIS INTESTINAL ADHESIONS      onset: 16    LUMBAR DISC SURGERY      [place] LAP,DIAGNOSTIC ABDOMEN N/A 2016    Procedure: LAPAROSCOPY DIAGNOSTIC, EXTENSIVE LYSIS OF ADHESIONS, MAYURI'S PROCEDURE(DIVISION OF MAYURI'S BANDS,DETORSION OF VOLVULUS, WIDENING OF MESENTERIC PEDICLE, CECOPEXY, APPENDECTOMY); Surgeon: Shona Man MD;  Location: [facility];  Service: General    TUBAL LIGATION       Allergies   Allergen Reactions    Biaxin [Clarithromycin] GI Intolerance and Other (See Comments)    Sulfa Antibiotics     Sulfasalazine     Venlafaxine GI Intolerance, Vomiting and Other (See Comments)     Category: Allergy; Category: Allergy;      Current Outpatient Medications on File Prior to Visit   Medication Sig Dispense Refill    ALPRAZolam (XANAX) 0 5 mg tablet Take one tablet q12 h prn anxiety 21 tablet 0    busPIRone (BUSPAR) 5 mg tablet take 1 tablet by mouth three times a day 90 tablet 5    DULoxetine (CYMBALTA) 30 mg delayed release capsule Take 1 capsule (30 mg total) by mouth daily 30 capsule 0    DULoxetine (CYMBALTA) 60 mg delayed release capsule TAKE 1 CAPSULE BY MOUTH ONCE DAILY WITH 30MG CAPSULE 30 capsule 5    meloxicam (MOBIC) 15 mg tablet Take 1 tablet (15 mg total) by mouth daily 30 tablet 0    metoclopramide (Reglan) 10 mg tablet Take 1 tablet (10 mg total) by mouth every 6 (six) hours 15 tablet 0    multivitamin (THERAGRAN) TABS Take 1 tablet by mouth daily      Probiotic Product (PROBIOTIC-10 PO) Take by mouth 1 daily       No current facility-administered medications on file prior to visit       Social History     Socioeconomic History    Marital status: /Civil Union     Spouse name: Not on file    Number of children: Not on file    Years of education: 15  Highest education level: Not on file   Occupational History    Occupation: [employer]   Tobacco Use    Smoking status: Former Smoker     Packs/day: 0 25     Years: 5 00     Pack years: 1 25     Types: Cigarettes     Quit date:      Years since quittin 2    Smokeless tobacco: Never Used   Vaping Use    Vaping Use: Never used   Substance and Sexual Activity    Alcohol use: Not Currently    Drug use: No    Sexual activity: Yes     Partners: Male   Other Topics Concern    Not on file   Social History Narrative    Participates in activities inside and outside of home    Lives with family    Supportive and safe    No advance directives     Social Determinants of Health     Financial Resource Strain: Not on file   Food Insecurity: Not on file   Transportation Needs: No Transportation Needs    Lack of Transportation (Medical): No    Lack of Transportation (Non-Medical): No   Physical Activity: Not on file   Stress: Not on file   Social Connections: Not on file   Intimate Partner Violence: Not on file   Housing Stability: Not on file         I have reviewed the past medical, surgical, social and family history, medications and allergies as documented in the EMR  Review of systems: ROS is negative other than that noted in the HPI  Constitutional: Negative for fatigue and fever  HENT: Negative for sore throat  Respiratory: Negative for shortness of breath  Cardiovascular: Negative for chest pain  Gastrointestinal: Negative for abdominal pain  Endocrine: Negative for cold intolerance and heat intolerance  Genitourinary: Negative for flank pain  Musculoskeletal: Negative for back pain  Skin: Negative for rash  Allergic/Immunologic: Negative for immunocompromised state  Neurological: Negative for dizziness  Psychiatric/Behavioral: Negative for agitation        Physical Exam:    Blood pressure 128/76, height 5' 4" (1 626 m), weight 62 7 kg (138 lb 3 2 oz), not currently breastfeeding  General/Constitutional: NAD, well developed, well nourished  HENT: Normocephalic, atraumatic  CV: Intact distal pulses, regular rate  Resp: No respiratory distress or labored breathing  Lymphatic: No lymphadenopathy palpated  Neuro: Alert and Oriented x 3, no focal deficits  Psych: Normal mood, normal affect, normal judgement, normal behavior  Skin: Warm, dry, no rashes, no erythema     Shoulder Exam (focused): Shoulder focused exam:       RIGHT LEFT    Scapula Atrophy Negative Negative     Winging Negative Negative     Protraction Negative Negative    Rotator cuff SS 5/5 5/5     IS 5/5 5/5     SubS 5/5 5/5    ROM  170     ER0 60 60     ER90 90 90     IR90 40 40     IRb T6 T6    TTP: AC Positive Negative     Biceps Positive Negative     Coracoid Negative Negative    Special Tests: [place] Positive Negative     [place]-shear Negative Negative     Cross body Adduction Negative Negative     Speeds  Negative Negative     Freedom's Negative Negative     Whipple Negative Negative       Neer Negative Negative     Hook Negative Negative    Instability: Apprehension & relocation not tested not tested     Load & shift not tested not tested    Other: Crank Negative Negative               UE NV Exam: +2 Radial pulses bilaterally  Sensation intact to light touch C5-T1 bilaterally, Radial/median/ulnar nerve motor intact      Bilateral elbow, wrist, and and forearm ROM full, painless with passive ROM, no ttp or crepitance throughout extremities below shoulder joint    Cervical ROM is full without pain, numbness or tingling      Shoulder Imaging    X-rays of the right shoulder were reviewed, which demonstrate  No acute osseous abnormalities  I have reviewed the radiology report and agree with their impression

## 2022-03-26 DIAGNOSIS — F32.A DEPRESSION, UNSPECIFIED DEPRESSION TYPE: ICD-10-CM

## 2022-03-27 DIAGNOSIS — F32.A DEPRESSION, UNSPECIFIED DEPRESSION TYPE: ICD-10-CM

## 2022-03-27 RX ORDER — DULOXETIN HYDROCHLORIDE 30 MG/1
CAPSULE, DELAYED RELEASE ORAL
Qty: 30 CAPSULE | Refills: 0 | Status: SHIPPED | OUTPATIENT
Start: 2022-03-27

## 2022-03-28 RX ORDER — DULOXETIN HYDROCHLORIDE 30 MG/1
30 CAPSULE, DELAYED RELEASE ORAL DAILY
Qty: 30 CAPSULE | Refills: 0 | Status: SHIPPED | OUTPATIENT
Start: 2022-03-28 | End: 2022-04-29 | Stop reason: SDUPTHER

## 2022-04-02 DIAGNOSIS — G89.29 CHRONIC RIGHT SHOULDER PAIN: ICD-10-CM

## 2022-04-02 DIAGNOSIS — M25.511 CHRONIC RIGHT SHOULDER PAIN: ICD-10-CM

## 2022-04-04 ENCOUNTER — TELEMEDICINE (OUTPATIENT)
Dept: BEHAVIORAL/MENTAL HEALTH CLINIC | Facility: CLINIC | Age: 42
End: 2022-04-04
Payer: COMMERCIAL

## 2022-04-04 DIAGNOSIS — F41.0 SEVERE ANXIETY WITH PANIC: ICD-10-CM

## 2022-04-04 DIAGNOSIS — F33.2 SEVERE EPISODE OF RECURRENT MAJOR DEPRESSIVE DISORDER, WITHOUT PSYCHOTIC FEATURES (HCC): Primary | ICD-10-CM

## 2022-04-04 PROCEDURE — 90834 PSYTX W PT 45 MINUTES: CPT | Performed by: SOCIAL WORKER

## 2022-04-04 RX ORDER — MELOXICAM 15 MG/1
15 TABLET ORAL DAILY
Qty: 30 TABLET | Refills: 0 | Status: SHIPPED | OUTPATIENT
Start: 2022-04-04 | End: 2022-04-29 | Stop reason: SDUPTHER
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
## 2022-04-19 ENCOUNTER — HOSPITAL ENCOUNTER (OUTPATIENT)
Dept: RADIOLOGY | Facility: HOSPITAL | Age: 42
Discharge: HOME | End: 2022-04-19
Attending: ORTHOPAEDIC SURGERY | Admitting: RADIOLOGY
Payer: COMMERCIAL

## 2022-04-19 ENCOUNTER — HOSPITAL ENCOUNTER (OUTPATIENT)
Dept: MRI IMAGING | Facility: HOSPITAL | Age: 42
Discharge: HOME | End: 2022-04-19
Attending: ORTHOPAEDIC SURGERY
Payer: COMMERCIAL

## 2022-04-19 DIAGNOSIS — G89.29 CHRONIC RIGHT SHOULDER PAIN: ICD-10-CM

## 2022-04-19 DIAGNOSIS — M25.511 CHRONIC RIGHT SHOULDER PAIN: ICD-10-CM

## 2022-04-19 DIAGNOSIS — M25.511 ACUTE PAIN OF RIGHT SHOULDER: ICD-10-CM

## 2022-04-19 PROCEDURE — A9585 GADOBUTROL INJECTION: HCPCS | Performed by: ORTHOPAEDIC SURGERY

## 2022-04-19 PROCEDURE — 23350 INJECTION FOR SHOULDER X-RAY: CPT

## 2022-04-19 PROCEDURE — 73222 MRI JOINT UPR EXTREM W/DYE: CPT

## 2022-04-19 PROCEDURE — G1004 CDSM NDSC: HCPCS

## 2022-04-19 PROCEDURE — 77002 NEEDLE LOCALIZATION BY XRAY: CPT

## 2022-04-19 RX ORDER — LIDOCAINE HYDROCHLORIDE 10 MG/ML
30 INJECTION, SOLUTION EPIDURAL; INFILTRATION; INTRACAUDAL; PERINEURAL ONCE
Status: COMPLETED | OUTPATIENT
Start: 2022-04-19 | End: 2022-04-19

## 2022-04-19 RX ADMIN — GADOBUTROL 0.2 ML: 604.72 INJECTION INTRAVENOUS at 10:54

## 2022-04-19 RX ADMIN — LIDOCAINE HYDROCHLORIDE 7 ML: 10 INJECTION, SOLUTION EPIDURAL; INFILTRATION; INTRACAUDAL; PERINEURAL at 10:54

## 2022-04-19 RX ADMIN — IOHEXOL 3 ML: 300 INJECTION, SOLUTION INTRAVENOUS at 10:54

## 2022-04-25 ENCOUNTER — OFFICE VISIT (OUTPATIENT)
Dept: OBGYN CLINIC | Facility: MEDICAL CENTER | Age: 42
End: 2022-04-25
Payer: COMMERCIAL

## 2022-04-25 VITALS
SYSTOLIC BLOOD PRESSURE: 117 MMHG | DIASTOLIC BLOOD PRESSURE: 64 MMHG | BODY MASS INDEX: 23.56 KG/M2 | WEIGHT: 138 LBS | HEIGHT: 64 IN

## 2022-04-25 DIAGNOSIS — S46.011A TRAUMATIC INCOMPLETE TEAR OF RIGHT ROTATOR CUFF, INITIAL ENCOUNTER: Primary | ICD-10-CM

## 2022-04-25 PROCEDURE — 3008F BODY MASS INDEX DOCD: CPT | Performed by: ORTHOPAEDIC SURGERY

## 2022-04-25 PROCEDURE — 20610 DRAIN/INJ JOINT/BURSA W/O US: CPT | Performed by: ORTHOPAEDIC SURGERY

## 2022-04-25 PROCEDURE — 99214 OFFICE O/P EST MOD 30 MIN: CPT | Performed by: ORTHOPAEDIC SURGERY

## 2022-04-25 PROCEDURE — 1036F TOBACCO NON-USER: CPT | Performed by: ORTHOPAEDIC SURGERY

## 2022-04-25 RX ORDER — LIDOCAINE HYDROCHLORIDE 10 MG/ML
3 INJECTION, SOLUTION INFILTRATION; PERINEURAL
Status: COMPLETED | OUTPATIENT
Start: 2022-04-25 | End: 2022-04-25

## 2022-04-25 RX ORDER — METHYLPREDNISOLONE ACETATE 40 MG/ML
2 INJECTION, SUSPENSION INTRA-ARTICULAR; INTRALESIONAL; INTRAMUSCULAR; SOFT TISSUE
Status: COMPLETED | OUTPATIENT
Start: 2022-04-25 | End: 2022-04-25

## 2022-04-25 RX ADMIN — METHYLPREDNISOLONE ACETATE 2 ML: 40 INJECTION, SUSPENSION INTRA-ARTICULAR; INTRALESIONAL; INTRAMUSCULAR; SOFT TISSUE at 14:26

## 2022-04-25 RX ADMIN — LIDOCAINE HYDROCHLORIDE 3 ML: 10 INJECTION, SOLUTION INFILTRATION; PERINEURAL at 14:26

## 2022-04-25 NOTE — PROGRESS NOTES
Ortho Sports Medicine Shoulder Follow Up Visit     Assesment:   36 y o  female right shoulder small full thickness rotator cuff tear    Plan:    Conservative treatment:    Ice to shoulder 1-2 times daily, for 20 minutes at a time  PT for ROM and strengthening to shoulder, rotator cuff, scapular stabilizers  Imaging:    Mri imaging from today was reviewed by myself and explained to the patient  Injection:    The risks and benefits of the injection (which include but are not limited to: infection, bleeding,damage to nerve/artery, need for further intervention), as well as the risks and benefits of all alternative treatments were explained and understood  The patient elected to proceed with injection  The procedure was done with aseptic technique, and the patient tolerated the procedure well with no complications  A corticosteroid injection of the subacromial space was performed  Ice to the shoulder 1-2 times daily for 20 minutes, for next 24-48 hrs  Surgery:     No surgery is recommended at this point, continue with conservative treatment plan as noted  Follow up:    Return in about 6 weeks (around 6/6/2022) for Recheck  Chief Complaint   Patient presents with    Right Shoulder - Follow-up         History of Present Illness: The patient is returns for follow up of MRI arthrogram of the right shoulder  Since the prior visit, She reports no improvement  Patient continues to have pain in the anterior aspect of the shoulder  She does note that more recently the pain is worse in the posterior aspect of the shoulder, radiating down into the shoulder blade  She denies having pain that radiates into the biceps region  She does have occasional pain at night that wake her up from sleep but this is not common  She denies having any weakness or limitations in her range of motion    She reports that pain is most often with her job as a  doing repetitive lifting shoulder motions  Pain is improved by rest, ice and NSAIDS  Pain is aggravated by overhead activity, rotation and lifting   Symptoms include clicking  The patient denies weakness  The patient has tried rest, ice and NSAIDS  Shoulder Surgical History:  None    Past Medical, Social and Family History:  Past Medical History:   Diagnosis Date    Anxiety     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[address] )      Past Surgical History:   Procedure Laterality Date    APPENDECTOMY      extensive lysis of adhesions, MAYURI's procedure (divisions of MAYURI's bands) detorsion of vulvulus, widening of mesenteric pedicle, cecopeexy, appendectomy       BACK SURGERY Right 2013    SF: L4-L5 hemilaminectomy for discectomy  Use of the microscope for microdissection, Use og 40mg of depo-medrol though thee exiting right L5 nerve root  Onset:13     SECTION      x2    CHOLECYSTECTOMY      FL INJECTION RIGHT SHOULDER (ARTHROGRAM)  2022    GALLBLADDER SURGERY      [facility], Onset:     LAPAROSCOPIC LYSIS INTESTINAL ADHESIONS      onset: 16    LUMBAR DISC SURGERY      [place] LAP,DIAGNOSTIC ABDOMEN N/A 2016    Procedure: LAPAROSCOPY DIAGNOSTIC, EXTENSIVE LYSIS OF ADHESIONS, MAYURI'S PROCEDURE(DIVISION OF MAYURI'S BANDS,DETORSION OF VOLVULUS, WIDENING OF MESENTERIC PEDICLE, CECOPEXY, APPENDECTOMY); Surgeon: Cynthia Hickman MD;  Location:  MAIN OR;  Service: General    TUBAL LIGATION       Allergies   Allergen Reactions    Biaxin [Clarithromycin] GI Intolerance and Other (See Comments)    Sulfa Antibiotics     Sulfasalazine     Venlafaxine GI Intolerance, Vomiting and Other (See Comments)     Category: Allergy; Category:  Allergy;      Current Outpatient Medications on File Prior to Visit   Medication Sig Dispense Refill    ALPRAZolam (XANAX) 0 5 mg tablet Take one tablet q12 h prn anxiety 21 tablet 0    busPIRone (BUSPAR) 5 mg tablet take 1 tablet by mouth three times a day 90 tablet 5    DULoxetine (CYMBALTA) 30 mg delayed release capsule Take 1 capsule (30 mg total) by mouth daily 30 capsule 0    DULoxetine (CYMBALTA) 30 mg delayed release capsule take 1 capsule by mouth once daily 30 capsule 0    DULoxetine (CYMBALTA) 60 mg delayed release capsule TAKE 1 CAPSULE BY MOUTH ONCE DAILY WITH 30MG CAPSULE 30 capsule 5    meloxicam (MOBIC) 15 mg tablet Take 1 tablet (15 mg total) by mouth daily 30 tablet 0    metoclopramide (Reglan) 10 mg tablet Take 1 tablet (10 mg total) by mouth every 6 (six) hours 15 tablet 0    multivitamin (THERAGRAN) TABS Take 1 tablet by mouth daily      Probiotic Product (PROBIOTIC-10 PO) Take by mouth 1 daily       No current facility-administered medications on file prior to visit       Social History     Socioeconomic History    Marital status: /Civil Union     Spouse name: Not on file    Number of children: Not on file    Years of education: 15    Highest education level: Not on file   Occupational History    Occupation: [employer]   Tobacco Use    Smoking status: Former Smoker     Packs/day: 0 25     Years: 5 00     Pack years: 1 25     Types: Cigarettes     Quit date:      Years since quittin 3    Smokeless tobacco: Never Used   Vaping Use    Vaping Use: Never used   Substance and Sexual Activity    Alcohol use: Not Currently    Drug use: No    Sexual activity: Yes     Partners: Male   Other Topics Concern    Not on file   Social History Narrative    Participates in activities inside and outside of home    Lives with family    Supportive and safe    No advance directives     Social Determinants of Health     Financial Resource Strain: Not on file   Food Insecurity: Not on file   Transportation Needs: Not on file   Physical Activity: Not on file   Stress: Not on file   Social Connections: Not on file   Intimate Partner Violence: Not on file   Housing Stability: Not on file       I have reviewed the past medical, surgical, social and family history, medications and allergies as documented in the EMR  Review of systems: ROS is negative other than that noted in the HPI  Constitutional: Negative for fatigue and fever  Physical Exam:    Blood pressure 117/64, height 5' 4" (1 626 m), weight 62 6 kg (138 lb), not currently breastfeeding  General/Constitutional: NAD, well developed, well nourished  HENT: Normocephalic, atraumatic  CV: Intact distal pulses, regular rate  Resp: No respiratory distress or labored breathing  Lymphatic: No lymphadenopathy palpated  Neuro: Alert and Oriented x 3, no focal deficits  Psych: Normal mood, normal affect, normal judgement, normal behavior  Skin: Warm, dry, no rashes, no erythema      Shoulder focused exam:       RIGHT LEFT    Scapula Atrophy Negative Negative     Winging Negative Negative     Protraction Negative Negative    Rotator cuff SS 5/5 5/5     IS 5/5 5/5     SubS 5/5 5/5    ROM     170     ER0 60 60                   IRb T6    T6    TTP: AC Negative Negative     Biceps Negative Negative     Coracoid Negative Negative    Special Tests: O'Briens Negative Negative     [place]-shear Negative Negative     Cross body Adduction Negative Negative     Speeds  Negative Negative     Freedom's Negative Negative     Whipple Negative, 5/5, no pain  Negative       Neer Negative Negative     Hook Negative Negative    Instability: Apprehension & relocation not tested not tested     Load & shift not tested not tested    Other: Crank Negative Negative               UE NV Exam: +2 Radial pulses bilaterally  Sensation intact to light touch C5-T1 bilaterally, Radial/median/ulnar nerve motor intact    Cervical ROM is full without pain, numbness or tingling      Shoulder Imaging    MRI arthrogram of the right shoulder was reviewed and demonstrates a full-thickness anterior leading edge supraspinatus rotator cuff tear  I have reviewed the radiologist's report agree with their impression      Large joint arthrocentesis: R subacromial bursa  Universal Protocol:  Consent given by: patient  Time out: Immediately prior to procedure a "time out" was called to verify the correct patient, procedure, equipment, support staff and site/side marked as required    Timeout called at: 4/25/2022 2:24 PM   Patient understanding: patient states understanding of the procedure being performed  Site marked: the operative site was marked  Patient identity confirmed: verbally with patient    Supporting Documentation  Indications: pain   Procedure Details  Location: shoulder - R subacromial bursa  Preparation: Patient was prepped and draped in the usual sterile fashion  Needle size: 22 G  Ultrasound guidance: no  Approach: posterior  Medications administered: 3 mL lidocaine 1 %; 2 mL methylPREDNISolone acetate 40 mg/mL    Patient tolerance: patient tolerated the procedure well with no immediate complications  Dressing:  Sterile dressing applied

## 2022-04-29 DIAGNOSIS — G89.29 CHRONIC RIGHT SHOULDER PAIN: ICD-10-CM

## 2022-04-29 DIAGNOSIS — M25.511 CHRONIC RIGHT SHOULDER PAIN: ICD-10-CM

## 2022-04-29 DIAGNOSIS — F32.A DEPRESSION, UNSPECIFIED DEPRESSION TYPE: ICD-10-CM

## 2022-04-29 RX ORDER — DULOXETIN HYDROCHLORIDE 30 MG/1
30 CAPSULE, DELAYED RELEASE ORAL DAILY
Qty: 30 CAPSULE | Refills: 0 | Status: SHIPPED | OUTPATIENT
Start: 2022-04-29 | End: 2022-05-25

## 2022-05-03 RX ORDER — MELOXICAM 15 MG/1
15 TABLET ORAL DAILY
Qty: 30 TABLET | Refills: 0 | Status: SHIPPED | OUTPATIENT
Start: 2022-05-03

## 2022-05-09 ENCOUNTER — TELEMEDICINE (OUTPATIENT)
Dept: BEHAVIORAL/MENTAL HEALTH CLINIC | Facility: CLINIC | Age: 42
End: 2022-05-09
Payer: COMMERCIAL

## 2022-05-09 DIAGNOSIS — F41.0 SEVERE ANXIETY WITH PANIC: ICD-10-CM

## 2022-05-09 DIAGNOSIS — F33.0 MILD EPISODE OF RECURRENT MAJOR DEPRESSIVE DISORDER (HCC): Primary | ICD-10-CM

## 2022-05-09 PROCEDURE — 90834 PSYTX W PT 45 MINUTES: CPT | Performed by: SOCIAL WORKER

## 2022-05-09 NOTE — PSYCH
Psychotherapy Provided: Individual Psychotherapy 45 minutes     Length of time in session: 45 minutes  Current suicide risk : Low     Behavioral Health Treatment Plan [facility]: Diagnosis and Treatment Plan explained to Vida Charles relates understanding diagnosis and is agreeable to Treatment Plan  Yes  Virtual Regular Visit    Verification of patient location: [place]  Patient is located in the following state in which I hold an active license [place]  Assessment/Plan:    Problem List Items Addressed This Visit        Other    Depression, major, recurrent ([address] ) - Primary    Severe anxiety with panic        Goals addressed in session: Goal 1 Follow up psychotherapy session  Reason for visit is   Chief Complaint   Patient presents with    Virtual Regular Visit      Encounter provider Yaneli Claire    Provider located at [place] [id number]  [address] [id number] [phone]    Recent Visits  No visits were found meeting these conditions  Showing recent visits within past 7 days and meeting all other requirements  Today's Visits  Date Type Provider Dept   05/09/22 [facility]   Showing today's visits and meeting all other requirements  Future Appointments  No visits were found meeting these conditions  Showing future appointments within next 150 days and meeting all other requirements     The patient was identified by name and date of birth  Erin Berg was informed that this is a telemedicine visit and that the visit is being conducted through [address] Now and patient was informed that this is a secure, HIPAA-compliant platform  She agrees to proceed  My office door was closed  No one else was in the room  She acknowledged consent and understanding of privacy and security of the video platform   The patient has agreed to participate and understands they can discontinue the visit at any time  Patient is aware this is a billable service  Subjective  Mika Tidwell is a 36 y o  female  HPI     Past Medical History:   Diagnosis Date    Anxiety     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[address] )      Past Surgical History:   Procedure Laterality Date    APPENDECTOMY      extensive lysis of adhesions, MAYURI's procedure (divisions of MAYURI's bands) detorsion of vulvulus, widening of mesenteric pedicle, cecopeexy, appendectomy       BACK SURGERY Right 2013    SF: L4-L5 hemilaminectomy for discectomy  Use of the microscope for microdissection, Use og 40mg of depo-medrol though thee exiting right L5 nerve root  Onset:13     SECTION      x2    CHOLECYSTECTOMY      FL INJECTION RIGHT SHOULDER (ARTHROGRAM)  2022    GALLBLADDER SURGERY      [facility], Onset:     LAPAROSCOPIC LYSIS INTESTINAL ADHESIONS      onset: 15    LUMBAR DISC SURGERY      [place] LAP,DIAGNOSTIC ABDOMEN N/A 2016    Procedure: LAPAROSCOPY DIAGNOSTIC, EXTENSIVE LYSIS OF ADHESIONS, MAYURI'S PROCEDURE(DIVISION OF MAYURI'S BANDS,DETORSION OF VOLVULUS, WIDENING OF MESENTERIC PEDICLE, CECOPEXY, APPENDECTOMY);   Surgeon: Clarisa Hooper MD;  Location: [facility] OR;  Service: General    TUBAL LIGATION       Current Outpatient Medications   Medication Sig Dispense Refill    ALPRAZolam (XANAX) 0 5 mg tablet Take one tablet q12 h prn anxiety 21 tablet 0    busPIRone (BUSPAR) 5 mg tablet take 1 tablet by mouth three times a day 90 tablet 5    DULoxetine (CYMBALTA) 30 mg delayed release capsule take 1 capsule by mouth once daily 30 capsule 0    DULoxetine (CYMBALTA) 30 mg delayed release capsule Take 1 capsule (30 mg total) by mouth daily 30 capsule 0    DULoxetine (CYMBALTA) 60 mg delayed release capsule TAKE 1 CAPSULE BY MOUTH ONCE DAILY WITH 30MG CAPSULE 30 capsule 5    meloxicam (MOBIC) 15 mg tablet Take 1 tablet (15 mg total) by mouth daily 30 tablet 0    metoclopramide (Reglan) 10 mg tablet Take 1 tablet (10 mg total) by mouth every 6 (six) hours 15 tablet 0    multivitamin (THERAGRAN) TABS Take 1 tablet by mouth daily      Probiotic Product (PROBIOTIC-10 PO) Take by mouth 1 daily       No current facility-administered medications for this visit  Allergies   Allergen Reactions    Biaxin [Clarithromycin] GI Intolerance and Other (See Comments)    Sulfa Antibiotics     Sulfasalazine     Venlafaxine GI Intolerance, Vomiting and Other (See Comments)     Category: Allergy; Category: Allergy; Review of Systems    Video Exam    There were no vitals filed for this visit  Physical Exam     (D) Corinne attended her follow up psychotherapy session today  Corinne reported that since her last session, she has noticed a slight reduction in the intensity and frequency of her symptoms  Corinne reported that after her last session, she made an appointment for her daughter for therapy, and then canceled it reporting that she felt things got better  Corinne reported that she noticed her daughter's symptoms elevate around her menstrual cycle  Corinne reported that she felt triggered yesterday on Mother's Day, reporting that her daughter never acknowledged Mother's Day at all to her  Corinne reported that this happened, despite her  reminding her daughter about this  Corinne reported that her son woke up in the morning, said Happy Mother's Day, and gave her a picture he flako her  Corinne reported that she felt hurt and let down by her daughter, yet reported that she didn't share this with her daughter, hoping that her  would address this with her daughter  Corinne reported that she thought about her mother who is  yesterday on Mother's Day  Corinne spent time discussing and processing her relationship with her own mother, and reports feeling triggered by this   Corinne reports that she wants to do therapy with her daughter, then at times she doesn't  Corinne reorts fearing that her daughter will not participate in therapy, and Corinne doesn't want to feel disappointed by this, so avoids doing it all together  Corinne describes her son as bring more affectionate, reporting that she feels uncomfortable with her son hugging, and kissing her  Corinne reported that she never had this level of physical touch with her parents, and reports feeling uncomfortable with this  Corinne reported that she contributes physical touch to emotionally/ physically intimate relationships, reporting that she doesn't have this issue with her   Corinne reported that other than her , she doesn't ever really feel comfortable with physical touch  Corinne reported that she feels guilty that she didn't provide enough physical touch/ affection with her children  Corinne describes transference with her daughter, seeing herself in her daughter  Corinne reports avoiding therapy fearing that her daughter is going to share something with her mother that she did that unintentionally caused harm  Corinne describes struggling with interpersonal relationship conflict, vacillating with leaning in and leaning out of it  Corinne reports that she sometimes recognizes behaviors in herself, that her mother said/ did to her, that she didn't want to repeat onto her children, that she sees herself doing  Corinne and this writer processed this at length  Modeled effective forms of communication  Discussed ongoing skills  Reviewed limits and boundaries  Modeled effective forms of communication  (A) Corinne denies any evident or immediate risk factors for self-harm, SI, or HI  Corinne denies any symptoms of frank  Corinne's speech presented at a normal rate, volume, and rhythm  Corinne presented as alert and oriented x3  Corinne presented with good eye contact  Corinne's mood presented as anxious, and her affect appeared to be congruent, and tearful at times   Corinne describes symptoms of grief in relation to symbolic loss, and grief in relation to the loss of her mother  Corinne describes symptoms of being fidgety and restless that it's hard to sit still  Corinne reports symptoms of irritability, poor frustration tolerance, and becoming easily annoyed  Corinne reports feeling tired and having little energy, and feeling nervous, anxious, and on edge  Corinne describes symptoms of shame, guilt, and vulnerability at times  (P) Corinne plans to work addressing interpersonal relationship conflict, utilizing opposite action skills to actively identify, associate, and express her feelings, with healthy and effective forms of communication, asserting herself, advocating for herself, and expressing her unmet needs  Corinne plans to reconsidering participating in therapy services with her and her daughter  Corinne plans to work on breaking the cycle, implementing healthy patterns, and behaviors, and increasing self-awareness in relation to trauma triggers  Corinne plans to work on addressing fluctuating symptoms with sensory related skills, grounding techniques, distress tolerance skills, distraction techniques, coping skills, deep breathing techniques, and mindfulness/ meditation techniques  Corinne plans to lean into natural supports  Corinne plans to challenge co-dependency characteristics, and break the cycle with this  Corinne plans to implement radical acceptance, focusing on what she has control, verses what she doesn't have control over  Corinne plans to utilize DBT [employer] and Challenging Negative Thoughts Assessment Questions, to challenge harmful core beliefs, cognitive distortions, negative thinking traps, deciphering between facts and feelings  Corinne plans to work on decreasing judgement towards herself, and demonstrating bogdan and compassion towards herself  Corinne plans to work on being mindful and present in the moment  Corinne plans to reach out for additional support as needed  I spent 45 minutes directly with the patient during this visit  1:45pm-2:30pm     VIRTUAL VISIT DISCLAIMER    Corinne S Kulp verbally agrees to participate in [facility]  Pt is aware that [facility] could be limited without vital signs or the ability to perform a full hands-on physical exam  Corinne S Kulp understands she or the provider may request at any time to terminate the video visit and request the patient to seek care or treatment in person

## 2022-05-16 ENCOUNTER — EVALUATION (OUTPATIENT)
Dept: PHYSICAL THERAPY | Facility: CLINIC | Age: 42
End: 2022-05-16
Payer: COMMERCIAL

## 2022-05-16 DIAGNOSIS — S46.011D TRAUMATIC INCOMPLETE TEAR OF RIGHT ROTATOR CUFF, SUBSEQUENT ENCOUNTER: Primary | ICD-10-CM

## 2022-05-16 PROCEDURE — 97110 THERAPEUTIC EXERCISES: CPT | Performed by: PHYSICAL THERAPIST

## 2022-05-16 PROCEDURE — 97161 PT EVAL LOW COMPLEX 20 MIN: CPT | Performed by: PHYSICAL THERAPIST

## 2022-05-16 NOTE — PROGRESS NOTES
PT Evaluation     Today's date: 2022  Patient name: Nery Valentine  : 1980  MRN: [id number]  Referring provider: Yesenia Lo,*  Dx:   Encounter Diagnosis     ICD-10-CM    1  Traumatic incomplete tear of right rotator cuff, subsequent encounter  S46 011D Ambulatory Referral to Physical Therapy                  Assessment  Assessment details: Nery Valentine is a 36 y o  female who presents with pain, decreased strength and decreased ROM  Due to these impairments, patient has difficulty performing a/iadls, recreational activities, work-related activities and engaging in social activities  Patient's clinical presentation is consistent with their referring diagnosis of right shoulder pain  Patient has been educated in home exercise program and plan of care  Patient would benefit from skilled physical therapy services to address their aforementioned functional limitations and progress towards prior level of function and independence with home exercise program    Impairments: abnormal muscle firing, abnormal or restricted ROM, activity intolerance, impaired physical strength, lacks appropriate home exercise program, pain with function, scapular dyskinesis and poor body mechanics    Symptom irritability: moderateUnderstanding of Dx/Px/POC: good   Prognosis: good    Goals  Short Term Goals: Target Date 4 weeks  1  Pt will initiate and advance HEP  2  Pt will have < 3/10 pain  3  Pt will have full arom of the right shoulder  4  Pt will be able to work 1/2 day with min difficulty    Long Term Goals: Target Date 8 weeks  1  Pt will demonstrate independence in HEP  2  Pt will have <1/10 pain  3  Pt will have full c/s and B UE strength  4   Pt will be able to work full day with min difficulty        Plan  Patient would benefit from: skilled PT  Planned modality interventions: cryotherapy, electrical stimulation/Russian stimulation and thermotherapy: hydrocollator packs  Planned therapy interventions: joint mobilization, manual therapy, patient education, postural training, activity modification, abdominal trunk stabilization, body mechanics training, flexibility, functional ROM exercises, graded exercise, home exercise program, neuromuscular re-education, strengthening, stretching, therapeutic activities, therapeutic exercise, motor coordination training, gait training, balance/weight bearing training and ADL training  Frequency: 1x week  Duration in weeks: 8  Plan of Care beginning date: 2022  Plan of Care expiration date: 2022  Treatment plan discussed with: patient        Subjective Evaluation    History of Present Illness  Mechanism of injury: Pt notes a 3 year hx of right shoulder pain since her dog pulled her arm during a walk  She notes no treatment for it at that time  She notes that she is a  and always has her arms at shoulder height to work  She notes that her pain increases as the day goes on and as the week goes on  She normally feels better when she has a few days off in a row from work  She notes that it is uncomfortable to sleep on the right shoulder  She does exercise but has difficulty doing her shoulder exercises secondary to weakness and pain  Pain  Current pain ratin  At best pain ratin  At worst pain ratin  Quality: dull ache and discomfort    Patient Goals  Patient goals for therapy: decreased pain, increased motion, independence with ADLs/IADLs, increased strength and return to sport/leisure activities  Patient goal: work iwth ou tpain        Objective     Concurrent Complaints  Positive for disturbed sleep and headaches  Negative for night pain, dizziness, faints, nausea/motion sickness, tinnitus, trouble swallowing, difficulty breathing, shortness of breath, respiratory pain and visual change    Static Posture     Shoulders  Asymmetric shoulders and rounded  Thoracic Spine  Hyperkyphosis      Postural Observations  Seated posture: fair  Standing posture: fair  Correction of posture: makes symptoms better        Observations     Right Shoulder  Positive for atrophy  Palpation     Right   Tenderness of the infraspinatus, subscapularis, teres minor and upper trapezius  Active Range of Motion   Cervical/Thoracic Spine       Cervical    Flexion:  WFL  Extension:  WFL  Left lateral flexion: 35 degrees      Right lateral flexion: 45 degrees      Left rotation:  WFL  Right rotation:  WFL  Left Shoulder   Normal active range of motion    Right Shoulder   Normal active range of motion  External rotation BTH: with pain  Internal rotation BTB: with pain    Scapular Mobility   Left Shoulder   Scapular Dyskinesis: none  Scapular mobility: good  Scapular Mobility with Shoulder to 90° FF   Scapular winging: minimal    Right Shoulder   Scapular Dyskinesis: grade I  Scapular mobility: fair  Scapular Mobility with Shoulder to 90° FF   Scapular winging: moderate    Strength/Myotome Testing     Left Shoulder     Planes of Motion   Flexion: 4+   Abduction: 4+   External rotation at 0°: 4+   Internal rotation at 0°: 4+     Isolated Muscles   Lower trapezius: 4   Middle trapezius: 4+     Right Shoulder     Planes of Motion   Flexion: 4   Abduction: 4-   External rotation at 0°: 4-   Internal rotation at 0°: 4     Isolated Muscles   Lower trapezius: 4-   Middle trapezius: 4-   Neuro Exam:     Headaches   Patient reports headaches: Yes                Precautions: none      Manuals 5/16            Subscap, and infra stm  nv            T/s pa and rot mobs nv grade 3                                      Neuro Re-Ed             Supine open book foam roller nv            sidelying ER nv            S/l flex to H abd nv            Foam roller up wall nv            Table t/s ext stretch nv                                      Ther Ex                                                                                                                     Ther Activity Gait Training                                       Modalities

## 2022-05-23 ENCOUNTER — OFFICE VISIT (OUTPATIENT)
Dept: PHYSICAL THERAPY | Facility: CLINIC | Age: 42
End: 2022-05-23
Payer: COMMERCIAL

## 2022-05-23 DIAGNOSIS — S46.011D TRAUMATIC INCOMPLETE TEAR OF RIGHT ROTATOR CUFF, SUBSEQUENT ENCOUNTER: Primary | ICD-10-CM

## 2022-05-23 PROCEDURE — 97112 NEUROMUSCULAR REEDUCATION: CPT | Performed by: PHYSICAL THERAPIST

## 2022-05-23 PROCEDURE — 97140 MANUAL THERAPY 1/> REGIONS: CPT | Performed by: PHYSICAL THERAPIST

## 2022-05-23 NOTE — PROGRESS NOTES
Daily Note     Today's date: 2022  Patient name: Janel Russell  : 1980  MRN: [id number]  Referring provider: Adrian Mark,*  Dx:   Encounter Diagnosis     ICD-10-CM    1  Traumatic incomplete tear of right rotator cuff, subsequent encounter  [id number] [id number]                   Subjective: pt notes that her shoulder was pretty good last week, but did have some soreness in the med shoulder blade and upper trap the past few days  She thinks may have been from the ball exercises  Objective: See treatment diary below      Assessment: pt did well with new exercises today  And was able to progress to new exercises as well with complaints of fatigue      Plan: Continue per plan of care        Precautions: none      Manuals            Subscap, and infra stm  nv DB           T/s pa and rot mobs nv grade 3 DB                                     Neuro Re-Ed             Supine open book foam roller nv sidelying 5''x10           sidelying ER nv            S/l flex to H abd nv            Foam roller up wall nv            Table t/s ext stretch nv            [place] strength  Green seated 2x10           Prone I's and t's  5''x2x10           Ther Ex             UBE  2'x2'                                                                                                      Ther Activity                                       Gait Training                                       Modalities

## 2022-05-25 DIAGNOSIS — F32.A DEPRESSION, UNSPECIFIED DEPRESSION TYPE: ICD-10-CM

## 2022-05-25 RX ORDER — DULOXETIN HYDROCHLORIDE 30 MG/1
CAPSULE, DELAYED RELEASE ORAL
Qty: 30 CAPSULE | Refills: 0 | Status: SHIPPED | OUTPATIENT
Start: 2022-05-25 | End: 2022-06-23

## 2022-06-06 ENCOUNTER — TELEMEDICINE (OUTPATIENT)
Dept: BEHAVIORAL/MENTAL HEALTH CLINIC | Facility: CLINIC | Age: 42
End: 2022-06-06
Payer: COMMERCIAL

## 2022-06-06 DIAGNOSIS — F41.0 SEVERE ANXIETY WITH PANIC: ICD-10-CM

## 2022-06-06 DIAGNOSIS — F33.0 MILD EPISODE OF RECURRENT MAJOR DEPRESSIVE DISORDER (HCC): Primary | ICD-10-CM

## 2022-06-06 PROCEDURE — 90834 PSYTX W PT 45 MINUTES: CPT | Performed by: SOCIAL WORKER

## 2022-06-06 NOTE — PSYCH
Psychotherapy Provided: Individual Psychotherapy 45 minutes  Length of time in session: 45 minutes  Current suicide risk : Low   Behavioral Health Treatment Plan [facility]: Diagnosis and Treatment Plan explained to Jonelle Ly relates understanding diagnosis and is agreeable to Treatment Plan  Yes  Virtual Regular Visit    Verification of patient location: [place]  Patient is located in the following state in which I hold an active license [place]  Assessment/Plan:    Problem List Items Addressed This Visit        Other    Depression, major, recurrent ([address] ) - Primary    Severe anxiety with panic        Goals addressed in session: Goal 1 Follow up psychotherapy session  Reason for visit is   Chief Complaint   Patient presents with    Virtual Regular Visit      Encounter provider Britney Rojas    Provider located at [place] [id number]  [address] [address] [phone]    Recent Visits  No visits were found meeting these conditions  Showing recent visits within past 7 days and meeting all other requirements  Today's Visits  Date Type Provider Dept   06/06/22 Telemedicine [facility]ney [facility]   Showing today's visits and meeting all other requirements  Future Appointments  No visits were found meeting these conditions  Showing future appointments within next 150 days and meeting all other requirements     The patient was identified by name and date of birth  Giacomost Hensley was informed that this is a telemedicine visit and that the visit is being conducted through [address] Now and patient was informed that this is a secure, HIPAA-compliant platform  She agrees to proceed  My office door was closed  No one else was in the room  She acknowledged consent and understanding of privacy and security of the video platform   The patient has agreed to participate and understands they can discontinue the visit at any time  Patient is aware this is a billable service  Subjective  Kali Jeffrey is a 36 y o  female  HPI     Past Medical History:   Diagnosis Date    Anxiety     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[address] )      Past Surgical History:   Procedure Laterality Date    APPENDECTOMY      extensive lysis of adhesions, MAYURI's procedure (divisions of MAYURI's bands) detorsion of vulvulus, widening of mesenteric pedicle, cecopeexy, appendectomy       BACK SURGERY Right 2013    SF: L4-L5 hemilaminectomy for discectomy  Use of the microscope for microdissection, Use og 40mg of depo-medrol though thee exiting right L5 nerve root  Onset:13     SECTION      x2    CHOLECYSTECTOMY      FL INJECTION RIGHT SHOULDER (ARTHROGRAM)  2022    GALLBLADDER SURGERY      [facility], Onset:     LAPAROSCOPIC LYSIS INTESTINAL ADHESIONS      onset: 15    LUMBAR DISC SURGERY      MO LAP,DIAGNOSTIC ABDOMEN N/A 2016    Procedure: LAPAROSCOPY DIAGNOSTIC, EXTENSIVE LYSIS OF ADHESIONS, MAYURI'S PROCEDURE(DIVISION OF MAYURI'S BANDS,DETORSION OF VOLVULUS, WIDENING OF MESENTERIC PEDICLE, CECOPEXY, APPENDECTOMY);   Surgeon: Salley Schaumann, MD;  Location:  MAIN OR;  Service: General    TUBAL LIGATION       Current Outpatient Medications   Medication Sig Dispense Refill    ALPRAZolam (XANAX) 0 5 mg tablet Take one tablet q12 h prn anxiety 21 tablet 0    busPIRone (BUSPAR) 5 mg tablet take 1 tablet by mouth three times a day 90 tablet 5    DULoxetine (CYMBALTA) 30 mg delayed release capsule take 1 capsule by mouth once daily 30 capsule 0    DULoxetine (CYMBALTA) 30 mg delayed release capsule take 1 capsule by mouth once daily 30 capsule 0    DULoxetine (CYMBALTA) 60 mg delayed release capsule TAKE 1 CAPSULE BY MOUTH ONCE DAILY WITH 30MG CAPSULE 30 capsule 5    meloxicam (MOBIC) 15 mg tablet Take 1 tablet (15 mg total) by mouth daily 30 tablet 0    metoclopramide (Reglan) 10 mg tablet Take 1 tablet (10 mg total) by mouth every 6 (six) hours 15 tablet 0    multivitamin (THERAGRAN) TABS Take 1 tablet by mouth daily      Probiotic Product (PROBIOTIC-10 PO) Take by mouth 1 daily       No current facility-administered medications for this visit  Allergies   Allergen Reactions    Biaxin [Clarithromycin] GI Intolerance and Other (See Comments)    Sulfa Antibiotics     Sulfasalazine     Venlafaxine GI Intolerance, Vomiting and Other (See Comments)     Category: Allergy; Category: Allergy; Review of Systems    Video Exam    There were no vitals filed for this visit  Physical Exam     (D) Corinne attended her follow up psychotherapy session today  Corinne reported that since her last session, she has struggled with fluctuating symptoms  Corinne reported that today she is physically sick, reporting that she has a tickle in her throat, post nasal drip, and describes herself as feeling nauseated  Corinne reported that she doesn't want to take anything and plans to allow for itself to work it's course  Discussed and processed this  Corinne reported that she has been having some anxiety in relation to finances, reporting that her and her  are putting a sunroom on at their camper at the [facility], reporting that they took a home equity loan out of credit in order to do this  Corinne reported that in addition to this, she was approached by another salon owner asking Halley Mccurdy to take over her lease, in a bigger salon, and take on her employees  Roxanna reported that this has been a goal of hers, and reported that she decided to take out a business line of credit to do this  Corinne reported that she has been having anxiety in relation to this, fearing that she is going to fail  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, and remembers feeling like this when she first started   Corinne describes struggling with negative thinking traps in relation to this  Discussed and processed this  Corinne reported that there have been some interpersonal relationship stressors between her and her , reporting that her and her  had a disagreement over text message, over family plans that they had for months, and discrepancies in scheduling  Corinne reported that they had a family day planned to go to the [facility], and had this planned for months  Corinne reported that her  scheduled an appointment with the  for her business later in the afternoon that day, to help her, and told her that they needed to leave by a certain time, cutting the day short  Corinne describes herself as feeling triggered in relation to this, reporting that he made a decision without consulting her  Corinne reported that they went back and forth via text message, reporting that she felt like the family time wasn't as important to him, as it was to her  Corinne reported that her  then got upset, and said that he wasn't going to help her anymore with things, which further triggered her  Corinne reported that when arguing her  shuts down, doesn't talk to her, doesn't say rosaura or goodbye to her, and then this triggers her  Corinne her  Corinne reported that her  doesn't apologize, and describes him as stubborn  Corinne reports that she is the one that always has to initiate contact to solve for the arguments, she finds herself apologizing because she cannot tolerate the emotional distress, and the emotional state that it puts her in  Corinne dicussed past relational trauma, triggers of past abandonment  Corinne and this writer processed this at length  Discussed ongoing skills  Reviewed limits and boundaries  Modeled effective forms of communication  Provided ongoing psychoeducation  (A) Corinne denies any symptoms of frank  Corinne presented with good eye contact  Corinne presented as alert and oriented x3   Corinne's speech presented at a normal rate, volume, and rhythm  Corinne denies any evident or immediate risk factors for self-harm, SI, or HI  Corinne's mood presented as anxious, and depressed, and her affect appeared to be congruent and tearful at times  Corinne describes symptoms of trouble relaxing, worrying too much about different things, and feeling nervous, anxious, and on edge  Corinne reports irritability, poor frustration tolerance, and becoming easily annoyed  Corinne reported feeling tired and having little energy, reporting feeling bad about herself, feeling like she is letting herself, and her family down  Corinne describes lower moods of sadness, and depression  (P) Corinne plans to reach the article, The Four Horsemen: Criticism, Contempt, Defensiveness, and Stonewalling that this writer e-mailed her through the [employer]  Corinne plans to reflect upon healthy boundaries she would like to have in her marriage when it comes to conflict  Corinne plans to work on increasing self-awareness in relation to trauma triggers, and warning signs throughout her body  Corinne plans to actively work on pausing and being present in the moment, giving herself permission to feel what she is feeling, honor her emotions, and validate them  Corinne plans to work on being present in the moment, and implementing deep breathing techniques, mindfulness/ meditation, grounding techniques, sensory related skills, coping skills, distraction techniques, and distress tolerance skills  Corinne plans to work on giving herself permission to prioritize her needs, and challenge co-dependency characteristics  When triggered Corinne plans to actively pause, ground, and think about what she is going to say before she says it, and target interpersonal relationship stressors with healthy and effective forms of communication  Corinne plans to lean into natural supports  Corinne plans to engage in the use of self-care and take time for herself   Corinne plans to reinforce limits and boundaries  Corinne plans to implement the use of radical acceptance, outweigh risks verses benefits in order to make informed decisions  Corinne plans to work on aligning choices and decisions with what is in the best interest of her  Corinne plans to continue to work on structuring her schedule with ongoing balance, routine, and consistency  Corinne plans to work on increasing positive self-talk, positive affirmations, and grounding statements  Corinne plans to work on decreasing judgement towards herself, and journal to process her feelings, and emotions further  Corinne plans to reach out for additional support as needed  I spent 45 minutes directly with the patient during this visit  9:45am-10:30am     VIRTUAL VISIT DISCLAIMER    Corinne S Kulp verbally agrees to participate in [employer]  Pt is aware that [employer] could be limited without vital signs or the ability to perform a full hands-on physical exam  Corinne S Kulp understands she or the provider may request at any time to terminate the video visit and request the patient to seek care or treatment in person

## 2022-06-13 ENCOUNTER — OFFICE VISIT (OUTPATIENT)
Dept: PHYSICAL THERAPY | Facility: CLINIC | Age: 42
End: 2022-06-13
Payer: COMMERCIAL

## 2022-06-13 DIAGNOSIS — S46.011D TRAUMATIC INCOMPLETE TEAR OF RIGHT ROTATOR CUFF, SUBSEQUENT ENCOUNTER: Primary | ICD-10-CM

## 2022-06-13 PROCEDURE — 97110 THERAPEUTIC EXERCISES: CPT | Performed by: PHYSICAL THERAPIST

## 2022-06-13 NOTE — PROGRESS NOTES
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, but definitely better  Objective: See treatment diary below      Assessment:added in SCM and UT stretch to address lat and post c/s tightness  Also added in SA and shld flex stregthening to program     Plan: Continue per plan of care        Precautions: none      Manuals           Subscap, and infra stm  nv DB           T/s pa and rot mobs nv grade 3 DB DB                                    Neuro Re-Ed             Supine open book foam roller nv sidelying 5''x10           T/s ext nv  5x''10 foam roller          S/l flex to H abd nv            Foam roller up wall nv  Red 2x10          Table t/s ext stretch nv            [place] and arrow strength  Green seated 2x10 Green seated and standing 2x10          Prone I's and t's  5''x2x10 + W's 2x10          Ther Ex             UBE  2'x2'                                                                                                      Ther Activity                                       Gait Training                                       Modalities

## 2022-06-23 DIAGNOSIS — F32.A DEPRESSION, UNSPECIFIED DEPRESSION TYPE: ICD-10-CM

## 2022-06-23 RX ORDER — DULOXETIN HYDROCHLORIDE 30 MG/1
CAPSULE, DELAYED RELEASE ORAL
Qty: 30 CAPSULE | Refills: 0 | Status: SHIPPED | OUTPATIENT
Start: 2022-06-23 | End: 2022-07-24

## 2022-06-26 DIAGNOSIS — F41.9 ANXIETY: ICD-10-CM

## 2022-06-27 ENCOUNTER — OFFICE VISIT (OUTPATIENT)
Dept: PHYSICAL THERAPY | Facility: CLINIC | Age: 42
End: 2022-06-27
Payer: COMMERCIAL

## 2022-06-27 DIAGNOSIS — S46.011D TRAUMATIC INCOMPLETE TEAR OF RIGHT ROTATOR CUFF, SUBSEQUENT ENCOUNTER: Primary | ICD-10-CM

## 2022-06-27 PROCEDURE — 97110 THERAPEUTIC EXERCISES: CPT | Performed by: PHYSICAL THERAPIST

## 2022-06-27 PROCEDURE — 97140 MANUAL THERAPY 1/> REGIONS: CPT | Performed by: PHYSICAL THERAPIST

## 2022-06-27 NOTE — PROGRESS NOTES
Daily Note     Today's date: 2022  Patient name: Iman Bolanos  : 1980  MRN: [id number]  Referring provider: Es Auguste,*  Dx:   Encounter Diagnosis     ICD-10-CM    1  Traumatic incomplete tear of right rotator cuff, subsequent encounter  S46 011D                   Subjective: pt notes that her c/s is sore and stiff today from sleeping on a different pillow all week  Objective: See treatment diary below      Assessment: added in stm to B UT which improved pt's c/s soreness and helped to improve right shoulder stiffness as well today  Will put trial hold in place for 1 month as pt is doing exceptionally  Plan:  Will follow up in 1 month with pt to progress exercises, and monitor status       Precautions: none      Manuals          Subscap, and infra stm  nv DB  DB         T/s pa and rot mobs nv grade 3 DB DB DB         B UT stretch and stm    DB                      Neuro Re-Ed             Supine open book foam roller nv sidelying 5''x10           T/s ext nv  5x''10 foam roller          Face pulls    green 2x10         Foam roller up wall nv  Red 2x10          H abd  stand nv   Red 2x10         [place] and arrow strength  Green seated 2x10 Green seated and standing 2x10          High rows    green 2x10         Ther Ex             UBE  2'x2'                                                                                                      Ther Activity                                       Gait Training                                       Modalities

## 2022-06-28 RX ORDER — DULOXETIN HYDROCHLORIDE 60 MG/1
CAPSULE, DELAYED RELEASE ORAL
Qty: 30 CAPSULE | Refills: 5 | Status: SHIPPED | OUTPATIENT
Start: 2022-06-28

## 2022-06-28 RX ORDER — BUSPIRONE HYDROCHLORIDE 5 MG/1
TABLET ORAL
Qty: 90 TABLET | Refills: 5 | Status: SHIPPED | OUTPATIENT
Start: 2022-06-28

## 2022-07-11 ENCOUNTER — TELEMEDICINE (OUTPATIENT)
Dept: BEHAVIORAL/MENTAL HEALTH CLINIC | Facility: CLINIC | Age: 42
End: 2022-07-11
Payer: COMMERCIAL

## 2022-07-11 ENCOUNTER — OFFICE VISIT (OUTPATIENT)
Dept: OBGYN CLINIC | Facility: MEDICAL CENTER | Age: 42
End: 2022-07-11
Payer: COMMERCIAL

## 2022-07-11 VITALS
SYSTOLIC BLOOD PRESSURE: 132 MMHG | HEIGHT: 64 IN | WEIGHT: 137.6 LBS | BODY MASS INDEX: 23.49 KG/M2 | DIASTOLIC BLOOD PRESSURE: 67 MMHG

## 2022-07-11 DIAGNOSIS — S46.011A TRAUMATIC INCOMPLETE TEAR OF RIGHT ROTATOR CUFF, INITIAL ENCOUNTER: Primary | ICD-10-CM

## 2022-07-11 DIAGNOSIS — F41.0 SEVERE ANXIETY WITH PANIC: ICD-10-CM

## 2022-07-11 DIAGNOSIS — F33.0 MILD EPISODE OF RECURRENT MAJOR DEPRESSIVE DISORDER (HCC): Primary | ICD-10-CM

## 2022-07-11 PROCEDURE — 90834 PSYTX W PT 45 MINUTES: CPT | Performed by: SOCIAL WORKER

## 2022-07-11 PROCEDURE — 99213 OFFICE O/P EST LOW 20 MIN: CPT | Performed by: ORTHOPAEDIC SURGERY

## 2022-07-11 PROCEDURE — 20610 DRAIN/INJ JOINT/BURSA W/O US: CPT | Performed by: ORTHOPAEDIC SURGERY

## 2022-07-11 RX ORDER — LIDOCAINE HYDROCHLORIDE 10 MG/ML
3 INJECTION, SOLUTION INFILTRATION; PERINEURAL
Status: COMPLETED | OUTPATIENT
Start: 2022-07-11 | End: 2022-07-11

## 2022-07-11 RX ORDER — METHYLPREDNISOLONE ACETATE 40 MG/ML
1 INJECTION, SUSPENSION INTRA-ARTICULAR; INTRALESIONAL; INTRAMUSCULAR; SOFT TISSUE
Status: COMPLETED | OUTPATIENT
Start: 2022-07-11 | End: 2022-07-11

## 2022-07-11 RX ADMIN — METHYLPREDNISOLONE ACETATE 1 ML: 40 INJECTION, SUSPENSION INTRA-ARTICULAR; INTRALESIONAL; INTRAMUSCULAR; SOFT TISSUE at 13:30

## 2022-07-11 RX ADMIN — LIDOCAINE HYDROCHLORIDE 3 ML: 10 INJECTION, SOLUTION INFILTRATION; PERINEURAL at 13:30

## 2022-07-11 NOTE — PSYCH
Psychotherapy Provided: Individual Psychotherapy 50 minutes  Length of time in session: 50 minutes  Current suicide risk : Low   Behavioral Health Treatment Plan [facility]: Diagnosis and Treatment Plan explained to Crystal Platt relates understanding diagnosis and is agreeable to Treatment Plan  Yes  Virtual Regular Visit    Verification of patient location: [place]  Patient is located in the following state in which I hold an active license [place]  Assessment/Plan:    Problem List Items Addressed This Visit        Other    Depression, major, recurrent ([address] ) - Primary    Severe anxiety with panic        Goals addressed in session: Goal 1 Follow up psychotherapy session  Reason for visit is   Chief Complaint   Patient presents with    Virtual Regular Visit      Encounter provider Renate Keane    Provider located at [place] [id number]  [address] [address] [phone]    Recent Visits  No visits were found meeting these conditions  Showing recent visits within past 7 days and meeting all other requirements  Today's Visits  Date Type Provider Dept   07/11/22 Telemedicine Renate Keane [facility]   Showing today's visits and meeting all other requirements  Future Appointments  No visits were found meeting these conditions  Showing future appointments within next 150 days and meeting all other requirements     The patient was identified by name and date of birth  Gila Tejada was informed that this is a telemedicine visit and that the visit is being conducted through [address] [facility] and patient was informed that this is a secure, HIPAA-compliant platform  She agrees to proceed  My office door was closed  No one else was in the room  She acknowledged consent and understanding of privacy and security of the video platform   The patient has agreed to participate and understands they can discontinue the visit at any time  Patient is aware this is a billable service  Subjective  Kaleb Anderson is a 36 y o  female  HPI     Past Medical History:   Diagnosis Date    Anxiety     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[address] )      Past Surgical History:   Procedure Laterality Date    APPENDECTOMY      extensive lysis of adhesions, MAYURI's procedure (divisions of MAYURI's bands) detorsion of vulvulus, widening of mesenteric pedicle, cecopeexy, appendectomy       BACK SURGERY Right 2013    SF: L4-L5 hemilaminectomy for discectomy  Use of the microscope for microdissection, Use og 40mg of depo-medrol though thee exiting right L5 nerve root  Onset:13     SECTION      x2    CHOLECYSTECTOMY      FL INJECTION RIGHT SHOULDER (ARTHROGRAM)  2022    GALLBLADDER SURGERY      [facility], Onset:     LAPAROSCOPIC LYSIS INTESTINAL ADHESIONS      onset: 15    LUMBAR DISC SURGERY      [place] LAP,DIAGNOSTIC ABDOMEN N/A 2016    Procedure: LAPAROSCOPY DIAGNOSTIC, EXTENSIVE LYSIS OF ADHESIONS, MAYURI'S PROCEDURE(DIVISION OF MAYURI'S BANDS,DETORSION OF VOLVULUS, WIDENING OF MESENTERIC PEDICLE, CECOPEXY, APPENDECTOMY);   Surgeon: Sal Hathaway MD;  [facility] OR;  Service: General    TUBAL LIGATION       Current Outpatient Medications   Medication Sig Dispense Refill    ALPRAZolam (XANAX) 0 5 mg tablet Take one tablet q12 h prn anxiety 21 tablet 0    busPIRone (BUSPAR) 5 mg tablet take 1 tablet by mouth three times a day 90 tablet 5    DULoxetine (CYMBALTA) 30 mg delayed release capsule take 1 capsule by mouth once daily 30 capsule 0    DULoxetine (CYMBALTA) 30 mg delayed release capsule take 1 capsule by mouth once daily 30 capsule 0    DULoxetine (CYMBALTA) 60 mg delayed release capsule TAKE 1 CAPSULE BY MOUTH ONCE DAILY WITH 30MG CAPSULE 30 capsule 5    meloxicam (MOBIC) 15 mg tablet Take 1 tablet (15 mg total) by mouth daily 30 tablet 0    metoclopramide (Reglan) 10 mg tablet Take 1 tablet (10 mg total) by mouth every 6 (six) hours 15 tablet 0    multivitamin (THERAGRAN) TABS Take 1 tablet by mouth daily      Probiotic Product (PROBIOTIC-10 PO) Take by mouth 1 daily       No current facility-administered medications for this visit  Allergies   Allergen Reactions    Biaxin [Clarithromycin] GI Intolerance and Other (See Comments)    Sulfa Antibiotics     Sulfasalazine     Venlafaxine GI Intolerance, Vomiting and Other (See Comments)     Category: Allergy; Category: Allergy; Review of Systems    Video Exam    There were no vitals filed for this visit  Physical Exam     (D) Corinne attended her follow up psychotherapy session today  Corinne reported that since her last session, she has noticed fluctuating symptoms  Corinne reported that she has been struggling with feeling stressed out and overwhelmed in relation to finances  Corinne reported that she has been struggling with shame, guilt, and vulnerability, blaming herself for their financial situation, due to her managing the finances  Corinne reported that during the Summer she feels guilty for taking off to go to their camper at [employer], and when she's not working, she's not bringing in money, and then describes them as spending money to go down  Corinne reported that they took a loan out to fix their camper, and wonders if they should have done that  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, 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 too  Corinne reported that years ago, she and her  got into significant credit card debt, and reported that they struggled with this for sometime  Corinne reported that when her mother , she received an inheritance that paid this off   Corinne reported that as a result of her current financial situation, she has felt guilt for having to use her credit card, reporting that she is roughly $1,000 on her card now- describing it as triggering  Corinne reported that growing up her mother was financially irresponsible, and Corinne reported that this is triggering for her, fearing that she is repeatedly  Corinne reported that prior to her mother passing, Deja Bhakta asked her mother for financial support  Corinne reported that her mother communicated that she didn't have the ability to financially support, and if she could she would help them  Corinne reported that shortly after this, her mother passed away  Corinne reported that her mother initially supported her with a loan for the salon, for Corinne to open it up  Corinne reported that she didn't know this, her mother had life insurance on this, so when she , this loan was taken care of  Corinne reported that the same thing for her brother, with a car  Corinne described finances as being triggering for her, from a perspective of trauma  Corinne describes struggling with blaming herself, describing shame, guilt, and vulnerability  Corinne and this writer challenged this during session, discussing reality of inflation, cost of living, the economy  Corinne and this writer processed this at length  Discussed ongoing skills  Reviewed limits and boundaries  Modeled effective forms of communication  Provided ongoing psychoeducation  (A) Corinne presented as alert and oriented x3  Corinne presented with good eye contact  Corinne's speech presented at a normal rate, volume, and rhythm  Corinne denies any symptoms of frank  Corinne denies any evident or immediate risk factors for self-harm, SI, or HI  Corinne's mood presented as anxious, and depressed, and her affect appeared to be congruent and tearful at times  Corinne describes symptoms of anticipatory anxiety, worrying too much about different things, and describes feeling nervous, anxious, and on edge  Corinne reports poor frustration tolerance, irritability, and becoming easily annoyed  Corinne reports feeling tired and having little energy  Roxanna describes feeling bad about herself, feeling like she is letting herself and her family down  Corinne reports some episodes of crying, specifically when triggered by finances  (P) Corinne plans to work on actively being present in the moment, and increasing self-awareness in relation to recognizing the warning signs and triggers throughout her body  Corinne plans to give herself permission to pause, and implement identified grounding statements developed in session:      "I deserve to be present in this moment "   "I desire spending time with my family and making memories with them, that will last a lifetime "   "I am not fully responsible for the financial stressors that we are feeling right now "   "Finances are a trigger for me, based in past family and financial trauma  I am not there "   "This is temporary "   "My worth is in no way attached to my financial status "   "I am not solely responsible for our finances, even if I primarily manage them "   "I choose to not invest time and energy in thinking about how we have spent past finances, and choose to be more mindful with our choices moving forward "   "When I walk into the three season room, I feel proud of the work we have done, and look forward to all of the moments and memories we will create here "   "I desire to have time away from work, and to take care of myself "   "I am one person, and cannot possibly meet the needs of all people and aspects of my business "   "I am no where in the financial position that my mother was, compared to where I am "     In addition to this, Corinne plans to implement deep breathing techniques, mindfulness techniques, grounding techniques, distress tolerance skills, grounding techniques, and distraction techniques  Corinne plans to work on increasing self-compassion towards herself, extending bogdan, increasing positive affirmations, positive self-talk, and decrease judgement   Corinne plans to utilize previously identified CBT and DBT skills to challenge negative thoughts, cognitive distortions, harmful core beliefs, and negative thinking traps  Corinne plans to give herself permission to write her thoughts, feelings, and emotions out through journaling  Roxanna plans to give herself permission to identify, associate, and express her feelings, by using healthy forms of communication, to ask for what she needs  Corinne plans to reinforce limits and boundaries, prioritize her needs, break the cycle, implement healthy patterns, and behaviors, and challenge co-dependency  Corinne plans to implement the use of radical acceptance, outweigh risks verses benefits in order to make informed decisions, and align choices and decisions with what is in the best interest of her  Corinne plans to work on increasing insight in relation to trauma triggers, patterns, and responses, and increase self-awareness in relation to hyper-independent trauma responses  Discussed and processed this  Provided ongoing psychoeducation  Discussed ongoing skills  Reviewed limits and boundaries  Modeled effective forms of communication  I spent 50 minutes directly with the patient during this visit  8:45am-9:35am     VIRTUAL VISIT DISCLAIMER    Corinne S Kulp verbally agrees to participate in [employer]  Pt is aware that [employer] could be limited without vital signs or the ability to perform a full hands-on physical exam  Corinne S Kulp understands she or the provider may request at any time to terminate the video visit and request the patient to seek care or treatment in person

## 2022-07-11 NOTE — PROGRESS NOTES
Ortho Sports Medicine Shoulder Follow Up Visit     Assesment:   36 y o  female right shoulder small full thickness rotator cuff tear, with some pain    Plan:    Conservative treatment:    Ice to shoulder 1-2 times daily, for 20 minutes at a time  Let pain guide return to activities  Imaging: All imaging from today was reviewed by myself and explained to the patient  Injection:    The risks and benefits of the injection (which include but are not limited to: infection, bleeding,damage to nerve/artery, need for further intervention), as well as the risks and benefits of all alternative treatments were explained and understood  The patient elected to proceed with injection  The procedure was done with aseptic technique, and the patient tolerated the procedure well with no complications  A corticosteroid injection of the subacromial space was performed  The patient should take 1-2 days off of activity, with gradual return to activity as tolerated  Ice to the shoulder 1-2 times daily for 20 minutes, for next 24-48 hrs  Surgery:     No surgery is recommended at this point, continue with conservative treatment plan as noted  Follow up:    Return in about 3 months (around 10/11/2022) for Recheck  Chief Complaint   Patient presents with    Right Shoulder - Follow-up       History of Present Illness: The patient is returns for follow up of right shoulder small full thickness rotator cuff tear    Since the prior visit, She reports some improvement  Patient states that she can feels that the cortisone injection has worn off since her April injection  Patient states she had completed physical therapy which gave her much relief  She continues to keep up with light weighted exercises at home  Pain is improved by rest, ice, NSAIDS, physical therapy and injection  Pain is aggravated by overhead activity, reaching back and rotation  Symptoms include clicking, catching and popping      The patient has weakness  The patient has tried rest, ice, NSAIDS, physical therapy and injection  Shoulder Surgical History:  None    Past Medical, Social and Family History:  Past Medical History:   Diagnosis Date    Anxiety     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[address] )      Past Surgical History:   Procedure Laterality Date    APPENDECTOMY      extensive lysis of adhesions, MAYURI's procedure (divisions of MAYURI's bands) detorsion of vulvulus, widening of mesenteric pedicle, cecopeexy, appendectomy       BACK SURGERY Right 2013    SF: L4-L5 hemilaminectomy for discectomy  Use of the microscope for microdissection, Use og 40mg of depo-medrol though thee exiting right L5 nerve root  Onset:13     SECTION      x2    CHOLECYSTECTOMY      FL INJECTION RIGHT SHOULDER (ARTHROGRAM)  2022    GALLBLADDER SURGERY      [facility], Onset:     LAPAROSCOPIC LYSIS INTESTINAL ADHESIONS      onset: 15    LUMBAR DISC SURGERY      [place] LAP,DIAGNOSTIC ABDOMEN N/A 2016    Procedure: LAPAROSCOPY DIAGNOSTIC, EXTENSIVE LYSIS OF ADHESIONS, MAYURI'S PROCEDURE(DIVISION OF MAYURI'S BANDS,DETORSION OF VOLVULUS, WIDENING OF MESENTERIC PEDICLE, CECOPEXY, APPENDECTOMY); Surgeon: Gardiner Sicard, MD;  Location: [facility] OR;  Service: General    TUBAL LIGATION       Allergies   Allergen Reactions    Biaxin [Clarithromycin] GI Intolerance and Other (See Comments)    Sulfa Antibiotics     Sulfasalazine     Venlafaxine GI Intolerance, Vomiting and Other (See Comments)     Category: Allergy; Category:  Allergy;      Current Outpatient Medications on File Prior to Visit   Medication Sig Dispense Refill    ALPRAZolam (XANAX) 0 5 mg tablet Take one tablet q12 h prn anxiety 21 tablet 0    busPIRone (BUSPAR) 5 mg tablet take 1 tablet by mouth three times a day 90 tablet 5    DULoxetine (CYMBALTA) 30 mg delayed release capsule take 1 capsule by mouth once daily 30 capsule 0    DULoxetine (CYMBALTA) 30 mg delayed release capsule take 1 capsule by mouth once daily 30 capsule 0    DULoxetine (CYMBALTA) 60 mg delayed release capsule TAKE 1 CAPSULE BY MOUTH ONCE DAILY WITH 30MG CAPSULE 30 capsule 5    meloxicam (MOBIC) 15 mg tablet Take 1 tablet (15 mg total) by mouth daily 30 tablet 0    metoclopramide (Reglan) 10 mg tablet Take 1 tablet (10 mg total) by mouth every 6 (six) hours 15 tablet 0    multivitamin (THERAGRAN) TABS Take 1 tablet by mouth daily      Probiotic Product (PROBIOTIC-10 PO) Take by mouth 1 daily       No current facility-administered medications on file prior to visit  Social History     Socioeconomic History    Marital status: /Civil Union     Spouse name: Not on file    Number of children: Not on file    Years of education: 15    Highest education level: Not on file   Occupational History    Occupation: [employer]   Tobacco Use    Smoking status: Former Smoker     Packs/day: 0 25     Years: 5 00     Pack years: 1 25     Types: Cigarettes     Quit date:      Years since quittin 5    Smokeless tobacco: Never Used   Vaping Use    Vaping Use: Never used   Substance and Sexual Activity    Alcohol use: Not Currently    Drug use: No    Sexual activity: Yes     Partners: Male   Other Topics Concern    Not on file   Social History Narrative    Participates in activities inside and outside of home    Lives with family    Supportive and safe    No advance directives     Social Determinants of Health     Financial Resource Strain: Not on file   Food Insecurity: Not on file   Transportation Needs: Not on file   Physical Activity: Not on file   Stress: Not on file   Social Connections: Not on file   Intimate Partner Violence: Not on file   Housing Stability: Not on file       I have reviewed the past medical, surgical, social and family history, medications and allergies as documented in the EMR  Review of systems: ROS is negative other than that noted in the HPI    Constitutional: Negative for fatigue and fever  Physical Exam:    Blood pressure 132/67, height 5' 4" (1 626 m), weight 62 4 kg (137 lb 9 6 oz), not currently breastfeeding  General/Constitutional: NAD, well developed, well nourished  HENT: Normocephalic, atraumatic  CV: Intact distal pulses, regular rate  Resp: No respiratory distress or labored breathing  Lymphatic: No lymphadenopathy palpated  Neuro: Alert and Oriented x 3, no focal deficits  Psych: Normal mood, normal affect, normal judgement, normal behavior  Skin: Warm, dry, no rashes, no erythema      Shoulder focused exam:       RIGHT LEFT    Scapula Atrophy Negative Negative     Winging Negative Negative     Protraction Negative Negative    Rotator cuff SS 5/5 5/5     IS 5/5 5/5     SubS 5/5 5/5    ROM     170     ER0 60 60     ER90 90    90     IR90 T6    T6     IRb T6    T6    TTP: AC Positive Negative     Biceps Negative Negative     Coracoid Negative Negative    Special Tests: [place]ri[place] Negative Negative     [place]-shear Negative Negative     Cross body Adduction Negative Negative     Speeds  Negative Negative     Freedom's Negative Negative     Whipple Positive Negative       Neer Negative Negative     Hook Negative Negative    Instability: Apprehension & relocation not tested not tested     Load & shift not tested not tested    Other: Crank Negative Negative             Large joint arthrocentesis: R subacromial bursa  Universal Protocol:  Consent: Verbal consent obtained  Risks and benefits: risks, benefits and alternatives were discussed  Consent given by: patient and parent  Time out: Immediately prior to procedure a "time out" was called to verify the correct patient, procedure, equipment, support staff and site/side marked as required    Site marked: the operative site was marked  Supporting Documentation  Indications: pain and joint swelling   Procedure Details  Location: shoulder - R subacromial bursa  Needle size: 22 G  Ultrasound guidance: no  Approach: posterolateral  Medications administered: 3 mL lidocaine 1 %; 1 mL methylPREDNISolone acetate 40 mg/mL    Patient tolerance: patient tolerated the procedure well with no immediate complications  Dressing:  Sterile dressing applied            UE NV Exam: +2 Radial pulses bilaterally  Sensation intact to light touch C5-T1 bilaterally, Radial/median/ulnar nerve motor intact    Cervical ROM is full without pain, numbness or tingling      Shoulder Imaging    No imaging was performed today      Scribe Attestation    I,:  Melynda Favre am acting as a scribe while in the presence of the attending physician :       I,:  Brian Irene DO personally performed the services described in this documentation    as scribed in my presence :

## 2022-07-24 DIAGNOSIS — F32.A DEPRESSION, UNSPECIFIED DEPRESSION TYPE: ICD-10-CM

## 2022-07-24 RX ORDER — DULOXETIN HYDROCHLORIDE 30 MG/1
CAPSULE, DELAYED RELEASE ORAL
Qty: 30 CAPSULE | Refills: 0 | Status: SHIPPED | OUTPATIENT
Start: 2022-07-24 | End: 2022-08-21

## 2022-08-21 DIAGNOSIS — F32.A DEPRESSION, UNSPECIFIED DEPRESSION TYPE: ICD-10-CM

## 2022-08-21 RX ORDER — DULOXETIN HYDROCHLORIDE 30 MG/1
CAPSULE, DELAYED RELEASE ORAL
Qty: 30 CAPSULE | Refills: 0 | Status: SHIPPED | OUTPATIENT
Start: 2022-08-21 | End: 2022-09-22

## 2022-09-12 ENCOUNTER — TELEMEDICINE (OUTPATIENT)
Dept: BEHAVIORAL/MENTAL HEALTH CLINIC | Facility: CLINIC | Age: 42
End: 2022-09-12
Payer: COMMERCIAL

## 2022-09-12 DIAGNOSIS — F33.0 MILD EPISODE OF RECURRENT MAJOR DEPRESSIVE DISORDER (HCC): Primary | ICD-10-CM

## 2022-09-12 DIAGNOSIS — F41.0 SEVERE ANXIETY WITH PANIC: ICD-10-CM

## 2022-09-12 PROCEDURE — 90834 PSYTX W PT 45 MINUTES: CPT | Performed by: SOCIAL WORKER

## 2022-09-12 NOTE — PSYCH
Psychotherapy Provided: Individual Psychotherapy 60 minutes  Length of time in session: 60 minutes  Current suicide risk : Low   Behavioral Health Treatment Plan [facility]: Diagnosis and Treatment Plan explained to Gibran Lin relates understanding diagnosis and is agreeable to Treatment Plan  Yes  Virtual Regular Visit    Verification of patient location: [place]  Patient is located in the following state in which I hold an active license [place]  Assessment/Plan:    Problem List Items Addressed This Visit        Other    Depression, major, recurrent ([address] ) - Primary    Severe anxiety with panic        Goals addressed in session: Goal 1 Follow up psychotherapy session  Reason for visit is   Chief Complaint   Patient presents with    Virtual Regular Visit      Encounter provider Richelle Reis    Provider located at [place] [id number]  [address] [address] [phone]    Recent Visits  No visits were found meeting these conditions  Showing recent visits within past 7 days and meeting all other requirements  Today's Visits  Date Type Provider Dept   09/12/22 Telemedicine Richelle Reis [facility]   Showing today's visits and meeting all other requirements  Future Appointments  No visits were found meeting these conditions  Showing future appointments within next 150 days and meeting all other requirements     The patient was identified by name and date of birth  Harshil Medeiros was informed that this is a telemedicine visit and that the visit is being conducted through [address] Now and patient was informed that this is a secure, HIPAA-compliant platform  She agrees to proceed  My office door was closed  No one else was in the room  She acknowledged consent and understanding of privacy and security of the video platform   The patient has agreed to participate and understands they can discontinue the visit at any time  Patient is aware this is a billable service  Subjective  Brenda Otero is a 36 y o  female  HPI     Past Medical History:   Diagnosis Date    Anxiety     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[address] )        Past Surgical History:   Procedure Laterality Date    APPENDECTOMY      extensive lysis of adhesions, MAYURI's procedure (divisions of MAYURI's bands) detorsion of vulvulus, widening of mesenteric pedicle, cecopeexy, appendectomy       BACK SURGERY Right 2013    SF: L4-L5 hemilaminectomy for discectomy  Use of the microscope for microdissection, Use og 40mg of depo-medrol though thee exiting right L5 nerve root  Onset:13     SECTION      x2    CHOLECYSTECTOMY      FL INJECTION RIGHT SHOULDER (ARTHROGRAM)  2022    GALLBLADDER SURGERY      [facility], Onset:     LAPAROSCOPIC LYSIS INTESTINAL ADHESIONS      onset: 15    LUMBAR DISC SURGERY      FL LAP,DIAGNOSTIC ABDOMEN N/A 2016    Procedure: LAPAROSCOPY DIAGNOSTIC, EXTENSIVE LYSIS OF ADHESIONS, MAYURI'S PROCEDURE(DIVISION OF MAYURI'S BANDS,DETORSION OF VOLVULUS, WIDENING OF MESENTERIC PEDICLE, CECOPEXY, APPENDECTOMY);   Surgeon: Flora Barton MD;  Location:  MAIN OR;  Service: General    TUBAL LIGATION       Current Outpatient Medications   Medication Sig Dispense Refill    ALPRAZolam (XANAX) 0 5 mg tablet Take one tablet q12 h prn anxiety 21 tablet 0    busPIRone (BUSPAR) 5 mg tablet take 1 tablet by mouth three times a day 90 tablet 5    DULoxetine (CYMBALTA) 30 mg delayed release capsule take 1 capsule by mouth once daily 30 capsule 0    DULoxetine (CYMBALTA) 30 mg delayed release capsule take 1 capsule by mouth once daily 30 capsule 0    DULoxetine (CYMBALTA) 60 mg delayed release capsule TAKE 1 CAPSULE BY MOUTH ONCE DAILY WITH 30MG CAPSULE 30 capsule 5    meloxicam (MOBIC) 15 mg tablet Take 1 tablet (15 mg total) by mouth daily 30 tablet 0    metoclopramide (Reglan) 10 mg tablet Take 1 tablet (10 mg total) by mouth every 6 (six) hours 15 tablet 0    multivitamin (THERAGRAN) TABS Take 1 tablet by mouth daily      Probiotic Product (PROBIOTIC-10 PO) Take by mouth 1 daily       No current facility-administered medications for this visit  Allergies   Allergen Reactions    Biaxin [Clarithromycin] GI Intolerance and Other (See Comments)    Sulfa Antibiotics     Sulfasalazine     Venlafaxine GI Intolerance, Vomiting and Other (See Comments)     Category: Allergy; Category: Allergy; Review of Systems    Video Exam    There were no vitals filed for this visit  Physical Exam     (D) Corinne attended her follow up psychotherapy session today  Corinne reported that since her last session, she has noticed an increase in symptoms  Corinne reported that she has been struggling with psychosocial stressors  Corinne reported that she has been managing multiple responsibilities including being a small business owner, managing her schedule, taking care of the house, raising her children, and overextending herself with extra curricular activities of being involved with for the football season  Corinne reported that her  is coaching football, and is really busy at work, reporting feeling like he doesn't have more time to help her  Corinne reported that she has been doing fundraisers, and preparing for a (22) year anniversary 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  Corinne reported that she enjoys these things yet finds herself as being overwhelmed and stressed  Corinne reported that she is in the process of planning to move her business from one building to another, and describes herself as feeling stressed out and overwhelmed by this, fearing that it will not work out, reporting that there is a lot of work that needs to be done, and feels being triggered by not having control over everything   Corinne reported that she hasn't spoken publicly about this possible move yet, and through processing this Corinne reports not wanting the attention at the salon just yet, and reports fearing that if it doesn't work out people will view her as a failure  Corinne describes feeling pressure from the previous salon owner to publicly speak about this, and from a woman that works for her  Corinne describes herself as being oppositional in relation to this, reported that she is feeling resistance towards wanting to speak on this just yet  Corinne describes herself as worrying what people think, and say  Corinne reported that as a result of inflation 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, and reported feeling guilt and frustration in relation to this  Corinne discussed and processed this  Liliana Falcon reported that her daughter is scheduled for surgery for November after the soccer season, reporting that her daughter noticed a growth a few years ago and was diagnosed with osteochondroma through [facility]  Corinne reported that it as grown and doubled in size that is causing pain for her, resulting in the plan to have it surgically removed in November  Corinne discussed anticipatory anxiety in relation to this  Corinne reported that she has been struggling with shoulder pain, reporting that she went to an orthopedic, resulting in her having an MRI, and seeing a tear in her rotator cuff  Corinne reported that she did have (2) cortisol shots, reporting that the first shot was helpful, and the second one didn't last as long  Corinne reported that she did physical therapy in the past, that she reports hasn't been helpful  Corinne reports being fearful about her physical pain, reporting that she is having issues with it that require surgery, and having responsibilities with opening up a new salon, and being worried about being out of work financially   Corinne reported that she notices elevated symptoms every other month around her menstrual cycle, reporting that her cycle is sometimes consistent, reporting this past cycle she was (8-10) days late, and the month before it was a few days early which she reports has never happened before  Corinne reported that she sometimes struggles with abnormal bleeding a times, and reports that she sometimes gets quarter size clots throughout her cycle  Corinne reported that she hasn't had a transvaginal/ abdonminal ultrasound in roughly (15) years  Corinne reported that she has noticed a few times where she has had some brown discoloring on the toilet paper after she has gown pee, outside of her menstrual cycle, which she reports feeling concerned about  Corinne and this writer processed this at length  Discussed ongoing skills  Reviewed limits and boundaries  Modeled effective forms of communication  Provided ongoing psychoeducation  (A) Corinne plans to work on prioritizing her physical health and mental health symptoms, paying attention to her body, and actively listening to it  Corinne describes symptoms of worrying too much about different things, feeling nervous, anxious, and on edge, trouble relaxing, and reports symptoms of poor frustration tolerance, irritability, and becoming easily annoyed  Corinne describes feeling tired and having little energy, reporting lower moods of sadness, and depression, and little interest and pleasure in doing things  Corinne describes feeling stressed out and overwhelmed  Corinne's mood presented as anxious and depressed and her affect appeared to be congruent, and tearful at times  Corinne denies any evident or immediate risk factors for self-harm, SI, or HI  Corinne denies any symptoms of frank  Corinne's speech presented at a normal rate, volume, and rhythm  Corinne presented with good eye contact  Corinne presented as alert and oriented x3  Corinne describes psychosomatic symptoms       (P) Corinne plans to schedule a follow up appointment with her OBGYN, to review her symptoms with her OBGYN, and to consider asking for additional testing with hormonal labwork and transvaginal/ abdominal ultrasounds  Corinne plans to work on spending time and self-reflecting upon her feelings, and emotions of resistance and opposition,resulting in emotional dysregulation, and further explore this next session, journaling to process through feelings, and emotions  Corinne plans to address target psychosomatic symptoms, and trauma triggers, to implement grounding techniques, sensory related skills, coping skills, distress tolerance skills, distraction techniques, mindfulness/ meditation, and deep breathing techniques, to target fluctuating symptoms  Corinne plans to work on increasing self-awareness in relation to warning signs and triggers throughout her body, and actively giving herself permission to actively pause in the moment, and be present  Corinne plans to lean into healthy natural supports, engage in the use of self-care, take time for herself, and implement healthy patterns and behaviors, breaking the cycle of co-dependency, and prioritize her needs over the needs of others  Corinne plans to continue to work on increasing self-awareness in relation to identifying, associating, and expressing her feelings through healthy and effective forms of communication  Corinne plans to reinforce limits and boundaries for herself  Corinne plans to implement the use of radical acceptance, outweighing risks verses benefits in order to make informed decisions, and align choices and decisions with what is in the best interest of her  Corinne plans to utilize opposite action skills  Corinne plans to increase self-compassion, and bogdan towards herself, decreasing judgement towards herself, and utilizing CBT and DBT calls to challenge negative thoughts, and cognitive distortions  Roxanna plans to reach out for additional support as needed  I spent 60 minutes directly with the patient during this visit      9:45am-10:45am

## 2022-09-21 DIAGNOSIS — F32.A DEPRESSION, UNSPECIFIED DEPRESSION TYPE: ICD-10-CM

## 2022-09-22 RX ORDER — DULOXETIN HYDROCHLORIDE 30 MG/1
CAPSULE, DELAYED RELEASE ORAL
Qty: 30 CAPSULE | Refills: 0 | Status: SHIPPED | OUTPATIENT
Start: 2022-09-22 | End: 2022-10-03

## 2022-09-22 NOTE — TELEPHONE ENCOUNTER
Please notify patient I will refill her medication for 1 month, and please schedule her for an in office med check visit within the next month

## 2022-10-03 ENCOUNTER — OFFICE VISIT (OUTPATIENT)
Dept: FAMILY MEDICINE CLINIC | Facility: HOSPITAL | Age: 42
End: 2022-10-03
Payer: COMMERCIAL

## 2022-10-03 VITALS
HEIGHT: 64 IN | HEART RATE: 87 BPM | WEIGHT: 139 LBS | BODY MASS INDEX: 23.73 KG/M2 | DIASTOLIC BLOOD PRESSURE: 80 MMHG | SYSTOLIC BLOOD PRESSURE: 130 MMHG | OXYGEN SATURATION: 99 %

## 2022-10-03 DIAGNOSIS — Z13.220 SCREENING FOR HYPERLIPIDEMIA: ICD-10-CM

## 2022-10-03 DIAGNOSIS — F32.81 PMDD (PREMENSTRUAL DYSPHORIC DISORDER): Primary | ICD-10-CM

## 2022-10-03 DIAGNOSIS — F33.2 SEVERE EPISODE OF RECURRENT MAJOR DEPRESSIVE DISORDER, WITHOUT PSYCHOTIC FEATURES (HCC): ICD-10-CM

## 2022-10-03 DIAGNOSIS — N92.1 MENORRHAGIA WITH IRREGULAR CYCLE: ICD-10-CM

## 2022-10-03 DIAGNOSIS — D64.9 ANEMIA, UNSPECIFIED TYPE: ICD-10-CM

## 2022-10-03 DIAGNOSIS — Z13.0 SCREENING FOR IRON DEFICIENCY ANEMIA: ICD-10-CM

## 2022-10-03 DIAGNOSIS — Z13.1 SCREENING FOR DIABETES MELLITUS: ICD-10-CM

## 2022-10-03 PROCEDURE — 99214 OFFICE O/P EST MOD 30 MIN: CPT | Performed by: STUDENT IN AN ORGANIZED HEALTH CARE EDUCATION/TRAINING PROGRAM

## 2022-10-04 ENCOUNTER — TELEPHONE (OUTPATIENT)
Dept: ADMINISTRATIVE | Facility: OTHER | Age: 42
End: 2022-10-04

## 2022-10-04 NOTE — TELEPHONE ENCOUNTER
----- Message from Linda Silverman MA sent at 10/3/2022 11:36 AM EDT -----  Regarding: mammo results  10/03/22 11:37 AM    Hello, our patient Thea Jordan has had mammogram  completed/performed  Please assist in updating the patient chart by [facility] in care everywhere The date of service is 04/07/2022       Thank you,  Linda Silverman   [facility] [address] [facility]

## 2022-10-04 NOTE — TELEPHONE ENCOUNTER
Upon review of the In Basket request we were able to locate, review, and update the patient chart as requested for Mammogram     Any additional questions or concerns should be emailed to the Practice Liaisons via [email or web address] email, please do not reply via In Basket      Thank you  Suhail Gunter

## 2022-10-05 LAB
ALBUMIN SERPL-MCNC: 4.3 G/DL (ref 3.8–4.8)
ALBUMIN/GLOB SERPL: 2 {RATIO} (ref 1.2–2.2)
ALP SERPL-CCNC: 48 IU/L (ref 44–121)
ALT SERPL-CCNC: 16 IU/L (ref 0–32)
AST SERPL-CCNC: 18 IU/L (ref 0–40)
BASOPHILS # BLD AUTO: 0 X10E3/UL (ref 0–0.2)
BASOPHILS NFR BLD AUTO: 1 %
BILIRUB SERPL-MCNC: 0.5 MG/DL (ref 0–1.2)
BUN SERPL-MCNC: 14 MG/DL (ref 6–24)
BUN/CREAT SERPL: 19 (ref 9–23)
CALCIUM SERPL-MCNC: 9 MG/DL (ref 8.7–10.2)
CHLORIDE SERPL-SCNC: 106 MMOL/L (ref 96–106)
CHOLEST SERPL-MCNC: 170 MG/DL (ref 100–199)
CHOLEST/HDLC SERPL: 3 RATIO (ref 0–4.4)
CO2 SERPL-SCNC: 24 MMOL/L (ref 20–29)
CREAT SERPL-MCNC: 0.73 MG/DL (ref 0.57–1)
EGFR: 105 ML/MIN/1.73
EOSINOPHIL # BLD AUTO: 0 X10E3/UL (ref 0–0.4)
EOSINOPHIL NFR BLD AUTO: 1 %
ERYTHROCYTE [DISTWIDTH] IN BLOOD BY AUTOMATED COUNT: 12.4 % (ref 11.7–15.4)
FSH SERPL-ACNC: 4.3 MIU/ML
GLOBULIN SER-MCNC: 2.1 G/DL (ref 1.5–4.5)
GLUCOSE SERPL-MCNC: 92 MG/DL (ref 70–99)
HCT VFR BLD AUTO: 36.7 % (ref 34–46.6)
HDLC SERPL-MCNC: 57 MG/DL
HGB BLD-MCNC: 12 G/DL (ref 11.1–15.9)
IMM GRANULOCYTES # BLD: 0 X10E3/UL (ref 0–0.1)
IMM GRANULOCYTES NFR BLD: 0 %
IRON SATN MFR SERPL: 26 % (ref 15–55)
IRON SERPL-MCNC: 82 UG/DL (ref 27–159)
LDLC SERPL CALC-MCNC: 103 MG/DL (ref 0–99)
LH SERPL-ACNC: 7.4 MIU/ML
LYMPHOCYTES # BLD AUTO: 1.5 X10E3/UL (ref 0.7–3.1)
LYMPHOCYTES NFR BLD AUTO: 43 %
MCH RBC QN AUTO: 29.8 PG (ref 26.6–33)
MCHC RBC AUTO-ENTMCNC: 32.7 G/DL (ref 31.5–35.7)
MCV RBC AUTO: 91 FL (ref 79–97)
MONOCYTES # BLD AUTO: 0.3 X10E3/UL (ref 0.1–0.9)
MONOCYTES NFR BLD AUTO: 8 %
NEUTROPHILS # BLD AUTO: 1.7 X10E3/UL (ref 1.4–7)
NEUTROPHILS NFR BLD AUTO: 47 %
PLATELET # BLD AUTO: 184 X10E3/UL (ref 150–450)
POTASSIUM SERPL-SCNC: 4.6 MMOL/L (ref 3.5–5.2)
PROT SERPL-MCNC: 6.4 G/DL (ref 6–8.5)
RBC # BLD AUTO: 4.03 X10E6/UL (ref 3.77–5.28)
SL AMB VLDL CHOLESTEROL CALC: 10 MG/DL (ref 5–40)
SODIUM SERPL-SCNC: 139 MMOL/L (ref 134–144)
T4 FREE SERPL-MCNC: 1.02 NG/DL (ref 0.82–1.77)
TIBC SERPL-MCNC: 316 UG/DL (ref 250–450)
TRIGL SERPL-MCNC: 47 MG/DL (ref 0–149)
TSH SERPL DL<=0.005 MIU/L-ACNC: 1.26 UIU/ML (ref 0.45–4.5)
UIBC SERPL-MCNC: 234 UG/DL (ref 131–425)
WBC # BLD AUTO: 3.6 X10E3/UL (ref 3.4–10.8)

## 2022-10-10 ENCOUNTER — HOSPITAL ENCOUNTER (OUTPATIENT)
Dept: ULTRASOUND IMAGING | Facility: HOSPITAL | Age: 42
Discharge: HOME | End: 2022-10-10
Attending: STUDENT IN AN ORGANIZED HEALTH CARE EDUCATION/TRAINING PROGRAM
Payer: COMMERCIAL

## 2022-10-10 ENCOUNTER — TELEMEDICINE (OUTPATIENT)
Dept: BEHAVIORAL/MENTAL HEALTH CLINIC | Facility: CLINIC | Age: 42
End: 2022-10-10
Payer: COMMERCIAL

## 2022-10-10 DIAGNOSIS — F33.0 MILD EPISODE OF RECURRENT MAJOR DEPRESSIVE DISORDER (HCC): Primary | ICD-10-CM

## 2022-10-10 DIAGNOSIS — F41.0 SEVERE ANXIETY WITH PANIC: ICD-10-CM

## 2022-10-10 DIAGNOSIS — N92.1 MENORRHAGIA WITH IRREGULAR CYCLE: ICD-10-CM

## 2022-10-10 PROCEDURE — 76856 US EXAM PELVIC COMPLETE: CPT

## 2022-10-10 PROCEDURE — 90834 PSYTX W PT 45 MINUTES: CPT | Performed by: SOCIAL WORKER

## 2022-10-10 PROCEDURE — 76830 TRANSVAGINAL US NON-OB: CPT

## 2022-10-10 NOTE — PSYCH
Psychotherapy Provided: Individual Psychotherapy 55 minutes  Length of time in session: 55 minutes  Current suicide risk : Low   Behavioral Health Treatment Plan [facility]: Diagnosis and Treatment Plan explained to Clarita Rock relates understanding diagnosis and is agreeable to Treatment Plan  Yes  Virtual Regular Visit    Verification of patient location: [place]  Patient is located in the following state in which I hold an active license [place]    Assessment/Plan:    Problem List Items Addressed This Visit        Other    Depression, major, recurrent ([address] ) - Primary    Severe anxiety with panic          Goals addressed in session: Goal 1          Reason for visit is   Chief Complaint   Patient presents with   • Virtual Regular Visit      Encounter provider Megha Murrell    Provider located at [address] [address]  [phone]    Recent Visits  Date Type Provider Dept   10/03/22 Office Visit Beto Carvalho DO [facility] [address]   Showing recent visits within past 7 days and meeting all other requirements  Today's Visits  Date Type Provider Dept   10/10/22 Telemedicine Megha Murrell  [facility]   Showing today's visits and meeting all other requirements  Future Appointments  No visits were found meeting these conditions  Showing future appointments within next 150 days and meeting all other requirements     The patient was identified by name and date of birth  Ibis Allen was informed that this is a telemedicine visit and that the visit is being conducted through [address] [place] [address] Road Now and patient was informed that this is a secure, HIPAA-compliant platform  She agrees to proceed  My office door was closed  No one else was in the room  She acknowledged consent and understanding of privacy and security of the video platform   The patient has agreed to participate and understands they can discontinue the visit at any time  Patient is aware this is a billable service  Subjective  Ana Sanchez is a 36 y o  female  HPI     Past Medical History:   Diagnosis Date   • Anxiety    • 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[address] )        Past Surgical History:   Procedure Laterality Date   • APPENDECTOMY      extensive lysis of adhesions, MAYURI's procedure (divisions of MAYURI's bands) detorsion of vulvulus, widening of mesenteric pedicle, cecopeexy, appendectomy      • BACK SURGERY Right 2013    SF: L4-L5 hemilaminectomy for discectomy  Use of the microscope for microdissection, Use og 40mg of depo-medrol though thee exiting right L5 nerve root  Onset:13   •  SECTION      x2   • CHOLECYSTECTOMY     • FL INJECTION RIGHT SHOULDER (ARTHROGRAM)  2022   • GALLBLADDER SURGERY      [facility], Onset:    • LAPAROSCOPIC LYSIS INTESTINAL ADHESIONS      onset: 15   • LUMBAR DISC SURGERY     • [place] LAP,DIAGNOSTIC ABDOMEN N/A 2016    Procedure: LAPAROSCOPY DIAGNOSTIC, EXTENSIVE LYSIS OF ADHESIONS, MAYURI'S PROCEDURE(DIVISION OF MAYURI'S BANDS,DETORSION OF VOLVULUS, WIDENING OF MESENTERIC PEDICLE, CECOPEXY, APPENDECTOMY);   Surgeon: Suzie Brambila MD;  Location:  MAIN OR;  Service: General   • TUBAL LIGATION         Current Outpatient Medications   Medication Sig Dispense Refill   • ALPRAZolam (XANAX) 0 5 mg tablet Take one tablet q12 h prn anxiety 21 tablet 0   • busPIRone (BUSPAR) 5 mg tablet take 1 tablet by mouth three times a day 90 tablet 5   • DULoxetine (CYMBALTA) 30 mg delayed release capsule take 1 capsule by mouth once daily 30 capsule 0   • DULoxetine (CYMBALTA) 60 mg delayed release capsule TAKE 1 CAPSULE BY MOUTH ONCE DAILY WITH 30MG CAPSULE 30 capsule 5   • meloxicam (MOBIC) 15 mg tablet Take 1 tablet (15 mg total) by mouth daily 30 tablet 0   • multivitamin (THERAGRAN) TABS Take 1 tablet by mouth daily     • Probiotic Product (PROBIOTIC-10 PO) Take by mouth 1 daily       No current facility-administered medications for this visit  Allergies   Allergen Reactions   • Biaxin [Clarithromycin] GI Intolerance and Other (See Comments)   • Sulfa Antibiotics    • Sulfasalazine    • Venlafaxine GI Intolerance, Vomiting and Other (See Comments)     Category: Allergy; Category: Allergy; Review of Systems    Video Exam    There were no vitals filed for this visit  Physical Exam     (D) Corinne attended her follow up psychotherapy session today  Corinne reported that since her last session, she has noticed elevated symptoms  Corinne reported that after her last therapy session, she contacted her OBGYN office, and reported that she wasn't able to get an appointment until December  Corinne reported that she reached out to her PCP, and scheduled an appointment for 10/03/22  Corinne reported that she spoke with her PCP in relation to women's health issues, and reported that her PCP agreed to order to labwork, and transvaginal/ abdominal ultrasounds  Corinne reported that her PCP ordered a CBC, CMP, THS 3rd Generation, T4 Fee TIBC, FHS, and LH- and reports that they all came back normal  Corinne reported that her PCP also ordered a lipid panel, and reports that it came back slightly elevated  Corinne reported that she initially read her WBC as low, and reported that she was triggered, and spiraled, describing elevated anxiety, obsessive, intrusive, ruminating, and repetitive thoughts, describing panic, resulting in her taking PRN medication  Corinne reported that she reached out to her PCP, and this writer after she googled a low WBC level, and ruminating over the idea of something awful might be happening, and believed for a few days she had cancer  Corinne reported that when this writer and her PCP responded to her, this is when she became aware that she read the test wrong, and reported feeling relieved   Corinne reported that she felt bad about herself, judged herself, and was upset with herself, describing herself as feeling like she was overreacting  Corinne reported that she is scheduled for her transvaginal and abdominal ultrasound today, and describes anticipatory anxiety in relation to this  Corinne reported that she is working with her massage therapist, trying to address right shoulder pain, fearing that she is going to have to get surgery  Corinne describes ruminating on health related issues, having follow up with her orotho doctor next week, and discussed stressors related to preparing for her daughter's surgery  Discussed and processed this  Corinne reported that she and her  packed up their beach camper this past weekend, and discussed stressors related to this, along with some anxiety  Corinne reported that the camp site increased their lot rent by 8% this year  Discussed and processed  Corinne spent time 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, reporting that 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, making decisions in relation to this change, notifying clients, and struggling with anticipatory anxiety surrounding this  Corinne reported that they are getting to the point where she and her  are able to get in there and start working/ designing the salon  Corinne reported that they are projected to open up in (3) months, and reports feeling stressed out and overwhelmed in relation to this  Corinne reported that she continues to work with the state, in relation to applying for additional funding from the pandemic  Corinne reported that she applied and was accepted, reporting that she received $46,000 from back pay, allowing her to pay off her new salon loan- which she reports feeling relieved in relation to this  Corinne reported that she is blown away, and reports feeling excited about trusting the process, and trusting her joshua  Discussed and processed   Corinne discussed elevated anxiety 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, and the  retiring, and worrying about her  taking that position with the time commitment, verses them hiring and outside company, discussing them relaying on that income for their eddy mg  Corinne and this writer processed this at length  Discussed ongoing skills  Reviewed limits and boundaries  Modeled effective forms of communication  Provided ongoing psychoeducation  (A) Corinne presented with good eye contact  Corinne presented as alert and oriented x3  Corinne's speech presented at a normal rate, volume, and rhythm  Corinne denies any symptoms of frank  Corinne denies any evident or immediate risk factors for self-harm, SI, or HI  Corinne's mood presented as anxious and depressed and her affect appeared to be congruent  Corinne describes symptoms of fearing as if something awful might happen, not being able to stop and control worrying, worrying too much about different things, and feeling nervous, anxious, and on edge  Corinne describes symptoms of poor frustration tolerance, irritability, and becoming easily annoyed  Corinne describes lower moods of sadness, and depression  Corinne describes symptoms of feeling tired and having little energy  Corinne describes feeling bad about herself, feeling like she is letting herself, and her family down  (P) Corinne plans to work on implementing grounding statements in the moment, and effectively utilize positive self-talk, positive affirmations, while monitoring, tracking, and inventorying gratitude  Corinne plans to actively give herself permission to be present in the moment, take time for herself, and engage in the use of self-care  Corinne plans to lean into healthy natural supports, and prioritize her needs  Corinne plans to break the cycle, implementing healthy patterns, and behaviors, and challenging co-dependency characteristics   Corinne plans to work on identifying, associating, and expressing her feelings with healthy and effective forms of communication  Corinne plans to honor her feelings, and emotions, validating herself, honoring her inner child, and managing trauma triggers  Corinne plans to implement sensory related skills, mindfulness/ meditation, deep breathing techniques, distress tolerance skills, distraction techniques, coping skills, and grounding statements  Corinne plans to implement radical acceptance, outweighing risks verses benefits in order to make informed decisions, and align choices and decisions with what is in the best interest of her  Corinne plans to structure her schedule with ongoing balance, routine, and consistency, and utilize opposite action skills  Corinne plans to follow up with her Ortho Specialist, PCP, Massage Therapist, and her OBGYN- reporting that she recently switched to and was able to get an earlier appointment on 11/04/22  Corinne plans to challenge negative thoughts, cognitive distortions, and harmful core beliefs, with CBT and DBT skills  Corinne plans to reach out for additional support as needed       I spent 55 minutes directly with the patient during this visit     9:00am-9:55am

## 2022-10-17 ENCOUNTER — OFFICE VISIT (OUTPATIENT)
Dept: OBGYN CLINIC | Facility: MEDICAL CENTER | Age: 42
End: 2022-10-17
Payer: COMMERCIAL

## 2022-10-17 VITALS
WEIGHT: 140 LBS | HEIGHT: 64 IN | SYSTOLIC BLOOD PRESSURE: 101 MMHG | BODY MASS INDEX: 23.9 KG/M2 | DIASTOLIC BLOOD PRESSURE: 62 MMHG | HEART RATE: 81 BPM

## 2022-10-17 DIAGNOSIS — S46.011D TRAUMATIC INCOMPLETE TEAR OF RIGHT ROTATOR CUFF, SUBSEQUENT ENCOUNTER: Primary | ICD-10-CM

## 2022-10-17 PROCEDURE — 99213 OFFICE O/P EST LOW 20 MIN: CPT | Performed by: ORTHOPAEDIC SURGERY

## 2022-10-17 NOTE — PROGRESS NOTES
Ortho Sports Medicine Shoulder Visit     Assesment:   right shoulder small full thickness rotator cuff tear with pain, impingement     Plan:    Conservative treatment:    Ice to shoulder 1-2 times daily, for 20 minutes at a time  Home exercise program for shoulder, including ROM and strenthening  Instructions given to patient of what exercises to perform  OTC anti inflammatories as needed for pain  Let pain guide return to activities  Discussed if symptoms don't improve then may consider arthroscopic rotator cuff repair with possible subacromial decompression  Avoid repetitive overhead activities        Imaging:    No imaging was available for review today  Injection:    No Injection planned at this time  May consider future corticosteroid injection depending on clinical exam/imaging  Surgery:     No surgery is recommended at this point, continue with conservative treatment plan as noted  History of Present Illness: The patient is returns for follow up of right shoulder small full thickness rotator cuff tear  She was given CSI in July which only helped for 3 weeks  Patient states she had completed physical therapy which gave her much relief  She continues to keep up with light weighted exercises at home      Pain is improved by rest, ice, NSAIDS, physical therapy and injection  Pain is aggravated by overhead activity, reaching back and rotation  Symptoms include clicking, catching and popping  The patient has weakness  The patient has tried rest, ice, NSAIDS, physical therapy and injection  I have reviewed the past medical, surgical, social and family history, medications and allergies as documented in the EMR  Review of systems: ROS is negative other than that noted in the HPI  Constitutional: Negative for fatigue and fever     Cardiovascular: Negative for chest pain  Pulmonary: negative for shortness of breath    PMH/PSH:  Past Medical History:   Diagnosis Date • Anxiety    • 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HCC)      Past Surgical History:   Procedure Laterality Date   • APPENDECTOMY      extensive lysis of adhesions, MAYURI's procedure (divisions of MAYURI's bands) detorsion of vulvulus, widening of mesenteric pedicle, cecopeexy, appendectomy      • BACK SURGERY Right 2013    SF: L4-L5 hemilaminectomy for discectomy  Use of the microscope for microdissection, Use og 40mg of depo-medrol though thee exiting right L5 nerve root  Onset:13   •  SECTION      x2   • CHOLECYSTECTOMY     • FL INJECTION RIGHT SHOULDER (ARTHROGRAM)  2022   • GALLBLADDER SURGERY      [facility], Onset:    • LAPAROSCOPIC LYSIS INTESTINAL ADHESIONS      onset: 15   • LUMBAR DISC SURGERY     • [place] LAP,DIAGNOSTIC ABDOMEN N/A 2016    Procedure: LAPAROSCOPY DIAGNOSTIC, EXTENSIVE LYSIS OF ADHESIONS, MAYURI'S PROCEDURE(DIVISION OF MAYURI'S BANDS,DETORSION OF VOLVULUS, WIDENING OF MESENTERIC PEDICLE, CECOPEXY, APPENDECTOMY); Surgeon: René Marie MD;  Location: [facility];  Service: General   • TUBAL LIGATION          Physical Exam:    Blood pressure 101/62, pulse 81, height 5' 4" (1 626 m), weight 63 5 kg (140 lb), not currently breastfeeding  General/Constitutional: NAD, well developed, well nourished  HENT: Normocephalic, atraumatic  CV: Intact distal pulses, regular rate  Resp: No respiratory distress or labored breathing  Lymphatic: No lymphadenopathy palpated  Neuro: Alert and Oriented x 3, no focal deficits  Psych: Normal mood, normal affect, normal judgement, normal behavior  Skin: Warm, dry, no rashes, no erythema     Shoulder Exam (focused):     Shoulder focused exam:       RIGHT LEFT    Scapula Atrophy Negative Negative     Winging Negative Negative     Protraction Negative Negative    Rotator cuff SS 5/5 5/5     IS 5/5 5/5     SubS 5/5 5/5    ROM  170     ER0 60 60     ER90 90 90     IR90 40 40     IRb T6 T6    TTP: AC Positive Negative     Biceps Negative Negative Coracoid Negative Negative    Special Tests: [place]ri[place] Negative Negative     [place]-shear Negative Negative     Cross body Adduction Negative Negative     Speeds  Negative Negative     Freedom's Negative Negative     Whipple Positive Negative       Neer Negative Negative     Hook Negative Negative    Instability: Apprehension & relocation not tested not tested     Load & shift not tested not tested    Other: Crank Negative Negative               UE NV Exam: +2 Radial pulses bilaterally  Sensation intact to light touch C5-T1 bilaterally, Radial/median/ulnar nerve motor intact    Cervical ROM is full without pain, numbness or tingling      Shoulder Imaging    No new imaging to review     Scribe Attestation    I,:  Makenzie Cuenca am acting as a scribe while in the presence of the attending physician :       I,:  45 Helen Irene, DO personally performed the services described in this documentation    as scribed in my presence :

## 2022-10-24 ENCOUNTER — OFFICE VISIT (OUTPATIENT)
Dept: FAMILY MEDICINE CLINIC | Facility: HOSPITAL | Age: 42
End: 2022-10-24
Payer: COMMERCIAL

## 2022-10-24 VITALS
DIASTOLIC BLOOD PRESSURE: 78 MMHG | BODY MASS INDEX: 24.07 KG/M2 | SYSTOLIC BLOOD PRESSURE: 118 MMHG | HEIGHT: 64 IN | HEART RATE: 68 BPM | OXYGEN SATURATION: 99 % | WEIGHT: 141 LBS

## 2022-10-24 DIAGNOSIS — F41.9 ANXIETY: ICD-10-CM

## 2022-10-24 DIAGNOSIS — M25.511 CHRONIC RIGHT SHOULDER PAIN: ICD-10-CM

## 2022-10-24 DIAGNOSIS — F32.81 PMDD (PREMENSTRUAL DYSPHORIC DISORDER): ICD-10-CM

## 2022-10-24 DIAGNOSIS — F33.0 MILD EPISODE OF RECURRENT MAJOR DEPRESSIVE DISORDER (HCC): ICD-10-CM

## 2022-10-24 DIAGNOSIS — F32.A DEPRESSION, UNSPECIFIED DEPRESSION TYPE: ICD-10-CM

## 2022-10-24 DIAGNOSIS — G89.29 CHRONIC RIGHT SHOULDER PAIN: ICD-10-CM

## 2022-10-24 DIAGNOSIS — Z00.00 ROUTINE ADULT HEALTH MAINTENANCE: Primary | ICD-10-CM

## 2022-10-24 DIAGNOSIS — N92.1 MENORRHAGIA WITH IRREGULAR CYCLE: ICD-10-CM

## 2022-10-24 PROCEDURE — 99396 PREV VISIT EST AGE 40-64: CPT | Performed by: STUDENT IN AN ORGANIZED HEALTH CARE EDUCATION/TRAINING PROGRAM

## 2022-10-24 RX ORDER — DULOXETIN HYDROCHLORIDE 30 MG/1
30 CAPSULE, DELAYED RELEASE ORAL DAILY
Qty: 30 CAPSULE | Refills: 11 | Status: SHIPPED | OUTPATIENT
Start: 2022-10-24

## 2022-10-24 NOTE — PROGRESS NOTES
[facility] [address] [address]    NAME: Corinne S Kulp  AGE: 36 y o  SEX: female  : 1980   DATE: 10/24/2022     Assessment and Plan:      Diagnosis ICD-10-CM Associated Orders   1  Routine adult health maintenance  Z00 00    2  Mild episode of recurrent major depressive disorder (HCC)  F33 0    3  Menorrhagia with irregular cycle  N92 1    4  PMDD (premenstrual dysphoric disorder)  F32 81    5  Chronic right shoulder pain  M25 511     G89 29    6  Anxiety  F41 9    7  Depression, unspecified depression type  F32  A DULoxetine (CYMBALTA) 30 mg delayed release capsule     Medications reviewed, reconciled, and Cymbalta 30 mg ordered now to keep on cycle with the 60 mg and BuSpar  Continue with OB gyn appointment in 2 weeks  Immunizations and preventive care screenings were discussed with patient today  Appropriate education was printed on patient's after visit summary  Counseling:  Alcohol/drug use: discussed moderation in alcohol intake, the recommendations for healthy alcohol use, and avoidance of illicit drug use  Dental Health: discussed importance of regular tooth brushing, flossing, and dental visits  Injury prevention: discussed safety/seat belts, safety helmets, smoke detectors, carbon dioxide detectors, and smoking near bedding or upholstery  Sexual health: discussed sexually transmitted diseases, partner selection, use of condoms, avoidance of unintended pregnancy, and contraceptive alternatives  · Exercise: the importance of regular exercise/physical activity was discussed  Recommend exercise 3-5 times per week for at least 30 minutes  Return in about 6 months (around 2023) for Mental Health F/U        Chief Complaint:     Chief Complaint   Patient presents with   • Physical Exam      History of Present Illness:     Adult Annual Physical   Patient here for a comprehensive physical exam    Chronic R shoulder pain, needs surgery, not able to take off work  Diet and Physical Activity  · Diet/Nutrition: well balanced diet, consuming 3-5 servings of fruits/vegetables daily and E2M - proteins, veggies, good fats  · Exercise: moderate cardiovascular exercise, strength training exercises and 5-7 times a week on average  · No Drug or Tobacco use  Alcohol use - Rare/occasional/frequent: no     Depression Screening  PHQ-2/9 Depression Screening    Little interest or pleasure in doing things: 0 - not at all  Feeling down, depressed, or hopeless: 0 - not at all  Trouble falling or staying asleep, or sleeping too much: 0 - not at all  Feeling tired or having little energy: 0 - not at all  Poor appetite or overeatin - not at all  Feeling bad about yourself - or that you are a failure or have let yourself or your family down: 0 - not at all  Trouble concentrating on things, such as reading the newspaper or watching television: 0 - not at all  Moving or speaking so slowly that other people could have noticed  Or the opposite - being so fidgety or restless that you have been moving around a lot more than usual: 0 - not at all  Thoughts that you would be better off dead, or of hurting yourself in some way: 0 - not at all       General Health  · Sleep: sleeps well and shoulder hurts her at times  · Hearing: normal - bilateral   · Vision: no vision problems  · Dental: regular dental visits and brushes teeth twice daily  /GYN Health  · Patient is: premenopausal  · Last menstrual period: irregular, can be > 32d, some short menses & some long, breakthrough bleeding at times too  · Mom's menopause maybe in early 50's, but had full hysterectomy  · Contraceptive method: None  · GYN appt in 14d  Review of Systems:     Review of Systems   Constitutional: Negative for chills and fever  HENT: Positive for congestion (last week in ears)  Negative for rhinorrhea and sore throat      Respiratory: Negative for cough and shortness of breath  Cardiovascular: Negative for chest pain and palpitations  Gastrointestinal: Negative for constipation and diarrhea  Genitourinary: Negative for flank pain and frequency  Musculoskeletal: Positive for arthralgias (R shoulder)  Neurological: Negative for dizziness, light-headedness and headaches  Psychiatric/Behavioral: Negative for dysphoric mood  The patient is not nervous/anxious  Past Medical History:     Past Medical History:   Diagnosis Date   • Anxiety    • 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[address] )       Past Surgical History:     Past Surgical History:   Procedure Laterality Date   • APPENDECTOMY      extensive lysis of adhesions, MAYURI's procedure (divisions of MAYURI's bands) detorsion of vulvulus, widening of mesenteric pedicle, cecopeexy, appendectomy      • BACK SURGERY Right 2013    SF: L4-L5 hemilaminectomy for discectomy  Use of the microscope for microdissection, Use og 40mg of depo-medrol though thee exiting right L5 nerve root  Onset:13   •  SECTION      x2   • CHOLECYSTECTOMY     • FL INJECTION RIGHT SHOULDER (ARTHROGRAM)  2022   • GALLBLADDER SURGERY      [facility], Onset:    • LAPAROSCOPIC LYSIS INTESTINAL ADHESIONS      onset: 15   • LUMBAR DISC SURGERY     • [place] LAP,DIAGNOSTIC ABDOMEN N/A 2016    Procedure: LAPAROSCOPY DIAGNOSTIC, EXTENSIVE LYSIS OF ADHESIONS, MAYURI'S PROCEDURE(DIVISION OF MAYURI'S BANDS,DETORSION OF VOLVULUS, WIDENING OF MESENTERIC PEDICLE, CECOPEXY, APPENDECTOMY);   Surgeon: Antony Saldana MD;  Location: [facility] OR;  Service: General   • TUBAL LIGATION        Social History:     Social History     Socioeconomic History   • Marital status: /Civil Union     Spouse name: None   • Number of children: None   • Years of education: 12   • Highest education level: None   Occupational History   • Occupation: [employer]   Tobacco Use   • Smoking status: Former Smoker     Packs/day: 0 25     Years: 5 00     Pack years: 1 25 Types: Cigarettes     Quit date: 2009     Years since quittin 8   • Smokeless tobacco: Never Used   Vaping Use   • Vaping Use: Never used   Substance and Sexual Activity   • Alcohol use: Not Currently   • Drug use: No   • Sexual activity: Yes     Partners: Male     Birth control/protection: None   Other Topics Concern   • None   Social History Narrative    Participates in activities inside and outside of home    Lives with family    Supportive and safe    No advance directives     Social Determinants of Health     Financial Resource Strain: Not on file   Food Insecurity: Not on file   Transportation Needs: Not on file   Physical Activity: Not on file   Stress: Not on file   Social Connections: Not on file   Intimate Partner Violence: Not on file   Housing Stability: Not on file      Family History:     Family History   Problem Relation Age of Onset   • Hypertension Father    • Stroke Father    • Seizures Brother         epilepsy   • Heart disease Family    • Heart attack Other    • Scleroderma Other    • Stroke Other 70        stroke syndrome   • Heart attack Other    • Mental illness Mother         bipolar/anxiety   • Stroke Mother    • Heart disease Mother    • Depression Mother    • Bipolar disorder Mother    • Substance Abuse Neg Hx       Current Medications:     Current Outpatient Medications   Medication Sig Dispense Refill   • ALPRAZolam (XANAX) 0 5 mg tablet Take one tablet q12 h prn anxiety 21 tablet 0   • busPIRone (BUSPAR) 5 mg tablet take 1 tablet by mouth three times a day 90 tablet 5   • DULoxetine (CYMBALTA) 30 mg delayed release capsule Take 1 capsule (30 mg total) by mouth daily 30 capsule 11   • DULoxetine (CYMBALTA) 60 mg delayed release capsule TAKE 1 CAPSULE BY MOUTH ONCE DAILY WITH 30MG CAPSULE 30 capsule 5   • multivitamin (THERAGRAN) TABS Take 1 tablet by mouth daily     • Probiotic Product (PROBIOTIC-10 PO) Take by mouth 1 daily       No current facility-administered medications for this visit  Allergies: Allergies   Allergen Reactions   • Biaxin [Clarithromycin] GI Intolerance and Other (See Comments)   • Sulfa Antibiotics    • Sulfasalazine    • Venlafaxine GI Intolerance, Vomiting and Other (See Comments)     Category: Allergy; Category: Allergy; Physical Exam:     /78   Pulse 68   Ht 5' 4" (1 626 m)   Wt 64 kg (141 lb)   SpO2 99%   BMI 24 20 kg/m²     Physical Exam  Vitals reviewed  Constitutional:       General: She is not in acute distress  Appearance: Normal appearance  She is normal weight  She is not ill-appearing  HENT:      Head: Normocephalic and atraumatic  Right Ear: Tympanic membrane, ear canal and external ear normal  There is no impacted cerumen  Left Ear: Tympanic membrane, ear canal and external ear normal  There is no impacted cerumen  Ears:      Comments: No sinus tenderness bilaterally     Nose: Nose normal  No congestion or rhinorrhea  Mouth/Throat:      Mouth: Mucous membranes are moist       Pharynx: Oropharynx is clear  No oropharyngeal exudate or posterior oropharyngeal erythema  Eyes:      General: No scleral icterus  Right eye: No discharge  Left eye: No discharge  Conjunctiva/sclera: Conjunctivae normal    Neck:      Comments: No thyromegaly  Cardiovascular:      Rate and Rhythm: Normal rate and regular rhythm  Pulses: Normal pulses  Heart sounds: Normal heart sounds  No murmur heard  No friction rub  No gallop  Pulmonary:      Effort: Pulmonary effort is normal  No respiratory distress  Breath sounds: Normal breath sounds  No stridor  No wheezing  Musculoskeletal:         General: Normal range of motion  Cervical back: Normal range of motion and neck supple  No rigidity or tenderness  Right lower leg: No edema  Left lower leg: No edema  Lymphadenopathy:      Cervical: No cervical adenopathy  Skin:     General: Skin is warm and dry        Capillary Refill: Capillary refill takes less than 2 seconds  Coloration: Skin is not jaundiced or pale  Neurological:      Mental Status: She is alert and oriented to person, place, and time  Gait: Gait normal    Psychiatric:         Mood and Affect: Mood normal          Behavior: Behavior normal          Thought Content:  Thought content normal          Judgment: Judgment normal           DO Carlota Musa [phone]

## 2022-11-07 ENCOUNTER — OFFICE VISIT (OUTPATIENT)
Dept: OBGYN CLINIC | Facility: CLINIC | Age: 42
End: 2022-11-07

## 2022-11-07 ENCOUNTER — TELEMEDICINE (OUTPATIENT)
Dept: BEHAVIORAL/MENTAL HEALTH CLINIC | Facility: CLINIC | Age: 42
End: 2022-11-07

## 2022-11-07 VITALS
DIASTOLIC BLOOD PRESSURE: 66 MMHG | WEIGHT: 141 LBS | BODY MASS INDEX: 24.07 KG/M2 | HEIGHT: 64 IN | SYSTOLIC BLOOD PRESSURE: 114 MMHG

## 2022-11-07 DIAGNOSIS — F33.0 MILD EPISODE OF RECURRENT MAJOR DEPRESSIVE DISORDER (HCC): ICD-10-CM

## 2022-11-07 DIAGNOSIS — F41.0 SEVERE ANXIETY WITH PANIC: Primary | ICD-10-CM

## 2022-11-07 DIAGNOSIS — Z12.31 ENCOUNTER FOR SCREENING MAMMOGRAM FOR MALIGNANT NEOPLASM OF BREAST: ICD-10-CM

## 2022-11-07 DIAGNOSIS — Z01.419 WOMEN'S ANNUAL ROUTINE GYNECOLOGICAL EXAMINATION: Primary | ICD-10-CM

## 2022-11-07 DIAGNOSIS — N92.1 MENORRHAGIA WITH IRREGULAR CYCLE: ICD-10-CM

## 2022-11-07 DIAGNOSIS — Z12.4 SCREENING FOR CERVICAL CANCER: ICD-10-CM

## 2022-11-07 NOTE — PROGRESS NOTES
[facility], [address]    ASSESSMENT/PLAN: Nery Valentine is a 36 y o  [id number] who presents for annual gynecologic exam     Encounter for routine gynecologic examination  - Routine well woman exam completed today  - Cervical Cancer Screening: Current ASCCP Guidelines reviewed  Last Pap: 07/17/2017  History of abnormal: None  Repeat pap collected today   - STI screening offered including HIV testing: offered, pt declined  - Contraceptive counseling discussed  Current contraception: bilateral tubal ligation    - Breast Cancer Screening: Last Mammogram 04/07/2022, Repeat ordered  - The following were reviewed in today's visit: breast self exam, mammography screening ordered, menopause, exercise and healthy diet    Additional problems addressed during this visit:  1  Women's annual routine gynecological examination    2  Encounter for screening mammogram for malignant neoplasm of breast  -     Mammo screening bilateral w 3d & cad; Future    3  Menorrhagia with irregular cycle  -     Ambulatory Referral to Obstetrics / Gynecology    4  Screening for cervical cancer  -     IGP, Aptima HPV, Rfx 16/18,45        CC:  Annual Gynecologic Examination    HPI: Nery Valentine is a 36 y o  [id number] who presents for annual gynecologic examination  She is without complaint today  ROS: Negative except as noted in HPI    Patient's last menstrual period was 10/06/2022 (approximate)  Menstrual History  Period Cycle (Days): 35  Period Pattern: Regular  Menstrual Flow: Moderate  Menstrual Control: Maxi pad, Tampon  Menstrual Control Change Freq (Hours): 1-2  Dysmenorrhea: None    She  reports being sexually active and has had partner(s) who are male  She reports using the following method of birth control/protection: None    Sexual History  Sexual Assault: No  Sexually Transmitted Infection History: None  Multiple Sex Partners: No  Is Patient in a Monogamous Relationship?: Yes    The following portions of the patient's history were reviewed and updated as appropriate:   Past Medical History:   Diagnosis Date   • Anxiety    • 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[address] )      Past Surgical History:   Procedure Laterality Date   • APPENDECTOMY      extensive lysis of adhesions, MAYURI's procedure (divisions of MAYURI's bands) detorsion of vulvulus, widening of mesenteric pedicle, cecopeexy, appendectomy      • BACK SURGERY Right 2013    SF: L4-L5 hemilaminectomy for discectomy  Use of the microscope for microdissection, Use og 40mg of depo-medrol though thee exiting right L5 nerve root  Onset:13   •  SECTION      x2   • CHOLECYSTECTOMY     • FL INJECTION RIGHT SHOULDER (ARTHROGRAM)  2022   • GALLBLADDER SURGERY      [facility], Onset:    • LAPAROSCOPIC LYSIS INTESTINAL ADHESIONS      onset: 15   • LUMBAR DISC SURGERY     • [place] LAP,DIAGNOSTIC ABDOMEN N/A 2016    Procedure: LAPAROSCOPY DIAGNOSTIC, EXTENSIVE LYSIS OF ADHESIONS, MAYURI'S PROCEDURE(DIVISION OF MAYURI'S BANDS,DETORSION OF VOLVULUS, WIDENING OF MESENTERIC PEDICLE, CECOPEXY, APPENDECTOMY);   Surgeon: Jamie Wyatt MD;  Location: [facility] OR;  Service: General   • TUBAL LIGATION       Family History   Problem Relation Age of Onset   • Mental illness Mother         bipolar/anxiety   • Stroke Mother    • Heart disease Mother    • Depression Mother    • Bipolar disorder Mother    • Hypertension Father    • Stroke Father    • Seizures Brother         epilepsy   • Heart attack Other    • Scleroderma Other    • Stroke Other 79        stroke syndrome   • Heart attack Other    • Heart disease Family    • Substance Abuse Neg Hx    • Breast cancer Neg Hx    • Ovarian cancer Neg Hx    • Uterine cancer Neg Hx    • Colon cancer Neg Hx      Social History     Socioeconomic History   • Marital status: /Civil Union     Spouse name: None   • Number of children: None   • Years of education: 12   • Highest education level: None   Occupational History • Occupation: [employer]   Tobacco Use   • Smoking status: Former Smoker     Packs/day: 0 25     Years: 5 00     Pack years: 1 25     Types: Cigarettes     Quit date: 2009     Years since quittin 8   • Smokeless tobacco: Never Used   Vaping Use   • Vaping Use: Never used   Substance and Sexual Activity   • Alcohol use: Not Currently   • Drug use: No   • Sexual activity: Yes     Partners: Male     Birth control/protection: None   Other Topics Concern   • None   Social History Narrative    Participates in activities inside and outside of home    Lives with family    Supportive and safe    No advance directives     Social Determinants of Health     Financial Resource Strain: Not on file   Food Insecurity: Not on file   Transportation Needs: Not on file   Physical Activity: Not on file   Stress: Not on file   Social Connections: Not on file   Intimate Partner Violence: Not on file   Housing Stability: Not on file     Outpatient Medications Marked as Taking for the 22 encounter (Office Visit) with Sharath Zapata MD   Medication   • ALPRAZolam Merleen Severs) 0 5 mg tablet   • busPIRone (BUSPAR) 5 mg tablet   • DULoxetine (CYMBALTA) 30 mg delayed release capsule   • DULoxetine (CYMBALTA) 60 mg delayed release capsule   • multivitamin (THERAGRAN) TABS   • Probiotic Product (PROBIOTIC-10 PO)     Allergies   Allergen Reactions   • Biaxin [Clarithromycin] GI Intolerance and Other (See Comments)   • Sulfa Antibiotics    • Sulfasalazine    • Venlafaxine GI Intolerance, Vomiting and Other (See Comments)     Category: Allergy; Category: Allergy;            Objective:  /66 (BP Location: Left arm, Patient Position: Sitting, Cuff Size: Standard)   Ht 5' 4" (1 626 m)   Wt 64 kg (141 lb)   LMP 10/06/2022 (Approximate)   Breastfeeding No   BMI 24 20 kg/m²        Chaperone present? Yes: Noris Lorenzo MA  General Appearance: alert and oriented, in no acute distress     Neck/Thyroid: No thyromegaly, no thyroid nodules  Respiratory: Unlabored breathing  Cardiovascular: Regular rate, no peripheral edema  Abdomen: Soft, non-tender, non-distended, no masses, no rebound or guarding  Breast Exam: No dimpling, nipple retraction or discharge  No lumps or masses  No axillary or supraclavicular nodes  Pelvic:       External genitalia: Normal appearance, no abnormal pigmentation, no lesions or masses  Normal Bartholin's and [place]'s  Urinary system: Urethral meatus normal, bladder non-tender  Vaginal: normal mucosa without prolapse or lesions  Normal-appearing physiologic discharge  Cervix: Normal-appearing, well-epithelialized, no gross lesions or masses  No cervical motion tenderness  Adnexa: No adnexal masses or tenderness noted  Uterus: Normal-sized, regular contour, midline, mobile, no uterine tenderness  Extremities: Normal range of motion  Warm, well-perfused, non-tender     Skin: normal, no rash or abnormalities  Neurologic: alert, oriented x3  Psychiatric: Appropriate affect, mood stable, cooperative with exam         Devon Christina  11/7/2022 9:59 AM

## 2022-11-07 NOTE — PSYCH
Psychotherapy Provided: Individual Psychotherapy 30 minutes  Length of time in session: 30 minutes  Current suicide risk : Low   Behavioral Health Treatment Plan [facility]: Diagnosis and Treatment Plan explained to Mekhi El relates understanding diagnosis and is agreeable to Treatment Plan  Yes  Visit start and stop times:    11/07/22  Start Time: 1445  Stop Time: 1515  Total Visit Time: 30 minutes    Virtual Regular Visit    Verification of patient location: [place], [place]  Patient is located in the following state in which I hold an active license [place]      Assessment/Plan:    Problem List Items Addressed This Visit        Other    Depression, major, recurrent ([facility] [address] )    Severe anxiety with panic - Primary        Goals addressed in session: Goal 1 Follow up psychotherapy session  Reason for visit is   Chief Complaint   Patient presents with   • Virtual Regular Visit      Encounter provider Chantel Pineda    Provider located at [address] [address] [phone]      Recent Visits  No visits were found meeting these conditions  Showing recent visits within past 7 days and meeting all other requirements  Today's Visits  Date Type Provider Dept   11/07/22 Telemedicine [facility]el [facility]ineda [facility] [place] [facility]   Showing today's visits and meeting all other requirements  Future Appointments  No visits were found meeting these conditions  Showing future appointments within next 150 days and meeting all other requirements     The patient was identified by name and date of birth  Althomas Ross was informed that this is a telemedicine visit and that the visit is being conducted through the [address] [place] [address] Now platform  She agrees to proceed  My office door was closed  No one else was in the room    She acknowledged consent and understanding of privacy and security of the video platform  The patient has agreed to participate and understands they can discontinue the visit at any time  Patient is aware this is a billable service  Subjective  Rosetta Najera is a 36 y o  female  HPI     Past Medical History:   Diagnosis Date   • Anxiety    • 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[address] )        Past Surgical History:   Procedure Laterality Date   • APPENDECTOMY      extensive lysis of adhesions, MAYURI's procedure (divisions of MAYURI's bands) detorsion of vulvulus, widening of mesenteric pedicle, cecopeexy, appendectomy      • BACK SURGERY Right 2013    SF: L4-L5 hemilaminectomy for discectomy  Use of the microscope for microdissection, Use og 40mg of depo-medrol though thee exiting right L5 nerve root  Onset:13   •  SECTION      x2   • CHOLECYSTECTOMY     • FL INJECTION RIGHT SHOULDER (ARTHROGRAM)  2022   • GALLBLADDER SURGERY      [facility], Onset:    • LAPAROSCOPIC LYSIS INTESTINAL ADHESIONS      onset: 15   • LUMBAR DISC SURGERY     • CA LAP,DIAGNOSTIC ABDOMEN N/A 2016    Procedure: LAPAROSCOPY DIAGNOSTIC, EXTENSIVE LYSIS OF ADHESIONS, MAYURI'S PROCEDURE(DIVISION OF MAYURI'S BANDS,DETORSION OF VOLVULUS, WIDENING OF MESENTERIC PEDICLE, CECOPEXY, APPENDECTOMY);   Surgeon: Cynthia Hickman MD;  Location: [facility] OR;  Service: General   • TUBAL LIGATION         Current Outpatient Medications   Medication Sig Dispense Refill   • ALPRAZolam (XANAX) 0 5 mg tablet Take one tablet q12 h prn anxiety 21 tablet 0   • busPIRone (BUSPAR) 5 mg tablet take 1 tablet by mouth three times a day 90 tablet 5   • DULoxetine (CYMBALTA) 30 mg delayed release capsule Take 1 capsule (30 mg total) by mouth daily 30 capsule 11   • DULoxetine (CYMBALTA) 60 mg delayed release capsule TAKE 1 CAPSULE BY MOUTH ONCE DAILY WITH 30MG CAPSULE 30 capsule 5   • multivitamin (THERAGRAN) TABS Take 1 tablet by mouth daily     • Probiotic Product (PROBIOTIC-10 PO) Take by mouth 1 daily       No current facility-administered medications for this visit  Allergies   Allergen Reactions   • Biaxin [Clarithromycin] GI Intolerance and Other (See Comments)   • Sulfa Antibiotics    • Sulfasalazine    • Venlafaxine GI Intolerance, Vomiting and Other (See Comments)     Category: Allergy; Category: Allergy; Review of Systems    Video Exam    There were no vitals filed for this visit  Physical Exam     (D) Corinne attended her follow up psychotherapy session today  Corinne reported that since her last session, she has noticed fluctuating symptoms, reporting a slight decrease in them since her last session  Corinne reported that she struggled with anticipatory anxiety in relation to her daughter having surgery last Thursday at [address]  Corinne reported that she struggled with worrying, reporting some intrusive, ruminating, and repetitive thoughts  Corinne described the surgery as going well, and reports that she is recovering from surgery now  Corinne and this writer processed this at length  Corinne reported that 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, as a small business owner  Corinne discussed running into to unplanned financial stressors, reporting that there was some disagreements between her and the owner of the building, and having differing opinions  Corinne reported that she is not allowing herself to be excited about this, describing herself as going through the motions  Corinne describes herself as struggling to allow herself to be vulnerable, fearing that things will go wrong, she doesn't want to be disappointed, struggling to give herself permission to fully feel proud of herself, identifying anticipatory anxiety 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  Discussed and processed this   Corinne described anticipatory anxiety in relation to stressors surrounding finances, with her  possibly not coaching, and them using this money to maintain their beach camper, and describing stressors in relation to the holidays coming up, and processed through this  Corinne reported that in addition to this, she had an OBGYN New Patient appointment today  Corinne reported that the appointment went well, reporting that she had a male provider, reporting that she hasn't had a male OBGYN since her children  Corinne described having anticipatory anxiety in relation to having a male provider, and reported that she did feel comfortable and was able to share with him  Corinne reported that her ultrasounds and her labwork came back normal, and reported feeling better in relation to this  Corinne and this writer processed this at length  Provided ongoing psychoeducation  Modeled effective forms of communication  Discussed ongoing skills  Reviewed limits and boundaries  (A) Corinne describes a slight decrease in her symptoms since her last session  Corinne describes feeling nervous, anxious, and on edge, reporting some symptoms of irritability, poor frustration tolerance, and becoming easily annoyed  Corinne's mood presented as anxious, and irritable, and her affect appeared to be congruent  Corinne denies any evident or immediate risk factors for self-harm, SI, or HI  Corinne denies any symptoms of frank  Corinne's speech presented at a normal rate, volume, and rhythm  Corinne presented as alert and oriented x3  Corinne presented with good eye contact  Corinne denies any symptoms of frank  Corinne denies any evident or immediate risk factors for self-harm, SI, or HI  (P) Corinne plans to work on self-reflecting, journaling to process outside of session, upon her thoughts, feelings, and emotions in relation to progress from starting her career, to where she is out now, and where she anticipates going   Corinne plans to work on honoring her feelings and emotions, validating them, and making space for them, while continuing to identify, associate, and express her feelings with healthy and effective forms of communication, to ask for what she needs, and target interpersonal relationship stressors  Corinne plans to target cognitive distortions, negative thinking traps, and harmful core beliefs with DBT and CBT skills, while decreasing judgement towards herself  Corinne plans to utilize the concept of radical compassion and extend bogdan towards herself, implementing grounding statements, positive self-talk, and positive affirmations  Corinne plans to target ongoing symptoms with continued skill use  Corinne plans to work on giving herself permission to actively be present in the moment, honoring her inner child, meeting her needs, and actively challenging co-dependency by breaking the cycle, and implementing healthy patterns, and behaviors  Corinne plans to continue to reinforce limits and boundaries for herself, prioritizing her needs, engaging in the use of self-care, and taking time for herself  Corinne plans to utilize both opposite action skills, and radical acceptance, outweighing risks verses benefits in order to make informed decisions, while aligning choices and decisions with what is in the best interest of her  Corinne plans to reach out for additional support as needed       11/07/22  Start Time: 0134  Stop Time: 7007  Total Visit Time: 30 minutes

## 2022-11-15 LAB
CYTOLOGIST CVX/VAG CYTO: NORMAL
DX ICD CODE: NORMAL
HPV GENOTYPE REFLEX: NORMAL
HPV I/H RISK 4 DNA CVX QL PROBE+SIG AMP: NEGATIVE
OTHER STN SPEC: NORMAL
PATH REPORT.FINAL DX SPEC: NORMAL
SL AMB NOTE:: NORMAL
SL AMB SPECIMEN ADEQUACY: NORMAL
SL AMB TEST METHODOLOGY: NORMAL

## 2022-12-05 ENCOUNTER — TELEMEDICINE (OUTPATIENT)
Dept: BEHAVIORAL/MENTAL HEALTH CLINIC | Facility: CLINIC | Age: 42
End: 2022-12-05

## 2022-12-05 DIAGNOSIS — F33.0 MILD EPISODE OF RECURRENT MAJOR DEPRESSIVE DISORDER (HCC): Primary | ICD-10-CM

## 2022-12-05 DIAGNOSIS — F41.0 SEVERE ANXIETY WITH PANIC: ICD-10-CM

## 2022-12-05 NOTE — PSYCH
Psychotherapy Provided: Individual Psychotherapy 50 minutes  Length of time in session: 50 minutes  Current suicide risk : Low   Behavioral Health Treatment Plan [facility]: Diagnosis and Treatment Plan explained to Campbell Azul relates understanding diagnosis and is agreeable to Treatment Plan  Yes  Visit start and stop times:    12/05/22  Start Time: 1045  Stop Time: 1135  Total Visit Time: 50 minutes  Virtual Regular Visit    Verification of patient location: [place], [place]  Patient is located in the following [facility] in which I hold an active license [place]  Assessment/Plan:    Problem List Items Addressed This Visit        Other    Depression, major, recurrent ([address] ) - Primary    Severe anxiety with panic     Goals addressed in session: Goal 1 Follow up psychotherapy session  Reason for visit is   Chief Complaint   Patient presents with   • Virtual Regular Visit      Encounter provider Saad Arias    Provider located at [address] [id number] [phone]    Recent Visits  No visits were found meeting these conditions  Showing recent visits within past 7 days and meeting all other requirements  Today's Visits  Date Type Provider Dept   12/05/22 Telemedicine [facility]ad [facility]as [facility] [place] [facility]   Showing today's visits and meeting all other requirements  Future Appointments  No visits were found meeting these conditions  Showing future appointments within next 150 days and meeting all other requirements     The patient was identified by name and date of birth  Michelle Marks was informed that this is a telemedicine visit and that the visit is being conducted through the [address] Now platform  She agrees to proceed  My office door was closed  No one else was in the room    She acknowledged consent and understanding of privacy and security of the video platform  The patient has agreed to participate and understands they can discontinue the visit at any time  Patient is aware this is a billable service  Subjective  Tamela Charter is a 36 y o  female  HPI     Past Medical History:   Diagnosis Date   • Anxiety    • 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[address] )        Past Surgical History:   Procedure Laterality Date   • APPENDECTOMY      extensive lysis of adhesions, MAYURI's procedure (divisions of MAYURI's bands) detorsion of vulvulus, widening of mesenteric pedicle, cecopeexy, appendectomy      • BACK SURGERY Right 2013    SF: L4-L5 hemilaminectomy for discectomy  Use of the microscope for microdissection, Use og 40mg of depo-medrol though thee exiting right L5 nerve root  Onset:13   •  SECTION      x2   • CHOLECYSTECTOMY     • FL INJECTION RIGHT SHOULDER (ARTHROGRAM)  2022   • GALLBLADDER SURGERY      [facility], Onset:    • LAPAROSCOPIC LYSIS INTESTINAL ADHESIONS      onset: 15   • LUMBAR DISC SURGERY     • [place] LAP,DIAGNOSTIC ABDOMEN N/A 2016    Procedure: LAPAROSCOPY DIAGNOSTIC, EXTENSIVE LYSIS OF ADHESIONS, MAYURI'S PROCEDURE(DIVISION OF MAYURI'S BANDS,DETORSION OF VOLVULUS, WIDENING OF MESENTERIC PEDICLE, CECOPEXY, APPENDECTOMY);   Surgeon: Maureen Boyd MD;  Location:  MAIN OR;  Service: General   • TUBAL LIGATION         Current Outpatient Medications   Medication Sig Dispense Refill   • ALPRAZolam (XANAX) 0 5 mg tablet Take one tablet q12 h prn anxiety 21 tablet 0   • busPIRone (BUSPAR) 5 mg tablet take 1 tablet by mouth three times a day 90 tablet 5   • DULoxetine (CYMBALTA) 30 mg delayed release capsule Take 1 capsule (30 mg total) by mouth daily 30 capsule 11   • DULoxetine (CYMBALTA) 60 mg delayed release capsule TAKE 1 CAPSULE BY MOUTH ONCE DAILY WITH 30MG CAPSULE 30 capsule 5   • multivitamin (THERAGRAN) TABS Take 1 tablet by mouth daily     • Probiotic Product (PROBIOTIC-10 PO) Take by mouth 1 daily       No current facility-administered medications for this visit  Allergies   Allergen Reactions   • Biaxin [Clarithromycin] GI Intolerance and Other (See Comments)   • Sulfa Antibiotics    • Sulfasalazine    • Venlafaxine GI Intolerance, Vomiting and Other (See Comments)     Category: Allergy; Category: Allergy; Review of Systems    Video Exam    There were no vitals filed for this visit  Physical Exam     (D) Corinne attended her follow up psychotherapy session today  Corinne reported that since her last session, she has struggled with fluctuating symptoms  Corinne reported that 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, 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, and doing all of the work for it  Corinne describes people who are supporting her, helping her, and describes this as uncomfortable for her, feeling like she will owe them something, describing shame, and guilt  Corinne discussed stressors related to finances in relation to all of this, describing anticipatory anxiety, along with intrusive, ruminating, and repetitive thoughts  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, comparing where she was when she first started out until now, describing insecurities, vulnerabilities, and beliefs that she was unable to be successful in being a , and a small business owner  Corinne and this writer processed this at length  Discussed ongoing skills  Reviewed limits and boundaries  Modeled effective forms of communication  Provided ongoing psychoeducation  (A) Corinne describes a slight reduction in the intensity and frequency of her symptoms, cycles, and stressors  Corinne describes symptoms of trouble relaxing, reporting symptoms of feeling nervous, anxious, and on edge  Corinne describes symptoms of irritability, poor frustration tolerance, and becoming easily annoyed   Corinne describes feeling tired and having little energy  Corinne's mood presented as anxious, and her affect appeared to be congruent  Corinne's speech presented at a normal rate, volume, and rhythm  Corinne presented as alert and oriented x3  Corinne presented with good eye contact  Corinne denies any evident or immediate risk factors for self-harm, SI, or HI  Corinne denies any symptoms of frank  (P) Corinne plans to sit down and write herself a letter, journaling and processing through feelings, and emotions, writing a letter to the 34year old version of herself, starting as a new business owner, and writing out what she needed to hear then, and knowing what she knows now  Corinne plans to actively identify messages that she received throughout her lifetime, resulting in conditioned core beliefs, and actively decipher between facts and feelings, and using DBT Wise Mind and CBT Negative Thoughts Assessment Questions to actively challenge harmful core beliefs, negative thinking traps, and cognitive distortions  Corinne plans to implement self-affirming statements, positive self-talk, grounding statements and positive affirmations  Corinne plans to continue to reinforce limits and boundaries for herself, breaking the cycle of co-dependency, and implementing healthy patterns, and behaviors  Corinne plans to implement the use of radical concept of radical acceptance, outweighing risks verses benefits in order to make informed decisions, aligning choices and decisions with what is in the best interest of her  Corinne plans to utilize deep breathing techniques, and mindfulness/ meditations  Corinne plans to continue to ask for what she needs, assert herself, and advocate for herself, and utilize healthy and effective forms of communication to address interpersonal relationship stressors  Corinne plans to utilize opposite action skills   Corinne plans to work on honoring her feelings, and emotions, identifying, associating, and validating her emotions, actively working on decreasing judgement towards herself  Corinne plans to reach out for additional support as needed       12/05/22  Start Time: 1045  Stop Time: 1135  Total Visit Time: 50 minutes

## 2022-12-15 DIAGNOSIS — F41.9 ANXIETY: ICD-10-CM

## 2022-12-15 RX ORDER — DULOXETIN HYDROCHLORIDE 60 MG/1
CAPSULE, DELAYED RELEASE ORAL
Qty: 30 CAPSULE | Refills: 5 | Status: SHIPPED | OUTPATIENT
Start: 2022-12-15

## 2022-12-15 RX ORDER — BUSPIRONE HYDROCHLORIDE 5 MG/1
TABLET ORAL
Qty: 90 TABLET | Refills: 5 | Status: SHIPPED | OUTPATIENT
Start: 2022-12-15

## 2022-12-20 ENCOUNTER — TELEMEDICINE (OUTPATIENT)
Dept: BEHAVIORAL/MENTAL HEALTH CLINIC | Facility: CLINIC | Age: 42
End: 2022-12-20

## 2022-12-20 DIAGNOSIS — F41.0 SEVERE ANXIETY WITH PANIC: ICD-10-CM

## 2022-12-20 DIAGNOSIS — F33.0 MILD EPISODE OF RECURRENT MAJOR DEPRESSIVE DISORDER (HCC): Primary | ICD-10-CM

## 2022-12-20 NOTE — PSYCH
Psychotherapy Provided: Individual Psychotherapy 70 minutes  Length of time in session: 70 minutes  Current suicide risk : Low   Behavioral Health Treatment Plan [facility]: Diagnosis and Treatment Plan explained to Marsh Baumgarten relates understanding diagnosis and is agreeable to Treatment Plan  Yes  Visit start and stop times:    12/20/22  Start Time: 1200  Stop Time: 1310  Total Visit Time: 70 minutes  Virtual Regular Visit    Verification of patient location: [place], [place]  Patient is located in the following state in which I hold an active license [place]  Assessment/Plan:    Problem List Items Addressed This Visit        Other    Depression, major, recurrent ([facility] [address] ) - Primary    Severe anxiety with panic     Goals addressed in session: Goal 1 Follow up psychotherapy session  Reason for visit is   Chief Complaint   Patient presents with   • Virtual Regular Visit      Encounter provider Sammi Diego    Provider located at [address] [address] [phone]    Recent Visits  No visits were found meeting these conditions  Showing recent visits within past 7 days and meeting all other requirements  Today's Visits  Date Type Provider Dept   12/20/22 Telemedicine [facility] [place] [facility]   Showing today's visits and meeting all other requirements  Future Appointments  No visits were found meeting these conditions  Showing future appointments within next 150 days and meeting all other requirements     The patient was identified by name and date of birth  Joel Mar was informed that this is a telemedicine visit and that the visit is being conducted through the [address] [place] [address] Now platform  She agrees to proceed  My office door was closed  No one else was in the room    She acknowledged consent and understanding of privacy and security of the video platform  The patient has agreed to participate and understands they can discontinue the visit at any time  Patient is aware this is a billable service  Subjective  Shaji Jha is a 36 y o  female  HPI     Past Medical History:   Diagnosis Date   • Anxiety    • 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[address] )        Past Surgical History:   Procedure Laterality Date   • APPENDECTOMY      extensive lysis of adhesions, MAYURI's procedure (divisions of MAYURI's bands) detorsion of vulvulus, widening of mesenteric pedicle, cecopeexy, appendectomy      • BACK SURGERY Right 2013    SF: L4-L5 hemilaminectomy for discectomy  Use of the microscope for microdissection, Use og 40mg of depo-medrol though thee exiting right L5 nerve root  Onset:13   •  SECTION      x2   • CHOLECYSTECTOMY     • FL INJECTION RIGHT SHOULDER (ARTHROGRAM)  2022   • GALLBLADDER SURGERY      [facility], Onset:    • LAPAROSCOPIC LYSIS INTESTINAL ADHESIONS      onset: 15   • LUMBAR DISC SURGERY     • [place] LAP,DIAGNOSTIC ABDOMEN N/A 2016    Procedure: LAPAROSCOPY DIAGNOSTIC, EXTENSIVE LYSIS OF ADHESIONS, MAYURI'S PROCEDURE(DIVISION OF MAYURI'S BANDS,DETORSION OF VOLVULUS, WIDENING OF MESENTERIC PEDICLE, CECOPEXY, APPENDECTOMY);   Surgeon: Adebayo Lala MD;  Location:  MAIN OR;  Service: General   • TUBAL LIGATION         Current Outpatient Medications   Medication Sig Dispense Refill   • ALPRAZolam (XANAX) 0 5 mg tablet Take one tablet q12 h prn anxiety 21 tablet 0   • busPIRone (BUSPAR) 5 mg tablet take 1 tablet by mouth three times a day 90 tablet 5   • DULoxetine (CYMBALTA) 30 mg delayed release capsule Take 1 capsule (30 mg total) by mouth daily 30 capsule 11   • DULoxetine (CYMBALTA) 60 mg delayed release capsule TAKE 1 CAPSULE BY MOUTH ONCE DAILY WITH 30MG CAPSULE 30 capsule 5   • multivitamin (THERAGRAN) TABS Take 1 tablet by mouth daily     • Probiotic Product (PROBIOTIC-10 PO) Take by mouth 1 daily       No current facility-administered medications for this visit  Allergies   Allergen Reactions   • Biaxin [Clarithromycin] GI Intolerance and Other (See Comments)   • Sulfa Antibiotics    • Sulfasalazine    • Venlafaxine GI Intolerance, Vomiting and Other (See Comments)     Category: Allergy; Category: Allergy; Review of Systems    Video Exam    There were no vitals filed for this visit  Physical Exam     (D) Corinne reached out to this writer, and requested an earlier follow up appointment, which this writer was able to accommodate  Corinne reported that since her last session, she has struggled with increased and elevated symptoms  Corinne reported that she was triggered last week, by world related news, with celebratory Twitch from the [employer] show, who  by suicide  Corinne reported that when she found out about this through social media, Corinne reported that she obsessively read through multiple articles, watched videos, and focused on this  Corinne reported that she felt shocked, and overwhelmed by this  Corinne reported that she found herself relating to Twitch, identifying that from the outside it could be perceived as everything is, "fine," when in reality people who have everything, can still struggle with mental health symptoms  Corinne reported that she had intrusive thoughts of death, dying, and suicide, reporting that she doesn't have any plans, intentions, and desire to follow through with this  Corinne was able to identify reasons to live, and motivating factors  Corinne describes feeling triggered with the holidays, reporting that this time of year for her is overwhelming, describing herself as feeling overwhelmed with grief with missing her mother- reporting memories and flashbacks when triggered by certain [place] music  Corinne and this writer processed this   Corinne discussed stressors with her son, struggling with ADHD, often finding herself worrying that she somehow impacted and/or caused this as a result of her parenting  Corinne describes feeling stressed out and overwhelmed with raising her children, desiring to have healthy relationships with both her son and daughter  Corinne and this writer processed this at length  Discussed ongoing skills  Reviewed limits and boundaries  Modeled effective forms of communication  Provided ongoing psychoeducation  (A) Corinne describes having an increase in symptoms since her last session  Corinne describes worrying too much about different things, feeling nervous, anxious, and on edge, and fearing as if something awful might happen  Corinne describes symptoms of irritability, poor frustration tolerance, and becoming easily annoyed  Corinne describes feeling bad about herself, feeling like she is letting herself, and her family down  Corinne describes difficulty focusing, having a hard time concentrating on things, and becoming easily distracted  Corinne describes feeling tired and having little energy, reporting little interest and pleasure in doing things, reporting lower moods of sadness, and depression  Corinne's mood presented as depressed and anxious, and her affect appeared to be tearful  Corinne presented with good eye contact  Corinne presented as alert and oriented x3  Corinne's speech presented at a normal rate, volume, and rhythm  Corinne denies any symptoms of frank  Corinne describes having some obsessive, intrusive, ruminating, and repetitive thoughts  Corinne describes symptoms of grief around the holidays  Corinne describes having passive thoughts of death, dying, and suicide  Lewis Cooler denies any current, evident, or immediate risk factors for self-harm, SI, or HI  Corinne was able to verbally contract for safety, confirmed having the ability to reach out for support as needed, and denies any plan or intent to follow through with intrusive thoughts   Corinne reported that her  does legally own guns, reporting that they are locked and secured in the house  Destinee Lacey denies having full access to them, and denies that she would want to use them on herself, and also identified feeling uncomfortable with them being in the house with her feeling this way right now  Corinne provided this writer verbal permission to contact her , and provide basic information in relation to safety planning with guns  Destinee Lacey declined to participate in this call with her  at this time; however, verbalized that she text him to give him a heads up with this writer calling him  Corinne agreed to then come to the office prior to her next session and sign a SIDNEY for her , and consider a joint session in relation to this  *At 12:53pm- 1:10pm this writer reached out to Corinne's  Gillermina Lefort with Corinne's permission  This writer reviewed information with Gillermina Lefort, specifically focusing on the guns in the house, safety planning, risk factors, and symptom presentation  Gillermina Lefort communicated that he plans to relocate the guns, hide them, and/or remove them from the home so she doesn't have access to them at this time  Reviewed emergency line, crisis line, hotlines, in the event of actual acute emergency  These were reviewed with Corinne in session as well  Gillermina Lefort communicated a plan to follow up with Corinne, after her got off the phone with this writer  (P) This writer recommended that Corinne take the day off from work, and speak with her  about how she is feeling, reaching out to natural supports  Corinne plans to utilize PRN medication as needed  Corinne plans to reach out to her PCP in relation to her psychiatry medication, and plans to consider exploring psychiatry services, declining a referral at this time  Corinne declines a referral for higher levels of care with psychiatric medication management, intensive outpatient services, partial hospital programming, and declines the need for acute inpatient behavioral health treatment  Corinne plans to work on actively paying attention to her body, identifying, and associating warning signs and triggers throughout her body, and then giving herself permission to pause in the moment, and prioritize her needs  Corinne plans to then implement deep breathing techniques, mindfulness/ meditation, grounding techniques, sensory related skills, distress tolerance skills, distraction techniques, and coping skills to target fluctuating symptoms  Corinne plans to work on then identifying the negative thought that is occurring in the moment, and then connecting the thoughts and comparing them to the CBT Negative Thinking Traps, and Cognitive Distortions, to then connect them back to the harmful core beliefs  Corinne plans to work on actively challenge the negative thoughts, cognitive distortions, and harmful core beliefs, with DBT Wise Mind and CBT Negative Thoughts Assessment Questions  Corinne plans to prioritizing spending time journaling and actively writing out, inventorying motivating factors to live, and monitoring and tracking gratitude  Corinne plans to utilize grounding statements, positive affirmations, positive self-talk, and self-affirming statements  Corinne plans to work on decreasing judgement towards herself, and actively implement self-compassion, and bogdan, identifying worth within herself, and giving herself credit  Corinne plans to work on prioritizing her needs, over the needs of others, breaking the cycle of co-dependency, and implementing healthy patterns, and behaviors  Corinne plans to assert herself, advocate for herself, ask for what she needs, and target interpersonal relationship stressors with healthy and effective forms of communication  Corinne plans to utilize opposite action skills, structuring her schedule with balance, routine, and consistency   Corinne plans to outweigh risks verses benefits in order to make informed decisions, aligning choices and decisions with what is in the best interest of her, and implementing the concept of radical acceptance  Corinne plans to go through the stages of grief, and give herself permission to feel what she is feeling in the moment, without denying it, minimizing it, judging it, or avoiding it  Corinne plans to lean into healthy natural supports, take time for herself, work on being present in the moment, and engage in the use of self-care  Corinne plans to work on setting limits for herself, specifically with boundaries on time spend reading, listening, and watching world related news, and utilizing opposite action skills when it comes to listening to [place] songs  Corinne plans to reach out for additional support as needed       12/20/22  Start Time: 1200  Stop Time: 1310  Total Visit Time: 70 minutes

## 2023-01-09 ENCOUNTER — TELEMEDICINE (OUTPATIENT)
Dept: BEHAVIORAL/MENTAL HEALTH CLINIC | Facility: CLINIC | Age: 43
End: 2023-01-09

## 2023-01-09 DIAGNOSIS — F41.0 SEVERE ANXIETY WITH PANIC: ICD-10-CM

## 2023-01-09 DIAGNOSIS — F33.2 SEVERE EPISODE OF RECURRENT MAJOR DEPRESSIVE DISORDER, WITHOUT PSYCHOTIC FEATURES (HCC): Primary | ICD-10-CM

## 2023-01-09 NOTE — PSYCH
Psychotherapy Provided: Individual Psychotherapy 45 minutes  Length of time in session: 45 minutes  Current suicide risk : Low   Behavioral Health Treatment Plan [facility]: Diagnosis and Treatment Plan explained to Ab Reddy relates understanding diagnosis and is agreeable to Treatment Plan  Yes  Visit start and stop times:    01/09/23     Virtual Regular Visit    Verification of patient location: [place]  Patient is located in the following state in which I hold an active license [place]      Assessment/Plan:    Problem List Items Addressed This Visit        Other    Severe anxiety with panic    Severe episode of recurrent major depressive disorder, without psychotic features ([facility] [address] ) - Primary     Goals addressed in session: Goal 1 Follow up psychotherapy session  Reason for visit is   Chief Complaint   Patient presents with   • Virtual Regular Visit      Encounter provider Leroy Jones    Provider located at [address] [address] [phone]    Recent Visits  No visits were found meeting these conditions  Showing recent visits within past 7 days and meeting all other requirements  Today's Visits  Date Type Provider Dept   01/09/23 Telemedicine Leroy Jones  [facility]   Showing today's visits and meeting all other requirements  Future Appointments  No visits were found meeting these conditions  Showing future appointments within next 150 days and meeting all other requirements     The patient was identified by name and date of birth  Kaminimaxine Burdick was informed that this is a telemedicine visit and that the visit is being conducted through the [facility] Now platform  She agrees to proceed  My office door was closed  No one else was in the room    She acknowledged consent and understanding of privacy and security of the video platform  The patient has agreed to participate and understands they can discontinue the visit at any time  Patient is aware this is a billable service  Subjective  Harshil Medeiros is a 36 y o  female  HPI     Past Medical History:   Diagnosis Date   • Anxiety    • 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[address] )        Past Surgical History:   Procedure Laterality Date   • APPENDECTOMY      extensive lysis of adhesions, MAYURI's procedure (divisions of MAYURI's bands) detorsion of vulvulus, widening of mesenteric pedicle, cecopeexy, appendectomy      • BACK SURGERY Right 2013    SF: L4-L5 hemilaminectomy for discectomy  Use of the microscope for microdissection, Use og 40mg of depo-medrol though thee exiting right L5 nerve root  Onset:13   •  SECTION      x2   • CHOLECYSTECTOMY     • FL INJECTION RIGHT SHOULDER (ARTHROGRAM)  2022   • GALLBLADDER SURGERY      [facility], Onset:    • LAPAROSCOPIC LYSIS INTESTINAL ADHESIONS      onset: 15   • LUMBAR DISC SURGERY     • [place] LAP,DIAGNOSTIC ABDOMEN N/A 2016    Procedure: LAPAROSCOPY DIAGNOSTIC, EXTENSIVE LYSIS OF ADHESIONS, MAYURI'S PROCEDURE(DIVISION OF MAYURI'S BANDS,DETORSION OF VOLVULUS, WIDENING OF MESENTERIC PEDICLE, CECOPEXY, APPENDECTOMY);   Surgeon: Debra López MD;  Location: [facility];  Service: General   • TUBAL LIGATION         Current Outpatient Medications   Medication Sig Dispense Refill   • ALPRAZolam (XANAX) 0 5 mg tablet Take one tablet q12 h prn anxiety 21 tablet 0   • busPIRone (BUSPAR) 5 mg tablet take 1 tablet by mouth three times a day 90 tablet 5   • DULoxetine (CYMBALTA) 30 mg delayed release capsule Take 1 capsule (30 mg total) by mouth daily 30 capsule 11   • DULoxetine (CYMBALTA) 60 mg delayed release capsule TAKE 1 CAPSULE BY MOUTH ONCE DAILY WITH 30MG CAPSULE 30 capsule 5   • multivitamin (THERAGRAN) TABS Take 1 tablet by mouth daily     • Probiotic Product (PROBIOTIC-10 PO) Take by mouth 1 daily       No current facility-administered medications for this visit  Allergies   Allergen Reactions   • Biaxin [Clarithromycin] GI Intolerance and Other (See Comments)   • Sulfa Antibiotics    • Sulfasalazine    • Venlafaxine GI Intolerance, Vomiting and Other (See Comments)     Category: Allergy; Category: Allergy; Review of Systems    Video Exam    There were no vitals filed for this visit  Physical Exam     (D) Corinne attended her follow up psychotherapy session today  Corinne reported that since her last session, she has noticed fluctuating symptoms, that have decreased  Corinne reported that after her last session, she went to work, and then reported that her  called her  Corinne reported that her and her  had a conversation about her mental health symptoms, and described herself as struggling with anxiety surrounding this, yet describes herself as feeling supported by her   Corinne reported that this allowed her to have more of an open conversation surrounding her symptoms, and some of the barriers she experiences with always being open and honest with her feelings, and emotions  Corinne reported that she is working on asking for what she needs from her , and struggling with co-dependency characteristics, describing past relationship trauma  Discussed and processed this  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, 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, and discussed various stressors in relation to getting things in order for this  Corinne discussed stress between her and her  with navigating this, and processed through this  Corinne reported that the day after Christmas, her dog started acting bizarre, reporting that out of no where her dog couldn't hold himself up, was walking differently, and started breathing differently   Corinne reported that from when the dogs symptoms started, she called the vet, and left a message with the answering service, reporting that she received a call back within (3) minutes from the vet, who asked them to bring the dog into the vet, and within 15 minutes her dog collapsed in her arms, and passed away before they could even get him to the vet  Corinne reported that she called the vet back and told the vet that the dog passed away  Corinne reported that they ended up taking the dog into the vet, who believes that the dog  of something cardiac related  Corinne reported that her dog was only (1 years old  Shelton Clamp reported that she ended up getting an autopsy done, reporting that she was having ruminating, obsessive, and intrusive thoughts, worrying that the dog  from the medication she was giving him, or the bone she got him for Alex, or the dog getting into something her father may have left out, fearing that she did something to contribute to this  Corinne reported that the autopsy confirmed that it was cardiac related  Discussed and processed this  During session today, discussed and recommended psychiatry services, reporting that she reached out to her PCP, and discussed options surrounding this  Corinne and this writer processed this at length  Discussed ongoing skills  Reviewed limits and boundaries  Provided ongoing psychoeducation  Modeled effective forms of communication  (A) Corinne presented as alert and oriented x3  Corinne's speech presented at a normal rate, volume, and rhythm  Corinne presented with good eye contact  Corinne denies any symptoms of frank  Corinne denies any evident or immediate risk factors for self-harm, SI, or HI  Corinne's mood presented as depressed and anxious and her affect appeared to be tearful at times  Corinne describes symptoms of worrying too much about different things, feeling nervous, anxious, and on edge, being fidgety and restless that it's hard to sit still, and symptoms of irritability, poor frustration tolerance, and becoming easily annoyed   Corinne describes feeling tired and having little energy, reporting little interest and pleasure in doing things, reporting lower moods of sadness, and depression  Corinne describes symptoms of obsessive, intrusive, ruminating, and repetitive thoughts, describing symptoms of grief in relation to the loss of her dog, and grief surrounding the holidays with her family not being around  Corinne reported that when she starts to feel grief, she stops herself and avoids this, describing herself as not wanting to feel this  (P) Corinne described herself as being open to psychiatry services, and allowed for this writer to complete a referral for a psychiatric evaluation through [facility]  Corinne plans to actively give herself permission to feel what she is feeling in the moment, allowing herself to identify, associate, honor, make space for, and validate her feelings, and emotions, identifying, and associating them  Corinne plans to go through the stages of grief, giving herself permission to be present with her feelings, and increase self-awareness in relation to warning signs and triggers in her body  Corinne plans to work on pausing in the moment, and actively implementing grounding techniques, sensory related skills, distraction techniques, distress tolerance skills, coping skills, mindfulness/ meditation techniques, and deep breathing techniques to target elevated symptoms  Corinne plans to prioritize her mental health and physical health needs, lean into healthy natural supports, engage in the use of self-care, and take time for herself  Corinne plans to break the cycle of co-dependency, reinforcing limits and boundaries, and implementing healthy patterns, and behaviors  Corinne plans to work on outweighing risks verses benefits in order to make informed decisions, and align choices and decisions with what is in the best interest of her   Corinne plans to implement the concept of radical acceptance, aligning choices and decisions with what is in the best interest o bronson  Corinne plans to continue to structure her schedule with balance, routine, and consistency, and utilize opposite action skills  Corinne plans to continue to work on demonstrating self-compassion and bogdan towards herself, and implementing grounding statements, positive affirmations, and positive self-talk  Corinne plans to decrease judgement towards herself, challenge negative thoughts, cognitive distortions, and harmful core beliefs with DBT Wise Mind and CBT Challenging Negative Thoughts Assessment Questions  Corinne plans to lean into her joshua, journal to self-reflect outside of session, and process further in session  Corinne plans to reach out for additional support as needed       01/09/23  Start Time: 7455  Stop Time: 1478  Total Visit Time: 50 minutes

## 2023-01-26 ENCOUNTER — TELEPHONE (OUTPATIENT)
Dept: PSYCHIATRY | Facility: CLINIC | Age: 43
End: 2023-01-26

## 2023-01-26 NOTE — TELEPHONE ENCOUNTER
Behavioral Health Outpatient Intake Questions    Referred By : [address]    Please advise interviewee that they need to answer all questions truthfully to allow for best care, and any misrepresentations of information may affect their ability to be seen at this clinic   => Was this discussed? Yes     If Minor Child (under age 25)    Who is/are the legal guardian(s) of the child? Is there a custody agreement? No     • If "YES"- Custody orders must be obtained prior to scheduling the first appointment  • In addition, Consent to Treatment must be signed by all legal guardians prior to scheduling the first appointment    • If "NO"- Consent to Treatment must be signed by all legal guardians prior to scheduling the first appointment    Behavioral Health Outpatient Intake History -     Presenting Problem (in patient's own words): Anxiety, Depression    Are there any communication barriers for this patient? No                                               If yes, please describe barriers:   • If there is a unique situation, please refer to [facility] for final determination  Are you taking any psychiatric medications? Yes   •   If "YES" -What are they Cymbalta, Buspar, Xanax   •   If "YES" -Who prescribes? PCP  Has the Patient previously received outpatient Talk Therapy or Medication Management from [place] [address]  Yes     •    If "YES"- When, Where and with Whom? [facility] [place] [facility]      •    If "NO" -Has Patient received these services elsewhere? •   If "YES" -When, Where, and with Whom? Has the Patient abused alcohol or other substances in the last 6 months ? No  No concerns of substance abuse are reported  •  If "YES" -What substance, How much, How often? •  If illegal substance: Refer to [facility] (for ANA) or [facility]    •  If Alcohol in excess of 10 drinks per week:  Refer to [facility] (for ANA) or [facility]    Legal History-     Is this treatment court ordered? No   • If "Yes"- refer to [address] [place] [address] for final determination  Has the Patient been convicted of a felony? No  •  If "Yes" -When, What? • Talk Therapy : Send to [address] [place] [address] for final determination   • Med Management: Send to Dr Roxi Corado for final determination     ACCEPTED as a patient Yes  • If "Yes" Appointment Date: 2/27 @ 11a    Referred Elsewhere? No  • If “Yes” - (Where? Ex: Kathia Antony, [facility], [address] [place] [address], etc )       Name of CESIA/Julio César Rubimatthieu Final [id number]  Insurance Phone #  If ins is primary or secondary? If patient is a minor, parents information such as Name, D  O B of guarantor

## 2023-02-06 ENCOUNTER — TELEMEDICINE (OUTPATIENT)
Dept: BEHAVIORAL/MENTAL HEALTH CLINIC | Facility: CLINIC | Age: 43
End: 2023-02-06

## 2023-02-06 DIAGNOSIS — F33.2 SEVERE EPISODE OF RECURRENT MAJOR DEPRESSIVE DISORDER, WITHOUT PSYCHOTIC FEATURES (HCC): Primary | ICD-10-CM

## 2023-02-06 DIAGNOSIS — F41.0 SEVERE ANXIETY WITH PANIC: ICD-10-CM

## 2023-02-06 NOTE — PSYCH
Psychotherapy Provided: Individual Psychotherapy 50 minutes  Length of time in session: 50 minutes  Current suicide risk : Low   Behavioral Health Treatment Plan [facility]: Diagnosis and Treatment Plan explained to Saschacarmen Lópezb relates understanding diagnosis and is agreeable to Treatment Plan  Yes  Visit start and stop times:    02/06/23       Virtual Regular Visit    Verification of patient location: [place]  Patient is located in the following state in which I hold an active license [place]  Assessment/Plan:    Problem List Items Addressed This Visit        Other    Severe anxiety with panic    Severe episode of recurrent major depressive disorder, without psychotic features ([facility] [address] ) - Primary     Goals addressed in session: Goal 1 Follow up psychotherapy session  Reason for visit is   Chief Complaint   Patient presents with   • Virtual Regular Visit      Encounter provider [facility]    Provider located at [address] [address] [phone]    Recent Visits  No visits were found meeting these conditions  Showing recent visits within past 7 days and meeting all other requirements  Today's Visits  Date Type Provider Dept   02/06/23 Telemedicine [facility]   Showing today's visits and meeting all other requirements  Future Appointments  No visits were found meeting these conditions  Showing future appointments within next 150 days and meeting all other requirements     The patient was identified by name and date of birth  Jennyfer Call was informed that this is a telemedicine visit and that the visit is being conducted through  the [address] [place] [address] Now platform  She agrees to proceed  My office door was closed  No one else was in the room    She acknowledged consent and understanding of privacy and security of the video platform  The patient has agreed to participate and understands they can discontinue the visit at any time  Patient is aware this is a billable service  Subjective  Jayde Gonzalez is a 39 y o  female  HPI     Past Medical History:   Diagnosis Date   • Anxiety    • 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[address] )      Past Surgical History:   Procedure Laterality Date   • APPENDECTOMY      extensive lysis of adhesions, MAYURI's procedure (divisions of MAYURI's bands) detorsion of vulvulus, widening of mesenteric pedicle, cecopeexy, appendectomy      • BACK SURGERY Right 2013    SF: L4-L5 hemilaminectomy for discectomy  Use of the microscope for microdissection, Use og 40mg of depo-medrol though thee exiting right L5 nerve root  Onset:13   •  SECTION      x2   • CHOLECYSTECTOMY     • FL INJECTION RIGHT SHOULDER (ARTHROGRAM)  2022   • GALLBLADDER SURGERY      [facility], Onset:    • LAPAROSCOPIC LYSIS INTESTINAL ADHESIONS      onset: 15   • LUMBAR DISC SURGERY     • AL LAP,DIAGNOSTIC ABDOMEN N/A 2016    Procedure: LAPAROSCOPY DIAGNOSTIC, EXTENSIVE LYSIS OF ADHESIONS, MAYURI'S PROCEDURE(DIVISION OF MAYURI'S BANDS,DETORSION OF VOLVULUS, WIDENING OF MESENTERIC PEDICLE, CECOPEXY, APPENDECTOMY);   Surgeon: Mendy Cheng MD;  Location:  MAIN OR;  Service: General   • TUBAL LIGATION       Current Outpatient Medications   Medication Sig Dispense Refill   • ALPRAZolam (XANAX) 0 5 mg tablet Take one tablet q12 h prn anxiety 21 tablet 0   • busPIRone (BUSPAR) 5 mg tablet take 1 tablet by mouth three times a day 90 tablet 5   • DULoxetine (CYMBALTA) 30 mg delayed release capsule Take 1 capsule (30 mg total) by mouth daily 30 capsule 11   • DULoxetine (CYMBALTA) 60 mg delayed release capsule TAKE 1 CAPSULE BY MOUTH ONCE DAILY WITH 30MG CAPSULE 30 capsule 5   • multivitamin (THERAGRAN) TABS Take 1 tablet by mouth daily     • Probiotic Product (PROBIOTIC-10 PO) Take by mouth 1 daily       No current facility-administered medications for this visit  Allergies   Allergen Reactions   • Biaxin [Clarithromycin] GI Intolerance and Other (See Comments)   • Sulfa Antibiotics    • Sulfasalazine    • Venlafaxine GI Intolerance, Vomiting and Other (See Comments)     Category: Allergy; Category: Allergy; Review of Systems    Video Exam    There were no vitals filed for this visit  Physical Exam     (D) Corinne attended her follow up psychotherapy session today  Corinne reported that since her last session, she has noticed elevated symptoms  Corinne reported that she was able to get in for an appointment for a psychiatric evaluation, with SAKINA Daniels through [facility]  [facility] on 23 @ 9:00am, that this writer referred her to  Corinne reported that she feels hopeful about this, and also describes herself as struggling with increased anxiety  Corinne describes her symptoms as vacillating between extremes  Corinne reported that she has been using more of her PRN of xanax, yet reports that she doesn't even take the full dose of the xanax PRN, only taking 1/2 of the dose, estimating that she's taking 0 25mg a a time  Corinne reported that in addition to this, she reports that her prescription that she is using is from , wondering if the medication is no longer good, reporting that it's   Corinne reported that she has another bottle of medication, yet reports that she doesn't want to take it, reporting that it is a different color and space, describing herself as not fully trusting it, yet identifying that pharmaceutical companies change the look of the medications frequently  Corinne reported that 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, worrying about finances, describing herself as not wanting to be there, and worrying about the recent expansion of the salon   Corinne reported that she feels better when she does things to take care of herself, yet reports that she struggles with following through with this, describing shame, guilt, and vulnerability  Corinne reported that she feels like she cannot take off from work, identifying feelings of guilt, worrying about money for her business, ultimately impacting her home  Corinne reported that she feels like she cannot take off from work, describing herself as worrying that her  will not support her in taking time off from work, feeling like he is stressed too  Corinne communicated that she struggles to share her mental health symptoms with her , and also describes herself as feeling frustrated that she doesn't feel seen in her marriage  Corinne describes fearing judgement from her  and other people  Corinne communicated that she has been feeling like, "I don't want to feel like my mom," and fears that she identifies more to her mother than she would like  Corinne reported that her mother struggled with bipolar disorder, and reports that she doesn't see their symptoms as exactly similar  Corinne discussed past family trauma  Corinne and this writer processed this at length  Provided ongoing psychoeducation  Modeled effective forms of communication  Discussed ongoing skills  Reviewed limits and boundaries  (A) Corinne's speech presented at a normal rate, volume, and rhythm  Corinne presented as alert and oriented x3  Corinne presented with good eye contact  Corinne denies any symptoms of frank  Corinne denies any evident or immediate risk factors for self-harm, SI, or HI  Corinne's mood presented as anxious and depressed and her affect appeared to be tearful throughout the session  Corinne describes feeling stressed out and overwhelmed, describing obsessive intrusive, ruminating, and repetitive thoughts   Corinne describes symptoms of trouble relaxing, fearing as if something awful might happen, not being able to stop and control worrying, worrying too much about different things, and feeling nervous, anxious, and on edge  Corinne describes symptoms of irritability, poor frustration tolerance, and becoming easily annoyed  Corinne describes symptoms of difficulty focusing, having a hard time concentrating on things, and becoming easily distracted  Corinne describes symptoms of lower moods of sadness, and depression and little interest and pleasure in doing things  Corinne describes feeling tired and having little energy  (P) Corinne declined a a higher level of care through partial hospital programming  During this session today, this writer and Corinne completed The Mood Disorder Questionnaire (MDQ) during session, and it does not appear that Corinne is meeting criteria for a Mood Disorder, which this writer was not leaning towards; however, did the screening, the support Corinne in increasing self-awareness in relation to this  A copy of this was placed to be filed in her medical record  Corinne plans to outweigh risks verses benefits in order to make informed decisions, and align choices and decisions with what is in the best interest of her, specifically considering using opposite action skills, when it comes to taking time off from work, prioritizing her mental health needs, and engaging in the use of self-care  Corinne plans to lean into healthy natural supports, and utilize healthy and effective forms of communication to target interpersonal relationship stressors, ask for what she needs, assert herself, and advocate for herself  Corinne plans to implement grounding statements, positive affirmations, positive self-talk, and self-affirming statements, identifying worth within herself, giving herself credit, and demonstrating bogdan and compassion towards herself  Corinne plans to reinforce limits and boundaries, implementing healthy patterns, and behaviors, breaking the cycle of co-dependency, while challenging this  Corinne plans to implement the concept of radical acceptance   Corinne plans to decrease judgement towards herself, and actively seek out evidence challenging negative thinking traps, cognitive distortions, and harmful core beliefs with DBT Wise Mind and CBT Challenging Negative Thoughts Assessment Questions  Corinne plans to increase self-awareness in relation to warning signs and triggers throughout her body, giving herself permission to pause in the moment, and implement deep breathing techniques, mindfulness/ meditation techniques, distraction techniques, distress tolerance skills, sensory related skills, grounding techniques, and coping skills to address fluctuating symptoms  Corinne plans to journal outside of sessions, honoring, validating, and making space for her feelings, and emotions, identifying, associating, and expressing them to review further next session  Corinne plans to use an updated prescription of xanax PRN, and consider taking the full dose, before attending her psychiatric evaluation at the end of the month  Corinne plans to reach out for additional support as needed       02/06/23  Start Time: 1100  Stop Time: 1150  Total Visit Time: 50 minutes

## 2023-02-22 NOTE — PSYCH
[address] [facility]    Name and Date of Birth:  Lulu Rodriguez 39 y o  1980 MRN: [id number]    Date of Visit: February 27, 2023    Reason for visit: Full psychiatric intake assessment for medication management        Chief Complaint   Patient presents with   • Establish Care       HPI:     Lulu Rodriguez is a 39 y o  female, domiciled with , son (16), daughter (15), and father in [place], currently self-employed, owns a salon, w/ PMH of chronic pain and PPH of depression and anxiety, no prior psychiatric admissions, no prior SA, no h/o self-injurious behavior, who presented to the mental health clinic for the initial intake and psychiatric evaluation on February 27, 2023  Corinne was previously under the care of her PCP for psychiatric medication management and is currently prescribed Cymbalta 90 mg daily, Buspar 5 mg tid, and Xanax 0 5 mg bid prn  Tolerating medication well with no medication side effects observed or reported  Actively involved in individual psychotherapy with Yi Saeed  Lulu Rodriguez was visited in the clinic; chart reviewed  Met with patient individually  Corinne K  Is a 59-year-old female with a PPH of depression and anxiety who presents today to establish care  Corinne endorses a long history of anxiety symptoms, with worsening of symptoms around 2003  As a child, Emmett Seen would experience worsening anxiety in social situations and often experienced severe worry about death  In 2003, anxiety and panic symptoms began to worsen and Corinne began seeing her PCP for medication management around that time  She was experiencing frequent panic episodes, with one severe panic episode resulting in an ED visit due to sharp chest pain  She reports fluctuating periods of anxiety since that time  More recently, anxiety symptoms have been better managed with medications and psychotherapy   She reports panic episodes have been infrequent, with maybe one episode occurring over the past several months  She is able to distract self and utilize other coping mechanisms when experiencing panic symptoms  Corinne endorses acute and chronic anxiety, pathologic in nature, and suggestive of NAVI (generalized anxiety disorder)  Reports excessive nervousness, irrational worry, and overt anxiousness  Pervasively restless, tense, keyed-up, and chronically on-edge  Experiences disruption in energy and concentration secondary to anxiety  Experiences irritability, inability to relax, and disruption in sleep secondary to pathologic anxiety  At times, overwhelmed/consumed by irrational fear  Corinne reports worsening anxiety symptoms in crowds and social situations  Corinne endorses a history of depressive symptoms that began around the holidays 12/2022  She denies any prior depressive episodes  Patient currently endorses depressed mood, anhedonia, decreased appetite, decreased concentration, low energy, and poor motivation  She is tearful throughout evaluation discussing current symptoms  She is unable to identify triggers for depressive episode, denying any stressors that occurred around the holidays  She describes  as supportive  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, which has been stressful but the salon has been doing well  Dad lives with her, which is an ongoing stressor but not new  She feels tired most days and just wants to be home where she feels comfortable  She has had passive death wishes of feeling as though she would be better off if she weren't around  She denies any plan or intent to harm herself in any way  She has felt down and depressed more days than not over the past several months  Passive death wishes were frequent and stronger around the holidays  Her therapist did reach out to  to discuss safety and verify that patient does not have access to 's gun   Corinne has no hx of SAs or inpatient hospitalizations  She currently denies any plan or intent to harm self  She recently wrote down how she was feeling on her birthday 3 weeks ago to share with provider  She wrote that she was feeling down, sad, and overwhelmed  Fearful that something bad was going to happen  Having bad thoughts about not wanting to be here  Wanted to "freak out " Did not know what would make her feel happy again  Lisette Dumont that she has a great marriage, great job, a busy salon, and great kids, however, everyone gets on her nerves, annoys her, and she just wants to be at home where she feels more comfortable  SIB: denies   HI/hx violence: no HI or concerns for violence    Hx of traumatic incident of finding mother  in her home, frequent intrusive thoughts related to the incident in the past  No current intrusive, avoidance, negative alterations, or hyperarousal symptoms of PTSD noted  No disordered eating patterns, including restricting intake, binging, or purging  No symptoms of OCD including obsessive, ritualistic acts, intrusive thoughts or images  No present or past manic symptoms  No perceptual disturbances  No paranoid ideations or fixed delusions were elicited  Does not appear internally preoccupied at time of encounter  No history of substance use, including vaping, cigarettes, etoh, marijuana, or other illicit drug use      Review Of Systems:    Constitutional negative   ENT negative   Cardiovascular negative   Respiratory negative   Gastrointestinal negative   Genitourinary negative   Musculoskeletal negative   Integumentary negative   Neurological negative   Endocrine negative   Other Symptoms none, all other systems are negative         PHQ-2/9 Depression Screening    Little interest or pleasure in doing things: 1 - several days  Feeling down, depressed, or hopeless: 2 - more than half the days  Trouble falling or staying asleep, or sleeping too much: 0 - not at all  Feeling tired or having little energy: 1 - several days  Poor appetite or overeatin - not at all  Feeling bad about yourself - or that you are a failure or have let yourself or your family down: 1 - several days  Trouble concentrating on things, such as reading the newspaper or watching television: 0 - not at all  Moving or speaking so slowly that other people could have noticed  Or the opposite - being so fidgety or restless that you have been moving around a lot more than usual: 0 - not at all  Thoughts that you would be better off dead, or of hurting yourself in some way: 1 - several days  PHQ-9 Score: 6   PHQ-9 Interpretation: Mild depression          NAVI-7 Flowsheet Screening    Flowsheet Row Most Recent Value   Over the last 2 weeks, how often have you been bothered by any of the following problems? Feeling nervous, anxious, or on edge 1   Not being able to stop or control worrying 1   Worrying too much about different things 0   Trouble relaxing 0   Being so restless that it is hard to sit still 0   Becoming easily annoyed or irritable 1   Feeling afraid as if something awful might happen 1   NAVI-7 Total Score 4        Past Psychiatric History:    Past Inpatient Psychiatric Treatment:   No history of past inpatient psychiatric admissions  Past Outpatient Psychiatric Treatment:    Was in outpatient treatment in the past with a family physician for psychiatric issues  Past Suicide Attempts: no  Past Violent Behavior: no  Past Psychiatric Medication Trials: Prozac, Zoloft, Paxil, Effexor XR, Cymbalta, Buspar and Xanax Xanax XR  GI SEs with Effexor, unable to recall details regarding other past medication trials     Current psychiatric medications: Cymbalta 90 mg daily, Buspar 5 mg tid, Xanax 0 5 mg bid prn      Traumatic History:    Abuse: no history of physical or sexual abuse, no history of emotional abuse  Other Traumatic Events: found mom in her home  in  several days after she passed away, had frequent intrusive thoughts related to this after the incident  Family Psychiatric History: Mother-Bipolar, anxiety, alcoholism  Father-alcohol use, likely MH issues, unsure of diagnoses  Brother-up and down moods but not diagnosed  Son-depression, anxiety, ADHD  On Zoloft and methylphenidate    FH of suicide-denies     Substance Abuse History:   Tobacco/alcohol/caffeine: Denies alcohol use, Denies tobacco use, Caffeine intake: 3 cups of caffeinated coffee per day(s)   Nicotine use socially in past, etoh use socially in past    After had son would break out in hives if drank alcohol, no longer drinks  Illicit drugs: Denies history of illicit drug use   Tried medical marijuana once, made anxiety worse    Social History:    Developmental: Denies a history of milestone/developmental delay  Denies a history of in-utero exposure to toxins/illicit substances  There is no documented history of IEP or need for special education  Education: technical college   Owns [employer], recently opened in   Living arrangement, social support: , daughter, son and father son 16, daughter 15  Dad living with since 2019, has been a stressor, dad with etoh use   Access to firearms:  has gun locked up  Denies direct access to weapons/firearms  Past Medical History:    Past Medical History:   Diagnosis Date   • Anxiety    • 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[facility] [address] )         Past Surgical History:   Procedure Laterality Date   • APPENDECTOMY      extensive lysis of adhesions, MAYURI's procedure (divisions of MAYURI's bands) detorsion of vulvulus, widening of mesenteric pedicle, cecopeexy, appendectomy      • BACK SURGERY Right 2013    SF: L4-L5 hemilaminectomy for discectomy  Use of the microscope for microdissection, Use og 40mg of depo-medrol though thee exiting right L5 nerve root   Onset:13   •  SECTION      x2   • CHOLECYSTECTOMY     • [place] INJECTION RIGHT SHOULDER (ARTHROGRAM)  2022   • GALLBLADDER SURGERY      [facility], Onset: 2011   • LAPAROSCOPIC LYSIS INTESTINAL ADHESIONS      onset: 9/23/16   • LUMBAR DISC SURGERY     • [place] LAPS ABD PRTM&OMENTUM DX W/WO SPEC BR/WA SPX N/A 9/23/2016    Procedure: LAPAROSCOPY DIAGNOSTIC, EXTENSIVE LYSIS OF ADHESIONS, MAYURI'S PROCEDURE(DIVISION OF MAYURI'S BANDS,DETORSION OF VOLVULUS, WIDENING OF MESENTERIC PEDICLE, CECOPEXY, APPENDECTOMY); Surgeon: Mendy Cheng MD;  Location: [facility] OR;  Service: General   • TUBAL LIGATION       Allergies   Allergen Reactions   • Biaxin [Clarithromycin] GI Intolerance and Other (See Comments)   • Sulfa Antibiotics    • Sulfasalazine    • Venlafaxine GI Intolerance, Vomiting and Other (See Comments)     Category: Allergy; Category: Allergy; History Review: The following portions of the patient's history were reviewed and updated as appropriate: allergies, current medications, past family history, past medical history, past social history, past surgical history and problem list     OBJECTIVE:    Vital signs in last 24 hours: There were no vitals filed for this visit      Mental Status Evaluation:  Appearance and attitude: appeared as stated age, cooperative and attentive, casually dressed, with good hygiene  Eye contact: good  Motor Function: within normal limits, intact gait, No PMA/PMR  Gait/station: normal gait/station and normal balance  Speech: normal for rate, rhythm, volume, latency, amount  Language: No overt abnormality  Mood/affect: depressed / Affect was constricted but reactive, mood congruent, tearful  Thought Processes: sequential and goal-directed  Thought content: no overt delusions elicited, passive death wishes, consents for safety  Associations: intact associations  Perceptual disturbances: denies Auditory/Visual/Tactile Hallucinations  Orientation: oriented to time, person, place and to the situational context  Cognitive Function: intact  Memory: recent and remote memory grossly intact  Intellect: average  Fund of knowledge: aware of current events, aware of past history and vocabulary average  Impulse control: good  Insight/judgment: good/good    Pain: denied    Lab Results: I have personally reviewed all pertinent laboratory/tests results  Recent Labs (last 2 months):   No visits with results within 2 Month(s) from this visit  Latest known visit with results is:   Office Visit on 11/07/2022   Component Date Value   • Diagnosis: 11/07/2022 Comment    • Specimen Adequacy 11/07/2022 Comment    • Clinician Provided ICD10 11/07/2022 Comment    • Performed by: 11/07/2022 Comment    • SL AMB   11/07/2022   • Note: 11/07/2022 Comment    • Test Methodology: 11/07/2022 Comment    • HPV Aptima 11/07/2022 Negative    • HPV GENOTYPE REFLEX 11/07/2022 Comment          Suicide/Homicide Risk Assessment:    Risk of Harm to Self:  The following ratings are based on assessment at the time of the interview  Demographic risk factors include:   Historical Risk Factors include: chronic anxiety symptoms  Recent Specific Risk Factors include: mental illness diagnosis, current depressive symptoms, passive death wishes  Protective Factors: no current suicidal ideation, access to mental health treatment, being a parent, being , compliant with medications, compliant with mental health treatment, effective coping skills, good health, having a desire to live, no substance use problems, responsibilities and duties to others, stable living environment, stable job, supportive family  Weapons: gun  The following steps have been taken to ensure weapons are properly secured: no access  Based on today's assessment, Corinne presents the following risk of harm to self: low    Risk of Harm to Others: The following ratings are based on assessment at the time of the interview  Demographic Risk Factors include: none  Historical Risk Factors include: none  Recent Specific Risk Factors include: none  Protective Factors: no current homicidal ideation  Weapons: gun  The following steps have been taken to ensure weapons are properly secured: no access  Based on today's assessment, Corinne presents the following risk of harm to others: none    The following interventions are recommended: no intervention changes needed  Although patient's acute lethality risk is LOW, long-term/chronic lethality risk is mildly elevated given chronic mental health symptoms  Dalila Magana is future-oriented, forward-thinking, and demonstrates ability to act in a self-preserving manner as evidenced by volitionally presenting to the clinic today, seeking treatment  At this time, inpatient hospitalization is not currently warranted  To mitigate future risk, patient should adhere to treatment recommendations, avoid alcohol/illicit substance use, utilize community-based resources and familiar support, and prioritize mental health treatment  Based on today's assessment and clinical criteria, Corinne S Namita contracts for safety and is not an imminent risk of harm to self or others  Outpatient level of care is deemed appropriate at this present time  Corinne understands that if they are no longer able to contract for safety, they need to call/contact the outpatient office including this writer, call/contact crisis and/or attend to the nearest Emergency Department for immediate evaluation  Assessment/Plan:   Diagnosis: 1  MDD, current severe episode without prior episode 2  NAVI    In summary,   Rosy Elise is a 39 y o  female, domiciled with , son [de-identified]16), daughter (15), and father in [place], currently self-employed, owns a salon, w/ PMH of chronic pain and PPH of depression and anxiety, no prior psychiatric admissions, no prior SA, no h/o self-injurious behavior, who presented to the mental health clinic for the initial intake and psychiatric evaluation on February 27, 2023    Corinne was previously under the care of her PCP for psychiatric medication management and is currently prescribed Cymbalta 90 mg daily, Buspar 5 mg tid, and Xanax 0 5 mg bid prn  Tolerating medication well with no medication side effects observed or reported  Actively involved in individual psychotherapy with Burgess Orona  On assessment today, Corinne endorses overall stability in terms of anxiety symptoms, less frequent panic episodes, some frequent worry and irritability but feels better able to manage anxiety symptoms, worsening depressive symptoms over the past several months with no past history of depressive episodes, passive death wishes, tearfulness, and wanting to isolate at home, in the context of chronic mental health symptoms, family history of mental illness, and psychosocial stressors  Her current presentation meets criteria for MDD and NAVI  Currently she is not at risk for suicide, homicide, self-injury, aggressive behaviors, self-neglect, or neglect of dependents or children  Given this presentation, the patient will benefit from further outpatient follow up for management of her symptoms  At conclusion of evaluation, Apryl Hutchinson is amenable and gave informed consent to the recommendations of this writer  Psycho-education regarding Cymbalta, Xanax, and Buspar medication class, and the importance of compliance with psychiatric treatment reiterated  Educated on the [facility] and [facility] Approach to mental health  Patient was receptive to education  Plan:  1  Admit to [facility] outpatient services for treatment of MDD and NAVI  2  Increase Cymbalta to 120 mg daily for depression and anxiety symptoms   3  Continue Buspar 5 mg tid for anxiety symptoms  4  Continue Xanax 0 25 mg daily as needed for severe anxiety symptoms   5  Continue individual psychotherapy sessions  6  Follow up with primary care provider for ongoing medical care  7   Follow up with this provider in 2 months      Diagnoses and all orders for this visit:    Current severe episode of major depressive disorder without psychotic features without prior episode (HCC)  -     DULoxetine (CYMBALTA) 60 mg delayed release capsule; Take 1 capsule (60 mg total) by mouth 2 (two) times a day    NAVI (generalized anxiety disorder)  -     ALPRAZolam (XANAX) 0 25 mg tablet; Take 1 tablet (0 25 mg total) by mouth daily as needed for anxiety        - Psychoeducation provided regarding the importance of exercise and health dietary choices and their impact on mood, energy, and motivation   - Counseled to avoid ETOH, illicit substances, and nicotine secondary to the detrimental effects of these substances on mental and physical health  - Encouraged to engage in non-verbal forms of therapy such as art therapy, music therapy, and mindfulness  - Psychoeducation regarding medication benefits and risks, side effects, indications and alternatives provided to the patient and the importance of compliance with psychiatric medication reiterated  The [address] [place] [address] Avenue verbalized understanding and agreed with the plan  - The patient was educated about 24 hour and weekend coverage for urgent situations accessed by calling [facility] main practice number  - Patient was educated to call [address] [place] [address] ([phone]]) for behavioral crisis at any time, [phone] for any safety concerns, or go to nearest ER if her symptoms become overwhelming or unmanageable  Medications Risks/Benefits:      Risks, Benefits And Possible Side Effects Of Medications:    Risks, benefits, and possible side effects of medications explained to Corinne and she verbalizes understanding and agreement for treatment      Controlled Medication Discussion:     Randi Cummings has been filling controlled prescriptions on time as prescribed according to [place] Prescription Drug Monitoring Program    Treatment Plan:    Completed and signed during the session: Yes - Treatment Plan done but not signed at time of office visit due to:  Plan reviewed in person and verbal consent given due to [address] distancing    This note was not shared with the patient due to reasonable likelihood of causing patient harm    Visit Time    Visit Start Time: 11:01 AM  Visit Stop Time: 12:00 PM  Total Visit Duration: 59 minutes      SAKINA Myles 02/27/23

## 2023-02-27 ENCOUNTER — OFFICE VISIT (OUTPATIENT)
Dept: PSYCHIATRY | Facility: CLINIC | Age: 43
End: 2023-02-27

## 2023-02-27 DIAGNOSIS — F32.2 CURRENT SEVERE EPISODE OF MAJOR DEPRESSIVE DISORDER WITHOUT PSYCHOTIC FEATURES WITHOUT PRIOR EPISODE (HCC): Primary | ICD-10-CM

## 2023-02-27 DIAGNOSIS — F41.1 GAD (GENERALIZED ANXIETY DISORDER): ICD-10-CM

## 2023-02-27 RX ORDER — ALPRAZOLAM 0.25 MG/1
0.25 TABLET ORAL DAILY PRN
Qty: 30 TABLET | Refills: 0 | Status: SHIPPED | OUTPATIENT
Start: 2023-02-27

## 2023-02-27 RX ORDER — DULOXETIN HYDROCHLORIDE 60 MG/1
60 CAPSULE, DELAYED RELEASE ORAL 2 TIMES DAILY
Qty: 60 CAPSULE | Refills: 2 | Status: SHIPPED | OUTPATIENT
Start: 2023-02-27

## 2023-02-27 NOTE — BH TREATMENT PLAN
TREATMENT PLAN (Medication Management Only)        [facility]    Name and Date of Birth:  Logan Salinas 39 y o  1980  Date of Treatment Plan: February 27, 2023  Diagnosis/Diagnoses:    1  Current severe episode of major depressive disorder without psychotic features without prior episode ([facility] [address] )    2  NAVI (generalized anxiety disorder)      Strengths/Personal Resources for Self-Care: supportive family, taking medications as prescribed, ability to communicate well, ability to understand psychiatric illness, average or above intelligence, good physical health, good understanding of illness, motivation for treatment, ability to negotiate basic needs, stable employment, willingness to work on problems  Area/Areas of need (in own words): anxiety symptoms, depressive symptoms  1  Long Term Goal: stabilization of depression  Target Date:2 months - 4/27/2023  Person/Persons responsible for completion of goal: Corinne  2  Short Term Objective (s) - How will we reach this goal?:   A  Provider new recommended medication/dosage changes and/or continue medication(s): continue current medications as prescribed (increase Cymbalta dose)  B  N/A   C  N/A  Target Date:2 months - 4/27/2023  Person/Persons Responsible for Completion of Goal: Corinne  Progress Towards Goals: starting treatment  Treatment Modality: medication management every 2 months  Review due 180 days from date of this plan: 6 months - 8/27/2023  Expected length of service: ongoing treatment  My Physician/PA/NP and I have developed this plan together and I agree to work on the goals and objectives  I understand the treatment goals that were developed for my treatment

## 2023-03-06 ENCOUNTER — TELEMEDICINE (OUTPATIENT)
Dept: BEHAVIORAL/MENTAL HEALTH CLINIC | Facility: CLINIC | Age: 43
End: 2023-03-06

## 2023-03-06 DIAGNOSIS — Z00.00 HEALTH CARE MAINTENANCE: Primary | ICD-10-CM

## 2023-03-06 DIAGNOSIS — F33.2 SEVERE EPISODE OF RECURRENT MAJOR DEPRESSIVE DISORDER, WITHOUT PSYCHOTIC FEATURES (HCC): ICD-10-CM

## 2023-03-06 NOTE — PSYCH
Psychotherapy Provided: Individual Psychotherapy 55 minutes  Length of time in session: 55 minutes  Current suicide risk : Low   Behavioral Health Treatment Plan [facility]: Diagnosis and Treatment Plan explained to Rosales Gasper relates understanding diagnosis and is agreeable to Treatment Plan  Yes  Visit start and stop times:    03/06/23  Start Time: 1045  Stop Time: 1140  Total Visit Time: 55 minutes    Virtual Regular Visit    Verification of patient location: [place], [place]  Patient is located in the following state in which I hold an active license [place]  Assessment/Plan:    Problem List Items Addressed This Visit        Other    Health care maintenance - Primary    Severe episode of recurrent major depressive disorder, without psychotic features ([facility] [address] )     Goals addressed in session: Goal 1 Follow up psychotherapy  Reason for visit is   Chief Complaint   Patient presents with   • Virtual Regular Visit      Encounter provider Rubi Burnett    Provider located at [address] [id number] [phone]    Recent Visits  No visits were found meeting these conditions  Showing recent visits within past 7 days and meeting all other requirements  Today's Visits  Date Type Provider Dept   03/06/23 Telemedicine Rubi Burnett [facility] [place] [facility]   Showing today's visits and meeting all other requirements  Future Appointments  No visits were found meeting these conditions  Showing future appointments within next 150 days and meeting all other requirements     The patient was identified by name and date of birth  Rebecca Talavera was informed that this is a telemedicine visit and that the visit is being conducted through the [address] Now platform  She agrees to proceed  My office door was closed  No one else was in the room    She acknowledged consent and understanding of privacy and security of the video platform  The patient has agreed to participate and understands they can discontinue the visit at any time  Patient is aware this is a billable service  Subjective  Viola Helms is a 39 y o  female  HPI     Past Medical History:   Diagnosis Date   • Anxiety    • 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[address] )        Past Surgical History:   Procedure Laterality Date   • APPENDECTOMY      extensive lysis of adhesions, MAYURI's procedure (divisions of MAYURI's bands) detorsion of vulvulus, widening of mesenteric pedicle, cecopeexy, appendectomy      • BACK SURGERY Right 2013    SF: L4-L5 hemilaminectomy for discectomy  Use of the microscope for microdissection, Use og 40mg of depo-medrol though thee exiting right L5 nerve root  Onset:13   •  SECTION      x2   • CHOLECYSTECTOMY     • [place] INJECTION RIGHT SHOULDER (ARTHROGRAM)  2022   • GALLBLADDER SURGERY      [facility], Onset:    • LAPAROSCOPIC LYSIS INTESTINAL ADHESIONS      onset: 15   • LUMBAR DISC SURGERY     • [place] LAPS ABD PRTM&OMENTUM DX W/WO SPEC BR/WA SPX N/A 2016    Procedure: LAPAROSCOPY DIAGNOSTIC, EXTENSIVE LYSIS OF ADHESIONS, MAYURI'S PROCEDURE(DIVISION OF MAYURI'S BANDS,DETORSION OF VOLVULUS, WIDENING OF MESENTERIC PEDICLE, CECOPEXY, APPENDECTOMY);   Surgeon: Triston Stark MD;  Location: [facility] OR;  Service: General   • TUBAL LIGATION         Current Outpatient Medications   Medication Sig Dispense Refill   • ALPRAZolam (XANAX) 0 25 mg tablet Take 1 tablet (0 25 mg total) by mouth daily as needed for anxiety 30 tablet 0   • busPIRone (BUSPAR) 5 mg tablet take 1 tablet by mouth three times a day 90 tablet 5   • DULoxetine (CYMBALTA) 30 mg delayed release capsule Take 1 capsule (30 mg total) by mouth daily 30 capsule 11   • DULoxetine (CYMBALTA) 60 mg delayed release capsule TAKE 1 CAPSULE BY MOUTH ONCE DAILY WITH 30MG CAPSULE 30 capsule 5   • DULoxetine (CYMBALTA) 60 mg delayed release capsule Take 1 capsule (60 mg total) by mouth 2 (two) times a day 60 capsule 2   • multivitamin (THERAGRAN) TABS Take 1 tablet by mouth daily     • Probiotic Product (PROBIOTIC-10 PO) Take by mouth 1 daily       No current facility-administered medications for this visit  Allergies   Allergen Reactions   • Biaxin [Clarithromycin] GI Intolerance and Other (See Comments)   • Sulfa Antibiotics    • Sulfasalazine    • Venlafaxine GI Intolerance, Vomiting and Other (See Comments)     Category: Allergy; Category: Allergy; Review of Systems    Video Exam    There were no vitals filed for this visit  Physical Exam     (D) Corinne attended her follow up psychotherapy session today  Corinne reported that since her last session, she has noticed ongoing symptoms  Corinne reported that on 23 she met with a psychiatric nurse practitioner Kathie for a psychiatric evaluation  Corinne reported that she experienced anticipatory anxiety leading up to this appointment and discussed and processed this  Corinne reported that ultimately they decided to increase her cymbalta from 90mg daily to 120mg daily  Corinne reported that she stated this on 23  Corinne reported that she noticed some side effects of nausea and IBS symptoms, that have since subsided  Corinne reported that she is prescribed buspar 5mg TID, yet only takes it BID, reporting that she was honest with Kathie about this, along with her PRN of xanax  Corinne reported that she has been using an old bottle of xanax from years ago, reporting that the last bottle she filled that she reports she hasn't touched yet, was from a year ago  Corinne reported that Kathie gave her a new perscription of xanax, and moved the dose from 0 5mg PRN to 0 25mg since Corinne was only taking half the dose  Corinne communicated a plan to no longer use the  xanax and plans to use the current one if needed, reported that Kathie recommended this  Corinne reported that overall she feels positive in relation to this experience, and reported that she has follow up in (3) months in early June, reporting that [facility] is on maternity leave  Corinne reported that she was assured that there are providers covering for her while she is out, discussing anxiety in relation to this  Corinne reported that leading up to her menstrual cycle, she notices an increase in depressive and anxiety symptoms, noticing that she notices that this happens every other month  Corinne reported that she has considered taking her buspar 5mg in the morning and 10mg in the afternoon to get the full dose of 15 mg, rather than only taking 5mg BID, identifying that it is harder for her to remembering taking medication 3x a day, and only wanting to take medication 2x a day  Discussed and processed this  Corinne discussed ongoing stressors in relation to finances, reporting that she manages the 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, and her business  Corinne reported that she asked her  to sit down and review it together, identifying that she desired support in this  Corinne reported that she realized that they are paying their bills, meeting their needs, and not saving money  Corinne described feeling, "defeated," when it comes to finances, identifying that she and her  work hard and constantly feeling like it is never enough  Corinne reported that today she was triggered running into her , reminding her that she is going to owe money this years for taxes due to 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 throughout [facility]  Corinne describes shame, guilt, and vulnerability when it comes to finances, identifying ruminating thoughts of purchases they have made, feeling indecisive, and fearing regret   Corinne reported that growing up finances wee a stressors, identifying her mother struggling financially, and desiring wanting different for herself, when she created her own family  Corinne reported that she remembers her mother doing the best that she could, reporting that all their needs as children were met  Corinne reported that her mother would get herself in trouble financially, and Corinne's maternal grandfather would help her mother financially  Corinne reported identifying that she doesn't have anyone to help her  Corinne describes a desire for hyperindependnce, not wanting anyone's help in the way her mother was dependent on others  Corinne describes worrying that subconsciously she fears that she is repeating a similar unhealthy pattern when it comes to finances, like her mother, and when challenging the negative thinking traps with CBT Negative Thoughts Assessment Questions, and DBT Wise Mind, she was able to identify evidence that is different  Corinne describes stressors in relation to inflation, discussing stressors with gas prices, food, electric, and 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, 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, and her camper  Corinne described meeting the needs of the bills, and constantly worrying about failing, triggering negative thinking traps, and negative thoughts towards herself  Corinne and this writer processed this at length  Discussed ongoing skills  Reviewed limits and boundaries  Provided ongoing psychoeducation  Modeled effective forms of communication  (A) Corinne's mood presented as anxious, and depressed, and her affect appeared to be congruent, and tearful at times  Corinne presented with good eye contact  Corinne presented as alert and oriented x3  Corinne's speech presented at a normal rate, volume, and rhythm  Corinne denies any symptoms of frank  Corinne denies any evident or immediate risk factors for self-harm, SI, or HI  Corinne describes worrying too much about different things, feeling nervous, anxious, and on edge, and reports symptoms of obsessive, intrusive, ruminating, and repetitive thoughts   Corinne describes symptoms of irritability, poor frustration tolerance, and becoming easily annoyed  Corinne describes little interest and pleasure in doing things, reporting lower moods of sadness, and depression  Corinne describes feeling bad about herself, feeling like she is letting herself, and others down  Corinne describes symptoms of grief in relation to loss  (P) Corinne plans to continue to monitor, track, and inventory her symptoms, cycles, and stressors, specifically when it comes to the increase in her psychiatric medication  Corinne plans to work on taking her buspar full dose daily, specifically with working ahead in relation to the week leading up to her menstrual cycle, and giving herself permission to take her PRN medication as needed  Corinne plans to work on actively challenging negative thoughts, cognitive distortions, harmful core beliefs, and negative thinking traps with DBT Wise MInd, and CBT Challenging Negative Thoughts Assessment Questions  Corinne plans to specifically examine her mother's financial situation, compared to her financial situation, identifying similarities and differences, through journaling, identifying, associating, honoring, and making space for her feelings and emotions  Corinne plans to explore conditioned messages that she has received throughout her upbringing when it comes to finances specifically, and connect these back to her core beliefs, using opposite action skills actively  her self-worth from finances  Corinne plans to pay attention to her trauma triggers, giving herself permission to be present with the warning signs throughout her body, giving herself permission to pause, and actively implement skills to target ongoing symptoms  Corinne plans to lean into healthy natural supports, engage in the use of self-care, take time for herself, and prioritize her needs, actively challenging co-dependency, implementing healthy patterns, and behaviors, aiming to break the cycle   Corinne plans to implement grounding statements, positive affirmations, positive self-talk, and self-affirming statements, actively identifying her worth, and giving herself credit  Corinne plans to continue to assert herself, advocate for herself, and ask for what she needs, targeting interpersonal relationship stressors  Corinne plans to reach out for additional support as needed       03/06/23  Start Time: 1045  Stop Time: 1140  Total Visit Time: 55 minutes

## 2023-03-20 ENCOUNTER — TELEMEDICINE (OUTPATIENT)
Dept: BEHAVIORAL/MENTAL HEALTH CLINIC | Facility: CLINIC | Age: 43
End: 2023-03-20

## 2023-03-20 DIAGNOSIS — F41.0 SEVERE ANXIETY WITH PANIC: ICD-10-CM

## 2023-03-20 DIAGNOSIS — F33.2 SEVERE EPISODE OF RECURRENT MAJOR DEPRESSIVE DISORDER, WITHOUT PSYCHOTIC FEATURES (HCC): Primary | ICD-10-CM

## 2023-03-20 NOTE — PSYCH
Outpatient Behavioral Health Psychotherapy Treatment Plan    Ronnie Bloch  1980     Date of Initial Psychotherapy Assessment: 09/17/2019  Date of Current Treatment Plan: 03/20/23  Treatment Plan Target Date: 09/20/2023  Treatment Plan Expiration Date: 09/20/2023    Diagnosis:   1  Severe episode of recurrent major depressive disorder, without psychotic features ([facility] [address] )        2  Severe anxiety with panic          Area(s) of Need: Individual Psychotherapy and Individual Psychiatry  Long Term Goal 1 (in the client's own words): "I want to feel better, I don't want to feel as depressed and anxious "     Stage of Change: Action    Target Date for completion: 09/20/2023     Anticipated therapeutic modalities: CBT and DBT      People identified to complete this goal: JUSTIN Roblero, LCSMILES, CCTP, CDBT and PG&HypereightSAKINA  Objective 1: (identify the means of measuring success in meeting the objective):      Participate in bi-weekly psychotherapy sessions with Shena De Luna, JUSTIN, LEONEL, NAOMI, CDBT, and transition from participating fully virtual, to having in person sessions too  (CBT)      Participate in individual Psychiatric Medication Management Sessions with Radha Daniel, and work towards stabilizing symptoms from a pharmacology aspect  (CBT)      Corinne plans to utilize [address] to actively take her psychiatric medication as prescribed, and target anticipatory anxiety with skills  (CBT and DBT)  Corinne plans to work on increasing self-awareness in relation to warning signs and triggers throughout her body, giving herself permission to pause in the moment, and actively implement coping skills, deep breathing techniques, mindfulness/ meditation techniques, sensory related skills, grounding techniques, distraction techniques, distress tolerance skills, and sensory related skills to target fluctuating symptoms  (CBT and DBT)          Objective 2: (identify the means of measuring success in meeting the objective):      Corinne plans to monitor, track, and inventory symptoms, cycles, and stressors to gather data for her mental health and physical health providers, to evaluate effectiveness of pharmacology options, as well as tracking mood adjustments in relation to her menstrual cycle, continuing to work with therapy, psychiatry, PCP, and OBGYN in relation to this  (CBT)      Corinne plans to work on identifying, associating, honoring, validating, and making space for her feelings, and emotions, journaling outside of session to process further in session  (CBT and DBT)  Long Term Goal 2 (in the client's own words): "I want to have healthier relationships with people "     Stage of Change: Contemplation    Target Date for completion: 09/20/2023     Anticipated therapeutic modalities: CBT and DBT      People identified to complete this goal: Corinne and César Loza, MSW, LCSW, CCTP, CDBT       Objective 1: (identify the means of measuring success in meeting the objective):      Corinne plans to read the book, "Co-Dependent No More," to increase self-awareness in relation to co-dependency characteristics, process this in sessions, and challenge these  (CBT and DBT)     Corinne plans to work on considering participating in [facility] and/or [facility] Meetings to increase psychoeducation opportunities and expand her support network outside of therapy and psychiatry  (CBT)      Corinne plans to implement grounding statements, positive affirmations, positive self-talk, self-affirming statements, actively identifying worth within herself, giving herself credit, and demonstrating bogdan and compassion towards herself   (CBT and DBT)       Objective 2: (identify the means of measuring success in meeting the objective):      Corinne plans to actively seek out evidence to challenge negative thoughts, cognitive distortions, negative thinking traps, and harmful core beliefs with DBT Wise Mind and CBT Challenging Negative Thoughts Assessment Questions  (DBT and CBT)      Corinne plans to spend time examining her own needs, identifying limits and boundaries, and reinforcing them, aiming to prioritize her needs over the needs of others  (DBT and CBT)  Long Term Goal 3 (in the client's own words): "I want to increase comfort with talking about difficult things and conflict "     Stage of Change: Contemplation    Target Date for completion: 09/20/2023     Anticipated therapeutic modalities: CBT and DBT      People identified to complete this goal: Corinne and Cloretta Kehr, MSW, LCSW, CCTP, CDBT  Objective 1: (identify the means of measuring success in meeting the objective):      Corinne plans to work on asserting herself, advocating for herself, and asking for what she needs through healthy and effective forms of communication  (DBT and CBT)      Corinne plans to work on addressing interpersonal relationship conflict in the moment, through verbal communication and decrease texting communications  Corinne plans to consider participating in a family meeting and/or family therapy, and discuss and evaluate this in session, aiming to address barriers in relation to anticipatory anxiety surrounding this  Objective 2: (identify the means of measuring success in meeting the objective):      Corinne plans to explore Internal Family Systems Parts Work, and identify different Parts to her, specifically aiming to increase self-awareness in relation to the Protector Part inside of her that will often avoid  (IFS)      Corinne plans to consider reading the book, "No Bad Parts," and read outside of sessions to process further in session, aiming to increase self-awareness in relation to trauma triggers, patterns, and behaviors in the moment  (IFS, CBT, and DBT)         Corinne plans to follow through with journaling outside of session, and completing assignments discussed in therapy sessions  (DBT, CBT, and IFS)  I am currently under the care of a [facility] psychiatric provider: Yes  My [facility] psychiatric provider is: Deni Flores  I am currently taking psychiatric medications: Yes, but not as prescribed  (explain) Not taking the full dose of the buspar daily, sometimes only taking 5mg BID, rather than TID, due to anxiety  I feel that I will be ready for discharge from mental health care when I reach the following (measurable goal/objective): "I'm not sure, when I no longer feel this way "     For children and adults who have a legal guardian: N/A   Has there been any change to custody orders and/or guardianship status? NA  If yes, attach updated documentation  Behavioral Health Treatment Plan [facility]: Diagnosis and Treatment Plan explained to [employer] acknowledges an understanding of their diagnosis  Kaleb Anderson agrees to this treatment plan  I have been offered a copy of this Treatment Plan  Yes    *Corinne verbally agrees to electronically sign and consent to treatment planning, unable to electronically sign due to technical difficulties  *     Psychotherapy Provided: Individual Psychotherapy 50 minutes  Length of time in session: 50 minutes  Current suicide risk : Low   Behavioral Health Treatment Plan [facility]: Diagnosis and Treatment Plan explained to Allison Deshpande relates understanding diagnosis and is agreeable to Treatment Plan  Yes  Visit start and stop times:    03/20/23       Virtual Regular Visit    Verification of patient location: [place]  Patient is located in the following state in which I hold an active license [place]       Assessment/Plan:    Problem List Items Addressed This Visit        Other    Severe anxiety with panic    Severe episode of recurrent major depressive disorder, without psychotic features ([facility] [address] ) - Primary     Goals addressed in session: Goal 1 Follow up psychotherapy session  Reason for visit is   Chief Complaint   Patient presents with   • Virtual Regular Visit      Encounter provider Татьяна Lopez    Provider located at [place] [address]  [address] [address] [phone]    Recent Visits  No visits were found meeting these conditions  Showing recent visits within past 7 days and meeting all other requirements  Today's Visits  Date Type Provider Dept   23 Telemedicine Татьяна Lopez [facility]   Showing today's visits and meeting all other requirements  Future Appointments  No visits were found meeting these conditions  Showing future appointments within next 150 days and meeting all other requirements       The patient was identified by name and date of birth  Diego Mcburney was informed that this is a telemedicine visit and that the visit is being conducted through the [address] Now platform  She agrees to proceed  My office door was closed  No one else was in the room  She acknowledged consent and understanding of privacy and security of the video platform  The patient has agreed to participate and understands they can discontinue the visit at any time  Patient is aware this is a billable service  Subjective  Diego Mcburney is a 39 y o  female  HPI     Past Medical History:   Diagnosis Date   • Anxiety    • 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[address] )        Past Surgical History:   Procedure Laterality Date   • APPENDECTOMY      extensive lysis of adhesions, MAYURI's procedure (divisions of MAYURI's bands) detorsion of vulvulus, widening of mesenteric pedicle, cecopeexy, appendectomy      • BACK SURGERY Right 2013    SF: L4-L5 hemilaminectomy for discectomy  Use of the microscope for microdissection, Use og 40mg of depo-medrol though thee exiting right L5 nerve root   Onset:13   •  SECTION      x2   • CHOLECYSTECTOMY     • FL INJECTION RIGHT SHOULDER (ARTHROGRAM)  4/19/2022   • GALLBLADDER SURGERY      GV, Onset: 2011   • LAPAROSCOPIC LYSIS INTESTINAL ADHESIONS      onset: 9/23/16   • LUMBAR DISC SURGERY     • IN LAPS ABD PRTM&OMENTUM DX W/WO SPEC BR/WA SPX N/A 9/23/2016    Procedure: LAPAROSCOPY DIAGNOSTIC, EXTENSIVE LYSIS OF ADHESIONS, MAYURI'S PROCEDURE(DIVISION OF MAYURI'S BANDS,DETORSION OF VOLVULUS, WIDENING OF MESENTERIC PEDICLE, CECOPEXY, APPENDECTOMY); Surgeon: Maureen Boyd MD;  Location:  MAIN OR;  Service: General   • TUBAL LIGATION         Current Outpatient Medications   Medication Sig Dispense Refill   • ALPRAZolam (XANAX) 0 25 mg tablet Take 1 tablet (0 25 mg total) by mouth daily as needed for anxiety 30 tablet 0   • busPIRone (BUSPAR) 5 mg tablet take 1 tablet by mouth three times a day 90 tablet 5   • DULoxetine (CYMBALTA) 30 mg delayed release capsule Take 1 capsule (30 mg total) by mouth daily 30 capsule 11   • DULoxetine (CYMBALTA) 60 mg delayed release capsule TAKE 1 CAPSULE BY MOUTH ONCE DAILY WITH 30MG CAPSULE 30 capsule 5   • DULoxetine (CYMBALTA) 60 mg delayed release capsule Take 1 capsule (60 mg total) by mouth 2 (two) times a day 60 capsule 2   • multivitamin (THERAGRAN) TABS Take 1 tablet by mouth daily     • Probiotic Product (PROBIOTIC-10 PO) Take by mouth 1 daily       No current facility-administered medications for this visit  Allergies   Allergen Reactions   • Biaxin [Clarithromycin] GI Intolerance and Other (See Comments)   • Sulfa Antibiotics    • Sulfasalazine    • Venlafaxine GI Intolerance, Vomiting and Other (See Comments)     Category: Allergy; Category: Allergy; Review of Systems    Video Exam    There were no vitals filed for this visit  Physical Exam     (D) Corinne attended her follow up psychotherapy session today  Corinne reported that since her last session, she has noticed ongoing symptoms   Corinne reported that on Tuesday 03/07/23, a day after her session, she got her menstrual cycle  Corinne reported that it lasted for a total of (7) days, ending last Tuesday 03/14/23  Corinne reported that Tuesday 03/07/23 through Thursday 03/09/23 she reported having increased vaginal, reporting that she was going through a super tampon every 1 5 hours  Corinne reported that she had quarter sized clotting throughout that timeframe as well  Corinne reported that she reached out to her OBGYN who suggested that she take ibuprofen to slow down the bleeding and clotting, and reported that the pads assisted with passing the clots  Corinne described herself as feeling physically uncomfortable  Corinne reported that during those days she noticed an increase in depression, and anxiety reporting that she has been tracking this in her menstrual cycle calender  Corinne reported that she started taking the full dose of the buspar on Tuesday 03/07/23, reporting that she was struggling to take it 3x a day, reporting that she started taking 5mg QAM, and 10mg in the afternoon, reporting that 5mg TID wasn't working for her  Corinne reported that she started feeling, "tired and out of it," reporting that she did this for a few weeks, and then went back to only taking 5mg BID  Corinne reported that yesterday she went back to taking 5mg QAM and 10mg in the afternoon  Corinne reported that she doesn't feel that she noticed a difference in the increase in cymbalta, reporting that this was increased (3) weeks ago  Corinne reported that last Thursday she left a message for her psychiatry provider's office, and reports that she hasn't heard back from then yet  Corinne reported that she noticed an increase in depressive symptoms on 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 [employer]  Corinne reports that on days that she is not working she still feels, "down, and blah," but not as bad   Corinne describes struggling with negative thinking traps, cognitive distortions, and harmful core beliefs  Corinne describes fluctuating symptoms, reporting some days she, "feels fine," and other days she feels an increase in symptoms  Corinne reported that the last day she had off from work was a Saturday in February, 02/04/23, giving her three days off total  Corinne reported that she is off every Sunday and [place], yet reports that these days are devoted to shopping, cooking, cleaning, laundry, appointments, and managing her children, and household  Corinne reported that she is scheduled to work from (34-40) hours a week, physically working (34) hours a week or more with clients directly, and the other hours are left for managing the business  Corinne reported that 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, ultimately identifying that this is not a day off  Corinne reported financial stressors, reporting that last week she was left with $68 in her business bank account, identifying increased stress  Corinne reported that she and her  owe $11,000 to the IRS between personal and business taxes  Corinne describes feeling stressed out and overwhelmed, and processed through this  Corinne reported that there are stressors with father living with her, identifying that this is too triggering for her, describing herself as feeling angry, resentful, and upset with him all the time  Corinne reported that there was an issue that occurred prior to her session, reporting that she observed her father drinking alcohol siting in his car  Corinne reported that she sent him a text message, telling him that she is done with him, and doesn't want to talk to him anymore  Corinne reported that she called her brother who communicated that her brother feels like her father shouldn't live with her  Corinne reported that she was upset, triggered, overwhelmed, and believes that him living with her is more of a trigger for her than she realized   Corinne reported that since her father officially retired in January, he is there and present in the house the majority of the time  Corinne reported that since this, she noticed an increase in her mental health symptoms  Corinne described trauma triggers in relation to her father's alcoholism, and reminding her of her mother, along with ongoing unmet emotional needs  Discussed and processed this  Corinne and this writer updated her treatment plan in session today  Corinne and this writer processed this at length  Discussed ongoing skills  Reviewed limits and boundaries  Provided ongoing psychoeducation  Modeled effective forms of communication  (A) Corinne presented as tearful during session  Corinne's mood presented as anxious and depressed, and her affect appeared to be congruent, and tearful  Corinne describes symptoms of grief in relation to symbolic loss  Corinne describes not being able to stop and control worrying, feeling nervous, anxious, and on edge, and fearing as if something awful might happen  Corinne describes symptoms of irritability, poor frustration tolerance, and becoming easily annoyed  Corinne describes feeling bad about herself, feeling like she is letting herself, and her family down  Corinne describes difficulty focusing, having a hard time concentrating on things, and becoming easily distracted  Corinne describes little interest and pleasure in dong things, reporting lower moods of sadness, and depression  Corinne describes sleep disturbances, and feeling tired and having little energy  Corinne describes symptoms of emotional dysregulation, reporting intrusive, ruminating, and repetitive thoughts at times  (P) Corinne agreed to complete a crisis plan next session   Jose Armando Medrano declined a referral for a higher level of care through [facility] [address] today at this time; however, agreed to consider thinking about this and talking it over more with her , outweighing risks verse benefits in order to make informed decisions, aligning choices and decisions with what is in the best interest of her, implementing healthy patterns, and behaviors, aiming to challenge the cycle of co-dependency  This wrier recommended that Corinne switch from virtual sessions to in person sessions, to support her with comfort of participating in psychotherapy in a private office, rather than doing it at home when her father is at home  This writer recommended that [id number] get connected through local [employer] Meetings and [employer], providing in person options or virtual options  Corinne plans to utilize [address] to prioritize her mental health and physical health needs, while giving herself permission to engage in the use of self-care, take time for herself, work on being present in the moment, and considering taking time off from work  Corinne plans to lean into healthy natural supports, reinforce limits and boundaries with healthy and effective forms of communication, asking for what she needs, asserting herself, and advocating for herself  Corinne plans to reach out to [address] [address] again, and leave a second message requesting a call back from the covering provider, as her psychiatric nurse practitioner is currently out on maternity leave, requesting an appointment  Corinne plans to spend time self-reflecting upon limits and boundaries, unmet needs, verses met needs, and examine identifying, associating, honoring, validating, and making space for her feelings, and emotions  Corinne plans to work on decreasing judgement towards herself, actively challenging negative thinking traps, cognitive distortions, and harmful core beliefs, with DBT Wise Mind and CBT Challenging Negative Thoughts Assessment Questions   Corinne plans to increase self-awareness in relation to warning signs and triggers throughout her body, giving herself permission to pause in the moment, and implement coping skills, grounding techniques, sensory related skills, deep breathing techniques, mindfulness/ meditation, distraction techniques, distress tolerance skills, and coping skills to target ongoing symptoms  Corinne plans to outweigh risks verses benefits in order to make informed decisions, and align choices and decisions with what is in the best interest of her  Corinne plans to continue to work on identifying worth within herself, giving herself credit, and implementing grounding statements, positive affirmations, positive self-talk, and self-affirming statements  Corinne plans to reach out for additional support as needed       03/20/23  Start Time: 1130  Stop Time: 1220  Total Visit Time: 50 minutes

## 2023-04-03 ENCOUNTER — SOCIAL WORK (OUTPATIENT)
Dept: BEHAVIORAL/MENTAL HEALTH CLINIC | Facility: CLINIC | Age: 43
End: 2023-04-03

## 2023-04-03 DIAGNOSIS — F33.2 SEVERE EPISODE OF RECURRENT MAJOR DEPRESSIVE DISORDER, WITHOUT PSYCHOTIC FEATURES (HCC): Primary | ICD-10-CM

## 2023-04-03 DIAGNOSIS — F41.0 SEVERE ANXIETY WITH PANIC: ICD-10-CM

## 2023-04-03 NOTE — PSYCH
"Client Name: Marc Galvan       Client YOB: 1980  : 1980    Treatment Team (include name and contact information):     Psychotherapist: Juan Manuel Dahl, MSW, LCSW, CCTP, CDBT, CPD     Psychiatrist: N/A   Release of information completed: N/A    : N/A   Release of information completed: N/A    Other (Specify Role): N/A    Release of information completed: N/A    Other (Specify Role): N/A   Release of information completed: N/A     Healthcare Provider  Kyra Favre, [address] [place] [address]  [phone]    Type of Plan   * Child plans (children 15 yo and younger) must be completed and signed by the child's legal guardian   * Plans for all individuals 15 yo and above must be signed by the client  Plan Type: adolescent/adult (14 and over) Initial    My Personal Strengths are (in the client's own words): \"At work the girls say I am a good boss and a good person  \"   \"I am a hard worker  \"   \"I am a good mom and a good wife  \"   \"I am always there for my kids   \"   \"I am reliable and dependable  \"  \"I am helpful  \"   \"I am an honest person  \"   \"I am loyal \"     The stressors and triggers that may put me at risk are:  being physically tired, loneliness, feeling a lack of control and being treated unfairly  \"Finances, money issues, conflict of any kind, and work  \" \"Dad and his alcoholism  \" \"Being accused of things  \" \"Having a lot on my plate  \"     Coping skills I can use to keep myself calm and safe: Take a shower, Listen to music, Physical activity, Call a friend or family member, Endeavor/meditate, Journal and Increased contact with professional supports  \"Venting to someone  \" \"Using the [employer] Timer dionicio  \" \"Listening to brown noise  \" \"Exercising  \" \"Reading  \" \"Sitting outside on the porch when it is simba outside  \" \"Going for a walk  \" \"Deep breathing, and counting  \"     Coping skills/supports I can use to maintain abstinence from substance use: " "N/A    The people that provide me with help and support: (Include name, contact, and how they can help)     Support person #1: - Howie Maza    * Phone number: [phone]    * How can they help me? \"He can talk me through the situation  He helps me see another side of things  Him agreeing with me is helpful  \"      Support person #2: [id number]     * Phone number: [phone]    * How can they help me? \"She just talks me through situations  She asks questions  She is there for me and supports me  \"      Support person #3: (Doesn't identify a third person)  * Phone number: N/A    * How can they help me? N/A    In the past, the following has helped me in times of crisis:    Being in a quiet space, Being with other people, Taking medications, Talking to a professional on the telephone, Calling a friend, Calling a family member, Taking a walk or exercising, Breathing exercises (or other mindfulness-based activities), Praying or meditating and Using de-escalation tools that I have learned      If it is an emergency and you need immediate help, call     If there is a possibility of danger to yourself or others, call the following crisis hotline resources:     Adult Crisis Numbers  Suicide Prevention Hotline - Dial   [facility]: [address]: [address]: [address]: [address] [place] [address] Avenue: [address] [place] [address] Street: [address] [place] [address] Avenue: [address] [place] [address] St: [phone] (daytime)  [phone] (after hours, weekends, holidays)     Child/Adolescent Crisis Numbers   [facility]: [address]: [phone]   [facility]: [phone]   [facility]: [phone]    Please note: Some counties do not have a separate number for Child/Adolescent specific crisis   If your county is not listed under Child/Adolescent, please call the adult " number for your [facility]     National Talk to Text Line   All Ages - [phone]    In the event your feelings become unmanageable, and you cannot reach your support system, you will call 911 immediately or go to the nearest hospital emergency room  *Corinne created her treatment plan in session  Was unable to electronically sign due to technical difficulties  *     Psychotherapy Provided: Individual Psychotherapy 85 minutes  Length of time in session: 85 minutes  Goals addressed in session: Goal 1 Follow up psychotherapy session  Current suicide risk : Low     Behavioral Health Treatment Plan [facility]: Diagnosis and Treatment Plan explained to Magda Petit relates understanding diagnosis and is agreeable to Treatment Plan  Yes     Visit start and stop times:    04/03/23  Start Time: 1050  Stop Time: 1215  Total Visit Time: 85 minutes    (D) Corinne attended her follow up psychotherapy session today  Corinne reported that since her last session, she has noticed a decrease in symptoms  Corinne reported that over the past week, she has noticed some sleep issues at night, reporting that she gets really tired, and is able to fall asleep  Corinne reported that she has been waking up throughout the night, and had a harder time falling back asleep at night  Corinne reported that she was waking up to go to the bathroom, and also sleeping in a different bed at her [place], compared to her bed at home  Corinne reported that when she wakes up at night, she reports some anxiety with intrusive thoughts, thinking about there daughter, and checking her phone  Corinne reported wondering if her medicine is contributing to this, and plans to monitor this over the next two weeks when she is sleeping in her own bed at home  Discussed and processed this   Corinne reported that she has been having physical health issues, describing different aches and pains throughout her body, reporting that her left shoulder has been bothering "her, and her right shoulder has the tear, that she reports getting massages to as sit with this  Corinne discussed some neck pain, attributing this to working as a  for may years, while also wondering if stress is contributing to this  Discussed and processed this  Corinne reported that there have been ongoing interpersonal relationship stressors between her and her father  Corinne reported that her father has been insistent that Corinne didn't see her father drinking alcohol, when she believes that she did  Corinne reported that she started to question herself, wondering if it was soda, over a beer, questioning her own reality, then feeling shame, and guilt  Corinne discussed ongoing triggers in relation to childhood trauma, reporting that she doesn't have many memories of her parents living together as a child, only identifying a few which she reports were negative  Corinne reported that she remembers going to visit her father at his house after her parents divorce, remembering him taking her and her brother places, and being the   Corinne reported that when her father talks about her childhood, she doesn't have these memories, wishing her mother was here to verify facts  Corinne describes her father as being self-consumed, only ever talking about himself, and her perception of him, \"living his glory days when he played football in school  \" Juan J Sena reported that recently her father brought up a birthday party he said they had for her at a roller skating rink, and Corinne reported having no memory of this  Corinne reported that her brother confirmed that that birthday party was for one of his girlfriend's children, not her, triggering her  Roxanna describes beliefs that her father has always chosen alcohol and women over anybody else in his life   Corinne reported that her and her father haven't been communicating with one another in the past two weeks, identifying feeling like she doesn't " have anything to say to him  Corinne describes stressors with her father living with her  Corinne describes a history of patterns, themes, and behaviors of her parents letting her down, struggling with mental health, substance/ addiction, and described repeated unmet needs  Discussed and processed this  Corinne spent time discussing stressors with her son, who struggles with ADHD, specifically struggling with budgeting, finances, working, and school, identifying this as triggering for [employer]  Corinne and this writer processed this at length  Discussed ongoing skills  Reviewed limits and boundaries  Provided ongoing psychoeducation  Modeled effective forms of communication  (A) Corinne presented with good eye contact  Corinne presented as alert and oriented x3  Corinne's speech presented at a normal rate, volume, and rhythm  Corinne denies any symptoms of frank  Corinne denies any evident or immediate risk factors for self-harm, SI, or HI  Corinne's mood presented as anxious, and slightly down, and her affect appeared to be congruent, and tearful at times  Corinne describes symptoms of feeling nervous, anxious, and on edge  Corinne describes symptoms of irritability, poor frustration tolerance, becoming easily annoyed, and emotional dysregulation  Corinne describes symptoms of grief in relation to symbolic loss  Corinne describes sleep disturbances, feeling tired and having little energy, and describes difficulty focusing, having a hard time concentrating on things, and becoming easily annoyed  (P) During session today, Corinne and this writer updated her Crisis Plan  Please see above  Corinne plans to outweigh risks verses benefits in relation to having a family meeting with her father, and align choices and decisions with what is in the best interest of her   Corinne plans to examine unmet needs, explore limits and boundaries for herself, and challenge the cycle of co-dependency, reinforcing limits and "boundaries with healthy and effective forms of communication, while implementing healthy patterns, and behaviors  Corinne plans to prioritize her needs, lean into healthy natural supports, take time for herself, and engage in self-care, with DBT Opposite Action Skills  Corinne plans to consider reading the book, \"The Body Keep Score,\" to increase self-awareness in relation to how the body stores trauma, and psychosomatic symptoms to journal upon, identifying areas she connects with verses areas that she doesn't connect with  Corinne plans to target ongoing symptoms with continued skill use, demonstrate bogdan and compassion towards herself, monitor and track her symptoms, cycles, and stressors, and implement grounding statements, positive affirmations, and positive self-talk  Corinne plans to challenge negative thoughts, cognitive distortions, and harmful core beliefs with DBT Wise Mind, and CBT Challenging Negative Thoughts Assessment Questions  Corinne plans to continue to read outside of session, journal to honor her feelings, and emotions, validate them, and increase self-awareness in relation to warning signs and triggers  Corinne plans to reach out for additional support as needed       04/03/23  Start Time: 1050  Stop Time: 2685  Total Visit Time: 85 minutes  "

## 2023-04-10 PROBLEM — F41.1 GAD (GENERALIZED ANXIETY DISORDER): Status: ACTIVE | Noted: 2019-09-17

## 2023-04-17 ENCOUNTER — SOCIAL WORK (OUTPATIENT)
Dept: BEHAVIORAL/MENTAL HEALTH CLINIC | Facility: CLINIC | Age: 43
End: 2023-04-17

## 2023-04-17 DIAGNOSIS — F33.2 SEVERE EPISODE OF RECURRENT MAJOR DEPRESSIVE DISORDER, WITHOUT PSYCHOTIC FEATURES (HCC): Primary | ICD-10-CM

## 2023-04-17 DIAGNOSIS — F41.1 GAD (GENERALIZED ANXIETY DISORDER): ICD-10-CM

## 2023-04-17 NOTE — PSYCH
Psychotherapy Provided: Individual Psychotherapy 105 minutes  Length of time in session: 105 minutes  Goals addressed in session: Goal 1 Follow up psychotherapy session- family session  Current suicide risk : Low     Behavioral Health Treatment Plan [facility]: Diagnosis and Treatment Plan explained to Natividad Crenshaw relates understanding diagnosis and is agreeable to Treatment Plan  Yes  Visit start and stop times:    04/17/23        (D) Corinne attended her follow up psychotherapy session today, in preparation for her family meeting with her father  The first (20) minutes of the appointment was done with Corinne alone, before her father was invited into the meeting for a family session, discussing and evaluating Corinne's overall goals for the session, desires, boundaries, and limits  At the beginning of the family session, limits, boundaries, and expectations were reviewed, and mutually agreed upon by all parties  During the session today, John Mcmillan was able to utilize healthy and effective forms of communication to express her concerns about her father's alcoholism, the impacts it is having on her overall mental health, and comfort in her family home with him residing there  Corinne discussed past family trauma in relation to both her mother and father's alcoholism, messages that she received about herself in relation to this, resulting in conditioned core beliefs, and feelings, and emotions surrounding losing her mother to alcoholism  Corinne expressed her frustration, anger, disappointment, and hurt in relation to this, desiring for things to be different, and her doubts that her father has the capacity to be honest, sober, and acknowledge his issues, and her perception of the harm it has cause  Corinne's father listened to her, and at times the both of them discussed specific instances, memories, perception, and individual experiences   During the session Corinne's father acknowledged his issues with drinking alcohol, identifying feeling like although he has improved, he was unaware of the harmful impacts it has on Corinne, discussing he often experiences Corinne as distant, cold, and feels that she wants nothing to do with him  Corinne's father communicated feeling isolated from the family, feeling like people in the house don't communicate with him, leaving him feeling like he doesn't belong  Corinne described her distancing herself, isolating from him, as an attempt to have boundaries for herself, with her perception on him not being honest and struggle with alcoholism  Corinne, her father, and this writer processed this at length  Provided ongoing psychoeducation  Modeled effective forms of communication  Discussed ongoing skills  Reviewed limits and boundaries  (A) Corinne's mood presented as anxious and depressed, and her affect appeared to be tearful throughout the session  Corinne presented with good eye contact  Corinne presented as alert and oriented x3  Corinne's speech presented at a normal rate, volume, and rhythm  Corinne denies any symptoms of frank  Corinne denies any evident or immediate risk factors for self-harm, SI, or HI  Corinne reported an overall reduction in the intensity and frequency of her symptoms, cycles, and stressors since last session, identifying feeling like she is adjusting to her psychiatric medication  Corinne describes feeling nervous, anxious, and on edge, worrying too much about different things, and reports symptoms of irritability, poor frustration tolerance, and becoming easily annoyed  (P) Corinne plans to give herself permission to take space, honoring her feelings, and emotions, validating them, making space for them, while identifying, associating, and expressing them through healthy and effective forms of communication   Corinne plans to prioritize her needs, reinforce limits and boundaries, lean into healthy natural supports, engage in the use of self-care, take time for herself, work on being present, implement radical compassion, and extend bogdan towards herself  Corinne plans to implement grounding statements, positive affirmations, positive self-talk, and self-affirming statements, actively working on giving herself credit, and identifying worth within herself  Corinne plans to work on decreasing judgement towards herself, and actively challenging negative thoughts, cognitive distortions, harmful core beliefs, and negative thinking traps with previously identified CBT and DBT skills  Corinne plans to outweigh risks verses benefits in order to make informed decisions, implement radical acceptance, and align choices and decisions with what is in the best interest of her  Corinne plans to consider ongoing family sessions, verses an alternative family therapist, while her father plans to purse individual counseling on his own  Corinne plans to reach out for additional support as needed       04/17/23  Start Time: 1045  Stop Time: 5644  Total Visit Time: 60 minutes

## 2023-05-02 ENCOUNTER — TELEMEDICINE (OUTPATIENT)
Dept: BEHAVIORAL/MENTAL HEALTH CLINIC | Facility: CLINIC | Age: 43
End: 2023-05-02

## 2023-05-02 DIAGNOSIS — F41.1 GAD (GENERALIZED ANXIETY DISORDER): Primary | ICD-10-CM

## 2023-05-02 DIAGNOSIS — F33.2 SEVERE EPISODE OF RECURRENT MAJOR DEPRESSIVE DISORDER, WITHOUT PSYCHOTIC FEATURES (HCC): ICD-10-CM

## 2023-05-02 NOTE — PSYCH
Psychotherapy Provided: Individual Psychotherapy 50 minutes  Length of time in session: 50 minutes  Current suicide risk : Low   Behavioral Health Treatment Plan [facility]: Diagnosis and Treatment Plan explained to Eduardo Iyer relates understanding diagnosis and is agreeable to Treatment Plan  Yes  Visit start and stop times:    05/02/23       Virtual Regular Visit    Verification of patient location: [place]  Patient is located at Home in the following state in which I hold an active license [place]  Assessment/Plan:    Problem List Items Addressed This Visit        Other    NAVI (generalized anxiety disorder) - Primary    Severe episode of recurrent major depressive disorder, without psychotic features ([facility] [address] )     Goals addressed in session: Goal 1 Follow up psychotherapy session  Reason for visit is   Chief Complaint   Patient presents with    Virtual Regular Visit        Encounter provider Conner Gilmore    Provider located at [address] [address] [phone]      Recent Visits  No visits were found meeting these conditions  Showing recent visits within past 7 days and meeting all other requirements  Today's Visits  Date Type Provider Dept   05/02/23 Telemedicine Conner Gilmore [facility]   Showing today's visits and meeting all other requirements  Future Appointments  No visits were found meeting these conditions  Showing future appointments within next 150 days and meeting all other requirements     The patient was identified by name and date of birth  Adeline Skinner was informed that this is a telemedicine visit and that the visit is being conducted through the [facility] Now platform  She agrees to proceed  My office door was closed  No one else was in the room    She acknowledged consent and understanding of privacy and security of the video platform  The patient has agreed to participate and understands they can discontinue the visit at any time  Patient is aware this is a billable service  Subjective  Bigg Granado is a 39 y o  female  HPI     Past Medical History:   Diagnosis Date    Anxiety     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[address] )        Past Surgical History:   Procedure Laterality Date    APPENDECTOMY      extensive lysis of adhesions, MAYURI's procedure (divisions of MAYURI's bands) detorsion of vulvulus, widening of mesenteric pedicle, cecopeexy, appendectomy       BACK SURGERY Right 2013    SF: L4-L5 hemilaminectomy for discectomy  Use of the microscope for microdissection, Use og 40mg of depo-medrol though thee exiting right L5 nerve root  Onset:13     SECTION      x2    CHOLECYSTECTOMY      FL INJECTION RIGHT SHOULDER (ARTHROGRAM)  2022    GALLBLADDER SURGERY      GV, Onset:     LAPAROSCOPIC LYSIS INTESTINAL ADHESIONS      onset: 15    LUMBAR DISC SURGERY      PA LAPS ABD PRTM&OMENTUM DX W/WO SPEC BR/WA SPX N/A 2016    Procedure: LAPAROSCOPY DIAGNOSTIC, EXTENSIVE LYSIS OF ADHESIONS, MAYURI'S PROCEDURE(DIVISION OF MAYURI'S BANDS,DETORSION OF VOLVULUS, WIDENING OF MESENTERIC PEDICLE, CECOPEXY, APPENDECTOMY); Surgeon: Edda Parry MD;  Location:  MAIN OR;  Service: General    TUBAL LIGATION         Current Outpatient Medications   Medication Sig Dispense Refill    ALPRAZolam (XANAX) 0 25 mg tablet Take 1 tablet (0 25 mg total) by mouth daily as needed for anxiety 30 tablet 0    busPIRone (BUSPAR) 5 mg tablet take 1 tablet by mouth three times a day 90 tablet 5    DULoxetine (CYMBALTA) 60 mg delayed release capsule Take 1 capsule (60 mg total) by mouth 2 (two) times a day 60 capsule 2    multivitamin (THERAGRAN) TABS Take 1 tablet by mouth daily      Probiotic Product (PROBIOTIC-10 PO) Take by mouth 1 daily       No current facility-administered medications for this visit  "       Allergies   Allergen Reactions    Biaxin [Clarithromycin] GI Intolerance and Other (See Comments)    Sulfa Antibiotics     Sulfasalazine     Venlafaxine GI Intolerance, Vomiting and Other (See Comments)     Category: Allergy; Category: Allergy; Review of Systems    Video Exam    There were no vitals filed for this visit  Physical Exam     (D) Corinne attended her follow up psychotherapy session today  Corinne reported that since her last session, she has noticed ongoing symptoms  Corinne reported that after her family session, she described herself as feeling, \"emotionally exhausted and drained  \" Kenzie Liao reported that there were parts of her that felt relieved with being able to verbalize her concerns, and express her feelings, and needs in the meeting, and also described herself as feeling guarded, reserved, fearful, and doubtful that her father with follow through with sobriety, treatment, or any real change  Corinne discussed varying evidence from the past that supports her belief system in having more doubts  Corinne describes feeling somewhat more comfortable with being at home right now, with them living together, and also describes some frustrations  Corinne reported that she observes her father as being more communicative, and making more efforts to engage with her, and describes herself as feeling a lack of desire to do so, not trusting him  Corinne described anticipatory anxiety in relation to worrying that her father believes that everything is okay and normal, after one session, yet also describing feeling unsure of what her expectations would be in order to work on a relationship with her father- identifying that she feels like she doesn't want to at this time   Corinne describes herself as isolating herself from her father, distancing herself with limits and boundaries, identifying this as a measure sh is taking in attempts to protect herself from potentially being let down, and " hurt by her father  Corinne describes a long standing history of unmet needs with her father, describing his history and impacts alcohol has had on his life, marriages, and relationships  Corinne discussed stressors with the living situation, her father living with them due to him not having place to stay, and also her and her  being financially dependent upon his rent each month with the financial hardships they are facing right now  Corinne reported that she feels that it would be better if they lived apart, and perceives this as not being feasible on either end at this time  Corinne describes missing her mother, wishing that she could call her, while identifying that even when she would reach out for her mother for support, she believed that her mother would make it about herself  Corinne describes feeling a lack of emotional support from those around her, identifying herself as the caretaker, desiring for that to be reciprocated, in an emotional capacity  Corinne and this writer processed this  Discussed ongoing skills  Reviewed limits and boundaries  Modeled effective forms of communication  Provided ongoing psychoeducation  (A) Corinne denies any symptoms of frank  Corinne denies any evident or immediate risk factors for self-harm, SI, or HI  Corinne presented with good eye contact  Corinne presented as alert and oriented x3  Corinne's speech presented at a normal rate, volume, and rhythm  Corinne's mood presented as anxious, and slightly down, and her affect appeared to be congruent, and tearful at times  Corinne describes worrying too much about different things, and feeling nervous, anxious, and on edge  Corinne describes symptoms of irritability, poor frustration tolerance, and becoming easily annoyed  Corinne describes feeling tired and having little energy  Corinne describes symptoms of obsessive, intrusive, ruminating, and repetitive thoughts   Corinne describes a reduction in the intensity and frequency of her symptoms, cycles, and stressors since her last session  Corinne describes symptoms of grief in relation to actual loss and various forms of symbolic loss  (P) Corinne plans to consider exploring Al-Anon 12 Step Meetings, and/or supports virtually or in person  Corinne plans to utilize DBT Opposite Action Skills to reach out to natural supports, and give herself permission to build emotional intimacy, specifically through the concept of friendships  Corinne plans to actively seek out evidence to challenge negative thoughts, cognitive distortions, and harmful core beliefs, with DBT Wise Mind, and CBT Challenging Negative Thoughts Assessment Questions  Corinne plans to work on decreasing judgement towards herself  Corinne plans to implement radical compassion, demonstrating bogdan towards herself, giving herself credit, and identifying worth within herself  Corinne plans to prioritize her mental health needs, engage in the use of self-care, and take time for herself, and work on being present  Corinne plans to target ongoing symptoms with continued skill use  Corinne plans to assert herself, advocate for herself, ask for what she needs, and target interpersonal relationship stressors with healthy and effective forms of communication  Corinne plan to outweigh risks verses benefits in order to make informed decisions, and align choices and decisions with what is in the best interest of her, implementing radical acceptance  Corinne plans to implement grounding statements, positive affirmations, positive self-talk, and self-affirming statements  Corinne plans to journal outside of session, identifying, associating, honoring, validating, and making space for her feelings, and emotions, examining patterns, themes, and behaviors, increasing self-awareness in relation to this  Corinne plans to consider participating in a bible study, to increase social interactions, and engagements with others   Corinne plans to work on self-reflecting outside of session, what she needs from her father, in order to increase self-awareness in relation to expectations, boundaries, and limits for their relationship to process further next session  Corinne plans to reach out for additional support as needed       05/02/23  Start Time: 0945  Stop Time: 5563  Total Visit Time: 50 minutes

## 2023-05-15 ENCOUNTER — SOCIAL WORK (OUTPATIENT)
Dept: BEHAVIORAL/MENTAL HEALTH CLINIC | Facility: CLINIC | Age: 43
End: 2023-05-15

## 2023-05-15 DIAGNOSIS — F32.2 CURRENT SEVERE EPISODE OF MAJOR DEPRESSIVE DISORDER WITHOUT PSYCHOTIC FEATURES WITHOUT PRIOR EPISODE (HCC): ICD-10-CM

## 2023-05-15 DIAGNOSIS — F41.1 GAD (GENERALIZED ANXIETY DISORDER): Primary | ICD-10-CM

## 2023-05-15 DIAGNOSIS — F33.2 SEVERE EPISODE OF RECURRENT MAJOR DEPRESSIVE DISORDER, WITHOUT PSYCHOTIC FEATURES (HCC): ICD-10-CM

## 2023-05-15 RX ORDER — DULOXETIN HYDROCHLORIDE 60 MG/1
60 CAPSULE, DELAYED RELEASE ORAL 2 TIMES DAILY
Qty: 180 CAPSULE | Refills: 2 | Status: SHIPPED | OUTPATIENT
Start: 2023-05-15

## 2023-05-15 NOTE — PSYCH
Psychotherapy Provided: Individual Psychotherapy 58 minutes  Length of time in session: 58 minutes  Goals addressed in session: Goal 1 Follow up psychotherapy session  Current suicide risk : Low   Behavioral Health Treatment Plan [facility]: Diagnosis and Treatment Plan explained to Florence Patel relates understanding diagnosis and is agreeable to Treatment Plan  Visit start and stop times:    05/15/23  Start Time: 1100  Stop Time: 1208  Total Visit Time: 68 minutes    (D) Corinne attended her follow up psychotherapy session today  Roxanna reported that since her last session, she has noticed ongoing symptoms  Corinne reported that the past (2) weeks she has noticed a decrease in anxiety surrounding finances with the business, reporting that 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 with the expansion in the [employer]  Corinne reported that with the increase in revenue she was able to make an extra $500 a 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, and is hoping to continue to do this  Corinne describes anticipatory anxiety in relation to business decisions, her schedule, and managing multiple responsibilities  Corinne reported that her  is starting a side business, and reported that she has been helping him with the logistics, discussing stressors in relation to this  Corinne reported that she is going to need to cut back her work schedule, with her daughter making the travel soccer team, and reported that she will have practice more, and games, that she will need to be available for  Corinne reported that she will need to adjust her schedule at work with this, and discussed anxiety in relation to doing this  Corinne reported that she has struggled with anticipatory anxiety in relation to [place], loving [place] space, and also fearing that they will lose it from a financial standpoint  Corinne discussed ruminating, repetitive, and obsessive thoughts surrounding this  Discussed and processed this  Corinne discussed stressors in relation to her son Jase Parikh, reporting that she got an e-mail from the school guidance counselor, describing depressive episodes, and lack of motivation  Corinne reported that her son is questioning his mental health diagnosis, reporting that she is frustrated with his current providers  Corinne reported that she read her son's journal, and reported that she read about her son's passive suicidal thoughts, describing triggers, and transference  Corinne describes having a desire to help him, and feeling overwhelmed with the process  Corinne and this writer processed this at length  Discussed ongoing skills  Reviewed limits and boundaries  Provided ongoing psychoeducation  Modeled effective forms of communication  (A) Corinne describes a slight reduction in the intensity and frequency of her symptoms since her last session  Corinne describes feeling nervous, anxious, and on edge  Corinne describes symptoms of irritability, poor frustration tolerance, and becoming easily annoyed  Corinne describes feeling tired and having little energy  Corinne's mood presented as anxious, and her affect appeared to be congruent  Corinne presented as alert and oriented x3  Corinne presented with good eye contact  Corinne's speech presented at a normal rate, volume, and rhythm  Corinne denies any symptoms of frank  Corinne denies any evident or immediate risk factors for self-harm, SI, or HI  (P) This writer provided information to [employer] on [employer] for her son to bring up to her son's psychiatrist, and also to explore for herself  This writer provided Eli Huddleston with (2) referrals for psychiatry and therapy for her son, per her request  Corinne plans to work on actively giving herself credit, identifying worth within herself, demonstrating radical compassion, and extending bogdan towards herself   Corinne plans to utilize DBT Opposite Action Skills, decreasing judgement towards herself, and actively challenging negative thoughts, cognitive distortions, negative thinking traps, and harmful core beliefs with DBT Wise Mind, and CBT Challenging Negative Thoughts Assessment Questions  Corinne plans to implement radical acceptance, outweighing risks verses benefits in order to make informed decisions, and align choices and decisions with what is in the best interest of hers, prioritizing her mental health needs, leaning into natural supports, engaging in the use of self-care, and taking time for herself  Corinne plans to journal outside of session, self-reflecting, and identifying, associating, honoring, validating, and making space for her feelings, and emotions  Corinne plans to reinforce limits and boundaries, implementing healthy patterns, and behaviors, aiming to challenge the cycle of co-dependency, and utilize healthy and effective forms of communication to do so  Corinne plans to reach out for additional support as needed       05/15/23  Start Time: 1100  Stop Time: 8830  Total Visit Time: 68 minutes

## 2023-05-16 ENCOUNTER — TELEPHONE (OUTPATIENT)
Dept: PSYCHIATRY | Facility: CLINIC | Age: 43
End: 2023-05-16

## 2023-05-16 NOTE — TELEPHONE ENCOUNTER
Patient was calling to switch her appt on 6/5/2023 to a virtual appt, writer transferred caller to [facility]

## 2023-05-31 NOTE — PSYCH
Virtual Regular Visit    Problem List Items Addressed This Visit    None    Reason for visit is   Chief Complaint   Patient presents with   • Medication Management     Encounter provider [address]    Provider located at [address]  [facility]    [address] [address] [address]  [facility] [id number] [phone]    Recent Visits  No visits were found meeting these conditions  Showing recent visits within past 7 days and meeting all other requirements  Today's Visits  Date Type Provider Dept   06/05/23 Office Visit [address], CRNP [facility]   Showing today's visits and meeting all other requirements  Future Appointments  No visits were found meeting these conditions  Showing future appointments within next 150 days and meeting all other requirements       After connecting through [employer], the patient was identified by name and date of birth  Yvonne Zamora was informed that this is a telemedicine visit and that the visit is being conducted through the [address] [place] [address] Now platform  She agrees to proceed  which may not be secure and therefore, might not be HIPAA-compliant  My office door was closed  No one else was in the room  She acknowledged consent and understanding of privacy and security of the video platform  The patient has agreed to participate and understands they can discontinue the visit at any time          MEDICATION MANAGEMENT NOTE        [facility]      Name and Date of Birth:  Yvonne Zamora 39 y o  1980 MRN: [id number]    Date of Visit: June 5, 2023    Reason for Visit:   Chief Complaint   Patient presents with   • Medication Management         SUBJECTIVE:    Yvonne Zamora is a 39 y o  female with past psychiatric history significant for Major Depressive Disorder and Generalized Anxiety Disorder who was personally seen and evaluated today at the [place] [address] [facility] outpatient clinic for follow-up and medication management  She presents as euthymic, pleasant, cooperative, calm  Her thoughts are organized, goal directed and completes psychiatric assessment without difficulty  Corinne endorses compliance with psychotropic medication regimen that consists of Cymbalta, Buspar and Xanax  She denies any current adverse medication side effects  At previous outpatient psychiatric appointment with this writer, Cymbalta dose was increased  On evaluation today, Corinne endorses overall improvement in depression and anxiety symptoms over the past several months, with decreased severity and frequency of depressed mood, anhedonia, and concentration impairment  Sleep, appetite, energy, and motivation have gradually improved  No SI  Anxiety has overall improved and she is unable to recall the last time she experienced a panic episode  She says it has been at least several months  She no longer feels nervous with constant worrying on a daily basis  She sometimes becomes a little more anxious and irritable the week before menses starts, but this has overall decreased in severity  She has not needed to utilize prn Xanax over the past several months  She reports mild fatigue when medication dose was first increased but otherwise denies side effects  Anxiety and depression symptoms persisted for about 3 weeks following medication increase, then she noticed significant symptom improvement  Corinne denies any further concerns today  Current Rating Scores:     None completed today      Review Of Systems:      Constitutional negative   ENT negative   Cardiovascular negative   Respiratory negative   Gastrointestinal negative   Genitourinary negative   Musculoskeletal negative   Integumentary negative   Neurological negative   Endocrine negative   Other Symptoms none, all other systems are negative       Historical information: (unchanged information from previous note copied and italicized) - Information that is bolded has been updated  Past Psychiatric History:    Past Inpatient Psychiatric Treatment:   No history of past inpatient psychiatric admissions  Past Outpatient Psychiatric Treatment:    Was in outpatient treatment in the past with a family physician for psychiatric issues  Past Suicide Attempts: no  Past Violent Behavior: no  Past Psychiatric Medication Trials: Prozac, Zoloft, Paxil, Effexor XR, Cymbalta, Buspar and Xanax Xanax XR  GI SEs with Effexor, unable to recall details regarding other past medication trials  Current psychiatric medications: Cymbalta 90 mg daily, Buspar 5 mg tid, Xanax 0 5 mg bid prn        Traumatic History:    Abuse: no history of physical or sexual abuse, no history of emotional abuse  Other Traumatic Events: found mom in her home  in  several days after she passed away, had frequent intrusive thoughts related to this after the incident       Family Psychiatric History: Mother-Bipolar, anxiety, alcoholism  Father-alcohol use, likely MH issues, unsure of diagnoses  Brother-up and down moods but not diagnosed  Son-depression, anxiety, ADHD  On Zoloft and methylphenidate     FH of suicide-denies      Substance Abuse History:   Tobacco/alcohol/caffeine: Denies alcohol use, Denies tobacco use, Caffeine intake: 3 cups of caffeinated coffee per day(s)   Nicotine use socially in past, etoh use socially in past    After had son would break out in hives if drank alcohol, no longer drinks  Illicit drugs: Denies history of illicit drug use   Tried medical marijuana once, made anxiety worse     Social History:    Developmental: Denies a history of milestone/developmental delay  Denies a history of in-utero exposure to toxins/illicit substances  There is no documented history of IEP or need for special education    Education: technical college   Owns [employer], recently opened in   Living arrangement, social support: , daughter, son and father son 16, daughter 15  Dad living with since 2019, has been a stressor, dad with etoh use   Access to firearms:  has gun locked up  Denies direct access to weapons/firearms  Past Medical History:    Past Medical History:   Diagnosis Date   • Anxiety    • 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[address] )         Past Surgical History:   Procedure Laterality Date   • APPENDECTOMY      extensive lysis of adhesions, MAYURI's procedure (divisions of MAYURI's bands) detorsion of vulvulus, widening of mesenteric pedicle, cecopeexy, appendectomy      • BACK SURGERY Right 2013    SF: L4-L5 hemilaminectomy for discectomy  Use of the microscope for microdissection, Use og 40mg of depo-medrol though thee exiting right L5 nerve root  Onset:12   •  SECTION      x2   • CHOLECYSTECTOMY     • FL INJECTION RIGHT SHOULDER (ARTHROGRAM)  2022   • GALLBLADDER SURGERY      [facility], Onset:    • LAPAROSCOPIC LYSIS INTESTINAL ADHESIONS      onset: 15   • LUMBAR DISC SURGERY     • LA LAPS ABD PRTM&OMENTUM DX W/WO SPEC BR/WA SPX N/A 2016    Procedure: LAPAROSCOPY DIAGNOSTIC, EXTENSIVE LYSIS OF ADHESIONS, MAYURI'S PROCEDURE(DIVISION OF MAYURI'S BANDS,DETORSION OF VOLVULUS, WIDENING OF MESENTERIC PEDICLE, CECOPEXY, APPENDECTOMY); Surgeon: Reggie Littlejohn MD;  Location: [facility] OR;  Service: General   • TUBAL LIGATION       Allergies   Allergen Reactions   • Biaxin [Clarithromycin] GI Intolerance and Other (See Comments)   • Sulfa Antibiotics    • Sulfasalazine    • Venlafaxine GI Intolerance, Vomiting and Other (See Comments)     Category: Allergy; Category:  Allergy;        Substance Abuse History:    Social History     Substance and Sexual Activity   Alcohol Use Not Currently     Social History     Substance and Sexual Activity   Drug Use No       Social History:    Social History     Socioeconomic History   • Marital status: /Civil Union     Spouse name: Not on file   • Number of children: Not on file   • Years of education: 15   • Highest education level: Not on file   Occupational History   • Occupation: [employer]   Tobacco Use   • Smoking status: Former     Packs/day: 0 25     Years: 5 00     Total pack years: 1 25     Types: Cigarettes     Quit date: 2009     Years since quittin 4   • Smokeless tobacco: Never   Vaping Use   • Vaping Use: Never used   Substance and Sexual Activity   • Alcohol use: Not Currently   • Drug use: No   • Sexual activity: Yes     Partners: Male     Birth control/protection: None   Other Topics Concern   • Not on file   Social History Narrative    Participates in activities inside and outside of home    Lives with family    Supportive and safe    No advance directives     Social Determinants of Health     Financial Resource Strain: Not on file   Food Insecurity: Not on file   Transportation Needs: No Transportation Needs (2021)    PRAPARE - Transportation    • Lack of Transportation (Medical): No    • Lack of Transportation (Non-Medical):  No   Physical Activity: Not on file   Stress: Not on file   Social Connections: Not on file   Intimate Partner Violence: Not At Risk (10/12/2020)    Humiliation, Afraid, Rape, and Kick questionnaire    • Fear of Current or Ex-Partner: No    • Emotionally Abused: No    • Physically Abused: No    • Sexually Abused: No   Housing Stability: Not on file       Family Psychiatric History:     Family History   Problem Relation Age of Onset   • Mental illness Mother         bipolar/anxiety   • Stroke Mother    • Heart disease Mother    • Depression Mother    • Bipolar disorder Mother    • Hypertension Father    • Stroke Father    • Seizures Brother         epilepsy   • Heart attack Other    • Scleroderma Other    • Stroke Other 78        stroke syndrome   • Heart attack Other    • Heart disease Family    • Substance Abuse Neg Hx    • Breast cancer Neg Hx    • Ovarian cancer Neg Hx    • Uterine cancer Neg Hx    • Colon cancer Neg Hx        History Review: The following portions of the patient's history were reviewed and updated as appropriate: allergies, current medications, past family history, past medical history, past social history, past surgical history and problem list          OBJECTIVE:     Vital signs in last 24 hours: There were no vitals filed for this visit  Mental Status Evaluation:    Appearance age appropriate, casually dressed   Behavior cooperative, calm   Speech normal rate, normal volume, normal pitch   Mood euthymic   Affect normal range and intensity, appropriate   Thought Processes organized, goal directed   Associations intact associations   Thought Content no overt delusions   Perceptual Disturbances: no auditory hallucinations, no visual hallucinations   Abnormal Thoughts  Risk Potential Suicidal ideation - None  Homicidal ideation - None  Potential for aggression - No   Orientation oriented to person, place, time/date and situation   Memory recent and remote memory grossly intact   Consciousness alert and awake   Attention Span Concentration Span attention span and concentration are age appropriate   Intellect appears to be of average intelligence   Insight intact   Judgement intact   Muscle Strength and  Gait unable to assess today due to virtual visit   Motor activity unable to assess today due to virtual visit   Language no difficulty naming common objects, no difficulty repeating a phrase, no difficulty writing a sentence   Fund of Knowledge adequate knowledge of current events  adequate fund of knowledge regarding past history  adequate fund of knowledge regarding vocabulary    Pain none   Pain Scale 0       Laboratory Results: I have personally reviewed all pertinent laboratory/tests results    Recent Labs (last 2 months):   No visits with results within 2 Month(s) from this visit     Latest known visit with results is:   Office Visit on 11/07/2022   Component Date Value   • Diagnosis: 11/07/2022 Comment    • Specimen Adequacy 11/07/2022 Comment    • Clinician Provided ICD10 11/07/2022 Comment    • Performed by: 11/07/2022 Comment    • SL AMB   11/07/2022   • Note: 11/07/2022 Comment    • Test Methodology: 11/07/2022 Comment    • HPV Aptima 11/07/2022 Negative    • HPV GENOTYPE REFLEX 11/07/2022 Comment        Suicide/Homicide Risk Assessment:    The following interventions are recommended: no intervention changes needed      Lethality Statement:    Based on today's assessment and clinical criteria, Corinne S Kulp contracts for safety and is not an imminent risk of harm to self or others  Outpatient level of care is deemed appropriate at this current time  Corinne understands that if they can no longer contract for safety, they need to call the office or report to their nearest Emergency Room for immediate evaluation  Assessment/Plan:     Psychopharmacologically, Corinne continues to tolerate current medications with no adverse effects  She endorses overall improvement in depression and anxiety symptoms  She is agreeable to continue current medications  Risks/benefits/alternativies to treatment discussed, including a myriad of potential adverse medication side effects, to which Corinne voiced understanding and consented fully to treatment  Also, patient is amenable to calling/contacting the outpatient office including this writer if any acute adverse effects of their medication regimen arise in addition to any comments or concerns pertaining to their psychiatric management  There are no diagnoses linked to this encounter  Diagnosis/Treatment Recommendations  - Psychoeducation provided regarding the importance of exercise and health dietary choices and their impact on mood, energy, and motivation   - Counseled to avoid ETOH, illicit substances, and nicotine secondary to the detrimental effects of these substances on mental and physical health    - Encouraged to engage in non-verbal forms of therapy such as art therapy, music therapy, and mindfulness  Aware of 24 hour and weekend coverage for urgent situations accessed by calling [facility] main practice number    Plan:  1  Continue Cymbalta 60 mg bid for depression and anxiety  2  Continue Xanax 0 25 mg daily prn for severe anxiety symptoms  3  Continue Buspar 5 mg tid for anxiety  4  Continue individual psychotherapy sessions  5  Follow up with primary care provider for ongoing medical care  6  Follow up with this provider in 3 months     Medications Risks/Benefits      Risks, Benefits And Possible Side Effects Of Medications:    Risks, benefits, and possible side effects of medications explained to Corinne and she verbalizes understanding and agreement for treatment  Controlled Medication Discussion:     Angel Santoyo has been filling controlled prescriptions on time as prescribed according to [address] [place] [address] [employer] Monitoring Program    Psychotherapy Provided:     Individual psychotherapy provided: Yes  Medication education provided to Angel Santoyo  Goals discussed during session  Importance of medication and treatment compliance reviewed with Corinne  Cognitive therapy was utilized during the session  Reassurance and supportive therapy provided  Crisis/safety plan discussed with Corinne S Kulp     Treatment Plan:    Completed and signed during the session: Yes - Treatment Plan done but not signed at time of office visit due to:  Plan reviewed in person and verbal consent given due to [place] social distancing    Note Share Disclaimer:      This note was not shared with the patient due to reasonable likelihood of causing patient harm    Visit Time    Visit Start Time: 12:56 PM  Visit Stop Time: 1:09 PM  Total Visit Duration: 13 minutes    SAKINA Garcia 06/05/23

## 2023-06-05 ENCOUNTER — OFFICE VISIT (OUTPATIENT)
Dept: PSYCHIATRY | Facility: CLINIC | Age: 43
End: 2023-06-05
Payer: COMMERCIAL

## 2023-06-05 ENCOUNTER — TELEMEDICINE (OUTPATIENT)
Dept: BEHAVIORAL/MENTAL HEALTH CLINIC | Facility: CLINIC | Age: 43
End: 2023-06-05
Payer: COMMERCIAL

## 2023-06-05 DIAGNOSIS — F41.1 GAD (GENERALIZED ANXIETY DISORDER): Primary | ICD-10-CM

## 2023-06-05 DIAGNOSIS — F33.2 SEVERE EPISODE OF RECURRENT MAJOR DEPRESSIVE DISORDER, WITHOUT PSYCHOTIC FEATURES (HCC): ICD-10-CM

## 2023-06-05 PROCEDURE — 90837 PSYTX W PT 60 MINUTES: CPT | Performed by: SOCIAL WORKER

## 2023-06-05 PROCEDURE — 99214 OFFICE O/P EST MOD 30 MIN: CPT

## 2023-06-05 NOTE — BH TREATMENT PLAN
TREATMENT PLAN (Medication Management Only)        [facility]    Name and Date of Birth:  Xavier Nissen 39 y o  1980  Date of Treatment Plan: June 5, 2023  Diagnosis/Diagnoses:  No diagnosis found  Strengths/Personal Resources for Self-Care: taking medications as prescribed, ability to communicate well, ability to listen, average or above intelligence, ability to negotiate basic needs, willingness to work on problems  Area/Areas of need (in own words): anxiety symptoms, depressive symptoms  1  Long Term Goal: maintain improvement in depression  Target Date:3 months - 9/5/2023  Person/Persons responsible for completion of goal: Corinne  2  Short Term Objective (s) - How will we reach this goal?:   A  Provider new recommended medication/dosage changes and/or continue medication(s): continue current medications as prescribed  B  N/A   C  N/A  Target Date:3 months - 9/5/2023  Person/Persons Responsible for Completion of Goal: Corinne  Progress Towards Goals: continuing treatment  Treatment Modality: medication management every 3 months  Review due 180 days from date of this plan: 6 months - 12/5/2023  Expected length of service: ongoing treatment  My Physician/PA/NP and I have developed this plan together and I agree to work on the goals and objectives  I understand the treatment goals that were developed for my treatment

## 2023-06-05 NOTE — PSYCH
Psychotherapy Provided: Individual Psychotherapy 55 minutes  Length of time in session: 55 minutes  Current suicide risk : Low   Behavioral Health Treatment Plan [facility]: Diagnosis and Treatment Plan explained to Micah Best relates understanding diagnosis and is agreeable to Treatment Plan  Yes  Visit start and stop times:    06/05/23  Start Time: 0800  Stop Time: 0855  Total Visit Time: 55 minutes    Virtual Regular Visit    Verification of patient location: [place]  Patient is located at Home in the following state in which I hold an active license [place]  Assessment/Plan:    Problem List Items Addressed This Visit        Other    NAVI (generalized anxiety disorder) - Primary    Severe episode of recurrent major depressive disorder, without psychotic features ([facility] [address] )     Goals addressed in session: Goal 1 Follow up psychotherapy session  Reason for visit is   Chief Complaint   Patient presents with   • Virtual Regular Visit        Encounter provider Ana Maria Vilchis    Provider located at [address] [address] [phone]      Recent Visits  No visits were found meeting these conditions  Showing recent visits within past 7 days and meeting all other requirements  Today's Visits  Date Type Provider Dept   06/05/23 Telemedicine Ana Maria Vilchis [facility]   Showing today's visits and meeting all other requirements  Future Appointments  No visits were found meeting these conditions  Showing future appointments within next 150 days and meeting all other requirements     The patient was identified by name and date of birth  Nilda Morillo was informed that this is a telemedicine visit and that the visit is being conducted through the [address] Now platform  She agrees to proceed  My office door was closed  No one else was in the room    She acknowledged consent and understanding of privacy and security of the video platform  The patient has agreed to participate and understands they can discontinue the visit at any time  Patient is aware this is a billable service  Subjective  Florencio Loyd is a 39 y o  female  HPI     Past Medical History:   Diagnosis Date   • Anxiety    • 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[address] )        Past Surgical History:   Procedure Laterality Date   • APPENDECTOMY      extensive lysis of adhesions, MAYURI's procedure (divisions of MAYURI's bands) detorsion of vulvulus, widening of mesenteric pedicle, cecopeexy, appendectomy      • BACK SURGERY Right 2013    SF: L4-L5 hemilaminectomy for discectomy  Use of the microscope for microdissection, Use og 40mg of depo-medrol though thee exiting right L5 nerve root  Onset:13   •  SECTION      x2   • CHOLECYSTECTOMY     • FL INJECTION RIGHT SHOULDER (ARTHROGRAM)  2022   • GALLBLADDER SURGERY      GV, Onset:    • LAPAROSCOPIC LYSIS INTESTINAL ADHESIONS      onset: 15   • LUMBAR DISC SURGERY     • LA LAPS ABD PRTM&OMENTUM DX W/WO SPEC BR/WA SPX N/A 2016    Procedure: LAPAROSCOPY DIAGNOSTIC, EXTENSIVE LYSIS OF ADHESIONS, MAYURI'S PROCEDURE(DIVISION OF MAYURI'S BANDS,DETORSION OF VOLVULUS, WIDENING OF MESENTERIC PEDICLE, CECOPEXY, APPENDECTOMY);   Surgeon: Ambreen Garay MD;  Location: [facility] OR;  Service: General   • TUBAL LIGATION         Current Outpatient Medications   Medication Sig Dispense Refill   • DULoxetine (CYMBALTA) 60 mg delayed release capsule Take 1 capsule (60 mg total) by mouth 2 (two) times a day 180 capsule 2   • ALPRAZolam (XANAX) 0 25 mg tablet Take 1 tablet (0 25 mg total) by mouth daily as needed for anxiety 30 tablet 0   • busPIRone (BUSPAR) 5 mg tablet take 1 tablet by mouth three times a day 90 tablet 5   • multivitamin (THERAGRAN) TABS Take 1 tablet by mouth daily     • Probiotic Product (PROBIOTIC-10 PO) Take by mouth 1 daily       No current facility-administered medications for this visit  Allergies   Allergen Reactions   • Biaxin [Clarithromycin] GI Intolerance and Other (See Comments)   • Sulfa Antibiotics    • Sulfasalazine    • Venlafaxine GI Intolerance, Vomiting and Other (See Comments)     Category: Allergy; Category: Allergy; Review of Systems    Video Exam    There were no vitals filed for this visit  Physical Exam     (D) Corinne attended her follow up psychotherapy session today  Corinne reported that since her last session, she has noticed ongoing symptoms  Makenna Hannon reported that she was able to get her son into [facility], and switched to a new therapist  Makenna Hannon reported that her son felt more connected with the new therapist and is planning to discharge with his old therapist through [place] [address]  Corinne reported that in addition to this, she received a letter from her son's psychiatrist that Dr Charley Daniel is leaving the practice, needing to establish with a new psychiatry provider  Corinne reported feeling stressed out and overwhelmed in relation to this, and processed through this  Corinne reported a reduction in her own anxiety, with noticing a decrease in there son's mental health symptoms  Corinne reported that she has been working with ANDRES Hansen, considering this for her son and for her as well, identifying that she wants to prioritize her son first  Makenna Hannon reported that his current psychiatrist does it and is leaving, and is considering going through her son's PCP  Corinne reported that she is planning to work with her son's new therapist, to get him set up with psychiatry, and possibly pursue [facility] through them  Discussed ad processed this  Corinne discussed stressors in relation to her father, describing interpersonal relationship stressors, identifying her father as drinking less from what she can see, and trying harder with her   Corinne reported feeling encouraged by this, yet apprehensive, not wanting to be hopeful, or for there to be a full repair just yet  Corinne reported that she doesn't feel as uncomfortable as she did  Corinne reported that she feels like she has her guard up with her father, in attempts to protect herself, while identifying that she is already hurting at times  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, and reported that she told her Aunt about the family session that she had with her father  Jacquenette Phalen reported that she learned that her paternal grandmother struggled with significant depression, reporting that she struggled with depression and wouldn't get out of bed for days, and self-medicated with alcohol  Corinne reported that she wasn't aware of this, and was surprised by this  Corinne reported that her Aunt was able to relate to her, identifying that they were able to connect with one another  Corinne reported that (2) days after this conversation, she started to feel guilt that she disclosed information to her Aunt, resulting in her sending a follow up apology text message  Corinne reported that she has been triggered in relation to family trauma, alcoholism, describing patterns, and themes of shame, and guilt  Discussed and processed this  Corinne spent time discussing stressors in relation to work, describing interpersonal relationship stressors, managing and handling conflict, and discussed triggers  Corinne and this writer processed this at length  Discussed ongoing skills  Reviewed limits ad boundaries  Provided ongoing psychoeducation  Modeled effective forms of communication  (A) Corinne denies any evident or immediate risk factors for self-harm, SI, or HI  Corinne denies any symptoms of frank  Corinne's speech presented at a normal rate, volume, and rhythm  Corinne presented as alert and oriented x3  Corinne presented with good eye contact   Corinne's mood presented as anxious, and her affect appeared to be congruent, and tearful at times  Corinne reports a reduction int he intensity and frequency of her symptoms, cycles, and stressors  Corinne describes symptoms of feeling nervous, anxious, and on edge  Corinne describes symptoms of irritability, poor frustration tolerance, and becoming easily annoyed  Corinne describes symptoms of grief in relation to symbolic loss  Corinne discussed some symptoms of intrusive, ruminating, and repetitive thoughts, describing them as obsessive at times  (P) Corinne plans to examine patterns, themes, and behaviors through the lens of trauma, and co-dependency, and further explore messages that she received throughout her lifetime that resulted in harmful core beliefs  Corinne plans to continue to work on deciphering between facts and feelings, and actively seek out evidence to challenge negative thoughts, cognitive distortions, harmful core beliefs, and negative thinking traps, with DBT [facility], and CBT Challenging Negative thoughts Assessment Questions  Corinne plans to utilize DBT [employer] to prioritize her mental health needs, engage in the use of self-care, take time for herself, be present in the moment, and reinforce limits and boundaries with healthy and effective forms of communication  Corinne plans to challenge co-dependency, implementing healthy patterns, and behaviors, asserting herself, advocating for herself, asking for what she needs, and target interpersonal relationship stressors  Corinne plans to outweigh risks verses benefits in order to make informed decisions, and align choices and decisions with what is in the best interest of her, implementing radical acceptance  Corinne plans to decrease judgement towards herself, and implement the concept of radical compassion, identifying worth within herself, giving herself credit, and implementing grounding statements, positive affirmations, positive self-talk, and positive affirmations   Corinne plans to honor her feelings, and emotions, validate them, and make space for them  Corinne plans to self-reflect outside of session, and journal as needed  Corinne plans to increase self-awareness in relation to warning signs, and triggers throughout her body, giving herself permission to pause, and implement skills to target ongoing symptoms  Corinne plans to reach out for additional support as needed       06/05/23  Start Time: 0800  Stop Time: 4866  Total Visit Time: 55 minutes

## 2023-06-19 ENCOUNTER — SOCIAL WORK (OUTPATIENT)
Dept: BEHAVIORAL/MENTAL HEALTH CLINIC | Facility: CLINIC | Age: 43
End: 2023-06-19
Payer: COMMERCIAL

## 2023-06-19 DIAGNOSIS — F41.1 GAD (GENERALIZED ANXIETY DISORDER): Primary | ICD-10-CM

## 2023-06-19 DIAGNOSIS — F33.0 MILD EPISODE OF RECURRENT MAJOR DEPRESSIVE DISORDER (HCC): ICD-10-CM

## 2023-06-19 PROCEDURE — 90837 PSYTX W PT 60 MINUTES: CPT | Performed by: SOCIAL WORKER

## 2023-06-19 NOTE — PSYCH
Psychotherapy Provided: Individual Psychotherapy 55 minutes  Length of time in session: 55 minutes  Goals addressed in session: Goal 1 Follow up psychotherapy session  Current suicide risk : Low     Behavioral Health Treatment Plan [facility]: Diagnosis and Treatment Plan explained to Eb Arndt relates understanding diagnosis and is agreeable to Treatment Plan  Yes  Visit start and stop times:    06/19/23        (D) Corinne attended her follow up psychotherapy session today  Corinne reported that since her last session, she has noticed ongoing symptoms  Corinne reported that she has been struggling with ongoing interpersonal relationship stressors between her and her father  Corinne discussed feeling triggered in relation to Father's Day beng yesterday, and them living together still  Corinne reported that she decided not to get her father a Father's Day card, reflecting upon feeling like she cannot relate to any of the Father's Day cards that can be bought  Corinne discussed interpersonal relationship conflict, past trauma, his current struggle with alcoholism  Corinne reported feeling triggered by other's social media posts about their Father's, describing grief in relation to symbolic loss with her relationship with her father  Corinne reported that her father went out to a concert, and reported that she felt guilty at parts, not inviting him to eat dinner with her and her family for Father's Day  Afshin Dinero reported that her father asked her if her daughter wanted to go to an event with him, and Corinne reported that she told her father that her children are not allowed to drive with him in the car, attributing this to her father's alcoholism, and drinking and driving  Corinne and this writer processed this  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, specifically discussing one person in particular   Corinne discussed triggers in relation to this person's approach, personality, comments, and engagement with others  Corinne discussed patterns, themes, and behaviors in relation to this when it comes to relationships, describing relationship anxiety  Corinne discussed wanting to cut her hours back and struggling with feeling bad about transferring other clients to other people  Corinne and this writer processed this at length  Discussed ongoing skills  Reviewed limits and boundaries  Modeled effective forms of communication  Provided ongoing psychoeducation  (A) Corinne's speech presented at a normal rate, volume, and rhythm  Corinne presented as alert and oriented x3  Corinne presented with good eye contact  Corinne denies any evident or immediate risk factors for self-harm, SI, or HI  Corinne denies any symptoms of frank  Corinne's mood presented as anxious, and depressed, and her affect appeared to be congruent, and tearful at times  Corinne describes symptoms of anticipatory anxiety, feeling nervous, anxious, and on edge  Corinne describes some intrusive, ruminating, and repetitive thoughts  Corinne describes symptoms of irritability, poor frustration tolerance, and becoming easily annoyed  Corinne describes lower moods of sadness, and depression  (P) Corinne plans to work on seeking out evidence to challenge negative thinking traps, negative thoughts, cognitive distortions, and harmful core beliefs with DBT [employer], and CBT Challenging Negative Thoughts Assessment Questions  Corinne plans to decrease judgement towards herself  Corinne plans to honor her feelings, and emotions, validate them, and make space for them  Corinne plans to demonstrate radical compassion towards herself, extending bogdan towards herself, while identifying worth, and giving herself credit  Corinne plans to address ongoing symptoms with continued skill use   Corinne plans to lean into healthy natural supports, take time for herself, be present in the moment, and engage in the use of self-care  Corinne plans to outweigh risks verses benefits in order to make informed decisions, and align choices and decisions with what is in the best interest of her, implementing radical acceptance  Corinne plans to reinforce limits and boundaries for herself, asserting herself, advocating for herself, and targeting interpersonal relationship stressors with healthy and effective forms of communication  Corinne plans to address ongoing symptoms with continued skill use  Corinne plans to work on beng present in the moment  Corinne plans to be intentional with being present, and journaling to self-reflect outside of session, to further explore in future sessions  Corinne plans to reach out for additional support as needed       06/19/23  Start Time: 1100  Stop Time: 1150  Total Visit Time: 50 minutes

## 2023-07-10 ENCOUNTER — SOCIAL WORK (OUTPATIENT)
Dept: BEHAVIORAL/MENTAL HEALTH CLINIC | Facility: CLINIC | Age: 43
End: 2023-07-10
Payer: COMMERCIAL

## 2023-07-10 DIAGNOSIS — F33.2 SEVERE EPISODE OF RECURRENT MAJOR DEPRESSIVE DISORDER, WITHOUT PSYCHOTIC FEATURES (HCC): ICD-10-CM

## 2023-07-10 DIAGNOSIS — F41.1 GAD (GENERALIZED ANXIETY DISORDER): Primary | ICD-10-CM

## 2023-07-10 PROCEDURE — 90837 PSYTX W PT 60 MINUTES: CPT | Performed by: SOCIAL WORKER

## 2023-07-10 NOTE — PSYCH
Psychotherapy Provided: Individual Psychotherapy 60 minutes. Length of time in session: 60 minutes. Goals addressed in session: Goal 1 Follow up psychotherapy session. Current suicide risk : Low     Behavioral Health Treatment Plan [facility]: Diagnosis and Treatment Plan explained to Lelo Davis relates understanding diagnosis and is agreeable to Treatment Plan. Yes. Visit start and stop times:    07/10/23  Start Time: 1055  Stop Time: 1155  Total Visit Time: 60 minutes       (D) Corinne attended her follow up psychotherapy session today. Corinne reported that since her last session, she has noticed elevated symptoms. Corinne reported that after her last session, she and her family went away to their camper at the [facility] for a week. Corinne reported that her son's girlfriend came, and reported that there were some issues between her son's girlfriend, and her daughter. Corinne discussed an incident that occurred between her son, son's girlfriend, her daughter, and another female friend down at the [facility] [place], and processed through triggers in relation to this. Corinne reported that has concerns in relation to her son and his girlfriend's relationship, reminding her of her boyfriend when she was younger. Corinne reported that the more people expressed their negativenfeelings about there own boyfriend at the time, the more she wanted to stay in the relationship. Corinne reported that she didn't want to do the same thing to her son, and hopes that it will work itself out in the near future. Discussed and processed this. Corinne reported that she had (11) days off from work, and reported that she really enjoyed this time off from work, making it harder for her to return to work, describing increased anxiety, and stress in relation to this.  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 Discussed and processed this. Corinne reported that she was triggered last night, reporting that her 's phone went off around midnight with a text message notification. Corinne reported that she struggled with insecurities, along with obsessive intrusive, ruminating, and repetitive thoughts worrying that her  may have a relationship with another woman, yet denying that there is sufficient evidence to support this thought. Corinne reported that she asked her  about the text message and learned that it was a male colleague of his, saying that he was going to be into work late due to his basement flooding from the weather. Corinne reported that her  did side work for her since her last session, and discussed worrying about them seeing each-other and communicating with one another. Corinne reported that she observed her  as being, "flirty," with [place], that Refugio identifies as feeling insecure with, comparing herself. Corinne describes struggling with negative thoughts towards herself, and processed through this. Corinne reported that she notices that this week, is the week prior to her menstrual cycle that she has been tracking, noticing that this week, every other month when she ovulates, she struggles with increased mental health symptoms. Corinne reported that she has her xanax . 25mg PRN that she has previously discussed using this during this week, and reports struggling to follow through with this. Discussed and processed this. Corinne reported that she has been struggling with stressors in relation to her son, and his mental health symptoms. Corinne reported that her son has continued to see his new therapist Max Sarmiento on a bi-weekly basis, through [facility], and reports that this has been going well.  Corinne reported that he will be meeting with the psychiatry provider Airam Henriquez through [facility] today for the first time as well, and reports having a desire to attend and participate in this, wanting to express her observations and concerns. Discussed and processed this. Corinne describes psychosomatic symptoms, reporting chest pain, and back pain from being tense at times. Corinne and this writer processed this at length. Discussed ongoing skills. Reviewed limits and boundaries. Provided ongoing psychoeducation. Modeled effective forms of communication. (A) Corinne presented as alert and oriented x3. Corinne presented with good eye contact. Corinne's speech presented at a normal rate, volume, and rhythm. Corinne denies any symptoms of frank. Corinne denies any evident or immediate risk factors for self-harm, SI, or HI. Corinne's mood presented as anxious, and depressed and her affect appeared to be congruent, and tearful at times. Corinne describes feeling nervous, anxious, and on edge, not being able to stop and control worrying, worrying too much about different things, and fearing that something awful might happen. Gee Moya describes symptoms of irritability, poor frustration tolerance, and becoming easily annoyed. Gee Moya describes feeling tired and having little energy, little interest and pleasure in doing things, and reports lower moods of sadness, and depression. Corinne describes episodes of crying. (P) Corinne plans to utilize [address] [place] [address], giving herself permission to take PRN medication over the next few days, leading up to her menstrual cycle, projected to come this Wednesday, to target those elevated symptoms. Corinne plans to be more mindful in the moment, engaging in the use of self-care, taking time for herself, and leaning into healthy natural supports.  Corinne plans to work on examining warning signs throughout her body, when it comes to an increase in symptoms, and actively work on implementing previously identified skills to target ongoing symptoms, with deep breathing techniques, mindfulness/ meditation techniques, sensory related skills, grounding techniques, coping skills, distraction techniques, and distress tolerance skills. Corinne plans to prioritize her mental health and physical health needs. Corinne plans to implement the concept of radical acceptance, outweighing risks verses benefits in order to make informed decisions, and align choices and decisions with what is in the best interest of her. Corinne plans to continue to work on reinforcing limits and boundaries, challenging co-dependency, implementing healthy patterns, and behaviors, asserting herself, advocating for herself, and asking for what she needs. Corinne plans to implement grounding statements, positive self-talk, positive affirmations, and self-affirming statements, demonstrating bogdan towards herself, identifying worth, and implementing radical compassion. Corinne plans to honor her feelings, and emotions, validate them, and make space for them, journaling outside of session to increase self-awareness in relation to this. Corinne plans to actively challenge negative thinking traps, negative thoughts, cognitive distortions, and harmful core beliefs with DBT [facility], and CBT Challenging Negative Thoughts Assessment Questions. Corinne plans to reach out for additional support as needed.      07/10/23  Start Time: 1055  Stop Time: 9859  Total Visit Time: 60 minutes

## 2023-07-24 ENCOUNTER — SOCIAL WORK (OUTPATIENT)
Dept: BEHAVIORAL/MENTAL HEALTH CLINIC | Facility: CLINIC | Age: 43
End: 2023-07-24
Payer: COMMERCIAL

## 2023-07-24 DIAGNOSIS — F41.1 GAD (GENERALIZED ANXIETY DISORDER): Primary | ICD-10-CM

## 2023-07-24 DIAGNOSIS — F33.2 SEVERE EPISODE OF RECURRENT MAJOR DEPRESSIVE DISORDER, WITHOUT PSYCHOTIC FEATURES (HCC): ICD-10-CM

## 2023-07-24 PROCEDURE — 90837 PSYTX W PT 60 MINUTES: CPT | Performed by: SOCIAL WORKER

## 2023-07-24 NOTE — PSYCH
Psychotherapy Provided: Individual Psychotherapy 60 minutes. Length of time in session: 60 minutes. Goals addressed in session: Goal 1 Follow up psychotherapy session. Current suicide risk : Low     Behavioral Health Treatment Plan [facility]: Diagnosis and Treatment Plan explained to Adalid Recinos relates understanding diagnosis and is agreeable to Treatment Plan. Yes. Visit start and stop times:    07/24/23  Start Time: 1100  Stop Time: 1200  Total Visit Time: 60 minutes    (D) Corinne attended her follow up psychotherapy session today. Corinne reported that since her last session, she has noticed a decrease in symptoms. Corinne reported that her son is working with a new psychiatric provider at [facility] and reports that the psychiatric nurse practitioner is wondering if her son struggles with mood disorder verses ADHD. Corinne reported that they decided to move forward with [facility] Testing for her son, and reported that they got the results, and plan to review at their next session. Corinne reported that the psychiatry provider her son is working with is believing that her son is misdiagnosed, and not on the right medication. Corinne reported that her son is currently diagnosed with depression and ADHD, and being treated with zoloft and ritalin. Corinne reported feeling hopeful working with a new provider, and discussed anticipatory anxiety in elation to this. Corinne discussed feeling bad about herself, feeling like she let him down, and used DBT and CBT skills in session to challenge. Discussed and processed this. Corinne describes struggling with her son's symptoms, specifically from the ADHD lens, describing his inattentive symptoms as triggering for her, describing lack of understanding. Corinne describes various stressors and triggers with her son, impacting her, reporting that he is now in [employer] for [employer].  Corinne describes worrying that she is sending her son negative messages about himself, as a result of her frustration, comparing her relationship with her son and her daughter, fearing she is repeating the same pattern, she experienced with her mother, and processed this. Corinne reported that taking the xanax PRN around her menstrual cycle, was helpful and impactful, noticing patterns, and themes with this. Corinne and this writer processed this at length. Modeled effective forms of communication. Provided ongoing psychoeducation. Discussed ongoing skills. Reviewed limits and boundaries. (A) Corinne presented with good eye contact. Corinne presented as alert and oriented x3. Corinne's speech presented at a normal rate, volume, and rhythm. Corinne denies any symptoms of frank. Corinne denies any evident or immediate risk factors for self-harm, SI, or HI. Corinne's mood presented as anxious, and slightly down, and her affect appeared to be congruent, and tearful at times. Corinne describes symptoms of feeling nervous, anxious, and on edge. Corinne describes sleep disturbances, and feeling tired and having little energy. Corinne described symptoms of anxiety, and lower moods at times, yet denied this on her PHQ-9 and NAVI-7 screenings. (P) Corinne plans to read the book, "What Your ADHD Child Wishes You Knew," along with "Your Brain's Not Broken" to read outside of session, to increase self-awareness in relation to this, and further process next session. Corinne plans to work on using audible, for audiobooks, to support when she is going on a walk, to increase coping skills. Corinne plans to lean into healthy natural supports, engage in the use of self-care, take time for herself, and be present in the moment. Corinne plans to increase self-awareness in relation to warning signs and triggers, implement grounding statements, and then implement previously identified skills to assist in targeting ongoing symptoms.  Corinne plans to target interpersonal relationship stressors with healthy and effective forms of communication. Corinne plans to prioritize her needs, challenge co-dependency, and implement healthy patterns, and behaviors. Corinne plans to implement radical acceptance, outweighing risks verses benefits in order to make informed decisions, and align choices and decisions with what is in the best interest of her. Corinne plans to identify worth within herself, give herself credit, and demonstrate radical compassion towards herself. Corinne plans to decrease judgement towards herself, and actively challenge negative thoughts with previously identified CBT and DBT skills. Corinne plans to reach out for additional support as needed.      07/24/23  Start Time: 1100  Stop Time: 1200  Total Visit Time: 60 minutes

## 2023-08-07 ENCOUNTER — TELEPHONE (OUTPATIENT)
Dept: PSYCHIATRY | Facility: CLINIC | Age: 43
End: 2023-08-07

## 2023-08-07 NOTE — TELEPHONE ENCOUNTER
Spoke with patient to inform of appointment cancellation with Hari Nickerson, patient is aware of next appointment

## 2023-08-21 ENCOUNTER — SOCIAL WORK (OUTPATIENT)
Dept: BEHAVIORAL/MENTAL HEALTH CLINIC | Facility: CLINIC | Age: 43
End: 2023-08-21
Payer: COMMERCIAL

## 2023-08-21 DIAGNOSIS — F33.0 MILD EPISODE OF RECURRENT MAJOR DEPRESSIVE DISORDER (HCC): Primary | ICD-10-CM

## 2023-08-21 DIAGNOSIS — F41.1 GAD (GENERALIZED ANXIETY DISORDER): ICD-10-CM

## 2023-08-21 PROCEDURE — 90834 PSYTX W PT 45 MINUTES: CPT | Performed by: SOCIAL WORKER

## 2023-08-21 NOTE — PSYCH
Psychotherapy Provided: Individual Psychotherapy 50 minutes. Length of time in session: 50 minutes. Goals addressed in session: Goal 1 Follow up psychotherapy session. Current suicide risk : Low     Behavioral Health Treatment Plan [facility]: Diagnosis and Treatment Plan explained to Aliza Hagan relates understanding diagnosis and is agreeable to Treatment Plan. Yes. Visit start and stop times:    08/21/23        (D) Corinne attended her follow up psychotherapy session today. Corinne reported that since her last session, she has noticed ongoing symptoms. Corinne reported that over the last month, she and her family went [place], [place]. Corinne reported that her brother's young children came with, and reported some stressors and triggers in relation to this. Corinne reported that she was triggered on vacation, observing her  as being flirty and changing his demeanor when it was around another couple, specifically with her  and the other couple's wife. Corinne reported that she constantly compares herself to this other woman, increasing negative thoughts about herself. Corinne reported that she internalized these, impacting her mood. Corinne reported that she was anxious by this, and communicated this to her , reporting that they had a conversation in relation to this, and processed through this. Corinne reported that she fears that she isn't good enough for her , and worries that he will leave her, describing a fear of being alone. Corinne discussed past trauma of people always lying to her and leaving, triggering her anxious attachment. Corinne reported that she doesn't have any evidence to support these thoughts and feelings, and fears that people cannot be trusted. Corinne reported that she will often obsessively ruminate on catastrophic thinking that her  would cheat on her, have an affair, and ultimately leave her. Corinne reported that many years ago, this woman's  reached out to her  confronting him about him and his wife talking, reporting that she was told that it was nothing. Corinne reported feeling conflicted, seeking out evidence to challenge negative thoughts, and describes internally feeling unsettled with the situation. Corinne and this writer processed this at length. Provided ongoing psychoeducation. Modeled effective forms of communication. Reviewed limits and boundaries. Discussed ongoing skills. (A) Corinne describes symptoms of feeling tired and having little energy. Corinne describes lower moods of sadness, and depression. Corinne describes symptoms of feeling nervous, anxious, and on edge. Corinne describes symptoms of irritability, poor frustration tolerance, and becoming easily annoyed. Corinne's mood presented as anxious, and depressed, and her affect appeared to be congruent, and tearful at times. Corinne presented with good eye contact. Corinne presented as alert and oriented x3. Corinne's speech presented at a normal rate, volume, and rhythm. Corinne denies any symptoms of frank. Corinne denies any evident or immediate risk factors for self-harm, SI, or HI. Corinne describes symptoms of obsessive, intrusive, ruminating, and repetitive thoughts, describing some emotional dysregulation. (P) Corinne plans to work on spending time journaling upon needs she desires from her , specifically desiring from her , specifically through the lens of love languages, desiring more words of affirmation and physical touch. Corinne plans to work on utilizing healthy and effective forms of communication to ask for what she needs, asserting herself, advocating for herself, and targeting interpersonal relationship stressors.  Corinne plans to process through feelings, and emotions, honoring, validating, and making space for them, implementing grounding statements, positive self-talk, and positive affirmations. Corinne plans to decrease judgement towards herself, and actively challenge negative thoughts, negative thinking traps, cognitive distortions, and harmful core beliefs with DBT [employer], and CBT Challenging Negative Thoughts Assessment Questions. Corinne plans to address ongoing symptoms with continued skill use. Corinne plans to implement the concept of radical acceptance, outweighing risks verses benefits in order to make informed decisions, and align choices and decisions with what is in the best interest of her. Corinne plans to engage in the use of self-care, take time for herself, and be present in the moment, while leaning into healthy natural supports. Corinne plans to challenge co-dependency, reinforcing limits and boundaries. Corinne plans to pay attention to be body, increasing self-awareness in relation to warnng signs and triggers. Corinne plans to reach out for additional support as needed.      08/21/23  Start Time: 1100  Stop Time: 1150  Total Visit Time: 50 minutes

## 2023-09-05 DIAGNOSIS — F41.9 ANXIETY: ICD-10-CM

## 2023-09-05 RX ORDER — BUSPIRONE HYDROCHLORIDE 5 MG/1
5 TABLET ORAL 3 TIMES DAILY
Qty: 90 TABLET | Refills: 5 | Status: SHIPPED | OUTPATIENT
Start: 2023-09-05

## 2023-09-05 NOTE — TELEPHONE ENCOUNTER
Medication Refill Request     Name of Medication Buspar  Dose/Frequency 5mg take 1 tablet by mouth 3 times a day  Quantity 90 Tablets  Verified pharmacy   [x]  Verified ordering Provider   [x]  Does patient have enough for the next 3 days? Yes [] No [x]  Does patient have a follow-up appointment scheduled?  Yes [x] No []   If so when is appointment: 9/18/23

## 2023-09-08 NOTE — PSYCH
Virtual Regular Visit    Problem List Items Addressed This Visit        Other    NAVI (generalized anxiety disorder)    Relevant Medications    busPIRone (BUSPAR) 10 mg tablet   Other Visit Diagnoses     Current severe episode of major depressive disorder without psychotic features without prior episode ([address] [facility])    -  Primary    Relevant Medications    busPIRone (BUSPAR) 10 mg tablet        Reason for visit is   Chief Complaint   Patient presents with   • Medication Management     Encounter provider [address], [place]    Provider located at [address] [place] [address] [address] [facility] [id number] [phone]    Recent Visits  Date Type Provider Dept   09/13/23 Telephone [address], [facility] recent visits within past 7 days and meeting all other requirements  Today's Visits  Date Type Provider Dept   09/18/23 Telemedicine SAKINA Chaney  [facility]   Showing today's visits and meeting all other requirements  Future Appointments  No visits were found meeting these conditions. Showing future appointments within next 150 days and meeting all other requirements       After connecting through [employer], the patient was identified by name and date of birth. Rashawn Gautam was informed that this is a telemedicine visit and that the visit is being conducted through the [facility] 22 Now platform. She agrees to proceed. which may not be secure and therefore, might not be HIPAA-compliant. My office door was closed. No one else was in the room. She acknowledged consent and understanding of privacy and security of the video platform. The patient has agreed to participate and understands they can discontinue the visit at any time. MEDICATION MANAGEMENT NOTE        [facility]      Name and Date of Birth:  Rashawn Gautam 39 y. o. 1980 MRN: [id number]    Date of Visit: September 18, 2023    Reason for Visit:   Chief Complaint   Patient presents with   • Medication Management         SUBJECTIVE:    Nargis Melo is a 39 y.o. female with past psychiatric history significant for Major Depressive Disorder and Generalized Anxiety Disorder who was personally seen and evaluated today at the [address] [place] [address] outpatient clinic for follow-up and medication management. She presents as anxious, pleasant, cooperative. Her thoughts are organized, goal directed and completes psychiatric assessment without difficulty. Corinne endorses compliance with psychotropic medication regimen that consists of Cymbalta, Buspar and Xanax. She denies any current adverse medication side effects. At previous outpatient psychiatric appointment with this writer, medications were continued at current doses. On evaluation today, Corinne endorses fluctuating anxiety symptoms over the past several months, with intermittent periods of increased anxiety with frequent worry, difficulty controlling worry at times, trouble relaxing, and feeling irritable/on edge. She has utilized prn Xanax on several occasions due to worsening anxiety symptoms with physical symptom of chest discomfort. She has had a number of recent stressors over the past several months but does feel her anxiety level is out of proportion to the stressors. She has experienced increased anxiety and irritability the week prior to menses consistently, with worsening anxiety occurring every other month. She endorses overall stability in terms of depressive symptoms. She denies any recent passive death wishes or suicidal thoughts. She sleeps well most nights. Anxiety symptoms occur in the middle of the night at times but she is generally able to utilize deep breathing and calm self.  She is interested in pursuing [employer] testing as her son recently had this completed and she feels this would give her some peace of mind. Current Rating Scores:     Current PHQ-9   PHQ-2/9 Depression Screening           Review Of Systems:      Constitutional negative   ENT negative   Cardiovascular negative   Respiratory negative   Gastrointestinal negative   Genitourinary negative   Musculoskeletal negative   Integumentary negative   Neurological negative   Endocrine negative   Other Symptoms none, all other systems are negative       Historical information: (unchanged information from previous note copied and italicized) - Information that is bolded has been updated. Past Psychiatric History:    Past Inpatient Psychiatric Treatment:   No history of past inpatient psychiatric admissions  Past Outpatient Psychiatric Treatment:    Was in outpatient treatment in the past with a family physician for psychiatric issues  Past Suicide Attempts: no  Past Violent Behavior: no  Past Psychiatric Medication Trials: Prozac, Zoloft, Paxil, Effexor XR, Cymbalta, Buspar and Xanax Xanax XR  GI SEs with Effexor, unable to recall details regarding other past medication trials. Current psychiatric medications: Cymbalta 90 mg daily, Buspar 5 mg tid, Xanax 0.5 mg bid prn        Traumatic History:    Abuse: no history of physical or sexual abuse, no history of emotional abuse  Other Traumatic Events: found mom in her home  in  several days after she passed away, had frequent intrusive thoughts related to this after the incident.      Family Psychiatric History:   Mother-Bipolar, anxiety, alcoholism  Father-alcohol use, likely MH issues, unsure of diagnoses  Brother-up and down moods but not diagnosed  Son-depression, anxiety, ADHD.  On Zoloft and methylphenidate     FH of suicide-denies      Substance Abuse History:   Tobacco/alcohol/caffeine: Denies alcohol use, Denies tobacco use, Caffeine intake: 3 cups of caffeinated coffee per day(s)   Nicotine use socially in past, etoh use socially in past.   After had son would break out in hives if drank alcohol, no longer drinks  Illicit drugs: Denies history of illicit drug use   Tried medical marijuana once, made anxiety worse     Social History:    Developmental: Denies a history of milestone/developmental delay. Denies a history of in-utero exposure to toxins/illicit substances. There is no documented history of IEP or need for special education. Education: technical college   Owns salon, recently opened in   Living arrangement, social support: , daughter, son and father son 16, daughter 15. Dad living with since 2019, has been a stressor, dad with etoh use   Access to firearms:  has gun locked up. Denies direct access to weapons/firearms. 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 Use of the microscope for microdissection, Use og 40mg of depo-medrol though thee exiting right L5 nerve root. Onset:12   •  SECTION      x2   • CHOLECYSTECTOMY     • FL INJECTION RIGHT SHOULDER (ARTHROGRAM)  2022   • GALLBLADDER SURGERY      [facility], Onset:    • LAPAROSCOPIC LYSIS INTESTINAL ADHESIONS      onset: 15   • LUMBAR DISC SURGERY     • UT LAPS ABD PRTM&OMENTUM DX W/WO SPEC BR/WA SPX N/A 2016    Procedure: LAPAROSCOPY DIAGNOSTIC, EXTENSIVE LYSIS OF ADHESIONS, MAYURI'S PROCEDURE(DIVISION OF MYAURI'S BANDS,DETORSION OF VOLVULUS, WIDENING OF MESENTERIC PEDICLE, CECOPEXY, APPENDECTOMY);   Surgeon: Deanne Bishop MD;  Location:  MAIN OR;  Service: General   • TUBAL LIGATION       Allergies   Allergen Reactions   • Biaxin [Clarithromycin] GI Intolerance and Other (See Comments)   • Sulfa Antibiotics    • Sulfasalazine    • Venlafaxine GI Intolerance, Vomiting and Other (See Comments)     Category: Allergy; Category: Allergy;        Substance Abuse History:    Social History     Substance and Sexual Activity   Alcohol Use Not Currently     Social History     Substance and Sexual Activity   Drug Use No       Social History:    Social History     Socioeconomic History   • Marital status: /Civil Union     Spouse name: Not on file   • Number of children: Not on file   • Years of education: 12   • Highest education level: Not on file   Occupational History   • Occupation: [employer]   Tobacco Use   • Smoking status: Former     Packs/day: 0.25     Years: 5.00     Total pack years: 1.25     Types: Cigarettes     Quit date: 2009     Years since quittin.7   • Smokeless tobacco: Never   Vaping Use   • Vaping Use: Never used   Substance and Sexual Activity   • Alcohol use: Not Currently   • Drug use: No   • Sexual activity: Yes     Partners: Male     Birth control/protection: None   Other Topics Concern   • Not on file   Social History Narrative    Participates in activities inside and outside of home    Lives with family    Supportive and safe    No advance directives     Social Determinants of Health     Financial Resource Strain: Not on file   Food Insecurity: Not on file   Transportation Needs: No Transportation Needs (2021)    PRAPARE - Transportation    • Lack of Transportation (Medical): No    • Lack of Transportation (Non-Medical):  No   Physical Activity: Not on file   Stress: Not on file   Social Connections: Not on file   Intimate Partner Violence: Not At Risk (10/12/2020)    Humiliation, Afraid, Rape, and Kick questionnaire    • Fear of Current or Ex-Partner: No    • Emotionally Abused: No    • Physically Abused: No    • Sexually Abused: No   Housing Stability: Not on file       Family Psychiatric History:     Family History   Problem Relation Age of Onset • Mental illness Mother         bipolar/anxiety   • Stroke Mother    • Heart disease Mother    • Depression Mother    • Bipolar disorder Mother    • Hypertension Father    • Stroke Father    • Seizures Brother         epilepsy   • Heart attack Other    • Scleroderma Other    • Stroke Other 79        stroke syndrome   • Heart attack Other    • Heart disease Family    • Substance Abuse Neg Hx    • Breast cancer Neg Hx    • Ovarian cancer Neg Hx    • Uterine cancer Neg Hx    • Colon cancer Neg Hx        History Review: The following portions of the patient's history were reviewed and updated as appropriate: allergies, current medications, past family history, past medical history, past social history, past surgical history and problem list.         OBJECTIVE:     Vital signs in last 24 hours: There were no vitals filed for this visit.     Mental Status Evaluation:    Appearance age appropriate, casually dressed   Behavior pleasant, cooperative   Speech normal rate, normal volume, normal pitch   Mood anxious   Affect normal range and intensity, appropriate   Thought Processes organized, goal directed   Associations intact associations   Thought Content no overt delusions   Perceptual Disturbances: no auditory hallucinations, no visual hallucinations   Abnormal Thoughts  Risk Potential Suicidal ideation - None  Homicidal ideation - None  Potential for aggression - No   Orientation oriented to person, place, time/date and situation   Memory recent and remote memory grossly intact   Consciousness alert and awake   Attention Span Concentration Span attention span and concentration are age appropriate   Intellect appears to be of average intelligence   Insight intact   Judgement intact   Muscle Strength and  Gait unable to assess today due to virtual visit   Motor activity unable to assess today due to virtual visit   Language no difficulty naming common objects, no difficulty repeating a phrase, no difficulty writing a sentence   Fund of Knowledge adequate knowledge of current events  adequate fund of knowledge regarding past history  adequate fund of knowledge regarding vocabulary    Pain none   Pain Scale 0       Laboratory Results: I have personally reviewed all pertinent laboratory/tests results    Recent Labs (last 2 months):   No visits with results within 2 Month(s) from this visit. Latest known visit with results is:   Office Visit on 11/07/2022   Component Date Value   • Diagnosis: 11/07/2022 Comment    • Specimen Adequacy 11/07/2022 Comment    • Clinician Provided ICD10 11/07/2022 Comment    • Performed by: 11/07/2022 Comment    • SL AMB . 11/07/2022 . • Note: 11/07/2022 Comment    • Test Methodology: 11/07/2022 Comment    • HPV Aptima 11/07/2022 Negative    • HPV GENOTYPE REFLEX 11/07/2022 Comment        Suicide/Homicide Risk Assessment:    The following interventions are recommended: no intervention changes needed      Lethality Statement:    Based on today's assessment and clinical criteria, Corinne S Namita contracts for safety and is not an imminent risk of harm to self or others. Outpatient level of care is deemed appropriate at this current time. Corinne understands that if they can no longer contract for safety, they need to call the office or report to their nearest Emergency Room for immediate evaluation. Assessment/Plan:     Psychopharmacologically, Corinne continues to tolerate current medications with no adverse effects. She endorses ongoing stability in depressive symptoms but continues with intermittent anxiety symptoms that are severe at times. She often forgets to take 3rd dose of Buspar due to frequent dosing. She is currently taking 10 mg total of Buspar daily most days. She is agreeable to minimize dosing to bid and titrate dose to 10 mg bid to help with anxiety. Will refer for [employer] testing to guide any future medication changes if indicated.      Risks/benefits/alternativies to treatment discussed, including a myriad of potential adverse medication side effects, to which Corinne voiced understanding and consented fully to treatment. Also, patient is amenable to calling/contacting the outpatient office including this writer if any acute adverse effects of their medication regimen arise in addition to any comments or concerns pertaining to their psychiatric management. 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  - Counseled to avoid ETOH, illicit substances, and nicotine secondary to the detrimental effects of these substances on mental and physical health. - Encouraged to engage in non-verbal forms of therapy such as art therapy, music therapy, and mindfulness. Aware of 24 hour and weekend coverage for urgent situations accessed by calling [facility] main practice number    Plan:  1. Continue Cymbalta 60 mg bid for depression and anxiety  2. Increase Buspar to 10 mg bid for anxiety  3. Continue Xanax 0.25 mg daily prn for severe anxiety symptoms   4. Continue individual psychotherapy sessions  5. Follow up with primary care provider for ongoing medical care  6. ALFRED     Medications Risks/Benefits      Risks, Benefits And Possible Side Effects Of Medications:    Risks, benefits, and possible side effects of medications explained to Corinne and she verbalizes understanding and agreement for treatment.     Controlled Medication Discussion:     No recent records found for controlled prescriptions according to [facility] Prescription Drug Monitoring Program    Psychotherapy Provided:     Individual psychotherapy provided: Yes  Counseling was provided during the session today for 16 minutes  Medication education provided to Corinne  Goals discussed during session  Importance of medication and treatment compliance reviewed with Corinne  Cognitive therapy was utilized during the session  Reassurance and supportive therapy provided  Crisis/safety plan discussed with Corinne S Kulp     Treatment Plan:    Completed and signed during the session: Not applicable - Treatment Plan not due at this session    Note Share Disclaimer:      This note was not shared with the patient due to reasonable likelihood of causing patient harm    Visit Time    Visit Start Time: 2:28 PM  Visit Stop Time: 2:52 PM  Total Visit Duration: 24 minutes    SAKINA Ochoa 09/18/23

## 2023-09-11 ENCOUNTER — SOCIAL WORK (OUTPATIENT)
Dept: BEHAVIORAL/MENTAL HEALTH CLINIC | Facility: CLINIC | Age: 43
End: 2023-09-11
Payer: COMMERCIAL

## 2023-09-11 DIAGNOSIS — F41.1 GAD (GENERALIZED ANXIETY DISORDER): Primary | ICD-10-CM

## 2023-09-11 DIAGNOSIS — F33.2 SEVERE EPISODE OF RECURRENT MAJOR DEPRESSIVE DISORDER, WITHOUT PSYCHOTIC FEATURES (HCC): ICD-10-CM

## 2023-09-11 PROCEDURE — 90837 PSYTX W PT 60 MINUTES: CPT | Performed by: SOCIAL WORKER

## 2023-09-11 NOTE — PSYCH
Psychotherapy Provided: Individual Psychotherapy 55 minutes. Length of time in session: 55 minutes. Goals addressed in session: Goal 1 Follow up psychotherapy session. Current suicide risk : Low     Behavioral Health Treatment Plan [facility]: Diagnosis and Treatment Plan explained to Kerry Houston relates understanding diagnosis and is agreeable to Treatment Plan. Yes. Visit start and stop times:    09/11/23  Start Time: 1100  Stop Time: 1155  Total Visit Time: 55 minutes    (D) Corinne attended her follow up psychotherapy session today. Corinne reported that since her last session, she has noticed ongoing symptoms. Corinne reported that she was triggered over Labor Day weekend, reporting that she, her , and children went down to their beach trailer. Christiano Kelly reported that her daughter was in a mood, experiencing pressure from her daughter when wanting to leave to go to the beach, pressuring her about timing, that triggered Corinne, resulting in her emotionally reacting to this. Corinne reported that she felt flooded with emotions, resulting in tightness in her chest, and anxiety, that presents as irritability. Corinne reported that she started packing things up, triggering her children, and her . Corinne reported that her and her  got into an argument about this. Corinne reported that she ended up taking a PRN of xanax, using skills, and going on a golf cart ride with her  to ground and regroup. Corinne reported that she recognized elevated levels of anxiety surrounding decision making with her beach trailer, and finances. Corinne reported that her and her  decided to renew another year of their lease, describing anxiety in relation to finances, and decision-making. Discussed and processed.  Corinne reported that she has been feeling stressed and overwhelmed in relation to her daughter going back to school, managing her business, working, and keeping up with mental health and physical health needs. Discussed and processed this. Corinne reported that her son Pati Mg switched his psychiatric medication, reporting that they cross tapperd the zoloft 150mg, decreasing him for three days to 100mg, and three days of 50mg, and three days to 25mg to coming off of it completely. Corinne reported that her son was supposed to start the pristiq 25mg when he was tappering down to the zoloft 25mg, reporting that she has noticed a significant positive change in relation to her son's mood. Corinne reported that her son is currently on pristiq 25mg and ritalin 25mg and is hopeful to stick with this. Corinne reported that her son's lab work and testing came back positive for cannibas, reporting that she was not aware that he was doing this. Corinne reported that her and her  talked to him about it, and discussed concerns and risks in relation to this, along with talking to his providers. Discussed and processed this. Corinne reported that she has been questioning her own medication, having a desire to follow through with [employer] testing, and working with psychiatry. Corinne reported that she is currently on: Cymbalta 120mg,and buspar 5mg TID, and her xanax PRN. Discussed and processed this. Corinne reported that she started reading the book, "Your Brain Is Not Broken," in attempts to learn more about ADHD when it comes to her son, and found herself personally relating to the readings about negative thoughts, emotional dysregulation, filling the gaps, control, rumination, and obsessive at times. Discussed and processed this. Corinne reported that after her last session, she communicated with her  regarding her insecurities with their family friend that is a female, and reported that she did it through text message, noticing discomfort with talking in person. Corinne reported that her  responded by stating that he loved her, and was there for her.  Corinne and this writer processed this. Corinne reported that in relation to her menstrual cycle, she is currently (9) days away from her period, noticing an increase in her mental health symptoms, this being the month her symptoms are worse. Corinne reported that she is currently not on any birth control, reporting that that her tubes are tied. Discussed and processed this. Corinne and this writer processed this at length. Discussed ongoing skills. Reviewed limits and boundaries. Modeled effective forms of communication. Provided ongoing psychoeducation. (A) Corinne's mood presented as depressed, and anxious, and her affect appeared to be congruent, and tearful. Corinne describes worrying too much about different things, feeling nervous, anxious, and on edge, describing anticipatory anxiety. Corinne describes obsessive, intrusive, ruminating, and repetitive thoughts. Corinne describes symptoms of irritability, poor frustration tolerance, becoming easily annoyed, and emotional dysregulation. Corinne describes feeling tired and having little energy. Corinne describes feeling bad about herself, feeling like she is letting herself, and others down. Corinne describes difficulty focusing, having a hard time concentrating on things, and becoming easily distracted. Corinne describes little interest and pleasure in doing things, reporting lower moods of sadness, and depression. Corinne presented as alert and oriented x3. Corinne presented with good eye contact. Corinne's speech presented at a normal rate, volume, and rhythm. Corinne denies any symptoms of frank. Corinne denies any evident or immediate risk factors for self-harm, SI, or HI. (P) Corinne plans to utilize [address] [place] [address], and follow through with scheduling an OBGYN appointment, to discuss options with further testing with labs for hormone levels, and options to treat heaving bleeding, and clotting.  Corinne plans to follow through with [employer] Testing with her psychiatric nurse practitioner Kathie at her appointment next week, and plans to follow up with inquiring about who will be taking over her pharmacology treatment, with her provider leaving. Corinne plans to work ahead in relation to anticipatory anxiety, and the cycle and pattern of her menstrual cycle, giving herself permission to take PRN medication as needed. Corinne plans to continue to monitor, track, and inventorying symptoms, cycles, and stressors to gather data for supportive evidence when it comes to decision-making, outweighing risks verses benefits in order to make informed decisions, and align choices and decisions with what is in the best interest of her. Corinne plan to prioritize her mental health and physical health needs, engage in the use of self-care, take time for herself, and be present in the moment. Corinne plans to lean into healthy natural supports. Corinne plans to implement the concept of Radical Acceptance. Corinne plans to reinforce limits and boundaries, with healthy and effective forms of communication, challenging co-dependency, and implementing healthy patterns, and behaviors. Corinne plans to continue to pay attention to her body, increasing self-awareness in relation to warning signs and triggers, giving herself permission to pause in the moment. Corinne plans to implement coping skills, deep breathing techniques, mindfulness/ meditation techniques, sensory related skills, grounding statements, distraction techniques, distress tolerance skills, and coping skills to target ongoing symptoms. Corinne plans to implement positive self-talk, positive affirmations, self-affirming statements, and grounding statements. Corinne plans to decrease judgement towards herself, and actively challenge negative thoughts, negative thinking traps, and cognitive distortions with DBT [facility], and CBT Challenging Negative Thoughts Assessment Questions.  Corinne plans to demonstrate Radical Compassion, demonstrating bogdan towards herself, identifying worth, and giving herself credit. Corinne plans to reach out for additional support as needed.      09/11/23  Start Time: 1100  Stop Time: 1155  Total Visit Time: 55 minutes

## 2023-09-13 ENCOUNTER — TELEPHONE (OUTPATIENT)
Dept: PSYCHIATRY | Facility: CLINIC | Age: 43
End: 2023-09-13

## 2023-09-13 NOTE — TELEPHONE ENCOUNTER
Patient called requesting to change her appointment to in person instead of virtual. She mention about the gene site testing that she wasn't sure if it had to be in person.  Please review Thank You!!

## 2023-09-18 ENCOUNTER — TELEPHONE (OUTPATIENT)
Dept: PSYCHIATRY | Facility: CLINIC | Age: 43
End: 2023-09-18

## 2023-09-18 ENCOUNTER — TELEMEDICINE (OUTPATIENT)
Dept: PSYCHIATRY | Facility: CLINIC | Age: 43
End: 2023-09-18
Payer: COMMERCIAL

## 2023-09-18 DIAGNOSIS — F32.2 CURRENT SEVERE EPISODE OF MAJOR DEPRESSIVE DISORDER WITHOUT PSYCHOTIC FEATURES WITHOUT PRIOR EPISODE (HCC): Primary | ICD-10-CM

## 2023-09-18 DIAGNOSIS — F41.1 GAD (GENERALIZED ANXIETY DISORDER): ICD-10-CM

## 2023-09-18 PROCEDURE — 99214 OFFICE O/P EST MOD 30 MIN: CPT

## 2023-09-18 RX ORDER — BUSPIRONE HYDROCHLORIDE 10 MG/1
10 TABLET ORAL 2 TIMES DAILY
Qty: 180 TABLET | Refills: 1 | Status: SHIPPED | OUTPATIENT
Start: 2023-09-18

## 2023-09-18 NOTE — TELEPHONE ENCOUNTER
Called Corinne to discuss [employer] testing. Darci Najjar is interested in having it done and is aware of possible co-pay. Order entered and kit being shipped to her by lab. loss of vision R

## 2023-09-25 ENCOUNTER — SOCIAL WORK (OUTPATIENT)
Dept: BEHAVIORAL/MENTAL HEALTH CLINIC | Facility: CLINIC | Age: 43
End: 2023-09-25
Payer: COMMERCIAL

## 2023-09-25 DIAGNOSIS — F33.2 SEVERE EPISODE OF RECURRENT MAJOR DEPRESSIVE DISORDER, WITHOUT PSYCHOTIC FEATURES (HCC): Primary | ICD-10-CM

## 2023-09-25 DIAGNOSIS — F41.1 GAD (GENERALIZED ANXIETY DISORDER): ICD-10-CM

## 2023-09-25 PROCEDURE — 90834 PSYTX W PT 45 MINUTES: CPT | Performed by: SOCIAL WORKER

## 2023-09-25 NOTE — PSYCH
Psychotherapy Provided: Individual Psychotherapy 55 minutes. Length of time in session: 55 minutes. Current suicide risk : Low . Behavioral Health Treatment Plan [facility]: Diagnosis and Treatment Plan explained to Aliza Hagan relates understanding diagnosis and is agreeable to Treatment Plan. Yes. Visit start and stop times:    09/25/23  Start Time: 1100  Stop Time: 1155  Total Visit Time: 55 minutes     (D) Corinne attended her follow up psychotherapy session today. Corinne reported that since her last session, she has noticed ongoing symptoms. Corinne reported that she contacted her psychiatry provider after her last session, and they mailed her the [address] Corinne reported that she completed the [employer] testing, and placed it to be mailed through [facility] today. Corinne reported that it should take about (2) weeks to get the results back. Corinne reported that she spoke with her psychiatry provider, and had her last session with her, since she resigned. Corinne reported that there is a wait list to get in with another psychiatry provider, and discussed stressors in relation to this. Corinne reported that her psychiatry provider increased her buspar, reporting that she was taking 5mg QAM, and increased to 10mg QAM, and stayed at the same 10mg dose in the afternoon. Corinne reported that she started this, she has noticed a reduction in anxiety. Discussed and processed this. Corinne reported that in addition to this, she contacted her OBGYN provider, and was able to schedule follow up for 10/16/23, and reports feeling better in relation to this. Corinne reported that this menstrual cycle is the month where she normally has elevated levels of anxiety and depression, and reports that it's not as bad this month, compared to two months ago- reporting that she only took (1) PRN to week of. Discussed and processed this.  Corinne reported that her son had a follow up psychiatry appointment, and reported that his psychiatry provider wants to have another UDS before they consider changing his ADHD medication. Corinne reported some anticipatory anxiety about whether or not her son is using marijuana- yet reports that her son's mood has improved since working with his new provider. Discussed and processed this. Corinne describes struggling with interpersonal relationship stressors with her father, reporting that she feels like he has been making an effort to have conversation with Corinne, and reports feeling triggered by this. Corinne describes feeling conflicted between wanting to have boundaries, and then feeling shame, and guilt. During session, discussed Internal Family Systems, Parts Work, and provided psychoeducation in relation to this. Corinne and this writer processed this. Discussed limits and boundaries. Reviewed ongoing skills. Modeled effective forms of communication. Provided ongoing psychoeducation. (A) Corinne denies any symptoms of frank. Corinne denies any evident or immediate risk factors for self-harm, SI, or HI. Corinne presented with good eye contact. Corinne presented as alert and oriented x3. Corinne's speech presented at a normal rate, volume, and rhythm. Corinne's mood presented as anxious, and slightly down, and her affect appeared to be congruent, and tearful at times. Corinne describes a slight reduction in the intensity and frequency of her symptoms of anxiety. Corinne describes symptoms of feeling tired and having little energy. Corinne describes symptoms of irritability, poor frustration tolerance, and becoming easily annoyed. Corinne describes symptoms of anticipatory anxiety, worrying, and some lower moods- yet denied this on her NAVI-7 and PHQ-9 screenings. (P) This writer referred Corinne to [facility], to schedule an initial in-take assessment for a psychiatric evaluation and pharmacology treatment.  During session today, this writer supported Corinne with grounding her central nervous system, and inner child wounds. Corinne plans to continue to give herself permission to implement grounding statements, positive self-talk, positive affirmations, and self-affirming statements. Corinne plans to journal on the specific statement: "Be gentle, you're meeting parts of yourself you've been at war with since you were a child," aiming to increase self-awareness in relation to inner child wounds, to further process and explore next session. Corinne plans to explore the Internal Family Systems Reyes Galvan, specifically exploring, "The 8 C's of Internal Family Systems (IFS) that this writer sent her in an e-mail including: Tg [place], Curiosity, Compassion, Confidence, Courage, Creativity, and Connectedness. Geena Gabriel plans to read about this, and further explore and process next session. Corinne plans to work on giving herself permission to pay attention to the warning signs and triggers throughout her body, specifically from her inner child. Corinne plans to utilize DBT Opposite Action Skills to meet the needs of her inner child, prioritizing her, while engaging in the use of self-care, taking time for herself, being present in the moment, and leaning into healthy natural supports. Corinne plans to reinforce limits and boundaries with healthy and effective forms of communication, asserting herself, advocating for herself, asking for what she needs, and targeting interpersonal relationship conflict. Corinne plans to challenge co-dependency, implementing healthy patterns, and behaviors. Corinne plans to target ongoing symptoms with continued skill use. Crinne plans to implement radical acceptance, outweighing risks verses benefits in order to make informed decisions, and align choices and decisions with what is in the best interest of her.  Corinne plans to decrease judgement towards herself, and challenge negative thinking traps, cognitive distortions, and harmful core beliefs with previously identified DBT and CBT skills. Corinne plans to reach out for additional support as needed.      09/25/23  Start Time: 1100  Stop Time: 1155  Total Visit Time: 55 minutes

## 2023-10-09 ENCOUNTER — SOCIAL WORK (OUTPATIENT)
Dept: BEHAVIORAL/MENTAL HEALTH CLINIC | Facility: CLINIC | Age: 43
End: 2023-10-09
Payer: COMMERCIAL

## 2023-10-09 DIAGNOSIS — F41.1 GAD (GENERALIZED ANXIETY DISORDER): ICD-10-CM

## 2023-10-09 DIAGNOSIS — F33.2 SEVERE EPISODE OF RECURRENT MAJOR DEPRESSIVE DISORDER, WITHOUT PSYCHOTIC FEATURES (HCC): Primary | ICD-10-CM

## 2023-10-09 PROCEDURE — 90837 PSYTX W PT 60 MINUTES: CPT | Performed by: SOCIAL WORKER

## 2023-10-09 NOTE — PSYCH
Psychotherapy Provided: Individual Psychotherapy 60 minutes. Length of time in session: 60 minutes. Current suicide risk : Low . Behavioral Health Treatment Plan [facility]: Diagnosis and Treatment Plan explained to Kerry Houston relates understanding diagnosis and is agreeable to Treatment Plan. Yes. Visit start and stop times:    10/09/23       (D) Corinne attended her follow up psychotherapy session today. Corinne reported that since her last session, she has noticed ongoing symptoms. Corinne reported that last Thursday when she was at work, she had noticed that she felt dizzy and lightheaded, and reported being unsure why. Corinne reported that she had eaten food prior to this, and wasn't sure what triggered this. Corinne reported that it went away after two hours. Corinne reported that she wasn't sure what triggered this entirely, and reported that the following day that Friday, she felt more fatigued, and processed through this. Corinne reported that the Tuesday prior to this, her father had to have some dental work done, which she reports that she had known about it. Corinne reported that she didn't realize this at the time, yet her father had a friend pick him up from the house, and take him to and from the appointment. Corinne reported that when her father came home, he too was dizzy, lightheaded, staggering, and reporting that he had blood on his mouth, his shirt, and was holding his head over the kitchen sink. Corinne reported that she was triggered by this, reminding her of when her father was drunk from alcohol. Corinne reported that her father went upstairs, and continued bleeding from having all this teeth pulled.  Corinne reported that she was triggered by this, reporting that she recognized that her father needed help, finding herself helping him, and not wanting to help him, identifying this as feeling like this was violating her boundaries, with ongoing role reversal. Corinne reported that she helped him with what she felt was within reason, and then ate lunch before she left for work. Corinne reported that she text her  about this, and reported that her agreed to help take care of him when he got home. Corinne described feeling stressed out and overwhelmed in relation to this. Discussed and processed this. Corinne reported that last Monday, she followed through with getting blood work done for her PCP, reporting that her results came through via [address] [place] [address]. Corinne reported that her LDL Cholesterol came back slightly elevated, reporting it has been for the past four years. Corinne reported that through researching, she found out that it could be genetic, drink stress, drinking coffee, and/or diet. Corinne reported that she was anxious about this, wondering if she should go on medication for it or not. Corinne reported that she then started thinking about her 's labs, reporting that they always come back abnormal, triggering thoughts and fears of something awful happening to him, increasing anxiety. Corinne reported that she is doing E2M- Eating to Motivate, a diet, and reports that she has been doing it for the past two years. Through processing, Corinne reported that maybe she didn't drink enough water, or at enough the morning of the day she was feeling dizzy. Discussed and processed this. Corinne reported that after her last session, she explored Internal Family Systems, reviewing the 8 C's, and journaled. Corinne identified the following major instances in her life to support the work in a trauma timeline. Trauma Timeline:     Miguel Preston was born: 1980   Parents / : 1986  Father Moved Out: 1986  Father's 2nd Marriage Daniele Camille) : When Corinne was in middle school in the 90's. Father's 2nd Separation/ Divorce from [facility] [place]: Unknown- Reported that her and her brother made their father choose between his wife, or her children.    Father's 3rd Marriage Manisha Medina): After she graduated in high school. Were together a while before that. Father's 3rd Separation/ Divorce from [place]: In . [place] struggled with a drug and alcohol addiction. Corinne Graduated from [email or web address]: In [address]: In 1999  Refugio met her  Charity Gallowayner: In 1999   Moved out of her mother's house, into her maternal grandparent's house: In , prior to graduating [employer]. Graduated from [employer]: Spring 2000   Corinne and her  Charity Cannon got Engaged: In . Corinne  her  Charity Kassandra: 2004  Son Karime Mann: Born 2005  Daughter Lucila Rice: 2009   Father's 4th Marriage Maira Romo): Around . Father's 4th Separation/ Marriage from [place]: After Corinne's mother passed away in 2013. Mother's Death: Luci Degroot- : 1957- Passing:   Father Moved In: 2020    Corinne and this writer processed this. Discussed ongoing skills. Reviewed limits and boundaries. Modeled effective forms of communication. Provided ongoing psychoeducation. (A) Corinne's speech presented at a normal rate, volume, and rhythm. Corinne presented as alert and oriented x3. Corinne presented with good eye contact. Corinne denies any symptoms of frank. Corinne denies any evident or immediate risk factors for self-harm, SI, or HI. Corinne's mood presented anxious, and depressed, and her affect appeared to be congruent, and tearful at times. Corinne describes symptoms of feeling nervous, anxious, and on edge, describing anticipatory anxiety, and fearing that something awful might happen. Corinne described symptoms of irritability, poor frustration tolerance, and becoming easily annoyed. Corinne describes lower moods of sadness, and depression- yet denies this on screenings. Corinne describes feeling tired and having little energy.  Corinne describes difficulty focusing, having a hard time concentrating on things, and becoming easily annoyed. (P) During session today, this writer and Corinne reviewed ongoing Internal Family System's Model, specifically- "Helping People Manage The Different Parts of Themselves." This writer provided psychoeducation on different parts including: Managers, Laura Ctjorge Yungy I, Firefighters, and Core-Self. This writer provided Eli Huddleston additional information on this to review outside of session. Corinne plans to journal on the concepts of: Managers, Laura Yungy I, and Firefighters- identifying areas that she connects with to explore next session. In follow up, Corinne plans to explore these different parts, read about them, and learn more to further explore next session. Corinne plans to journal upon her first memory, to support with her Trauma Timeline, and continue to self-reflecting upon her trauma timeline, specifically in relation to her upbringing with trauma to continue to add in next session. Corinne plans to work on giving herself breaks with this, paying attention, to her body, and increasing self-awareness in relation to recognizing the warning signs and triggers. Corinne plans to implement previously identified skills to target ongoing symptoms. Corinne plans to implement grounding statements, and give herself permission to meet herself with Radical Compassion, decreasing judgement towards herself, and challenging negative thinking traps and cognitive distortions with previously identified DBT and CBT skills. Corinne plans to prioritize her mental health and physical health needs, engage in the use of self-care, take time for herself, be present in the moment, and lean into healthy natural supports. Corinne plans to challenge co-dependency, reinforcing limits and boundaries with healthy and effective forms of communication, asserting herself, advocating for herself, and asking for what she needs. Corinne plans to implement radical acceptance, focusing on what is in her control, and aiming for balance.  Corinne plans to reach out for additional support as needed.      10/09/23  Start Time: 1105  Stop Time: 6623  Total Visit Time: 60 minutes

## 2023-10-10 ENCOUNTER — TELEPHONE (OUTPATIENT)
Dept: PSYCHIATRY | Facility: CLINIC | Age: 43
End: 2023-10-10

## 2023-10-10 NOTE — TELEPHONE ENCOUNTER
Previous pt of [facility], called pt to schedule an apt with Macy Arreola.  No response left a message requesting a call back

## 2023-10-16 ENCOUNTER — OFFICE VISIT (OUTPATIENT)
Dept: OBGYN CLINIC | Facility: CLINIC | Age: 43
End: 2023-10-16
Payer: COMMERCIAL

## 2023-10-16 VITALS
DIASTOLIC BLOOD PRESSURE: 64 MMHG | SYSTOLIC BLOOD PRESSURE: 120 MMHG | WEIGHT: 144.6 LBS | HEIGHT: 65 IN | BODY MASS INDEX: 24.09 KG/M2

## 2023-10-16 DIAGNOSIS — N92.0 MENORRHAGIA WITH REGULAR CYCLE: Primary | ICD-10-CM

## 2023-10-16 PROCEDURE — 99213 OFFICE O/P EST LOW 20 MIN: CPT | Performed by: STUDENT IN AN ORGANIZED HEALTH CARE EDUCATION/TRAINING PROGRAM

## 2023-10-16 NOTE — ASSESSMENT & PLAN NOTE
- Potential causes of heavy menses reviewed, including ovulatory dysfunction, polyp, fibroids, adenomyosis. Given regular cycle without intermenstrual or prolonged bleeding, young age, and absence of risk factors, endometrial hyperplasia/malignancy unlikely. - Recent pelvic imaging from 11/2022 reviewed. No evidence of fibroid or polyp. Enlarged uterus is suggestive of possible adenomyosis, although this cannot be definitively diagnosed on imaging.   - Recent labs from 10/2023 reviewed, including CBC, TSH, and iron studies. Results normal.   - Options for management reviewed, including medical and surgical management options. Discussed OCP, LNG-IUD, TXA, endometrial ablation, and hysterectomy. - Patient is considering LNG-IUD, but wishes to discuss with her  and read more about device. Patient educational materials provided today. Patient will call office to initiate insurance authorization if she wishes to proceed.

## 2023-10-16 NOTE — PROGRESS NOTES
[facility] [place], [address]    Assessment/Plan:  1. Menorrhagia with regular cycle  Assessment & Plan:  - Potential causes of heavy menses reviewed, including ovulatory dysfunction, polyp, fibroids, adenomyosis. Given regular cycle without intermenstrual or prolonged bleeding, young age, and absence of risk factors, endometrial hyperplasia/malignancy unlikely. - Recent pelvic imaging from 11/2022 reviewed. No evidence of fibroid or polyp. Enlarged uterus is suggestive of possible adenomyosis, although this cannot be definitively diagnosed on imaging.   - Recent labs from 10/2023 reviewed, including CBC, TSH, and iron studies. Results normal.   - Options for management reviewed, including medical and surgical management options. Discussed OCP, LNG-IUD, TXA, endometrial ablation, and hysterectomy. - Patient is considering LNG-IUD, but wishes to discuss with her  and read more about device. Patient educational materials provided today. Patient will call office to initiate insurance authorization if she wishes to proceed. Subjective:   Onel Ruiz is a 39 y.o. [id number] presenting for further discussion of heavy periods. CC:   Chief Complaint   Patient presents with    Menstrual Problem     Having heavier periods with a lot of clotting, really bad every other month has to change tampon / pad every 1 - 2 hours. HPI: Patient reports that heavy periods persist. Previously declined intervention, but she is growing tired of the heavy and prolonged bleeding. She also reports mood changes associated with menses. Seeing Behavioral Health in addition to medication management of anxiety. Patient denies intermenstrual bleeding, prolonged bleeding, or symptoms if acute blood loss. ROS: Negative except as noted in HPI    Patient's last menstrual period was 09/24/2023 (exact date).   Menstrual History  Period Cycle (Days): 28  Period Duration (Days): 7  Period Pattern: Regular  Menstrual Flow: Heavy  Menstrual Control: Tampon, Maxi pad  Menstrual Control Change Freq (Hours): 1-2  Dysmenorrhea: (!) Severe  Dysmenorrhea Symptoms: Cramping, Nausea, Other (Comment) (Mood changes)    She  reports being sexually active and has had partner(s) who are male. She reports using the following method of birth control/protection: None. 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 Use of the microscope for microdissection, Use og 40mg of depo-medrol though thee exiting right L5 nerve root. Onset:12     SECTION      x2    CHOLECYSTECTOMY      FL INJECTION RIGHT SHOULDER (ARTHROGRAM)  2022    GALLBLADDER SURGERY      [facility], Onset:     LAPAROSCOPIC LYSIS INTESTINAL ADHESIONS      onset: 15    LUMBAR DISC SURGERY      MS LAPS ABD PRTM&OMENTUM DX W/WO SPEC BR/WA SPX N/A 2016    Procedure: LAPAROSCOPY DIAGNOSTIC, EXTENSIVE LYSIS OF ADHESIONS, MAYURI'S PROCEDURE(DIVISION OF MAYURI'S BANDS,DETORSION OF VOLVULUS, WIDENING OF MESENTERIC PEDICLE, CECOPEXY, APPENDECTOMY);   Surgeon: Jt Martinez MD;  Location:  MAIN OR;  Service: General    TUBAL LIGATION       Family History   Problem Relation Age of Onset    Mental illness Mother         bipolar/anxiety    Stroke Mother     Heart disease Mother     Depression Mother     Bipolar disorder Mother     Hypertension Father     Stroke Father     Seizures Brother         epilepsy    Heart attack Other     Scleroderma Other     Stroke Other 79        stroke syndrome    Heart attack Other Heart disease Family     Substance Abuse Neg Hx     Breast cancer Neg Hx     Ovarian cancer Neg Hx     Uterine cancer Neg Hx     Colon cancer Neg Hx      Social History     Socioeconomic History    Marital status: /Civil Union     Spouse name: None    Number of children: None    Years of education: 12    Highest education level: None   Occupational History    Occupation: [employer]   Tobacco Use    Smoking status: Former     Packs/day: 0.25     Years: 5.00     Total pack years: 1.25     Types: Cigarettes     Quit date: 2009     Years since quittin.7    Smokeless tobacco: Never   Vaping Use    Vaping Use: Never used   Substance and Sexual Activity    Alcohol use: Not Currently    Drug use: No    Sexual activity: Yes     Partners: Male     Birth control/protection: None     Comment: no new partner in past year   Other Topics Concern    None   Social History Narrative    Participates in activities inside and outside of home    Lives with family    Supportive and safe    No advance directives     Social Determinants of Health     Financial Resource Strain: Not on file   Food Insecurity: Not on file   Transportation Needs: No Transportation Needs (2021)    PRAPARE - Transportation     Lack of Transportation (Medical): No     Lack of Transportation (Non-Medical):  No   Physical Activity: Not on file   Stress: Not on file   Social Connections: Not on file   Intimate Partner Violence: Not At Risk (10/12/2020)    Humiliation, Afraid, Rape, and Kick questionnaire     Fear of Current or Ex-Partner: No     Emotionally Abused: No     Physically Abused: No     Sexually Abused: No   Housing Stability: Not on file     Outpatient Medications Marked as Taking for the 10/16/23 encounter (Office Visit) with Dave Ferro MD   Medication    ALPRAZolam Jerl Hair) 0.25 mg tablet    busPIRone (BUSPAR) 10 mg tablet    DULoxetine (CYMBALTA) 60 mg delayed release capsule    multivitamin (THERAGRAN) TABS    Probiotic Product (PROBIOTIC-10 PO)     Allergies   Allergen Reactions    Biaxin [Clarithromycin] GI Intolerance and Other (See Comments)    Sulfa Antibiotics     Sulfasalazine     Venlafaxine GI Intolerance, Vomiting and Other (See Comments)     Category: Allergy; Category: Allergy;            Objective:  /64 (BP Location: Left arm, Patient Position: Sitting, Cuff Size: Standard)   Ht 5' 4.5" (1.638 m)   Wt 65.6 kg (144 lb 9.6 oz)   LMP 09/24/2023 (Exact Date)   BMI 24.44 kg/m²        Chaperone present? Yes: Tracey Maldonado MA. General Appearance: alert and oriented, in no acute distress. Abdomen: Soft, non-tender, non-distended, no masses, no rebound or guarding. Pelvic:       External genitalia: Normal appearance, no abnormal pigmentation, no lesions or masses. Normal Bartholin's and Holt's. Urinary system: Urethral meatus normal, bladder non-tender. Vaginal: normal mucosa without prolapse or lesions. Normal-appearing physiologic discharge. Cervix: Normal-appearing, well-epithelialized, no gross lesions or masses. No cervical motion tenderness. Adnexa: No adnexal masses or tenderness noted. Uterus: Normal-sized, regular contour, midline, mobile, no uterine tenderness. Extremities: Normal range of motion.    Skin: normal, no rash or abnormalities  Neurologic: alert, oriented x3  Psychiatric: Appropriate affect, mood stable, cooperative with exam.        Jairo Amaya MD  10/16/2023 2:50 PM

## 2023-10-17 ENCOUNTER — OFFICE VISIT (OUTPATIENT)
Dept: PSYCHIATRY | Facility: CLINIC | Age: 43
End: 2023-10-17
Payer: COMMERCIAL

## 2023-10-17 DIAGNOSIS — F39 MOOD DISORDER (HCC): Primary | ICD-10-CM

## 2023-10-17 PROCEDURE — 99214 OFFICE O/P EST MOD 30 MIN: CPT | Performed by: NURSE PRACTITIONER

## 2023-10-17 PROCEDURE — 90833 PSYTX W PT W E/M 30 MIN: CPT | Performed by: NURSE PRACTITIONER

## 2023-10-17 RX ORDER — HYDROXYZINE HYDROCHLORIDE 25 MG/1
25 TABLET, FILM COATED ORAL EVERY 6 HOURS PRN
Qty: 30 TABLET | Refills: 1 | Status: SHIPPED | OUTPATIENT
Start: 2023-10-17

## 2023-10-17 RX ORDER — LAMOTRIGINE 25 MG/1
TABLET ORAL
Qty: 30 TABLET | Refills: 1 | Status: SHIPPED | OUTPATIENT
Start: 2023-10-17

## 2023-10-17 NOTE — PSYCH
PROGRESS NOTE        [facility]      Name and Date of Birth:  Silver Patel 39 y.o. 1980    Date of Visit: 10/17/23    SUBJECTIVE: Patient is a 41-year-old female who has a history of depressive disorder, mood disorder and anxiety disorder. She is a transfer of care from [facility]. 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 Her business is doing well and she is able to manage that aspect of her life without a lot of issues. However, she has had difficult years when she was growing up. Both her parents were alcoholic. Mother  at the age of 64 after an accidental overdose of alcohol and medications. Her father, had 4 failed marriages, has poor financial means and is now residing with the patient, her  and her 2 children. She moved from a smaller home to a 4 bedroom home to help accommodate him. He still drinks alcohol, smokes cigarettes but now works a part-time job for financial reasons. Her anxiety issues developed years ago and she has had trouble controlling her anxiety over the last year. She does report episodes of mild mood fluctuations. Of note, her mother suffered from bipolar disorder. Father has a history of alcoholism, multiple marriages and probable mood disorder. Her brother, also suffers from mood disorder. We did discuss treatment options. Will continue with her therapist.  She has been seeing her individual therapist for the last 4 years and this has been very helpful for her. She does tell me that at times, she feels okay, then feels very down then feels a little better than down again. Most of this occurred when BuSpar was added to her medication regimen. BuSpar has been discontinued and we will trial Lamictal to use as a mood stabilizer and help to reduce some of her anxiety.   She will continue on Cymbalta 120 mg, hydroxyzine as needed has been added to help her anxiety that she suffers daily. She does have Xanax 0.25 mg which she can use daily if needed but it does make her feel "tired "so she does not use it often. She is agreeable to the treatment plan and aware of the side effects of Lamictal including rash. She is aware that if that develops, she is to stop it and give us a call. Follow-up will be in 2 weeks. She denies suicidal ideation, intent or plan at present, has no suicidal ideation, intent or plan at present. She denies any auditory hallucinations and visual hallucinations, denies any other delusional thinking, denies any delusional thinking. She denies any side effects from medications  . HPI ROS Appetite Changes and Sleep: normal appetite, normal sleep    Review Of Systems:      Constitutional Negative   ENT Negative   Cardiovascular Negative   Respiratory As Noted in HPI   Gastrointestinal Negative   Genitourinary Negative   Musculoskeletal Negative   Integumentary Negative   Neurological Negative   Endocrine Negative   Other Symptoms Negative and None       Laboratory Results: No results found for this or any previous visit.     Substance Abuse History:    Social History     Substance and Sexual Activity   Drug Use No       Family Psychiatric History:     Family History   Problem Relation Age of Onset    Mental illness Mother         bipolar/anxiety    Stroke Mother     Heart disease Mother     Depression Mother     Bipolar disorder Mother     Hypertension Father     Stroke Father     Seizures Brother         epilepsy    Heart attack Other     Scleroderma Other     Stroke Other 79        stroke syndrome    Heart attack Other     Heart disease Family     Substance Abuse Neg Hx     Breast cancer Neg Hx     Ovarian cancer Neg Hx     Uterine cancer Neg Hx     Colon cancer Neg Hx        The following portions of the patient's history were reviewed and updated as appropriate: past family history, past medical history, past social history, past surgical history and problem list.    Social History     Socioeconomic History    Marital status: /Civil Union     Spouse name: Not on file    Number of children: Not on file    Years of education: 12    Highest education level: Not on file   Occupational History    Occupation: [employer]   Tobacco Use    Smoking status: Former     Packs/day: 0.25     Years: 5.00     Total pack years: 1.25     Types: Cigarettes     Quit date: 2009     Years since quittin.8    Smokeless tobacco: Never   Vaping Use    Vaping Use: Never used   Substance and Sexual Activity    Alcohol use: Not Currently    Drug use: No    Sexual activity: Yes     Partners: Male     Birth control/protection: None     Comment: no new partner in past year   Other Topics Concern    Not on file   Social History Narrative    Participates in activities inside and outside of home    Lives with family    Supportive and safe    No advance directives     Social Determinants of Health     Financial Resource Strain: Not on file   Food Insecurity: Not on file   Transportation Needs: No Transportation Needs (2021)    PRAPARE - Transportation     Lack of Transportation (Medical): No     Lack of Transportation (Non-Medical):  No   Physical Activity: Not on file   Stress: Not on file   Social Connections: Not on file   Intimate Partner Violence: Not At Risk (10/12/2020)    Humiliation, Afraid, Rape, and Kick questionnaire     Fear of Current or Ex-Partner: No     Emotionally Abused: No     Physically Abused: No     Sexually Abused: No   Housing Stability: Not on file     Social History     Social History Narrative    Participates in activities inside and outside of home    Lives with family    Supportive and safe    No advance directives        Social History       Tobacco History       Smoking Status  Former Quit Date  2009 Smoking Frequency  0.25 packs/day for 5.00 years (1.25 ttl pk-yrs) Smoking Tobacco Type  Cigarettes quit in 1/1/2009      Smokeless Tobacco Use  Never              Alcohol History       Alcohol Use Status  Not Currently Drinks/Week  0 Glasses of wine, 0 Cans of beer, 0 Shots of liquor, 0 Standard drinks or equivalent per week              Drug Use       Drug Use Status  No              Sexual Activity       Sexually Active  Yes Partners  Male Birth Control/Protection  None Comment  no new partner in past year              Activities of Daily Living    Not Asked                       OBJECTIVE:     Mental Status Evaluation:    Appearance age appropriate, casually dressed   Behavior Easily tearful specially when discussing parents in her growing up years   Speech normal volume, normal pitch   Mood Anxious   Affect Congruent to mood. Tearful at times. Thought Processes logical   Associations intact associations   Thought Content No overt delusions   Perceptual Disturbances: none   Abnormal Thoughts  Risk Potential Suicidal ideation - None  Homicidal ideation - None  Potential for aggression - No   Orientation oriented to person, place, time/date and situation   Memory recent and remote memory grossly intact   Cosciousness alert and awake   Attention Span attention span and concentration are age appropriate   Intellect Appears to be of Average Intelligence   Insight Good   Judgement Good   Muscle Strength and  Gait muscle strength and tone were normal   Language no difficulty naming common objects   Fund of Knowledge displays adequate knowledge of current events   Pain none   Pain Scale 0       Assessment/Plan:       Diagnoses and all orders for this visit:    Mood disorder (HCC)  -     lamoTRIgine (LaMICtal) 25 mg tablet; 1 tab in AM  -     hydrOXYzine HCL (ATARAX) 25 mg tablet; Take 1 tablet (25 mg total) by mouth every 6 (six) hours as needed for anxiety          Treatment Recommendations/Precautions:    Continue current medications:  Lamictal 25 mg daily.   Add Atarax 25 mg every 6 hours as needed for anxiety  Continue duloxetine 120 mg daily  Continue alprazolam 0.25 mg, taking 1/4 to 1 tablet daily if needed for severe anxiety/panic. Follow-up with me in 2 weeks. Risks/Benefits      Risks, Benefits And Possible Side Effects Of Medications:    Risks, benefits, and possible side effects of medications explained to patient and patient verbalizes understanding and agreement for treatment. Controlled Medication Discussion: No history of any overuse or abuse of any controlled substances. Patient fills all prescriptions on time. Psychotherapy Provided:     Individual psychotherapy provided: Yes. The visit started at 10 AM and ended at 10:50 AM.  Time was spent reviewing the treatment plan, reviewing medications and symptoms and adjusting medications and completing the progress note.

## 2023-10-18 ENCOUNTER — TELEPHONE (OUTPATIENT)
Dept: PSYCHIATRY | Facility: CLINIC | Age: 43
End: 2023-10-18

## 2023-10-18 NOTE — TELEPHONE ENCOUNTER
GigaPan test results mailed to [facility]. Copy faxed to Arely Ellington for review.

## 2023-10-23 ENCOUNTER — SOCIAL WORK (OUTPATIENT)
Dept: BEHAVIORAL/MENTAL HEALTH CLINIC | Facility: CLINIC | Age: 43
End: 2023-10-23
Payer: COMMERCIAL

## 2023-10-23 DIAGNOSIS — F33.2 SEVERE EPISODE OF RECURRENT MAJOR DEPRESSIVE DISORDER, WITHOUT PSYCHOTIC FEATURES (HCC): Primary | ICD-10-CM

## 2023-10-23 DIAGNOSIS — F41.1 GAD (GENERALIZED ANXIETY DISORDER): ICD-10-CM

## 2023-10-23 PROCEDURE — 90837 PSYTX W PT 60 MINUTES: CPT | Performed by: SOCIAL WORKER
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
## 2023-10-30 ENCOUNTER — TELEMEDICINE (OUTPATIENT)
Dept: PSYCHIATRY | Facility: CLINIC | Age: 43
End: 2023-10-30
Payer: COMMERCIAL

## 2023-10-30 ENCOUNTER — OFFICE VISIT (OUTPATIENT)
Dept: FAMILY MEDICINE CLINIC | Facility: HOSPITAL | Age: 43
End: 2023-10-30
Payer: COMMERCIAL

## 2023-10-30 VITALS
WEIGHT: 144 LBS | DIASTOLIC BLOOD PRESSURE: 80 MMHG | BODY MASS INDEX: 24.59 KG/M2 | HEIGHT: 64 IN | HEART RATE: 84 BPM | OXYGEN SATURATION: 99 % | SYSTOLIC BLOOD PRESSURE: 118 MMHG

## 2023-10-30 DIAGNOSIS — Z00.00 HEALTH CARE MAINTENANCE: Primary | ICD-10-CM

## 2023-10-30 DIAGNOSIS — F32.2 CURRENT SEVERE EPISODE OF MAJOR DEPRESSIVE DISORDER WITHOUT PSYCHOTIC FEATURES WITHOUT PRIOR EPISODE (HCC): ICD-10-CM

## 2023-10-30 DIAGNOSIS — F33.2 SEVERE EPISODE OF RECURRENT MAJOR DEPRESSIVE DISORDER, WITHOUT PSYCHOTIC FEATURES (HCC): ICD-10-CM

## 2023-10-30 DIAGNOSIS — N92.0 MENORRHAGIA WITH REGULAR CYCLE: ICD-10-CM

## 2023-10-30 DIAGNOSIS — F39 MOOD DISORDER (HCC): ICD-10-CM

## 2023-10-30 PROCEDURE — 99214 OFFICE O/P EST MOD 30 MIN: CPT | Performed by: NURSE PRACTITIONER

## 2023-10-30 PROCEDURE — 99396 PREV VISIT EST AGE 40-64: CPT | Performed by: STUDENT IN AN ORGANIZED HEALTH CARE EDUCATION/TRAINING PROGRAM

## 2023-10-30 NOTE — PROGRESS NOTES
[facility] PRIMARY [facility]ARE [address]    NAME: Corinne S Kulp  AGE: 39 y.o. SEX: female  : 1980      Assessment and Plan:      Diagnosis ICD-10-CM Associated Orders   1. Health care maintenance  Z00.00       2. Severe episode of recurrent major depressive disorder, without psychotic features ([address] )  F33.2       3. Menorrhagia with regular cycle  N92.0       C/W psych team.   F/U with GYN for possible ablation, likely would benefit her. Patient's medications were reviewed and reconciled. Ordered/Re-ordered as above. Immunizations and preventive care screenings were discussed with patient today. Appropriate education was printed on patient's after visit summary. Counseling:  Alcohol/drug use: discussed moderation in alcohol intake, the recommendations for healthy alcohol use, and avoidance of illicit drug use. Dental Health: discussed importance of regular tooth brushing, flossing, and dental visits. Injury prevention: discussed safety/seat belts, safety helmets, smoke detectors, carbon dioxide detectors, and smoking near bedding or upholstery. Sexual health: discussed sexually transmitted diseases, partner selection, use of condoms, avoidance of unintended pregnancy, and contraceptive alternatives. Exercise: the importance of regular exercise/physical activity was discussed. Recommend exercise 3-5 times per week for at least 30 minutes. Return in about 9 months (around 2024) for Annual Physical.     Chief Complaint:     Chief Complaint   Patient presents with    Physical Exam      History of Present Illness:     Adult Annual Physical   Patient here for a comprehensive physical exam. The patient reports no problems. Diet and Physical Activity  Diet/Nutrition: well balanced diet and consuming 3-5 servings of fruits/vegetables daily. Exercise: moderate cardiovascular exercise and strength training exercises.    No Drug use.   Tobacco use:  reports that she quit smoking about 14 years ago. Her smoking use included cigarettes. She has a 1.25 pack-year smoking history. She has never used smokeless tobacco.  Alcohol use - no     Seeing psychiatry again this afternoon. Buspar stopped for atarax now. Still seeing Anshu Bansal. General Health  Sleep: sleeps well. Hearing: normal - bilateral.  Vision: no vision problems. Dental: regular dental visits and brushes teeth twice daily. /GYN Health  Patient just saw GYN. Considering options. May do ablation. Review of Systems:     Review of Systems   Constitutional:  Negative for chills and fever. Respiratory:  Negative for cough and shortness of breath. Cardiovascular:  Negative for chest pain and palpitations. Genitourinary:  Positive for menstrual problem (heavy, but regular) and pelvic pain (and clots). Neurological:  Negative for light-headedness and headaches. Past Medical History:     Past Medical History:   Diagnosis Date    Anxiety     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[address])       Past Surgical History:     Past Surgical History:   Procedure Laterality Date    APPENDECTOMY      extensive lysis of adhesions, MAYURI's procedure (divisions of MAYURI's bands) detorsion of vulvulus, widening of mesenteric pedicle, cecopeexy, appendectomy       BACK SURGERY Right 2013    SF: L4-L5 hemilaminectomy for discectomy. Use of the microscope for microdissection, Use og 40mg of depo-medrol though thee exiting right L5 nerve root.  Onset:13     SECTION      x2    CHOLECYSTECTOMY      FL INJECTION RIGHT SHOULDER (ARTHROGRAM)  2022    GALLBLADDER SURGERY      [facility], Onset:     LAPAROSCOPIC LYSIS INTESTINAL ADHESIONS      onset: 15    LUMBAR DISC SURGERY      CA LAPS ABD PRTM&OMENTUM DX W/WO SPEC BR/WA SPX N/A 2016    Procedure: LAPAROSCOPY DIAGNOSTIC, EXTENSIVE LYSIS OF ADHESIONS, MAYURI'S PROCEDURE(DIVISION OF MAYURI'S BANDS,DETORSION OF VOLVULUS, WIDENING OF MESENTERIC PEDICLE, CECOPEXY, APPENDECTOMY); Surgeon: Lilli Rowe MD;  Location:  MAIN OR;  Service: General    TUBAL LIGATION        Social History:     Social History     Socioeconomic History    Marital status: /Civil Union     Spouse name: None    Number of children: None    Years of education: 12    Highest education level: None   Occupational History    Occupation: [employer]   Tobacco Use    Smoking status: Former     Packs/day: 0.25     Years: 5.00     Total pack years: 1.25     Types: Cigarettes     Quit date: 2009     Years since quittin.9    Smokeless tobacco: Never   Vaping Use    Vaping Use: Never used   Substance and Sexual Activity    Alcohol use: Not Currently    Drug use: No    Sexual activity: Yes     Partners: Male     Birth control/protection: None     Comment: no new partner in past year   Other Topics Concern    None   Social History Narrative    Participates in activities inside and outside of home    Lives with family    Supportive and safe    No advance directives     Social Determinants of Health     Financial Resource Strain: Not on file   Food Insecurity: Not on file   Transportation Needs: No Transportation Needs (2021)    PRAPARE - Transportation     Lack of Transportation (Medical): No     Lack of Transportation (Non-Medical):  No   Physical Activity: Not on file   Stress: Not on file   Social Connections: Not on file   Intimate Partner Violence: Not At Risk (10/12/2020)    Humiliation, Afraid, Rape, and Kick questionnaire     Fear of Current or Ex-Partner: No     Emotionally Abused: No     Physically Abused: No     Sexually Abused: No   Housing Stability: Not on file      Family History:     Family History   Problem Relation Age of Onset    Mental illness Mother         bipolar/anxiety    Stroke Mother     Heart disease Mother     Depression Mother     Bipolar disorder Mother     Hypertension Father     Stroke Father     Seizures Brother         epilepsy Heart attack Other     Scleroderma Other     Stroke Other 70        stroke syndrome    Heart attack Other     Heart disease Family     Substance Abuse Neg Hx     Breast cancer Neg Hx     Ovarian cancer Neg Hx     Uterine cancer Neg Hx     Colon cancer Neg Hx       Current Medications:     Current Outpatient Medications   Medication Sig Dispense Refill    ALPRAZolam (XANAX) 0.25 mg tablet Take 1 tablet (0.25 mg total) by mouth daily as needed for anxiety 30 tablet 0    hydrOXYzine HCL (ATARAX) 25 mg tablet Take 1 tablet (25 mg total) by mouth every 6 (six) hours as needed for anxiety 30 tablet 1    multivitamin (THERAGRAN) TABS Take 1 tablet by mouth daily      Probiotic Product (PROBIOTIC-10 PO) Take by mouth 1 daily      DULoxetine (Cymbalta) 30 mg delayed release capsule 1 capsule daily. Continue Cymbalta 60 mg daily. 30 capsule 1    DULoxetine (CYMBALTA) 60 mg delayed release capsule Take 1 capsule (60 mg total) by mouth 2 (two) times a day 1 Daily 180 capsule 2    lamoTRIgine (LaMICtal) 25 mg tablet 2 Daily 60 tablet 1    mupirocin (BACTROBAN) 2 % ointment Apply topically 2 (two) times a day for 7 days Apply to right nostril twice a day 1 g 0     No current facility-administered medications for this visit. Allergies: Allergies   Allergen Reactions    Biaxin [Clarithromycin] GI Intolerance and Other (See Comments)    Sulfa Antibiotics     Sulfasalazine     Venlafaxine GI Intolerance, Vomiting and Other (See Comments)     Category: Allergy; Category: Allergy; Physical Exam:     /80   Pulse 84   Ht 5' 4" (1.626 m)   Wt 65.3 kg (144 lb)   LMP 09/24/2023 (Exact Date)   SpO2 99%   BMI 24.72 kg/m²     Physical Exam  Vitals reviewed. Constitutional:       General: She is not in acute distress. Appearance: Normal appearance. She is normal weight. She is not ill-appearing. HENT:      Head: Normocephalic and atraumatic.       Right Ear: Tympanic membrane, ear canal and external ear normal. There is no impacted cerumen. Left Ear: Tympanic membrane, ear canal and external ear normal. There is no impacted cerumen. Nose: Nose normal. No congestion or rhinorrhea. Mouth/Throat:      Mouth: Mucous membranes are moist.      Pharynx: Oropharynx is clear. No oropharyngeal exudate or posterior oropharyngeal erythema. Eyes:      General: No scleral icterus. Right eye: No discharge. Left eye: No discharge. Conjunctiva/sclera: Conjunctivae normal.   Cardiovascular:      Rate and Rhythm: Normal rate and regular rhythm. Pulses: Normal pulses. Heart sounds: Normal heart sounds. No murmur heard. No friction rub. No gallop. Pulmonary:      Effort: Pulmonary effort is normal. No respiratory distress. Breath sounds: Normal breath sounds. No stridor. No wheezing. Musculoskeletal:         General: Normal range of motion. Cervical back: Normal range of motion and neck supple. No rigidity. Right lower leg: No edema. Left lower leg: No edema. Lymphadenopathy:      Cervical: No cervical adenopathy. Skin:     General: Skin is warm and dry. Capillary Refill: Capillary refill takes less than 2 seconds. Coloration: Skin is not jaundiced or pale. Neurological:      Mental Status: She is alert and oriented to person, place, and time. Gait: Gait normal.   Psychiatric:         Mood and Affect: Mood normal.         Behavior: Behavior normal.         Thought Content:  Thought content normal.         Judgment: Judgment normal.          Josefa Fuentes DO  [address]

## 2023-10-31 RX ORDER — DULOXETIN HYDROCHLORIDE 30 MG/1
CAPSULE, DELAYED RELEASE ORAL
Qty: 30 CAPSULE | Refills: 1 | Status: SHIPPED | OUTPATIENT
Start: 2023-10-31

## 2023-10-31 RX ORDER — LAMOTRIGINE 25 MG/1
TABLET ORAL
Qty: 60 TABLET | Refills: 1 | Status: SHIPPED | OUTPATIENT
Start: 2023-10-31

## 2023-10-31 RX ORDER — DULOXETIN HYDROCHLORIDE 60 MG/1
60 CAPSULE, DELAYED RELEASE ORAL 2 TIMES DAILY
Qty: 180 CAPSULE | Refills: 2
Start: 2023-10-31

## 2023-10-31 NOTE — PSYCH
Virtual Regular Visit    Verification of patient location:    Patient is located at Home in the following state in which I hold an active license [place]      Assessment/Plan:    Problem List Items Addressed This Visit    None  Visit Diagnoses       Mood disorder (HCC)        Relevant Medications    lamoTRIgine (LaMICtal) 25 mg tablet    DULoxetine (Cymbalta) 30 mg delayed release capsule    DULoxetine (CYMBALTA) 60 mg delayed release capsule    Current severe episode of major depressive disorder without psychotic features without prior episode (HCC)        Relevant Medications    DULoxetine (Cymbalta) 30 mg delayed release capsule    DULoxetine (CYMBALTA) 60 mg delayed release capsule            Goals addressed in session: Continue for mood improvement. Decrease Cymbalta and increase Lamictal.         Reason for visit is   Chief Complaint   Patient presents with    Virtual Regular Visit          Encounter provider SAKINA Mi    Provider located at [address] [place] [id number]      Recent Visits  Date Type Provider Dept   10/30/23 Telemedicine Richelle Brown, [address] [place] [address]   10/30/23 Office Visit Laila Galdamez DO [facility] [address]   Showing recent visits within past 7 days and meeting all other requirements  Future Appointments  No visits were found meeting these conditions. Showing future appointments within next 150 days and meeting all other requirements       The patient was identified by name and date of birth. Jack Atkinson was informed that this is a telemedicine visit and that the visit is being conducted throughthe [employer] platform. She agrees to proceed. .  My office door was closed. No one else was in the room. She acknowledged consent and understanding of privacy and security of the video platform.  The patient has agreed to participate and understands they can discontinue the visit at any time. Patient is aware this is a billable service. Subha Mendez is a 39 y.o. female who has a history of depressive disorder and possible mood disorder. He is doing much better since we started Lamictal for mood stabilization. So we will increase to 50 mg daily. Also, we will start to reduce the dose of Cymbalta from 120 mg daily to 90 mg daily. The patient reports improvement of mood since starting Lamictal.  Family history of bipolar depression. Today seen by her PCP and PCP agrees with the treatment plan. We will continue to follow her very closely. Follow-up with me in 4 weeks or sooner if necessary. The patient will continue on:  Decrease Cymbalta 90 mg daily  Increase Lamictal 50 mg daily  Continue Xanax 0.25 mg daily as needed for anxiety. Patient uses this rarely. Hydroxyzine 25 mg every 6 hours as needed for anxiety  Follow-up with me the early part of December. Mental status exam: Patient is awake and alert and oriented x 3. Mood is improving. Patient is not suicidal, not homicidal and not psychotic. Associations are intact. No overt delusions. Speech is clear and thoughts are well organized, coherent and goal directed. Attention span is good. Impulse control is good. Judgment and insight are good. Memory is good. Simran CORDERO     Past Medical History:   Diagnosis Date    Anxiety     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[address])        Past Surgical History:   Procedure Laterality Date    APPENDECTOMY      extensive lysis of adhesions, MAYURI's procedure (divisions of MAYURI's bands) detorsion of vulvulus, widening of mesenteric pedicle, cecopeexy, appendectomy       BACK SURGERY Right 2013    SF: L4-L5 hemilaminectomy for discectomy. Use of the microscope for microdissection, Use og 40mg of depo-medrol though thee exiting right L5 nerve root.  Onset:13     SECTION      x2    CHOLECYSTECTOMY      FL INJECTION RIGHT SHOULDER (ARTHROGRAM) 4/19/2022    GALLBLADDER SURGERY      [facility], Onset: 2011    LAPAROSCOPIC LYSIS INTESTINAL ADHESIONS      onset: 9/23/16    LUMBAR DISC SURGERY      SC LAPS ABD PRTM&OMENTUM DX W/WO SPEC BR/WA SPX N/A 9/23/2016    Procedure: LAPAROSCOPY DIAGNOSTIC, EXTENSIVE LYSIS OF ADHESIONS, MAYURI'S PROCEDURE(DIVISION OF MAYURI'S BANDS,DETORSION OF VOLVULUS, WIDENING OF MESENTERIC PEDICLE, CECOPEXY, APPENDECTOMY); Surgeon: Uma Cummings MD;  Location:  MAIN OR;  Service: General    TUBAL LIGATION         Current Outpatient Medications   Medication Sig Dispense Refill    ALPRAZolam (XANAX) 0.25 mg tablet Take 1 tablet (0.25 mg total) by mouth daily as needed for anxiety 30 tablet 0    DULoxetine (Cymbalta) 30 mg delayed release capsule 1 capsule daily. Continue Cymbalta 60 mg daily. 30 capsule 1    DULoxetine (CYMBALTA) 60 mg delayed release capsule Take 1 capsule (60 mg total) by mouth 2 (two) times a day 1 Daily 180 capsule 2    hydrOXYzine HCL (ATARAX) 25 mg tablet Take 1 tablet (25 mg total) by mouth every 6 (six) hours as needed for anxiety 30 tablet 1    lamoTRIgine (LaMICtal) 25 mg tablet 2 Daily 60 tablet 1    multivitamin (THERAGRAN) TABS Take 1 tablet by mouth daily      Probiotic Product (PROBIOTIC-10 PO) Take by mouth 1 daily       No current facility-administered medications for this visit. Allergies   Allergen Reactions    Biaxin [Clarithromycin] GI Intolerance and Other (See Comments)    Sulfa Antibiotics     Sulfasalazine     Venlafaxine GI Intolerance, Vomiting and Other (See Comments)     Category: Allergy; Category: Allergy; Review of Systems    Video Exam    There were no vitals filed for this visit. Physical Exam     Visit Time    Visit Start Time:12:00p  Visit Stop Time: 12:30p  Total Visit Duration: 30 minutes were spent in the visit.   Time was spent reviewing the treatment plan, reviewing the medications, discussion with PCP regarding medications and completing the progress note.

## 2023-11-06 ENCOUNTER — TELEMEDICINE (OUTPATIENT)
Dept: BEHAVIORAL/MENTAL HEALTH CLINIC | Facility: CLINIC | Age: 43
End: 2023-11-06
Payer: COMMERCIAL

## 2023-11-06 DIAGNOSIS — F33.2 SEVERE EPISODE OF RECURRENT MAJOR DEPRESSIVE DISORDER, WITHOUT PSYCHOTIC FEATURES (HCC): Primary | ICD-10-CM

## 2023-11-06 DIAGNOSIS — F41.1 GAD (GENERALIZED ANXIETY DISORDER): ICD-10-CM

## 2023-11-06 PROCEDURE — 90837 PSYTX W PT 60 MINUTES: CPT | Performed by: SOCIAL WORKER

## 2023-11-06 NOTE — PSYCH
Psychotherapy Provided: Individual Psychotherapy 60 minutes. Length of time in session: 60 minutes. Current suicide risk : Low . Behavioral Health Treatment Plan [facility]: Diagnosis and Treatment Plan explained to Allison Cruz relates understanding diagnosis and is agreeable to Treatment Plan. Yes     Visit start and stop times:    11/06/23  Start Time: 1100  Stop Time: 1200  Total Visit Time: 60 minutes    Virtual Regular Visit    Verification of patient location: [place]. Patient is located at Home in the following state in which I hold an active license [place]. Assessment/Plan:    Problem List Items Addressed This Visit          Other    NAVI (generalized anxiety disorder)    Severe episode of recurrent major depressive disorder, without psychotic features ([address]) - Primary     Goals addressed in session: Goal 1 Follow up psychotherapy session. Reason for visit is   Chief Complaint   Patient presents with    Virtual Regular Visit     Encounter provider Shikha Benson    Provider located at [email or web address] [address]  [place] [id number] [phone]    Recent Visits  Date Type Provider Dept   10/30/23 Office Visit Freya Ambrocio DO [facility] [address]   Showing recent visits within past 7 days and meeting all other requirements  Today's Visits  Date Type Provider Dept   11/06/23 Telemedicine Shikha Benson [facility]   Showing today's visits and meeting all other requirements  Future Appointments  No visits were found meeting these conditions. Showing future appointments within next 150 days and meeting all other requirements     The patient was identified by name and date of birth. Asher Rooney was informed that this is a telemedicine visit and that the visit is being conducted through the [address] [place] [address] [employer] platform. She agrees to proceed.   My office door was closed. No one else was in the room. She acknowledged consent and understanding of privacy and security of the video platform. The patient has agreed to participate and understands they can discontinue the visit at any time. Patient is aware this is a billable service. Subjective  Petar Rdz is a 39 y.o. female. HPI     Past Medical History:   Diagnosis Date    Anxiety     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[address])        Past Surgical History:   Procedure Laterality Date    APPENDECTOMY      extensive lysis of adhesions, MAYURI's procedure (divisions of MAYURI's bands) detorsion of vulvulus, widening of mesenteric pedicle, cecopeexy, appendectomy       BACK SURGERY Right 2013    SF: L4-L5 hemilaminectomy for discectomy. Use of the microscope for microdissection, Use og 40mg of depo-medrol though thee exiting right L5 nerve root. Onset:12     SECTION      x2    CHOLECYSTECTOMY      FL INJECTION RIGHT SHOULDER (ARTHROGRAM)  2022    GALLBLADDER SURGERY      [facility], Onset:     LAPAROSCOPIC LYSIS INTESTINAL ADHESIONS      onset: 15    LUMBAR DISC SURGERY      [place] LAPS ABD PRTM&OMENTUM DX W/WO SPEC BR/WA SPX N/A 2016    Procedure: LAPAROSCOPY DIAGNOSTIC, EXTENSIVE LYSIS OF ADHESIONS, MAYRUI'S PROCEDURE(DIVISION OF MAYURI'S BANDS,DETORSION OF VOLVULUS, WIDENING OF MESENTERIC PEDICLE, CECOPEXY, APPENDECTOMY); Surgeon: Pallavi Yap MD;  Location: [facility] OR;  Service: General    TUBAL LIGATION       Current Outpatient Medications   Medication Sig Dispense Refill    ALPRAZolam (XANAX) 0.25 mg tablet Take 1 tablet (0.25 mg total) by mouth daily as needed for anxiety 30 tablet 0    DULoxetine (Cymbalta) 30 mg delayed release capsule 1 capsule daily. Continue Cymbalta 60 mg daily.  30 capsule 1    DULoxetine (CYMBALTA) 60 mg delayed release capsule Take 1 capsule (60 mg total) by mouth 2 (two) times a day 1 Daily 180 capsule 2    hydrOXYzine HCL (ATARAX) 25 mg tablet Take 1 tablet (25 mg total) by mouth every 6 (six) hours as needed for anxiety 30 tablet 1    lamoTRIgine (LaMICtal) 25 mg tablet 2 Daily 60 tablet 1    multivitamin (THERAGRAN) TABS Take 1 tablet by mouth daily      Probiotic Product (PROBIOTIC-10 PO) Take by mouth 1 daily       No current facility-administered medications for this visit. Allergies   Allergen Reactions    Biaxin [Clarithromycin] GI Intolerance and Other (See Comments)    Sulfa Antibiotics     Sulfasalazine     Venlafaxine GI Intolerance, Vomiting and Other (See Comments)     Category: Allergy; Category: Allergy; Review of Systems    Video Exam    There were no vitals filed for this visit. Physical Exam    (D) Corinne attended her follow up psychotherapy session today. Corinne reported that since her last session, she has noticed ongoing symptoms. Miguel Preston reported that two weeks ago on 10/24/23 she ended up sending her father a text message communicating that she was upset with him in relation to the incident that occurred with him smoking marijuana in her home, around her children, and her daughter's friend that was over. Corinne reported that she expressed beliefs, and feelings of her father not listening to her, hearing her, respecting her boundaries, and communicating that she doesn't believe that them living together is healthy. Corinne reported that she expressed having a desire for her father to move out of her home, and reported that she communicated this via text message to her father. Corinne reported that her father never responded to the message. Corinne reported that she talked this over with her , who verbalized being supportive of having her father move out, and made a comment about the financial impact that this will have on the family. Corinne reported that she was triggered by this, ruminating on finances, worrying about them, and not wanting her father to continue to live with them, based upon his financial contribution.  Corinne described a history of patterns, themes, and behaviors of her father's substance abuse and addiction impacting their relationships, and triggering Corinne's mental health. Corinne reported that she is worrying about finances, and discussed her and her  possibly giving up the beach camper in order to supplement her father's financial contribution, and then feeling further triggered by this. Corinne reported that she doesn't want to give up the beach camper describing herself ruminating on financial plans to work on budgeting to try to keep things the same and not have to give anything up. 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 Corinne reported that she talked to her brother and sister-in-law in relation to this, and described them as being supportive. Corinne reported that last Friday she reinforced again in a text message to her father, that she was serious about him moving out, and gave him a deadline by the end of December, reporting that she added her  to this text message as well. Corinne reported that her father responded by stating that it's not that easy to find a new place to live, and Corinne responded by stating that he needs to look for something temporarily to follow the timeline, until he can follow something more permeant. Corinne reported that her father stopped communicating with her and her  since this, describing tension in the house. Corinne reported that she doesn't want her father living there anymore, and then described struggling with guilt in relation to reinforcing this, triggering her further, describing co-dependency and trauma triggers.  Corinne describes symptoms of grief in relation to symbolic loss, and actual loss, triggering memories of missing her mother, and grandparents, identifying feelings of missing them, and not having the extended family that she desires when it comes to relationships. Discussed and processed this. Corinne reported that on Monday 10/30/23 she had a follow up psychiatry appointment with SAKINA Castillo, and reported that she lowered her cymbalta to 90mg, and reported that it was recommended that her lamictal be increased, and Corinne discussed anticipatory anxiety in relation to this. Discussed and processed this. Corinne reported that she scheduled a follow up pre-op appointment with her OBGYN for 11/20/23 for a possible ablation to treat heaving menstrual bleeding and clotting. Corinne and this writer processed this at length. Provided ongoing psychoeducation. Modeled effective forms of communication. Discussed ongoing skills. Reviewed limits and boundaries. (A) Corinne presented as alert and oriented x3. Corinne presented with good eye contact. Corinne's speech presented at a normal rate, volume, and rhythm. Corinne denies any symptoms of frank. Corinne denies any evident or immediate risk factors for self-harm, SI, or HI. Corinne's mood presented anxious and depressed, and her affect appeared to be congruent, and tearful at times. Corinne describes feeling stressed out and overwhelmed, describing symptoms of irritability, poor frustration tolerance, and becoming easily annoyed. Corinne describes worrying too much about different things, not being able to stop and control worrying, and feeling nervous, anxious, and on edge. Corinne describes symptoms of anticipatory anxiety. Corinne describes little interest and pleasure in doing things, describing lower moods of sadness, and depression. Corinne describes feeling tired and having little energy, and describes poor appetite. Corinne describes feeling bad about herself, feeling like she is letting herself, and her family down. Corinne describes symptoms of obsessive, intrusive, ruminating, and repetitive thoughts.  Corinne describes some psychosomatic symptoms, describing her stomach as feeling uneasy, and nauseated at times, when triggered and stress from her father. (P) Corinne reported a plan to use healthy and effective forms of communication to ask for what she needs, specifically asking her father to provide her and her  with weekly updates on his progression with finding alternative housing arrangements, and status updates in attempts to decrease anticipatory anxiety, and reinforce accountability with consistency. Corinne plans to examine the differences between guilt, compassion, empathy, and grief in relation to symbolic loss, to journaling upon this further in attempts to increase self-awareness, and explore next session. Corinne plans to examine these terms, through the lens of trauma and co-dependency, aiming to increase insight in relation to trauma triggers, patterns ,themes, and behaviors. Corinne plans to lean into healthy natural supports, asserting herself, advocating for herself, and targeting interpersonal relationship stressors. Corinne plans to give herself permission to engage in the use of self-care, take time for herself, and be present in the moment. Corinne plans to examine pros and cons, outweigh risks verses benefits in order to make informed decisions, and align choices and decisions with what is in the best interest of her, implementing radical acceptance. Corinne plans to implement grounding statements, positive self-talk, positive affirmations, and self-affirming statements, meeting herself with radical compassion, demonstrating bogdan towards herself, and identify worth. Corinne plans to intentionally  Rational Mind with logic and reason, and Emotional Mind, striking balance between the both implementing the concept of DBT [facility]. Corinne plans to continue to seek out evidence to challenge negative thoughts, negative thinking traps, cognitive distortions, and harmful core beliefs with CBT Challenging Negative Thoughts Assessment Questions.  Corinne plans to challenge herself to talk to her inner child, aiming to meet her needs. Corinne plans to reinforce limits and boundaries, challenging co-dependency, and trauma response, aiming to break the cycle, and implement healthy patterns, and behaviors. Corinne plans to honor her feelings, and emotions, validating them, and making space for them. Corinne plans to reach out for additional support as needed.      11/06/23  Start Time: 1100  Stop Time: 1200  Total Visit Time: 60 minutes

## 2023-11-15 ENCOUNTER — OFFICE VISIT (OUTPATIENT)
Dept: URGENT CARE | Facility: CLINIC | Age: 43
End: 2023-11-15
Payer: COMMERCIAL

## 2023-11-15 VITALS
WEIGHT: 138 LBS | RESPIRATION RATE: 16 BRPM | HEART RATE: 74 BPM | DIASTOLIC BLOOD PRESSURE: 68 MMHG | BODY MASS INDEX: 22.99 KG/M2 | OXYGEN SATURATION: 98 % | TEMPERATURE: 97.8 F | HEIGHT: 65 IN | SYSTOLIC BLOOD PRESSURE: 122 MMHG

## 2023-11-15 DIAGNOSIS — J30.89 NON-SEASONAL ALLERGIC RHINITIS DUE TO OTHER ALLERGIC TRIGGER: Primary | ICD-10-CM

## 2023-11-15 DIAGNOSIS — J34.89 NASAL VESTIBULITIS: ICD-10-CM

## 2023-11-15 PROCEDURE — 99213 OFFICE O/P EST LOW 20 MIN: CPT | Performed by: PHYSICIAN ASSISTANT

## 2023-11-15 NOTE — PATIENT INSTRUCTIONS
Apply mupirocin twice a day to right nostril for at least 7 days, may use longer if still noting discomfort  Follow up with PCP if lymph node swelling and tenderness does not resolve within 7-14 days     Proceed to  ER if symptoms worsen.

## 2023-11-15 NOTE — PROGRESS NOTES
[facility]        NAME: Benito Abrams is a 39 y.o. female  : 1980    MRN: [id number]  DATE: November 15, 2023  TIME: 9:05 AM    Assessment and Plan   Non-seasonal allergic rhinitis due to other allergic trigger [J30.89]  1. Non-seasonal allergic rhinitis due to other allergic trigger  mupirocin (BACTROBAN) 2 % ointment      2. Nasal vestibulitis              Patient Instructions     Apply mupirocin twice a day to right nostril for at least 7 days, may use longer if still noting discomfort  Follow up with PCP if lymph node swelling and tenderness does not resolve within 7-14 days     Proceed to  ER if symptoms worsen. Chief Complaint     Chief Complaint   Patient presents with    Jaw Pain     Pt reports tenderness/swelling on her right lower jaw that began on . Reports sinus pain today. Denies tooth pain/fevers. History of Present Illness       HPI  38 y/o female presents for evaluation of right sided swollen lymph node which developed  and has become progressively larger and more tender. She states for the last week the right nostril has been very tender - "I feel like there's a sore inside". She notes onset of sinus discharge and sinus HA within the last day or so. She denies fever/chills. She  notes mild right ear discomfort. Denies dental pain. Review of Systems   Review of Systems   Constitutional:  Negative for chills and fever. HENT:  Positive for congestion, ear pain and facial swelling. Negative for dental problem, postnasal drip and sore throat. Eyes:  Negative for pain and visual disturbance. Respiratory:  Negative for cough and shortness of breath. Cardiovascular:  Negative for chest pain and palpitations. Gastrointestinal:  Negative for abdominal pain and vomiting. Genitourinary:  Negative for dysuria and hematuria. Musculoskeletal:  Negative for arthralgias and back pain. Skin:  Negative for color change and rash.    Neurological: Positive for headaches. Negative for seizures and syncope. All other systems reviewed and are negative. Current Medications       Current Outpatient Medications:     ALPRAZolam (XANAX) 0.25 mg tablet, Take 1 tablet (0.25 mg total) by mouth daily as needed for anxiety, Disp: 30 tablet, Rfl: 0    DULoxetine (Cymbalta) 30 mg delayed release capsule, 1 capsule daily. Continue Cymbalta 60 mg daily. , Disp: 30 capsule, Rfl: 1    DULoxetine (CYMBALTA) 60 mg delayed release capsule, Take 1 capsule (60 mg total) by mouth 2 (two) times a day 1 Daily, Disp: 180 capsule, Rfl: 2    hydrOXYzine HCL (ATARAX) 25 mg tablet, Take 1 tablet (25 mg total) by mouth every 6 (six) hours as needed for anxiety, Disp: 30 tablet, Rfl: 1    lamoTRIgine (LaMICtal) 25 mg tablet, 2 Daily, Disp: 60 tablet, Rfl: 1    multivitamin (THERAGRAN) TABS, Take 1 tablet by mouth daily, Disp: , Rfl:     mupirocin (BACTROBAN) 2 % ointment, Apply topically 2 (two) times a day for 7 days Apply to right nostril twice a day, Disp: 1 g, Rfl: 0    Probiotic Product (PROBIOTIC-10 PO), Take by mouth 1 daily, Disp: , Rfl:     Current Allergies     Allergies as of 11/15/2023 - Reviewed 11/15/2023   Allergen Reaction Noted    Biaxin [clarithromycin] GI Intolerance and Other (See Comments) 09/22/2016    Sulfa antibiotics  10/01/2014    Sulfasalazine  10/01/2014    Venlafaxine GI Intolerance, Vomiting, and Other (See Comments) 10/01/2014            The following portions of the patient's history were reviewed and updated as appropriate: allergies, current medications, past family history, past medical history, past social history, past surgical history and problem list.     Past Medical History:   Diagnosis Date    Anxiety     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[address])        Past Surgical History:   Procedure Laterality Date    APPENDECTOMY      extensive lysis of adhesions, MAYURI's procedure (divisions of MAYURI's bands) detorsion of vulvulus, widening of mesenteric pedicle, cecopeexy, appendectomy       BACK SURGERY Right 2013    SF: L4-L5 hemilaminectomy for discectomy. Use of the microscope for microdissection, Use og 40mg of depo-medrol though thee exiting right L5 nerve root. Onset:12     SECTION      x2    CHOLECYSTECTOMY      FL INJECTION RIGHT SHOULDER (ARTHROGRAM)  2022    GALLBLADDER SURGERY      [facility], Onset:     LAPAROSCOPIC LYSIS INTESTINAL ADHESIONS      onset: 15    LUMBAR DISC SURGERY      ME LAPS ABD PRTM&OMENTUM DX W/WO SPEC BR/WA SPX N/A 2016    Procedure: LAPAROSCOPY DIAGNOSTIC, EXTENSIVE LYSIS OF ADHESIONS, MAYURI'S PROCEDURE(DIVISION OF MAYURI'S BANDS,DETORSION OF VOLVULUS, WIDENING OF MESENTERIC PEDICLE, CECOPEXY, APPENDECTOMY); Surgeon: Rich Woodruff MD;  Location: [facility];  Service: General    TUBAL LIGATION         Family History   Problem Relation Age of Onset    Mental illness Mother         bipolar/anxiety    Stroke Mother     Heart disease Mother     Depression Mother     Bipolar disorder Mother     Hypertension Father     Stroke Father     Seizures Brother         epilepsy    Heart attack Other     Scleroderma Other     Stroke Other 78        stroke syndrome    Heart attack Other     Heart disease Family     Substance Abuse Neg Hx     Breast cancer Neg Hx     Ovarian cancer Neg Hx     Uterine cancer Neg Hx     Colon cancer Neg Hx          Medications have been verified. Objective   /68   Pulse 74   Temp 97.8 °F (36.6 °C)   Resp 16   Ht 5' 5" (1.651 m)   Wt 62.6 kg (138 lb)   LMP 2023 (Exact Date)   SpO2 98%   BMI 22.96 kg/m²   Patient's last menstrual period was 2023 (exact date). Physical Exam     Physical Exam  Vitals and nursing note reviewed. Constitutional:       General: She is not in acute distress. Appearance: Normal appearance. HENT:      Head: Normocephalic and atraumatic.       Right Ear: Tympanic membrane and ear canal normal.      Left Ear: Tympanic membrane and ear canal normal.      Ears:      Comments: Mild tenderness over right maxillary sinus     Nose:      Comments: Right nasal vestibule with mild erythema, no sores or scabs visualized, scant white discharge. Turbinates are not erythematous or boggy. Left nostril: no erythema or discharge     Mouth/Throat:      Mouth: Mucous membranes are moist.      Pharynx: No posterior oropharyngeal erythema. Eyes:      Conjunctiva/sclera: Conjunctivae normal.   Neck:      Comments: 1 right submandibular lymph node is enlarged and tender. No erythema or warmth. Cardiovascular:      Rate and Rhythm: Normal rate and regular rhythm. Pulses: Normal pulses. Heart sounds: Normal heart sounds. Pulmonary:      Effort: Pulmonary effort is normal.      Breath sounds: Normal breath sounds. Lymphadenopathy:      Cervical: Cervical adenopathy present. Skin:     General: Skin is warm and dry. Neurological:      Mental Status: She is alert and oriented to person, place, and time.    Psychiatric:         Mood and Affect: Mood normal.         Behavior: Behavior normal.

## 2023-11-20 ENCOUNTER — SOCIAL WORK (OUTPATIENT)
Dept: BEHAVIORAL/MENTAL HEALTH CLINIC | Facility: CLINIC | Age: 43
End: 2023-11-20
Payer: COMMERCIAL

## 2023-11-20 ENCOUNTER — OFFICE VISIT (OUTPATIENT)
Dept: OBGYN CLINIC | Facility: CLINIC | Age: 43
End: 2023-11-20
Payer: COMMERCIAL

## 2023-11-20 VITALS
BODY MASS INDEX: 24.06 KG/M2 | WEIGHT: 144.4 LBS | SYSTOLIC BLOOD PRESSURE: 112 MMHG | DIASTOLIC BLOOD PRESSURE: 70 MMHG | HEIGHT: 65 IN

## 2023-11-20 DIAGNOSIS — F41.1 GAD (GENERALIZED ANXIETY DISORDER): ICD-10-CM

## 2023-11-20 DIAGNOSIS — N92.0 MENORRHAGIA WITH REGULAR CYCLE: Primary | ICD-10-CM

## 2023-11-20 DIAGNOSIS — F33.2 SEVERE EPISODE OF RECURRENT MAJOR DEPRESSIVE DISORDER, WITHOUT PSYCHOTIC FEATURES (HCC): Primary | ICD-10-CM

## 2023-11-20 PROCEDURE — 90837 PSYTX W PT 60 MINUTES: CPT | Performed by: SOCIAL WORKER

## 2023-11-20 PROCEDURE — 99213 OFFICE O/P EST LOW 20 MIN: CPT | Performed by: STUDENT IN AN ORGANIZED HEALTH CARE EDUCATION/TRAINING PROGRAM

## 2023-11-20 NOTE — PROGRESS NOTES
Pre-Operative History & Physical  [facility] - [address] [facility], [place], [address]    Assessment/Plan:  Onel Ruiz is a 39 y.o. [id number] with heavy menses desiring surgical management. - Options for management of menorrhagia with regular cycle were reviewed with the patient at length, including both medical management and surgical management options. Following counseling, she desires to proceed with endometrial ablation.   - Plan for hysteroscopy with dilation & curettage and endometrial ablation. Surgical consents reviewed and signed with patient today. Risks of surgery were reviewed, including bleeding, infection, damage to surrounding organs/structures (specifically bowel, bladder, vessels, nerves, ureters), scar tissue formation, failure to improve bleeding, post ablation syndrome, difficulty with future uterine sampling, and need for further surgery. All questions answered to the patient's apparent satisfaction. Patient agrees to proceed with surgery as discussed. - Belinda-operative pain control discussed. Reviewed first-line treatment with over-the-counter ibuprofen and acetaminophen. - PATs: CBC, BMP, T&S  - Antibiotic prophylaxis: None indicated  - VTE ppx: SCDs. Will plan to schedule procedure during the first 10 days of her cycle. Patient desires surgery in January. Her cycle lengths are currently approximately 35 days. She will call the office with onset of menses in December in order to schedule surgery for the following cycle. 1. Menorrhagia with regular cycle  Assessment & Plan:  - Potential causes of heavy menses reviewed, including ovulatory dysfunction, polyp, fibroids, adenomyosis. Given regular cycle without intermenstrual or prolonged bleeding, young age, and absence of risk factors, endometrial hyperplasia/malignancy unlikely. - Recent pelvic imaging from 11/2022 reviewed. No evidence of fibroid or polyp.  Enlarged uterus is suggestive of possible adenomyosis, although this cannot be definitively diagnosed on imaging.   - Recent labs from 10/2023 reviewed, including CBC, TSH, and iron studies. Results normal.   - Options for management reviewed, including medical and surgical management options. Discussed OCP, LNG-IUD, TXA, endometrial ablation, and hysterectomy. - Patient has ultimately decided to proceed with endometrial ablation. We reviewed implications following ablation, including that future endometrial sampling with biopsy or D&C may not be possible in the case of abnormal bleeding. We reviewed potential for delayed diagnosis of endometrial cancer if this develop in the future. We also reviewed potential for post ablation syndrome including cyclic pelvic pain following endometrial ablation. Patient understands these risks and desires to proceed. ____________________________________    Subjective:   Yon Hale is a 39 y.o. [id number] with heavy periods  CC:   Chief Complaint   Patient presents with    Consult       HPI: Patient presents to revisit our prior discussion regarding options for management of heavy periods. She reports that her period this last month was subjectively heavy, as is typical for her. Her heavy periods are getting in the way of her daily life and she desires surgical intervention. She has concerns about hormonal management and does not want IUD. 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  Use of the microscope for microdissection, Use og 40mg of depo-medrol though thee exiting right L5 nerve root. Onset:12     SECTION      x2    CHOLECYSTECTOMY      FL INJECTION RIGHT SHOULDER (ARTHROGRAM)  2022    GALLBLADDER SURGERY      GV, Onset:     LAPAROSCOPIC LYSIS INTESTINAL ADHESIONS      onset: 15    LUMBAR DISC SURGERY      OH LAPS ABD PRTM&OMENTUM DX W/WO SPEC BR/WA SPX N/A 2016    Procedure: LAPAROSCOPY DIAGNOSTIC, EXTENSIVE LYSIS OF ADHESIONS, MAYURI'S PROCEDURE(DIVISION OF MAYURI'S BANDS,DETORSION OF VOLVULUS, WIDENING OF MESENTERIC PEDICLE, CECOPEXY, APPENDECTOMY);   Surgeon: Ronit Dent MD;  Location:  MAIN OR;  Service: General    TUBAL LIGATION       Family History   Problem Relation Age of Onset    Mental illness Mother         bipolar/anxiety    Stroke Mother     Heart disease Mother     Depression Mother     Bipolar disorder Mother     Hypertension Father     Stroke Father     Seizures Brother         epilepsy    Heart attack Other     Scleroderma Other     Stroke Other 79        stroke syndrome    Heart attack Other     Heart disease Family     Substance Abuse Neg Hx     Breast cancer Neg Hx     Ovarian cancer Neg Hx     Uterine cancer Neg Hx     Colon cancer Neg Hx      Social History     Socioeconomic History    Marital status: /Civil Union     Spouse name: Not on file    Number of children: Not on file    Years of education: 12    Highest education level: Not on file   Occupational History    Occupation: [employer]   Tobacco Use    Smoking status: Former     Packs/day: 0.25     Years: 5.00     Total pack years: 1.25     Types: Cigarettes     Quit date: 2009     Years since quittin.8    Smokeless tobacco: Never   Vaping Use    Vaping Use: Never used   Substance and Sexual Activity    Alcohol use: Not Currently    Drug use: No    Sexual activity: Yes     Partners: Male     Birth control/protection: None     Comment: no new partner in past year   Other Topics Concern    Not on file   Social History Narrative Participates in activities inside and outside of home    Lives with family    Supportive and safe    No advance directives     Social Determinants of Health     Financial Resource Strain: Not on file   Food Insecurity: Not on file   Transportation Needs: No Transportation Needs (4/12/2021)    PRAPARE - Transportation     Lack of Transportation (Medical): No     Lack of Transportation (Non-Medical): No   Physical Activity: Not on file   Stress: Not on file   Social Connections: Not on file   Intimate Partner Violence: Not At Risk (10/12/2020)    Humiliation, Afraid, Rape, and Kick questionnaire     Fear of Current or Ex-Partner: No     Emotionally Abused: No     Physically Abused: No     Sexually Abused: No   Housing Stability: Not on file     Outpatient Medications Marked as Taking for the 11/20/23 encounter (Office Visit) with Ankita Kaba MD   Medication    ALPRAZolam Asa Antoni) 0.25 mg tablet    DULoxetine (Cymbalta) 30 mg delayed release capsule    DULoxetine (CYMBALTA) 60 mg delayed release capsule    hydrOXYzine HCL (ATARAX) 25 mg tablet    lamoTRIgine (LaMICtal) 25 mg tablet    multivitamin (THERAGRAN) TABS    mupirocin (BACTROBAN) 2 % ointment    Probiotic Product (PROBIOTIC-10 PO)     Allergies   Allergen Reactions    Biaxin [Clarithromycin] GI Intolerance and Other (See Comments)    Sulfa Antibiotics     Sulfasalazine     Venlafaxine GI Intolerance, Vomiting and Other (See Comments)     Category: Allergy; Category: Allergy;            Imaging: (10/10/2022)  PELVIC ULTRASOUND, COMPLETE     INDICATION:  The patient is 36years old. N92.1: Excessive and frequent menstruation with irregular cycle. COMPARISON: Pelvic ultrasound 3/8/2021     TECHNIQUE:   Transabdominal pelvic ultrasound was performed in sagittal and transverse planes with a curvilinear transducer. Additional transvaginal imaging was performed to better evaluate the endometrium and ovaries.   Imaging included volumetric   sweeps as well as traditional still imaging technique. FINDINGS:     UTERUS:  The uterus is anteverted and retroflexed in position, measuring 11.0 x 4.5 x 5.2 cm. The uterus has a normal contour and echotexture. The cervix appears within normal limits. ENDOMETRIUM:    The endometrial echo complex has an AP caliber of 4.0 mm. OVARIES/ADNEXA:  Right ovary:  2.4 x 1.8 x 1.6 cm. 3.7 mL  No suspicious right ovarian abnormality. Doppler flow within normal limits. Left ovary:  2.1 x 1.9 x 1.5 cm. 2.9 mL  No suspicious left ovarian abnormality. Doppler flow within normal limits. No suspicious adnexal mass or loculated collections. There is no free fluid. IMPRESSION:     Enlarged uterus. No fibroids identified. Objective:  /70 (BP Location: Left arm, Patient Position: Sitting, Cuff Size: Standard)   Ht 5' 5" (1.651 m)   Wt 65.5 kg (144 lb 6.4 oz)   LMP 11/01/2023 (Approximate)   Breastfeeding No   BMI 24.03 kg/m²      General Appearance: alert and oriented, in no acute distress. HEENT: NC/AT, EOMI  CVS: Regular rate and rhythm  Lungs: Normal work of breathing  Abdomen: Soft, non-tender, non-distended, no masses, no rebound or guarding. Pelvic: (Exam from 10/16/2023 visit with Nilesh Hernandez MA as chaperone)      External genitalia: Normal appearance, no abnormal pigmentation, no lesions or masses. Normal Bartholin's and Miston's. Urinary system: Urethral meatus normal, bladder non-tender. Vaginal: normal mucosa without prolapse or lesions. Normal-appearing physiologic discharge. Cervix: Normal-appearing, well-epithelialized, no gross lesions or masses. No cervical motion tenderness. Adnexa: No adnexal masses or tenderness noted. Uterus: Normal-sized, regular contour, midline, mobile, no uterine tenderness. Extremities: Normal range of motion.    Skin: normal, no rash or abnormalities  Neurologic: alert, oriented x3  Psychiatric: Appropriate affect, mood stable, cooperative with exam.        Darlene Jacobo MD  11/20/2023 5:41 PM

## 2023-11-20 NOTE — PSYCH
Psychotherapy Provided: Individual Psychotherapy 60 minutes. Length of time in session: 60 minutes. Goals addressed in session: Goal 1 Follow up psychotherapy session. Current suicide risk : Low . Behavioral Health Treatment Plan [facility]: Diagnosis and Treatment Plan explained to Guilherme Ornelas relates understanding diagnosis and is agreeable to Treatment Plan. Yes     Visit start and stop times:    11/20/23     (D) Corinne attended her follow up psychotherapy session today. Corinne reported that since her last session, she has noticed a decrease in symptoms. Corinne reported that her psychiatric provider Lupillo Doyle wanted her to start 50mg of lamictal, and reported that she started with the 25mg, and then has been doing 1/2 of the second dose, reporting that she is taking lamictal 37.5mg and cymbalta 90mg, and reports that she has valencia feeling more grounded. Corinne reported that she has follow up with her psychiatry provider 12/04, and plans to update her on this then. Discussed and processed this. Corinne reported that there are ongoing stressors with her father living with her. Corinne reported that her father has been talking more to her  about him moving out, reporting that her  has been helping her father to find a place, initially triggering Corinne. Corinne reported that her and her  talked about this, and reported that her  was trying to support her father in getting gout of the house, to [facility]. Corinne reported that her  connected her father with a local business owner, who is a landlord, reporting that her father is considering renting a room, as a result of finances. Corinne reported that her father recently asked her to print out his credit report, reporting that she assisted her father in doing this. Corinne reported that she was triggered seeing his credit report, with credit card debt.  Corinne reported that yesterday her daughter thought that she smelled marijuana in the house, and Corinne went to smell it as well. Corinne reported that she thought she smelled it as well and questioned her father. Corinne reported that her father denied this, saying it was a candle, or saying that the smell was coming from the kitchen from what Shaji Loza was doing. Corinne reported that her father communicated that he doesn't smoke marijuana in the house, yet also denies that he drinks alcohol in the house, yet reports that there is so much evidence that demonstrates he is drinking alcohol in the house, seeing it on the cameras at the house, and her father going to extreme levels to hide it. Corinne reported that there are trash bags filled with empty alcohol cans, and reports that her father doesn't respect her boundaries in her house, triggering her. Corinne reported that when her father denied this, she questioned herself, and then felt shame, and guilt. Corinne reported that she had to go back to the evidence to ground herself, and challenge negative thoughts. Corinne reported that her brother has the same experience with their father, reporting that multiple people smell alcohol and marijuana on her father, and uses this as supportive evidence. Corinne reported that this is triggering for her, reminding her of her mother's struggles, and her passing, death, and cleaning out her house. Corinne discussed all of this happening in , and now being in , experiencing this with her father, describing trauma triggers. Corinne describes herself as being so angry when her mother , cleaning out her house, and seeing bank statements of her mother going to the liquor store every other day, going back and forth between different liquor stores to hide and avoid addictive behaviors. Discussed and processed this. Corinne reported that she has been journaling, specifically on guilt, grief, and compassion.  Corinne brought her journal into session, and processed through this. Corinne reported that she has experienced a decrease in ruminating, obsessive, and intrusive thoughts in relation to finances, reporting that she has been busier at the salon at work, and moving some finances, bills around to work on finances. Corinne reported that her  has been doing a side business, remodeling projects around the house, reporting that he is trying to grow it full-time. Corinne reports that her being self-employed, she relies on her  for health insurance for the family, and his benefits with pension with his full-time job at the [employer]. Corinne describes some anxiety in relation to this. Discussed and processed this. Corinne reported that she has been developing and implementing grounding statements, positive self-talk, positive affirmations, and self-affirming statements. Corinne and this writer processed this. Discussed ongoing skills. Reviewed limits and boundaries. Provided ongoing psychoeducation. Modeled effective forms of communication. (A) Corinne denies any evident or immediate risk factors for self-harm, SI, or HI. Corinne denies any symptoms of frank. Corinne presented as alert and oriented x3. Corinne presented with good eye contact. Corinne's speech presented at a normal rate, volume, and rhythm. Corinne's mood presented as anxious, and slightly down, and her affect appeared to be congruent. Corinne describes feeling nervous, anxious, and on edge. Corinne describes symptoms of irritability, poor frustration tolerance, and becoming easily annoyed. Corinne describes feeling tired and having little energy. Corinne describes symptom of grief in relation to symbolic loss, along with actual loss, describing obsessive, intrusive, ruminating, and repetitive thoughts. (P) Corinne plans to attend her OBGYN appointment today to discuss her symptoms and pre-operation appointment, using skills to work through anticipatory anxiety.  Corinne plans to journal to identify, associate, honor, and validate her feelings. Corinne plans to continue to journal, specifically in relation to her mother's addiction to substance along with her father's, to identify similarities and differences, examining patterns, themes, and behaviors, aiming to increase self-awareness in relation to trauma triggers. Corinne plans to continue to monitor, track, and inventory her symptoms, cycles, and stressors, to gather evidence to support in evaluation and decision-making when it comes to pharmacology treatment for her appointments with her psychiatry provider Anisa Allen, [address] [place] [address]. Corinne plans to work on  facts verses feelings, examining Rational Mind, identifying facts, logic, and reason, along with Emotional Mind, identifying feelings, thoughts, and emotions,  them, to implement the concept of [address]. Corinne plans to utilize CBT Challenging Negative Thoughts Assessment Questions to challenge negative thoughts, negative thinking traps and cognitive distortions. Corinne plans to examine patterns, themes, and behaviors through the lens of trauma and co-dependency, aiming to increase self-awareness in relation to this. Corinne plans to decrease judgement towards herself. Corinne plans to implement grounding statements, positive self-talk, positive affirmations, and self-affirming statements. Corinne plans to target ongoing symptoms with sensory related skills, self-soothing skills, distraction techniques, distress tolerance skills, coping skills, grounding techniques, deep breathing techniques, and mindfulness meditation techniques. Corinne plans to implement radical acceptance, outweighing risks verses benefits in order to make informed decisions, and align choices and decisions with what is in the best interest of her.  Corinne plans to reinforce limits and boundaries using healthy forms of communication, challenging co-dependency, aiming to break the cycle, implementing healthy patterns, and behaviors. Corinne plans ot lean into healthy natural supports, engage in the use of self-care, take time for herself, and be present the moment. Corinne plans to reach out for additional support as needed.      11/20/23  Start Time: 1059  Stop Time: 5572  Total Visit Time: 60 minutes

## 2023-11-20 NOTE — ASSESSMENT & PLAN NOTE
- Potential causes of heavy menses reviewed, including ovulatory dysfunction, polyp, fibroids, adenomyosis. Given regular cycle without intermenstrual or prolonged bleeding, young age, and absence of risk factors, endometrial hyperplasia/malignancy unlikely. - Recent pelvic imaging from 11/2022 reviewed. No evidence of fibroid or polyp. Enlarged uterus is suggestive of possible adenomyosis, although this cannot be definitively diagnosed on imaging.   - Recent labs from 10/2023 reviewed, including CBC, TSH, and iron studies. Results normal.   - Options for management reviewed, including medical and surgical management options. Discussed OCP, LNG-IUD, TXA, endometrial ablation, and hysterectomy. - Patient has ultimately decided to proceed with endometrial ablation. We reviewed implications following ablation, including that future endometrial sampling with biopsy or D&C may not be possible in the case of abnormal bleeding. We reviewed potential for delayed diagnosis of endometrial cancer if this develop in the future. We also reviewed potential for post ablation syndrome including cyclic pelvic pain following endometrial ablation. Patient understands these risks and desires to proceed.

## 2023-12-04 ENCOUNTER — TELEMEDICINE (OUTPATIENT)
Dept: PSYCHIATRY | Facility: CLINIC | Age: 43
End: 2023-12-04
Payer: COMMERCIAL

## 2023-12-04 ENCOUNTER — SOCIAL WORK (OUTPATIENT)
Dept: BEHAVIORAL/MENTAL HEALTH CLINIC | Facility: CLINIC | Age: 43
End: 2023-12-04
Payer: COMMERCIAL

## 2023-12-04 DIAGNOSIS — F33.1 MAJOR DEPRESSIVE DISORDER, RECURRENT, MODERATE (HCC): Primary | ICD-10-CM

## 2023-12-04 DIAGNOSIS — F33.2 SEVERE EPISODE OF RECURRENT MAJOR DEPRESSIVE DISORDER, WITHOUT PSYCHOTIC FEATURES (HCC): Primary | ICD-10-CM

## 2023-12-04 DIAGNOSIS — F41.1 GAD (GENERALIZED ANXIETY DISORDER): ICD-10-CM

## 2023-12-04 PROCEDURE — 99214 OFFICE O/P EST MOD 30 MIN: CPT | Performed by: NURSE PRACTITIONER

## 2023-12-04 PROCEDURE — 90837 PSYTX W PT 60 MINUTES: CPT | Performed by: SOCIAL WORKER

## 2023-12-04 NOTE — PSYCH
Virtual Regular Visit    Verification of patient location:    Patient is located at Home in the following state in which I hold an active license [place]      Assessment/Plan:    Problem List Items Addressed This Visit    None      Goals addressed in session: Maintain current level of stability. Continue to work with individual therapist.         Reason for visit is   Chief Complaint   Patient presents with    Virtual Regular Visit          Encounter provider SAKINA Millan    Provider located at [address] [place] [id number]      Recent Visits  No visits were found meeting these conditions. Showing recent visits within past 7 days and meeting all other requirements  Today's Visits  Date Type Provider Dept   12/04/23 Telemedicine Priya Camargo, [facility] today's visits and meeting all other requirements  Future Appointments  No visits were found meeting these conditions. Showing future appointments within next 150 days and meeting all other requirements       The patient was identified by name and date of birth. Eri Perez was informed that this is a telemedicine visit and that the visit is being conducted throughthe [employer] ([employer]) platform. She agrees to proceed. .  My office door was closed. No one else was in the room. She acknowledged consent and understanding of privacy and security of the video platform. The patient has agreed to participate and understands they can discontinue the visit at any time. Patient is aware this is a billable service. Subjective  Eri Perez is a 39 y.o. female who has history of depressive disorder and mood disorder. Mood is improved since we added Lamictal and since we reduced Cymbalta. .  Depression is not evident and the patient's experiencing no mood instability.   Still a lot of issues surrounding family and she is dealing with those via individual therapy. Psychiatrically, everything will stay the same. We will consider making a change by reducing Cymbalta at her next appointment in the new year. Work is going well for her as well as her sleep is improved and her appetite is good. Mental status exam: Patient is awake and alert and oriented x 3. Mood is improved. Patient is not suicidal, not homicidal and not psychotic. Associations are intact. No overt delusions. Speech is clear and thoughts are well organized, coherent and goal directed. Attention span is good. Impulse control is good. Judgment and insight are good. Memory is good. The patient will continue on:  Xanax 0.25 mg, may take half or whole tab daily as needed for anxiety  Cymbalta 90 mg daily  Hydroxyzine 25 mg, may take 1/2-1 whole tab every 6 hours as needed for breakthrough anxiety  Lamictal 37.5 mg daily  Follow-up with me in the new year. We are looking at approximately March. Past Medical History:   Diagnosis Date    Anxiety     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[address])        Past Surgical History:   Procedure Laterality Date    APPENDECTOMY      extensive lysis of adhesions, MAYURI's procedure (divisions of MAYURI's bands) detorsion of vulvulus, widening of mesenteric pedicle, cecopeexy, appendectomy       BACK SURGERY Right 2013    SF: L4-L5 hemilaminectomy for discectomy. Use of the microscope for microdissection, Use og 40mg of depo-medrol though thee exiting right L5 nerve root.  Onset:13     SECTION      x2    CHOLECYSTECTOMY      FL INJECTION RIGHT SHOULDER (ARTHROGRAM)  2022    GALLBLADDER SURGERY      [facility], Onset:     LAPAROSCOPIC LYSIS INTESTINAL ADHESIONS      onset: 15    LUMBAR DISC SURGERY      WA LAPS ABD PRTM&OMENTUM DX W/WO SPEC BR/WA SPX N/A 2016    Procedure: LAPAROSCOPY DIAGNOSTIC, EXTENSIVE LYSIS OF ADHESIONS, MAYURI'S PROCEDURE(DIVISION OF MAYURI'S BANDS,DETORSION OF VOLVULUS, WIDENING OF MESENTERIC PEDICLE, CECOPEXY, APPENDECTOMY); Surgeon: Ivonne Salinas MD;  Location: [facility];  Service: General    TUBAL LIGATION         Current Outpatient Medications   Medication Sig Dispense Refill    ALPRAZolam (XANAX) 0.25 mg tablet Take 1 tablet (0.25 mg total) by mouth daily as needed for anxiety 30 tablet 0    DULoxetine (Cymbalta) 30 mg delayed release capsule 1 capsule daily. Continue Cymbalta 60 mg daily. 30 capsule 1    DULoxetine (CYMBALTA) 60 mg delayed release capsule Take 1 capsule (60 mg total) by mouth 2 (two) times a day 1 Daily 180 capsule 2    hydrOXYzine HCL (ATARAX) 25 mg tablet Take 1 tablet (25 mg total) by mouth every 6 (six) hours as needed for anxiety 30 tablet 1    lamoTRIgine (LaMICtal) 25 mg tablet 2 Daily 60 tablet 1    multivitamin (THERAGRAN) TABS Take 1 tablet by mouth daily      mupirocin (BACTROBAN) 2 % ointment Apply topically 2 (two) times a day for 7 days Apply to right nostril twice a day 1 g 0    Probiotic Product (PROBIOTIC-10 PO) Take by mouth 1 daily       No current facility-administered medications for this visit. Allergies   Allergen Reactions    Biaxin [Clarithromycin] GI Intolerance and Other (See Comments)    Sulfa Antibiotics     Sulfasalazine     Venlafaxine GI Intolerance, Vomiting and Other (See Comments)     Category: Allergy; Category: Allergy; Review of Systems    Video Exam    There were no vitals filed for this visit. Physical Exam     Visit Time    Visit Start Time:10:00a  Visit Stop Time: 10:25a  Total Visit Duration: 25 minutes were spent in the visit. Time was spent reviewing the treatment plan, reviewing previous medication management progress notes and completing the progress note.

## 2023-12-04 NOTE — PSYCH
Psychotherapy Provided: Individual Psychotherapy 55 minutes. Length of time in session: 55 minutes. Goals addressed in session: Goal 1 Follow up psychotherapy session. Current suicide risk : Low . Behavioral Health Treatment Plan [facility]: Diagnosis and Treatment Plan explained to Destin Plan relates understanding diagnosis and is agreeable to Treatment Plan. Yes     Visit start and stop times:    12/04/23  Start Time: 1105  Stop Time: 1200  Total Visit Time: 54 minutes\  (D) Corinne attended her follow up psychotherapy session today. Corinne reported that since her last session, she has noticed ongoing symptoms, yet reports a slight decrease in them. Nataly Shultz reported that after her last session, she met with her OBGYN and discussed a plan for surgery in January, doing a D&C and ablation, reporting that with her schedule with work, and her OBGYN's schedule with needing to do it on a Tuesday, they weren't able to get it done until next year. Corinne describes feeling hopeful in relation to the procedure, anticipating that this will help her when it comes to her symptoms of heavy bleeding, and clotting. Discussed and processed this. 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 Corinne described it as stressful, identifying feeling overwhelmed with the details of this, and reports being unsure if she will do it next year, identifying anticipatory anxiety. Discussed and processed this. Corinne discussed the Thanksgiving holiday, reporting that she had everyone over for Thanksgiving, reporting that there were (17) people at the house. Corinne reported that her father wasn't there, reporting that he went to her brother's to house sit.  Corinne reported that she was triggered Black Friday, reporting that her and her son go every year as a tradition, and him inviting his girlfriend, who Corinne doesn't fully support her son being with. Corinne reported that her  and her daughter went away hunting together, discussing the tradition in her family. Corinne reported anxiety, identifying struggling to sleep when her  Ish Bender isn't home. Discussed and processed this. Corinne reported that she challenged herself to participate in a group of craft activity with her sister-in-law and friends, reporting that she had a nice time, identifying anxiety doing social activities with others at times, and processed through this. Corinne reported that she was struggling with anticipatory anxiety in relation to her father, wanting to communicate with him to check in about where he is at in the process of finding alternative housing, reporting that she avoided this for a few days. Corinne reported that her father pulled her and her  aside and provided them with an update on his own, discussing ongoing apartments he is looking at. Corinne reported that she found out that her father secured an apartment yesterday, and reported that he was struggling with filling out the lease, and signing it, identifying her father as wanting her help, and identifying her desire to not help him. Corinne describes having conflicted thoughts, feelings, and emotions with her father moving out, getting his own apartment, and feeling overwhelmed financially, especially with [place] coming up, while identifying her as feeling relieved and it being healthier for her with her father moving out. Corinne describes having a desire to make her children happy, finding herself as having a hard time saying no to her children. 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  Corinne reported that she believes that it may be the last year that her and her family to have the camper [place] the beach, identifying inflation, and lot rent increasing to the point where it is no longer financially reasonable, along with her daughter starting high school soccer and their schedules not allowing them to be down there. Corinne describes symptoms of shame, guilt, and vulnerability in relation to finances, describing herself as ruminating on this. Discussed and processed this. Corinne discussed anticipatory anxiety in relation to [place], the change in the tradition, and having boundaries for herself. Corinne discussed increased grief this time of year, specifically with missing her mother, describing multiple triggers that impact her including smells, certain songs, images, and memories. Corinne and this writer processed this. Discussed ongoing skills. Reviewed limits and boundaries. Modeled effective forms of communication. Provided ongoing psychoeducation. (A) Corinne presented with good eye contact. Corinne's speech presented at a normal rate, volume, and rhythm. Corinne presented as alert and oriented x3. Corinne denies any symptoms of frank. Corinne denies any evident or immediate risk factors for self-harm, SI, or HI. Corinne's mood presented as anxious, and depressed, and her affect appeared to be congruent, and tearful throughout the session. Corinne describes symptoms of irritability, poor frustration tolerance, and becoming easily annoyed. Corinne describes feelng nervous, anxious, and on edge, describing anticipatory anxiety. Corinne describes feeling tired and having little energy. Corinne describes obsessive, intrusive, ruminating, and repetitive thoughts. Corinne describes symptoms of grief in relation to loss of her mother, triggering her this time of year around the holidays, with different songs, scents, and memories.      (P) Corinne plans to work on challenging obsessive, intrusive, ruminating, and repetitive thoughts with grounding statements, and continuing to seek out evidence and challenge negative thoughts, negative thinking traps, cognitive distortions, and harmful core beliefs with CBT Challenging Negative Thoughts Assessment Questions. Corinne plans to separate Rational mind, and Emotional Mind, striking balance between the both, aiming to implement the concept of DBT [facility]. Corinne plans to honor her feelings, and emotions, identifying, associating, and expressing them with healthy and effective forms of communication, asserting herself, advocating for herself, and asking for what she needs. Corinne plans to implement radical compassion, extending bogdan towards herself, identifying worth, and giving herself credit. Corinne plans to decrease judgement towards herself. Corinne plans to target ongoing symptoms with continued skill use. Corinne plans to challenge co-dependency, aiming to break the cycle, and implement healthy patterns, and behaviors. Corinne plans to outweigh risks verses benefits in order to make informed decisions, and align choices and decisions with what is in the best interest of her, implementing radical acceptance. Corinne plans to give herself permission to feel what she is feeling int he moment, using DBT Action Skills to decrease avoidance. Corinne plans to journal outside of session, examining patterns, themes, and behaviors through the lens of trauma, and grief. Corinne plans to lean into healthy natural supports, engage in the use of self-care, take time for herself, and be present in the moment. Corinne plans to reach out for additional support as needed.      12/04/23  Start Time: 9072  Stop Time: 1200  Total Visit Time: 55 minutes

## 2023-12-28 ENCOUNTER — PATIENT MESSAGE (OUTPATIENT)
Dept: OBGYN CLINIC | Facility: CLINIC | Age: 43
End: 2023-12-28

## 2023-12-30 DIAGNOSIS — F32.2 CURRENT SEVERE EPISODE OF MAJOR DEPRESSIVE DISORDER WITHOUT PSYCHOTIC FEATURES WITHOUT PRIOR EPISODE (HCC): ICD-10-CM

## 2024-01-01 RX ORDER — DULOXETIN HYDROCHLORIDE 30 MG/1
CAPSULE, DELAYED RELEASE ORAL
Qty: 30 CAPSULE | Refills: 1 | Status: SHIPPED | OUTPATIENT
Start: 2024-01-01

## 2024-01-04 DIAGNOSIS — F39 MOOD DISORDER (HCC): ICD-10-CM

## 2024-01-04 RX ORDER — LAMOTRIGINE 25 MG/1
TABLET ORAL
Qty: 60 TABLET | Refills: 2 | Status: SHIPPED | OUTPATIENT
Start: 2024-01-04

## 2024-01-08 ENCOUNTER — APPOINTMENT (OUTPATIENT)
Dept: LAB | Facility: HOSPITAL | Age: 44
End: 2024-01-08
Payer: COMMERCIAL

## 2024-01-08 ENCOUNTER — SOCIAL WORK (OUTPATIENT)
Dept: BEHAVIORAL/MENTAL HEALTH CLINIC | Facility: CLINIC | Age: 44
End: 2024-01-08
Payer: COMMERCIAL

## 2024-01-08 DIAGNOSIS — Z01.818 OTHER SPECIFIED PRE-OPERATIVE EXAMINATION: ICD-10-CM

## 2024-01-08 DIAGNOSIS — Z01.818 ENCOUNTER FOR PREADMISSION TESTING: ICD-10-CM

## 2024-01-08 DIAGNOSIS — F33.2 SEVERE EPISODE OF RECURRENT MAJOR DEPRESSIVE DISORDER, WITHOUT PSYCHOTIC FEATURES (HCC): ICD-10-CM

## 2024-01-08 DIAGNOSIS — F41.1 GAD (GENERALIZED ANXIETY DISORDER): Primary | ICD-10-CM

## 2024-01-08 LAB
ABO GROUP BLD: NORMAL
ANION GAP SERPL CALCULATED.3IONS-SCNC: 5 MMOL/L
BASOPHILS # BLD AUTO: 0.05 THOUSANDS/ÂΜL (ref 0–0.1)
BASOPHILS NFR BLD AUTO: 1 % (ref 0–1)
BLD GP AB SCN SERPL QL: NEGATIVE
BUN SERPL-MCNC: 13 MG/DL (ref 5–25)
CALCIUM SERPL-MCNC: 9.2 MG/DL (ref 8.4–10.2)
CHLORIDE SERPL-SCNC: 104 MMOL/L (ref 96–108)
CO2 SERPL-SCNC: 28 MMOL/L (ref 21–32)
CREAT SERPL-MCNC: 0.77 MG/DL (ref 0.6–1.3)
EOSINOPHIL # BLD AUTO: 0.05 THOUSAND/ÂΜL (ref 0–0.61)
EOSINOPHIL NFR BLD AUTO: 1 % (ref 0–6)
ERYTHROCYTE [DISTWIDTH] IN BLOOD BY AUTOMATED COUNT: 12.7 % (ref 11.6–15.1)
GFR SERPL CREATININE-BSD FRML MDRD: 94 ML/MIN/1.73SQ M
GLUCOSE P FAST SERPL-MCNC: 79 MG/DL (ref 65–99)
HCT VFR BLD AUTO: 35 % (ref 34.8–46.1)
HGB BLD-MCNC: 11.3 G/DL (ref 11.5–15.4)
IMM GRANULOCYTES # BLD AUTO: 0.01 THOUSAND/UL (ref 0–0.2)
IMM GRANULOCYTES NFR BLD AUTO: 0 % (ref 0–2)
LYMPHOCYTES # BLD AUTO: 1.75 THOUSANDS/ÂΜL (ref 0.6–4.47)
LYMPHOCYTES NFR BLD AUTO: 39 % (ref 14–44)
MCH RBC QN AUTO: 30.3 PG (ref 26.8–34.3)
MCHC RBC AUTO-ENTMCNC: 32.3 G/DL (ref 31.4–37.4)
MCV RBC AUTO: 94 FL (ref 82–98)
MONOCYTES # BLD AUTO: 0.31 THOUSAND/ÂΜL (ref 0.17–1.22)
MONOCYTES NFR BLD AUTO: 7 % (ref 4–12)
NEUTROPHILS # BLD AUTO: 2.33 THOUSANDS/ÂΜL (ref 1.85–7.62)
NEUTS SEG NFR BLD AUTO: 52 % (ref 43–75)
NRBC BLD AUTO-RTO: 0 /100 WBCS
PLATELET # BLD AUTO: 219 THOUSANDS/UL (ref 149–390)
PMV BLD AUTO: 11.7 FL (ref 8.9–12.7)
POTASSIUM SERPL-SCNC: 3.9 MMOL/L (ref 3.5–5.3)
RBC # BLD AUTO: 3.73 MILLION/UL (ref 3.81–5.12)
RH BLD: NEGATIVE
SODIUM SERPL-SCNC: 137 MMOL/L (ref 135–147)
SPECIMEN EXPIRATION DATE: NORMAL
WBC # BLD AUTO: 4.5 THOUSAND/UL (ref 4.31–10.16)

## 2024-01-08 PROCEDURE — 80048 BASIC METABOLIC PNL TOTAL CA: CPT

## 2024-01-08 PROCEDURE — 86901 BLOOD TYPING SEROLOGIC RH(D): CPT

## 2024-01-08 PROCEDURE — 36415 COLL VENOUS BLD VENIPUNCTURE: CPT

## 2024-01-08 PROCEDURE — 86900 BLOOD TYPING SEROLOGIC ABO: CPT

## 2024-01-08 PROCEDURE — 90834 PSYTX W PT 45 MINUTES: CPT | Performed by: SOCIAL WORKER

## 2024-01-08 PROCEDURE — 85025 COMPLETE CBC W/AUTO DIFF WBC: CPT

## 2024-01-08 PROCEDURE — 86850 RBC ANTIBODY SCREEN: CPT
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
## 2024-01-08 NOTE — PRE-PROCEDURE INSTRUCTIONS
Pre-Surgery Instructions:   Medication Instructions    ALPRAZolam (XANAX) 0.25 mg tablet Uses PRN- OK to take day of surgery    DULoxetine (CYMBALTA) 30 mg delayed release capsule Take day of surgery.    DULoxetine (CYMBALTA) 60 mg delayed release capsule Take day of surgery.    hydrOXYzine HCL (ATARAX) 25 mg tablet Uses PRN- OK to take day of surgery    lamoTRIgine (LaMICtal) 25 mg tablet Take day of surgery.    multivitamin (THERAGRAN) TABS Stop taking 7 days prior to surgery.    mupirocin (BACTROBAN) 2 % ointment Hold day of surgery.    Probiotic Product (PROBIOTIC-10 PO) Hold day of surgery.    Medication instructions for day surgery reviewed. Please use only a sip of water to take your instructed medications. Avoid all over the counter vitamins, supplements and NSAIDS for one week prior to surgery per anesthesia guidelines. Tylenol is ok to take as needed.     You will receive a call one business day prior to surgery with an arrival time and hospital directions. If your surgery is scheduled on a Monday, the hospital will be calling you on the Friday prior to your surgery. If you have not heard from anyone by 8pm, please call the hospital supervisor through the hospital  at [phone]. ([place] [phone]).    Do not eat or drink anything after midnight the night before your surgery, including candy, mints, lifesavers, or chewing gum. Do not drink alcohol 24hrs before your surgery. Try not to smoke at least 24hrs before your surgery.       Follow the pre surgery showering instructions as listed in the “My Surgical Experience Booklet” or otherwise provided by your surgeon's office. Do not use a blade to shave the surgical area 1 week before surgery. It is okay to use a clean electric clippers up to 24 hours before surgery. Do not apply any lotions, creams, including makeup, cologne, deodorant, or perfumes after showering on the day of your surgery. Do not use dry shampoo, hair spray, hair gel, or any  type of hair products.     No contact lenses, eye make-up, or artificial eyelashes. Remove nail polish, including gel polish, and any artificial, gel, or acrylic nails if possible. Remove all jewelry including rings and body piercing jewelry.     Wear causal clothing that is easy to take on and off. Consider your type of surgery.    Keep any valuables, jewelry, piercings at home. Please bring any specially ordered equipment (sling, braces) if indicated.    Arrange for a responsible person to drive you to and from the hospital on the day of your surgery. Visitor Guidelines discussed.     Call the surgeon's office with any new illnesses, exposures, or additional questions prior to surgery.    Please reference your “My Surgical Experience Booklet” for additional information to prepare for your upcoming surgery.

## 2024-01-08 NOTE — PATIENT INSTRUCTIONS
Please follow up in (2) weeks.    Detail Level: Zone Photo Preface (Leave Blank If You Do Not Want): Photographs were obtained today

## 2024-01-16 ENCOUNTER — HOSPITAL ENCOUNTER (OUTPATIENT)
Facility: HOSPITAL | Age: 44
Setting detail: OUTPATIENT SURGERY
Discharge: HOME | End: 2024-01-16
Attending: STUDENT IN AN ORGANIZED HEALTH CARE EDUCATION/TRAINING PROGRAM | Admitting: STUDENT IN AN ORGANIZED HEALTH CARE EDUCATION/TRAINING PROGRAM
Payer: COMMERCIAL

## 2024-01-16 ENCOUNTER — ANESTHESIA EVENT (OUTPATIENT)
Dept: PERIOP | Facility: HOSPITAL | Age: 44
End: 2024-01-16
Payer: COMMERCIAL

## 2024-01-16 ENCOUNTER — ANESTHESIA (OUTPATIENT)
Dept: PERIOP | Facility: HOSPITAL | Age: 44
End: 2024-01-16
Payer: COMMERCIAL

## 2024-01-16 VITALS
HEART RATE: 70 BPM | WEIGHT: 142.2 LBS | TEMPERATURE: 96.8 F | DIASTOLIC BLOOD PRESSURE: 67 MMHG | OXYGEN SATURATION: 99 % | SYSTOLIC BLOOD PRESSURE: 120 MMHG | RESPIRATION RATE: 12 BRPM | HEIGHT: 65 IN | BODY MASS INDEX: 23.69 KG/M2

## 2024-01-16 DIAGNOSIS — N92.0 MENORRHAGIA WITH REGULAR CYCLE: ICD-10-CM

## 2024-01-16 LAB
EXT PREGNANCY TEST URINE: NEGATIVE
EXT. CONTROL: NORMAL

## 2024-01-16 PROCEDURE — 58558 HYSTEROSCOPY BIOPSY: CPT | Performed by: STUDENT IN AN ORGANIZED HEALTH CARE EDUCATION/TRAINING PROGRAM

## 2024-01-16 PROCEDURE — 81025 URINE PREGNANCY TEST: CPT | Performed by: STUDENT IN AN ORGANIZED HEALTH CARE EDUCATION/TRAINING PROGRAM

## 2024-01-16 PROCEDURE — 88305 TISSUE EXAM BY PATHOLOGIST: CPT | Performed by: PATHOLOGY

## 2024-01-16 PROCEDURE — NC001 PR NO CHARGE: Performed by: STUDENT IN AN ORGANIZED HEALTH CARE EDUCATION/TRAINING PROGRAM

## 2024-01-16 RX ORDER — FENTANYL CITRATE 50 UG/ML
INJECTION, SOLUTION INTRAMUSCULAR; INTRAVENOUS AS NEEDED
Status: DISCONTINUED | OUTPATIENT
Start: 2024-01-16 | End: 2024-01-16

## 2024-01-16 RX ORDER — ALBUTEROL SULFATE 2.5 MG/3ML
2.5 SOLUTION RESPIRATORY (INHALATION) ONCE AS NEEDED
Status: DISCONTINUED | OUTPATIENT
Start: 2024-01-16 | End: 2024-01-16 | Stop reason: HOSPADM

## 2024-01-16 RX ORDER — MIDAZOLAM HYDROCHLORIDE 2 MG/2ML
INJECTION, SOLUTION INTRAMUSCULAR; INTRAVENOUS AS NEEDED
Status: DISCONTINUED | OUTPATIENT
Start: 2024-01-16 | End: 2024-01-16

## 2024-01-16 RX ORDER — SODIUM CHLORIDE, SODIUM LACTATE, POTASSIUM CHLORIDE, CALCIUM CHLORIDE 600; 310; 30; 20 MG/100ML; MG/100ML; MG/100ML; MG/100ML
INJECTION, SOLUTION INTRAVENOUS CONTINUOUS PRN
Status: DISCONTINUED | OUTPATIENT
Start: 2024-01-16 | End: 2024-01-16

## 2024-01-16 RX ORDER — PROMETHAZINE HYDROCHLORIDE 25 MG/ML
12.5 INJECTION, SOLUTION INTRAMUSCULAR; INTRAVENOUS ONCE AS NEEDED
Status: DISCONTINUED | OUTPATIENT
Start: 2024-01-16 | End: 2024-01-16 | Stop reason: HOSPADM

## 2024-01-16 RX ORDER — LIDOCAINE HYDROCHLORIDE 10 MG/ML
INJECTION, SOLUTION EPIDURAL; INFILTRATION; INTRACAUDAL; PERINEURAL AS NEEDED
Status: DISCONTINUED | OUTPATIENT
Start: 2024-01-16 | End: 2024-01-16

## 2024-01-16 RX ORDER — PROPOFOL 10 MG/ML
INJECTION, EMULSION INTRAVENOUS CONTINUOUS PRN
Status: DISCONTINUED | OUTPATIENT
Start: 2024-01-16 | End: 2024-01-16

## 2024-01-16 RX ORDER — ONDANSETRON 2 MG/ML
4 INJECTION INTRAMUSCULAR; INTRAVENOUS ONCE AS NEEDED
Status: DISCONTINUED | OUTPATIENT
Start: 2024-01-16 | End: 2024-01-16 | Stop reason: HOSPADM

## 2024-01-16 RX ORDER — ACETAMINOPHEN 325 MG/1
975 TABLET ORAL EVERY 6 HOURS PRN
Status: DISCONTINUED | OUTPATIENT
Start: 2024-01-16 | End: 2024-01-16 | Stop reason: HOSPADM

## 2024-01-16 RX ORDER — MAGNESIUM HYDROXIDE 1200 MG/15ML
LIQUID ORAL AS NEEDED
Status: DISCONTINUED | OUTPATIENT
Start: 2024-01-16 | End: 2024-01-16 | Stop reason: HOSPADM

## 2024-01-16 RX ORDER — DEXAMETHASONE SODIUM PHOSPHATE 10 MG/ML
INJECTION, SOLUTION INTRAMUSCULAR; INTRAVENOUS AS NEEDED
Status: DISCONTINUED | OUTPATIENT
Start: 2024-01-16 | End: 2024-01-16

## 2024-01-16 RX ORDER — LIDOCAINE HYDROCHLORIDE 10 MG/ML
INJECTION, SOLUTION EPIDURAL; INFILTRATION; INTRACAUDAL; PERINEURAL AS NEEDED
Status: DISCONTINUED | OUTPATIENT
Start: 2024-01-16 | End: 2024-01-16 | Stop reason: HOSPADM

## 2024-01-16 RX ORDER — ONDANSETRON 2 MG/ML
INJECTION INTRAMUSCULAR; INTRAVENOUS AS NEEDED
Status: DISCONTINUED | OUTPATIENT
Start: 2024-01-16 | End: 2024-01-16

## 2024-01-16 RX ORDER — MEPERIDINE HYDROCHLORIDE 25 MG/ML
12.5 INJECTION INTRAMUSCULAR; INTRAVENOUS; SUBCUTANEOUS
Status: DISCONTINUED | OUTPATIENT
Start: 2024-01-16 | End: 2024-01-16 | Stop reason: HOSPADM

## 2024-01-16 RX ORDER — KETOROLAC TROMETHAMINE 30 MG/ML
INJECTION, SOLUTION INTRAMUSCULAR; INTRAVENOUS AS NEEDED
Status: DISCONTINUED | OUTPATIENT
Start: 2024-01-16 | End: 2024-01-16

## 2024-01-16 RX ORDER — FENTANYL CITRATE/PF 50 MCG/ML
25 SYRINGE (ML) INJECTION
Status: DISCONTINUED | OUTPATIENT
Start: 2024-01-16 | End: 2024-01-16 | Stop reason: HOSPADM

## 2024-01-16 RX ORDER — HYDROMORPHONE HCL/PF 1 MG/ML
0.5 SYRINGE (ML) INJECTION
Status: DISCONTINUED | OUTPATIENT
Start: 2024-01-16 | End: 2024-01-16 | Stop reason: HOSPADM

## 2024-01-16 RX ADMIN — SODIUM CHLORIDE, SODIUM LACTATE, POTASSIUM CHLORIDE, AND CALCIUM CHLORIDE: .6; .31; .03; .02 INJECTION, SOLUTION INTRAVENOUS at 11:31

## 2024-01-16 RX ADMIN — PROPOFOL 100 MCG/KG/MIN: 10 INJECTION, EMULSION INTRAVENOUS at 11:17

## 2024-01-16 RX ADMIN — MIDAZOLAM 2 MG: 1 INJECTION INTRAMUSCULAR; INTRAVENOUS at 11:11

## 2024-01-16 RX ADMIN — FENTANYL CITRATE 25 MCG: 50 INJECTION, SOLUTION INTRAMUSCULAR; INTRAVENOUS at 11:26

## 2024-01-16 RX ADMIN — KETOROLAC TROMETHAMINE 30 MG: 30 INJECTION, SOLUTION INTRAMUSCULAR; INTRAVENOUS at 11:56

## 2024-01-16 RX ADMIN — FENTANYL CITRATE 25 MCG: 50 INJECTION, SOLUTION INTRAMUSCULAR; INTRAVENOUS at 11:23

## 2024-01-16 RX ADMIN — ONDANSETRON 4 MG: 2 INJECTION INTRAMUSCULAR; INTRAVENOUS at 11:24

## 2024-01-16 RX ADMIN — LIDOCAINE HYDROCHLORIDE 50 MG: 10 INJECTION, SOLUTION EPIDURAL; INFILTRATION; INTRACAUDAL; PERINEURAL at 11:17

## 2024-01-16 RX ADMIN — FENTANYL CITRATE 25 MCG: 50 INJECTION, SOLUTION INTRAMUSCULAR; INTRAVENOUS at 11:17

## 2024-01-16 RX ADMIN — SODIUM CHLORIDE, SODIUM LACTATE, POTASSIUM CHLORIDE, AND CALCIUM CHLORIDE: .6; .31; .03; .02 INJECTION, SOLUTION INTRAVENOUS at 11:11

## 2024-01-16 RX ADMIN — FENTANYL CITRATE 25 MCG: 50 INJECTION, SOLUTION INTRAMUSCULAR; INTRAVENOUS at 11:36

## 2024-01-16 RX ADMIN — DEXAMETHASONE SODIUM PHOSPHATE 5 MG: 10 INJECTION, SOLUTION INTRAMUSCULAR; INTRAVENOUS at 11:24

## 2024-01-16 NOTE — OP NOTE
OPERATIVE REPORT  PATIENT NAME: Corinne S Kulp    :  1980  MRN: [id number]  Pt Location: [facility] OR ROOM 04    SURGERY DATE: 2024    Surgeons and Role:     * Griffin Arriaga MD - Primary    Preop Diagnosis:  Menorrhagia with regular cycle [N92.0]    Post-Op Diagnosis Codes:     * Menorrhagia with regular cycle [N92.0]    Procedure(s) (LRB):  DILATATION AND CURETTAGE (D&C) WITH HYSTEROSCOPY, POLYPECTOMY (N/A)  ABLATION ENDOMETRIAL VIPUL (N/A)    Specimen(s):  ID Type Source Tests Collected by Time Destination   1 : Endometrial polyp and endometrial curettings (EMC) Tissue Endometrium TISSUE EXAM Griffin Arriaga MD 2024 1145        Surgical QBL: Minimal    Drains: None    Anesthesia Type:   IV Sedation with Anesthesia    Operative Indications:  Menorrhagia with regular cycle [N92.0]    Operative Findings:  Normal external genitalia, vagina, and cervix. Uterus midline, mobile. No adnexal masses noted on bimanual exam. Uterus sounded to 11 cm. Endocervical os at 6 cm, giving cavity length of 5 cm. On hysteroscopic examination, polypoid-appearing endometrium noted. Bilateral tubal ostia visualized     Complications:   None    Procedure and Technique:  The patient was taken to the operating room where IV anesthesia was administered. She was prepped and draped in the usual sterile fashion in the dorsal lithotomy position. A patient safety time-out was performed with all members of the operating room team present. Examination under anesthesia revealed findings as noted above. A bivalve speculum was placed to allow for good visualization of the cervix. A tenaculum was then placed on the anterior lip of the cervix. The uterus was sounded to a depth of 11 cm and the cervix was then dilated to accommodate a #25 Reyes dilator.     A 6.3 mm diameter 0 degree Symphion hysteroscope was then introduced into the endocervical canal and crystalloid solution distending medium was turned on. The hysteroscope was advanced to  the fundus and the diagnostic findings were as noted above. The Symphion resection device was then introduced via the hysteroscope's operative port and polypectomy was performed under direct visualization. The cervical length was then determined to be approximately 6 cm as measured with the hysteroscope. The a cavity length was therefore determined to be 5 cm. The hysteroscope was then removed and a thorough curettage of the endometrial canal was performed. The Carol endometrial ablation apparatus was then inserted into the uterine cavity and deployed according to the 's instructions. All safety checks satisfactorily confirmed cavity integrity. Endometrial ablation was performed. The Carol apparatus was then removed from the uterus. Upon completion, the tenaculum was removed from the anterior lip of the cervix and good hemostasis was noted. All instruments were removed from the vagina. Sponge and instrument counts were correct.    The patient tolerated the procedure well and was taken to the recovery room in stable condition.    I was present for the entire procedure.    Patient Disposition:  PACU     SIGNATURE: Griffin Arriaga MD  DATE: January 16, 2024  TIME: 12:11 PM

## 2024-01-16 NOTE — ANESTHESIA POSTPROCEDURE EVALUATION
Post-Op Assessment Note    CV Status:  Stable  Pain Score: 0    Pain management: adequate       Mental Status:  Alert and awake   Hydration Status:  Euvolemic   PONV Controlled:  Controlled   Airway Patency:  Patent     Post Op Vitals Reviewed: Yes      Staff: CRNA               BP   111/53   Temp   97.7   Pulse  71   Resp   16   SpO2   99 room air

## 2024-01-16 NOTE — INTERIM OP NOTE
DILATATION AND CURETTAGE (D&C) WITH HYSTEROSCOPY, POLYPECTOMY, ABLATION ENDOMETRIAL VIPUL  Postoperative Note  PATIENT NAME: Corinne S Kulp  : 1980  MRN: [id number]  [facility] OR ROOM 04    Surgery Date: 2024    Preop Diagnosis:  Menorrhagia with regular cycle [N92.0]    Post-Op Diagnosis Codes:     * Menorrhagia with regular cycle [N92.0]    Procedure(s) (LRB):  DILATATION AND CURETTAGE (D&C) WITH HYSTEROSCOPY, POLYPECTOMY (N/A)  ABLATION ENDOMETRIAL VIPUL (N/A)    Surgeons and Role:     * Griffin Arriaga MD - Primary    Specimens:  ID Type Source Tests Collected by Time Destination   1 : Endometrial polyp and endometrial curettings (EMC) Tissue Endometrium TISSUE EXAM Griffin Arriaga MD 2024 1145        Surgical QBL: Minimal    Anesthesia Type:   IV Sedation with Anesthesia     Findings:   Normal external genitalia, vagina, and cervix. Uterus midline, mobile. No adnexal masses noted on bimanual exam. Uterus sounded to 11 cm. Endocervical os at 6 cm, giving cavity length of 5 cm. On hysteroscopic examination, polypoid-appearing endometrium noted. Bilateral tubal ostia visualized.     Complications:   None      SIGNATURE: Griffin Arriaga MD   DATE: 2024   TIME: 12:07 PM

## 2024-01-16 NOTE — H&P
H&P reviewed. After examining the patient I find no changes in the patients condition since the H&P had been written.    Vitals:    24 1002   BP: 99/71   Pulse: 84   Resp: 18   Temp: 98.1 °F (36.7 °C)   SpO2: 100%     Griffin Arriaga MD  2024 11:06 AM  -------------------------------------------------  Pre-Operative History & Physical  [facility] [place]  [address] [place] [address]     Assessment/Plan:  Corinne S Kulp is a 43 y.o.  with heavy menses desiring surgical management.     - Options for management of menorrhagia with regular cycle were reviewed with the patient at length, including both medical management and surgical management options. Following counseling, she desires to proceed with endometrial ablation.   - Plan for hysteroscopy with dilation & curettage and endometrial ablation. Surgical consents reviewed and signed with patient today. Risks of surgery were reviewed, including bleeding, infection, damage to surrounding organs/structures (specifically bowel, bladder, vessels, nerves, ureters), scar tissue formation, failure to improve bleeding, post ablation syndrome, difficulty with future uterine sampling, and need for further surgery. All questions answered to the patient's apparent satisfaction. Patient agrees to proceed with surgery as discussed.   - Belinda-operative pain control discussed. Reviewed first-line treatment with over-the-counter ibuprofen and acetaminophen.  - PATs: CBC, BMP, T&S  - Antibiotic prophylaxis: None indicated  - VTE ppx: SCDs.     Will plan to schedule procedure during the first 10 days of her cycle. Patient desires surgery in January. Her cycle lengths are currently approximately 35 days. She will call the office with onset of menses in December in order to schedule surgery for the following cycle.      1. Menorrhagia with regular cycle  Assessment & Plan:  - Potential causes of heavy menses reviewed, including ovulatory  dysfunction, polyp, fibroids, adenomyosis. Given regular cycle without intermenstrual or prolonged bleeding, young age, and absence of risk factors, endometrial hyperplasia/malignancy unlikely.   - Recent pelvic imaging from 2022 reviewed. No evidence of fibroid or polyp. Enlarged uterus is suggestive of possible adenomyosis, although this cannot be definitively diagnosed on imaging.   - Recent labs from 10/2023 reviewed, including CBC, TSH, and iron studies. Results normal.   - Options for management reviewed, including medical and surgical management options. Discussed OCP, LNG-IUD, TXA, endometrial ablation, and hysterectomy.   - Patient has ultimately decided to proceed with endometrial ablation. We reviewed implications following ablation, including that future endometrial sampling with biopsy or D&C may not be possible in the case of abnormal bleeding. We reviewed potential for delayed diagnosis of endometrial cancer if this develop in the future. We also reviewed potential for post ablation syndrome including cyclic pelvic pain following endometrial ablation. Patient understands these risks and desires to proceed.         ____________________________________     Subjective:   Corinne S Kulp is a 43 y.o.  with heavy periods  CC:       Chief Complaint   Patient presents with    Consult         HPI: Patient presents to revisit our prior discussion regarding options for management of heavy periods. She reports that her period this last month was subjectively heavy, as is typical for her. Her heavy periods are getting in the way of her daily life and she desires surgical intervention. She has concerns about hormonal management and does not want IUD.      ROS: Negative except as noted in HPI     The following portions of the patient's history were reviewed and updated as appropriate:   Medical History        Past Medical History:   Diagnosis Date    Anxiety      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HCC)           Surgical  History         Past Surgical History:   Procedure Laterality Date    APPENDECTOMY         extensive lysis of adhesions, MAYURI's procedure (divisions of MAYURI's bands) detorsion of vulvulus, widening of mesenteric pedicle, cecopeexy, appendectomy       BACK SURGERY Right 2013     SF: L4-L5 hemilaminectomy for discectomy. Use of the microscope for microdissection, Use og 40mg of depo-medrol though thee exiting right L5 nerve root. Onset:13     SECTION         x2    CHOLECYSTECTOMY        FL INJECTION RIGHT SHOULDER (ARTHROGRAM)   2022    GALLBLADDER SURGERY         [facility], Onset:     LAPAROSCOPIC LYSIS INTESTINAL ADHESIONS         onset: 15    LUMBAR DISC SURGERY        KS LAPS ABD PRTM&OMENTUM DX W/WO SPEC BR/WA SPX N/A 2016     Procedure: LAPAROSCOPY DIAGNOSTIC, EXTENSIVE LYSIS OF ADHESIONS, MAYURI'S PROCEDURE(DIVISION OF MAYURI'S BANDS,DETORSION OF VOLVULUS, WIDENING OF MESENTERIC PEDICLE, CECOPEXY, APPENDECTOMY);  Surgeon: Sudeep Espinoza MD;  Location: [facility] OR;  Service: General    TUBAL LIGATION             Family History         Family History   Problem Relation Age of Onset    Mental illness Mother           bipolar/anxiety    Stroke Mother      Heart disease Mother      Depression Mother      Bipolar disorder Mother      Hypertension Father      Stroke Father      Seizures Brother           epilepsy    Heart attack Other      Scleroderma Other      Stroke Other 70         stroke syndrome    Heart attack Other      Heart disease Family      Substance Abuse Neg Hx      Breast cancer Neg Hx      Ovarian cancer Neg Hx      Uterine cancer Neg Hx      Colon cancer Neg Hx           Social History   Social History            Socioeconomic History    Marital status: /Civil Union       Spouse name: Not on file    Number of children: Not on file    Years of education: 12    Highest education level: Not on file   Occupational History    Occupation: [employer]   Tobacco Use     Smoking status: Former       Packs/day: 0.25       Years: 5.00       Total pack years: 1.25       Types: Cigarettes       Quit date: 2009       Years since quittin.8    Smokeless tobacco: Never   Vaping Use    Vaping Use: Never used   Substance and Sexual Activity    Alcohol use: Not Currently    Drug use: No    Sexual activity: Yes       Partners: Male       Birth control/protection: None       Comment: no new partner in past year   Other Topics Concern    Not on file   Social History Narrative     Participates in activities inside and outside of home     Lives with family     Supportive and safe     No advance directives      Social Determinants of Health           Financial Resource Strain: Not on file   Food Insecurity: Not on file   Transportation Needs: No Transportation Needs (2021)     PRAPARE - Transportation      Lack of Transportation (Medical): No      Lack of Transportation (Non-Medical): No   Physical Activity: Not on file   Stress: Not on file   Social Connections: Not on file   Intimate Partner Violence: Not At Risk (10/12/2020)     Humiliation, Afraid, Rape, and Kick questionnaire      Fear of Current or Ex-Partner: No      Emotionally Abused: No      Physically Abused: No      Sexually Abused: No   Housing Stability: Not on file         Active Medications       Outpatient Medications Marked as Taking for the 23 encounter (Office Visit) with Griffin Arriaga MD   Medication    ALPRAZolam (XANAX) 0.25 mg tablet    DULoxetine (Cymbalta) 30 mg delayed release capsule    DULoxetine (CYMBALTA) 60 mg delayed release capsule    hydrOXYzine HCL (ATARAX) 25 mg tablet    lamoTRIgine (LaMICtal) 25 mg tablet    multivitamin (THERAGRAN) TABS    mupirocin (BACTROBAN) 2 % ointment    Probiotic Product (PROBIOTIC-10 PO)               Allergies   Allergen Reactions    Biaxin [Clarithromycin] GI Intolerance and Other (See Comments)    Sulfa Antibiotics      Sulfasalazine      Venlafaxine GI  "Intolerance, Vomiting and Other (See Comments)       Category: Allergy;   Category: Allergy;              Imaging: (10/10/2022)  PELVIC ULTRASOUND, COMPLETE     INDICATION:  The patient is 42 years old.  N92.1: Excessive and frequent menstruation with irregular cycle.     COMPARISON: Pelvic ultrasound 3/8/2021     TECHNIQUE:   Transabdominal pelvic ultrasound was performed in sagittal and transverse planes with a curvilinear transducer.  Additional transvaginal imaging was performed to better evaluate the endometrium and ovaries.  Imaging included volumetric   sweeps as well as traditional still imaging technique.     FINDINGS:     UTERUS:  The uterus is anteverted and retroflexed in position, measuring 11.0 x 4.5 x 5.2 cm.   The uterus has a normal contour and echotexture.  The cervix appears within normal limits.     ENDOMETRIUM:    The endometrial echo complex has an AP caliber of 4.0 mm.     OVARIES/ADNEXA:  Right ovary:  2.4 x 1.8 x 1.6 cm. 3.7 mL  No suspicious right ovarian abnormality.  Doppler flow within normal limits.     Left ovary:  2.1 x 1.9 x 1.5 cm. 2.9 mL  No suspicious left ovarian abnormality.  Doppler flow within normal limits.     No suspicious adnexal mass or loculated collections.  There is no free fluid.     IMPRESSION:     Enlarged uterus.  No fibroids identified.        Objective:  /70 (BP Location: Left arm, Patient Position: Sitting, Cuff Size: Standard)   Ht 5' 5\" (1.651 m)   Wt 65.5 kg (144 lb 6.4 oz)   LMP 11/01/2023 (Approximate)   Breastfeeding No   BMI 24.03 kg/m²   General Appearance: alert and oriented, in no acute distress.   HEENT: NC/AT, EOMI  CVS: Regular rate and rhythm  Lungs: Normal work of breathing  Abdomen: Soft, non-tender, non-distended, no masses, no rebound or guarding.  Pelvic: (Exam from 10/16/2023 visit with Marilu Diaz MA as chaperone)      External genitalia: Normal appearance, no abnormal pigmentation, no lesions or masses. Normal Bartholin's and " [place]'s.      Urinary system: Urethral meatus normal, bladder non-tender.      Vaginal: normal mucosa without prolapse or lesions. Normal-appearing physiologic discharge.      Cervix: Normal-appearing, well-epithelialized, no gross lesions or masses. No cervical motion tenderness.      Adnexa: No adnexal masses or tenderness noted.      Uterus: Normal-sized, regular contour, midline, mobile, no uterine tenderness.   Extremities: Normal range of motion.   Skin: normal, no rash or abnormalities  Neurologic: alert, oriented x3  Psychiatric: Appropriate affect, mood stable, cooperative with exam.         Griffin Arriaga MD  11/20/2023 5:41 PM

## 2024-01-16 NOTE — ANESTHESIA PREPROCEDURE EVALUATION
Procedure:  DILATATION AND CURETTAGE (D&C) WITH HYSTEROSCOPY (Uterus)  ABLATION ENDOMETRIAL VIPUL (Uterus)    Relevant Problems   NEURO/PSYCH   (+) Chronic pain disorder   (+) Depression, major, recurrent (HCC)   (+) NAVI (generalized anxiety disorder)   (+) Severe episode of recurrent major depressive disorder, without psychotic features (HCC)        Physical Exam    Airway      TM Distance: >3 FB  Neck ROM: full     Dental       Cardiovascular  Cardiovascular exam normal    Pulmonary  Pulmonary exam normal     Other Findings  post-pubertal.      Anesthesia Plan  ASA Score- 2     Anesthesia Type- IV sedation with anesthesia with ASA Monitors.         Additional Monitors:     Airway Plan:            Plan Factors-Exercise tolerance (METS): >4 METS.    Chart reviewed. EKG reviewed. Imaging results reviewed. Existing labs reviewed. Patient summary reviewed.                  Induction- intravenous.    Postoperative Plan- Plan for postoperative opioid use. Planned trial extubation    Informed Consent- Anesthetic plan and risks discussed with patient.  I personally reviewed this patient with the CRNA. Discussed and agreed on the Anesthesia Plan with the CRNA..

## 2024-01-19 PROCEDURE — 88305 TISSUE EXAM BY PATHOLOGIST: CPT | Performed by: PATHOLOGY

## 2024-01-19 NOTE — RESULT ENCOUNTER NOTE
Results reviewed with patient by phone. She reports that she is recovering well after surgery. Has returned to work. Small amount of discharge, but no bleeding. Has not needed anything for pain. Recommend follow up for routine care.     Griffin Arriaga MD  1/19/2024 2:05 PM

## 2024-01-19 NOTE — TELEPHONE ENCOUNTER
[facility]   Heart and Vascular [facility]     CARDIOLOGY OUTPATIENT FOLLOW-UP NOTE                                       Chief Complaint   Patient presents with    Coronary Artery Disease    Congestive Heart Failure    S/P AVR     Re-do 9/6/22    Bi-V ICD       Coronary Artery Disease  Symptoms include leg swelling. Pertinent negatives include no chest pain, dizziness, palpitations or shortness of breath. His past medical history is significant for CHF.   Congestive Heart Failure  Pertinent negatives include no chest pain, palpitations or shortness of breath. His past medical history is significant for CAD.     Subjective    Patient ID: Hayden Franklin is a 60 y.o. male.    Gopal is here for preop risk assessment.    He had a inferior MI November 1, 2013 and underwent SVG to RCA graft with concomitant AVR (with Justin Mitroflow bioprosthesis) and ascending aortic Dacron graft.     He ended up having severe prosthetic aortic valve stenosis and underwent redo sternotomy with AVR using 23 mm On-X mechanical valve on 9/6/2022 at [facility].     His coronary angiogram prior to the valve replacement did show that his SVG to RPDA was widely patent and had  of proximal RCA.  Rest of the circulation was good.     He did have a postop complete AV block requiring dual-chamber permanent pacemaker placement on 9/10/2022.  He apparently had initial atrial lead dislodgment requiring redo lead before discharge on 9/15/2022.     Immediate postsurgical EF apparently was 45% which apparently got worse at the time of pacemaker implantation dropping down to 30 to 35%.    His last echo was done 8/23/2023.  Ejection fraction did improve to 41% on this echocardiogram.    He is currently on Entresto, Aldactone, Toprol-XL.  He remains on aspirin Lipitor and Repatha.    He is on warfarin for anticoagulation.     He was seen by Dr. Prieto because of his right shoulder issues.  He does have tear of the right  Contacted patient in regards to [employer] from provider to attempt to schedule patient with Alexandro Myers for med mgmt  lvm for patient to contact intake dept in regards to scheduling appt  rotator cuff and has chronic pain in that side and now he is actually losing functionality.  He is scheduled for repair surgery February 1, 2024.    In the interim he was also noted to have a mass in the left upper lobe measuring 2.2 cm on his lung cancer screening CT.  He is awaiting his PET scan on the 25th.    He remains active and denies any exertional chest pain shortness of breath or claudication.  He is able to climb 2 full flight of stairs without having to stop in between.    CARDIOLOGY PROBLEM LIST:  Cardiology Problem List    Last Edited By Inocencia Keller RN on 1/12/2024    Primary Cardiologist: Dr. Joshi  Primary EP: Dr Saldaña    Coronary   - Inferior MI 11/1/2013  - 1V CABG 11/5/2013: SVG-RPDA     CHF/CM  - ICM  - Chronic combined CHF    Peripheral Vascular  - Ascending Aortic aneurysm repair 11/5/2013: 34-mm Dacron graft    Valvular  - Nonrheumatic AV stenosis  - AVR 11/5/2013: Justin Mitroflow bioprosthetic  - AVR (re-do) 9/6/2022: #23-mm On-X  ([facility])    Electrophysiology  - Complete AVB w/ junction escape  - PPM implantation 9/9/2022: Accolade MRI EL [id number] SN: [id number] ([facility])  - RA lead dislodgement 9/15/2022  - Bi-V ICD upgrade 5/24/2023    Pulmonary Vascular: N/A  Other: N/A    Risk Factors   - Dyslipidemia [Type: Hyperlipidemia]  - h/o Tobacco use [cigarettes, 1 ppd x 30 years, quit 2/24/2022]    Cardiovascular Procedure History  - 11/05/2013 CABG x 1: SVG-RPDA  - 11/05/2013 Ascending Aortic aneurysm repair: 34-mm Dacron graft  - 11/05/2013 AVR: Justin Mitroflow bioprosthetic  - 09/06/2022 AVR (re-do): #23-mm On-X  ([facility])  - 09/09/2022 PPM implantation: Accolade MRI EL L331 SN: [id number] ([facility])  - 09/15/2022 RA lead dislodgement  - 05/24/2023 Bi-V ICD upgrade: [employer] Resonate G447 SN: [id number]; RV: [employer] Lind 4 0673-64cm SN: [id number]; Coronary sinus: [employer] Acuity Lv X4 4678-95cm SN: [id number]    Prior Imaging/Testing History   - 08/18/2021 Echo: EF  38%, moderate LV systolic dysfunction, segmental wall motion abnormalities, RV hypokinetic, LA moderately dilated, AV prosthetic graft present [34-mm Dacron Graft], RVSP is estimated at 28 mmHg  - 02/25/2022 Echo: EF 52%, RA dilated, 27-mm Justin Mitroflow bioprosthetic AV present, valve leaflets slightly thickened and calcified although all 3 leaflets are noted to have a decent range of motion, mild-moderate AR, mild pHTN [RVSP  is estimated at 40 mmHg]  - 11/09/2022 Echo: EF 29%, mild LV dilation [LVIDD 5.8 cm], severe LV systolic dysfunction with segmental wall motion abnormalities, akinesis of basal inferior wall, presence of diastolic dysfunction, unable to assess grade due to summation of E and A waves,  moderate RV hypokinesis, severe LAE [LA VI 52 ml/m2], dilated RA, normally functioning 23-mm On-X mechanical AV prosthesis [peak aortic velocity 1.5 m/s, mean gradient 5 mmHg, effective orifice area 2.1 cm², no significant stenosis or regurgitation], mild MR/TR, RVSP estimated at 34 mmHg  - 02/23/2023 Echo: EF 31%, mild LV [LVIDD 6.0 cm], moderate LV systolic dysfunction with segmental wall motion abnormalities, severe hypokinesis of basal to mid inferior and basal inferoseptal walls, moderate hypokinesis of rest of the segments, diastolic dysfunction, unable to assess grade (summation E and a waves), severe LAE [LA VI 49 ml/m2], dilated RA, normally functioning 23-mm On-X bileaflet mechanical AV [peak aortic velocity 1.9 m/s, mean gradient 8 mmHg, EOA 2.1 cm²], trivial AR, trace MR/TR, RVSP estimated 34 mmHg  - 08/23/2023 Echo (ltd): EF 41%, mild LV systolic dysfunction with segmental wall motion abnormalities, akinesis of basal inferior wall. , severe hypokinesis of basal to mid inferior wall, hypokinesis of basal to mid inferolateral wall, Grade 1 diastolic dysfunction, mild SILVIO [LA VI 39 ml/m2], normally functioning 23-mm On-X mechanical AV prosthesis [peak aortic velocity 1.6 m/s, mean gradient 6 mmHg, no  significant stenosis or regurgitation], trace MR/TR, RVSP estimated 27 mmHg    12/07/2022 EKG (Sinus Rhythm, 80 bpm, 1st degree AVB [], nonspecific IVCD with LAD, borderline T abnormalities [lateral leads])  - 08/23/2023 EKG (A-sensed V-paced, 70 bpm, Bi-V paced rhythm)    - 02/26/2022 Lexiscan MPI: abnormal; EF 48%     Past Medical History:   Diagnosis Date    Aortic valve stenosis     s/p AV replacement 11/2013    Arthritis     Bilat hands, ankle    Ascending aorta dilatation (CMS/HCC)     s/p AAA repair 11/2013    Asthma     CAD (coronary artery disease)     1 vessel bypass    Cancer (CMS/[facility])     Skin    CHF (congestive heart failure) (CMS/[facility])     COPD (chronic obstructive pulmonary disease) (CMS/[facility])     Dysrhythmia     nonsustained v-tach    Hyperlipidemia     Ischemic cardiomyopathy     Lung nodule 03/07/2022    Myocardial infarction (CMS/[facility])     Presence of heart assist device (CMS/[facility])     Shortness of breath      Past Surgical History:   Procedure Laterality Date    AAA REPAIR - ENDOGRAFT  11/2013    ANKLE SURGERY      AORTIC ROOT ANGIOGRAM N/A 05/20/2022    Procedure: Aortic Arch  Angiogram;  Surgeon: Derick Gallegos MD;  Location: [facility] Cath Lab;  Service: Cardiovascular;  Laterality: N/A;    AORTIC VALVE REPLACEMENT  11/2013    BIVENTRICULAR ICD UPGRADE N/A 05/24/2023    Procedure: Bi-V ICD upgrade;  Surgeon: Mitchel Saldaña DO;  Location: [facility] EP Lab;  Service: Cardiovascular;  Laterality: N/A;    CARDIAC VALVE REPLACEMENT      COLONOSCOPY N/A 10/05/2023    Procedure: COLONOSCOPY with polypectomies, endo clip placement x2;  Surgeon: Patricia Hutton MD;  Location: [facility] Endoscopy;  Service: Endoscopy;  Laterality: N/A;    CORONARY ANGIOPLASTY  11/2013    CORONARY ARTERY BYPASS GRAFT  11/2013    1V CABG SVG- RCA    LEFT HEART CATH N/A 05/20/2022    Procedure: Left heart cath;  Surgeon: Derick Gallegos MD;  Location: [facility] Cath Lab;  Service: Cardiovascular;  Laterality: N/A;       Allergies as of  2024 - Reviewed 2024   Allergen Reaction Noted    Ezetimibe Rash 2020     Current Outpatient Medications   Medication Sig Dispense Refill    ticagrelor (Brilinta) 90 mg tablet Take 1 tablet (90 mg total) by mouth 2 (two) times a day      clotrimazole (MYCELEX) 10 mg esdras Take 1 tablet (10 mg total) by mouth 5 (five) times a day      metoprolol succinate XL (TOPROL-XL) 50 mg 24 hr tablet Take 1 tablet (50 mg total) by mouth daily 90 tablet 3    warfarin (COUMADIN) 5 mg tablet Take 1/2 to 1 tablet by mouth daily as directed by INR 60 tablet 0    atorvastatin (LIPITOR) 80 mg tablet Take 1 tablet (80 mg total) by mouth nightly 90 tablet 1    sacubitriL-valsartan (ENTRESTO) 24-26 mg tablet Take 1 tablet by mouth 2 (two) times a day Pt to take 0.5tab BID for 2 weeks then increase to 1 tab BID. Pt will discontinue lisinopril for 3 day washout period prior to starting entresto. 60 tablet 11    acetaminophen (TYLENOL) 325 mg tablet Take 2 tablets (650 mg total) by mouth every 6 (six) hours as needed for pain scale 1-3/10      evolocumab (Repatha SureClick) 140 mg/mL pen injector Inject 140 mg under the skin every 14 (fourteen) days 6 mL 3    spironolactone (ALDACTONE) 25 mg tablet Take 1 tablet (25 mg total) by mouth daily 90 tablet 3    nitroglycerin (NITROSTAT) 0.4 mg SL tablet Place 1 tablet (0.4 mg total) under the tongue every 5 (five) minutes as needed for chest pain Limit 3 tabs per episode. 25 tablet 1    albuterol HFA (Ventolin HFA) 90 mcg/actuation inhaler Inhale 2 puffs every 4 (four) hours (Patient taking differently: Inhale 2 puffs every 4 (four) hours PRN Daily) 1 Inhaler 11    amoxicillin (AMOXIL) 500 mg tablet SMARTSI Tablet(s) By Mouth      budesonide-formoteroL (SYMBICORT) 160-4.5 mcg/actuation inhaler Inhale 2 puffs 2 (two) times a day Rinse mouth with water after use. Do not swallow. (Patient not taking: Reported on 2024) 30.6 g 3    aspirin 81 mg EC tablet Take 1 tablet (81 mg  total) by mouth daily 90 tablet 3     No current facility-administered medications for this visit.       Family History   Problem Relation Age of Onset    Cancer Mother     Heart disease Father     Aneurysm Father     Other Sister         undiagnosed neurological disease    Breast cancer Sister     No Known Problems Sister     Gallbladder disease Daughter     No Known Problems Daughter      Social History     Tobacco Use    Smoking status: Former     Packs/day: 0.75     Years: 30.00     Additional pack years: 0.00     Total pack years: 22.50     Types: Cigarettes     Quit date: 2022     Years since quittin.9    Smokeless tobacco: Never    Tobacco comments:     Last smoked 3/1/22   Vaping Use    Vaping Use: Never used   Substance Use Topics    Alcohol use: Yes     Alcohol/week: 4.0 - 5.0 standard drinks of alcohol     Types: 4 - 5 Cans of beer per week     Comment: 3-4 beers weekly or less    Drug use: No       Review of Systems  Review of Systems   Cardiovascular:  Positive for dyspnea on exertion and leg swelling/pain. Negative for chest pain, irregular heartbeat and palpitations.   Respiratory:  Negative for shortness of breath and sleep apnea.    Neurological:  Negative for dizziness and light-headedness.   All other systems reviewed and are negative.      Objective   Vitals:    24 1354   BP: 110/64   Pulse: 72   Resp: 16   SpO2: 93%   Height: 1.829 m (6')   Weight: 92.1 kg (203 lb)   PainSc: 10-Worst pain ever   PainLoc: Shoulder  Comment: right   Patient Position: Sitting     Weight: 92.1 kg (203 lb)  Physical Exam  General: Comfortable not in any acute distress  HEENT: Normocephalic atraumatic  Neck: Normal JVP, no carotid bruit  Chest: Clear to auscultation, no adventitious breath sounds  CVS: S1-S2, regular rate rhythm, no murmurs rubs or gallops  Abdomen: Soft, nontender nondistended, normoactive bowel sounds present  Extremities: No edema, no cyanosis or clubbing  Vascular: 2+ radial  pulsation bilaterally    Data Review:   Sodium   Date Value Ref Range Status   01/12/2024 137 135 - 145 mmol/L Final     Potassium   Date Value Ref Range Status   01/12/2024 4.4 3.5 - 5.1 MMOL/L Final     Chloride   Date Value Ref Range Status   01/12/2024 104 98 - 107 mmol/L Final     CO2   Date Value Ref Range Status   01/12/2024 27 21 - 32 mmol/L Final     BUN   Date Value Ref Range Status   01/12/2024 16 7 - 25 mg/dL Final     Creatinine   Date Value Ref Range Status   01/12/2024 0.98 0.70 - 1.30 mg/dL Final     Glucose   Date Value Ref Range Status   01/12/2024 105 70 - 105 mg/dL Final     Calcium   Date Value Ref Range Status   01/12/2024 9.5 8.6 - 10.3 mg/dL Final     BNP   Date Value Ref Range Status   03/08/2022 375 (H) 0 - 100 pg/mL Final     Lipids:    Lab Results   Component Value Date    CHOL 114 04/14/2023    CHOL 84 04/19/2022    CHOL 123 04/05/2021     Lab Results   Component Value Date    HDL 71 04/14/2023    HDL 56 04/19/2022    HDL 65 04/05/2021     Lab Results   Component Value Date    LDLCALC 25 04/14/2023    LDLCALC <20 (L) 04/19/2022    LDLCALC 41 04/05/2021     Lab Results   Component Value Date    TRIG 92 04/14/2023    TRIG 60 04/19/2022    TRIG 91 04/05/2021       Protime-INR:   Lab Results   Component Value Date    PT 24.7 (H) 12/30/2023    INR 2.2 (H) 12/30/2023     Electrocardiogram: 1/19/2024  Atrial sensed, biventricular paced rhythm, 72 bpm    Assessment/Plan   Diagnoses and all orders for this visit:    Chronic combined systolic and diastolic congestive heart failure (CMS/HCC)    Encounter for interrogation of cardiac defibrillator    Preoperative clearance  -     Ambulatory referral to Cardiology    Tear of right rotator cuff, unspecified tear extent, unspecified whether traumatic  -     Ambulatory referral to Cardiology    Coronary artery disease involving native coronary artery of native heart without angina pectoris  -     ECG 12 lead -Normal, Today    Biventricular implantable  cardioverter-defibrillator (ICD) in situ    History of coronary artery bypass graft x 1    S/P AVR        Detailed Assessment and Plan:  Preop cardiovascular risk assessment:  Clinically he is doing well and is NYHA class I.  He is status post mechanical aortic valve replacement with a 23 mm On-X prosthesis done 9/6/2022  He does have systolic CHF although well compensated and his last ejection fraction has improved to 41%  Okay to proceed with shoulder surgery.  His overall risk of cardiovascular complication is in low intermediate range.  Because of his cardiomyopathy and mechanical aortic valve he would require Lovenox bridging.  [facility] heart and [facility] [place] anticoagulation clinic will manage his perioperative anticoagulation.  He will get his last dose of Lovenox 24 hours prior to surgery.  He will come off his warfarin 5 days prior to surgery.  He will require bridging postoperatively with Lovenox as well till his INR is back to therapeutic  Please continue with his atorvastatin And Toprol-XL perioperatively    Status post aortic valve redo replacement with 23 mm On-X mechanical valve for treatment of severe aortic regurgitation of the bioprosthetic Justin valve back in September at [facility]: :  Normal valve function  Continue aspirin and warfarin  Lifelong antibiotic prophylaxis for dental work involving gingival manipulation with 2 g amoxicillin 1 hour prior to dental procedure    Chronic systolic CHF:  Ejection fraction stable around 41%  Status post BiV ICD  Unstable guideline directed medical therapy  Clinically euvolemic and class I        Patient instructions:     Everything checks out good from your heart standpoint today.  Okay to proceed with shoulder repair surgery  Please follow instruction of the Coumadin clinic for your anticoagulation bridging  I will see you back in 6 months for routine follow-up         Sincerely,        Electronically signed by: Michael Joshi  MD  1/19/2024  2:03 PM

## 2024-01-22 ENCOUNTER — SOCIAL WORK (OUTPATIENT)
Dept: BEHAVIORAL/MENTAL HEALTH CLINIC | Facility: CLINIC | Age: 44
End: 2024-01-22
Payer: COMMERCIAL

## 2024-01-22 DIAGNOSIS — F33.2 SEVERE EPISODE OF RECURRENT MAJOR DEPRESSIVE DISORDER, WITHOUT PSYCHOTIC FEATURES (HCC): ICD-10-CM

## 2024-01-22 DIAGNOSIS — F41.1 GAD (GENERALIZED ANXIETY DISORDER): Primary | ICD-10-CM

## 2024-01-22 PROCEDURE — 90837 PSYTX W PT 60 MINUTES: CPT | Performed by: SOCIAL WORKER
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
## 2024-02-04 DIAGNOSIS — F32.2 CURRENT SEVERE EPISODE OF MAJOR DEPRESSIVE DISORDER WITHOUT PSYCHOTIC FEATURES WITHOUT PRIOR EPISODE (HCC): ICD-10-CM

## 2024-02-04 RX ORDER — DULOXETIN HYDROCHLORIDE 30 MG/1
CAPSULE, DELAYED RELEASE ORAL
Qty: 90 CAPSULE | Refills: 2 | Status: SHIPPED | OUTPATIENT
Start: 2024-02-04

## 2024-02-19 ENCOUNTER — TELEMEDICINE (OUTPATIENT)
Dept: BEHAVIORAL/MENTAL HEALTH CLINIC | Facility: CLINIC | Age: 44
End: 2024-02-19

## 2024-02-19 DIAGNOSIS — F33.2 SEVERE EPISODE OF RECURRENT MAJOR DEPRESSIVE DISORDER, WITHOUT PSYCHOTIC FEATURES (HCC): ICD-10-CM

## 2024-02-19 DIAGNOSIS — F41.1 GAD (GENERALIZED ANXIETY DISORDER): Primary | ICD-10-CM

## 2024-02-19 NOTE — PSYCH
Psychotherapy Provided: Individual Psychotherapy 45 minutes.     Length of time in session: 45 minutes.     Current suicide risk : Low .    Behavioral Health Treatment Plan [facility]: Diagnosis and Treatment Plan explained to Corinne, Corinne relates understanding diagnosis and is agreeable to Treatment Plan. Yes     Visit start and stop times:    02/19/24  Start Time: 1105  Stop Time: 1150  Total Visit Time: 45 minutes    Virtual Regular Visit    Verification of patient location: [place].     Patient is located at Home in the following state in which I hold an active license [place].     Assessment/Plan:    Problem List Items Addressed This Visit          Other    NAVI (generalized anxiety disorder) - Primary    Severe episode of recurrent major depressive disorder, without psychotic features (HCC)     Goals addressed in session: Goal 1 Follow up psychotherapy session.     Reason for visit is   Chief Complaint   Patient presents with    Virtual Regular Visit     Encounter provider Veronica Sandoval    Provider located at [facility]  [address] [place] [address] [address]  [facility] [id number] [phone]    Recent Visits  No visits were found meeting these conditions.  Showing recent visits within past 7 days and meeting all other requirements  Today's Visits  Date Type Provider Dept   02/19/24 Telemedicine Veronica Sandoval  [facility]   Showing today's visits and meeting all other requirements  Future Appointments  No visits were found meeting these conditions.  Showing future appointments within next 150 days and meeting all other requirements     The patient was identified by name and date of birth. Corinne S Kulp was informed that this is a telemedicine visit and that the visit is being conducted through the [employer] platform. She agrees to proceed.  My office door was closed. No one else was in the room.  She  acknowledged consent and understanding of privacy and security of the video platform. The patient has agreed to participate and understands they can discontinue the visit at any time.    Patient is aware this is a billable service.     Subjective Corinne S Kulp is a 44 y.o. female.    HPI     Past Medical History:   Diagnosis Date    Anxiety     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HCC)      Past Surgical History:   Procedure Laterality Date    APPENDECTOMY      extensive lysis of adhesions, MAYURI's procedure (divisions of MAYURI's bands) detorsion of vulvulus, widening of mesenteric pedicle, cecopeexy, appendectomy       BACK SURGERY Right 2013    SF: L4-L5 hemilaminectomy for discectomy. Use of the microscope for microdissection, Use og 40mg of depo-medrol though thee exiting right L5 nerve root. Onset:13     SECTION      x2    CHOLECYSTECTOMY      FL INJECTION RIGHT SHOULDER (ARTHROGRAM)  2022    GALLBLADDER SURGERY      [facility], Onset:     LAPAROSCOPIC LYSIS INTESTINAL ADHESIONS      onset: 15    LUMBAR DISC SURGERY      DE HYSTEROSCOPY BX ENDOMETRIUM&/POLYPC W/WO D&C N/A 2024    Procedure: DILATATION AND CURETTAGE (D&C) WITH HYSTEROSCOPY, POLYPECTOMY;  Surgeon: Griffin Arriaga MD;  Location: [facility];  Service: Gynecology    DE HYSTEROSCOPY ENDOMETRIAL ABLATION N/A 2024    Procedure: ABLATION ENDOMETRIAL VIPUL;  Surgeon: Griffin Arriaga MD;  Location: [facility];  Service: Gynecology    DE LAPS ABD PRTM&OMENTUM DX W/WO SPEC BR/WA SPX N/A 2016    Procedure: LAPAROSCOPY DIAGNOSTIC, EXTENSIVE LYSIS OF ADHESIONS, MAYURI'S PROCEDURE(DIVISION OF MAYURI'S BANDS,DETORSION OF VOLVULUS, WIDENING OF MESENTERIC PEDICLE, CECOPEXY, APPENDECTOMY);  Surgeon: Sudeep Espinoza MD;  Location: [facility];  Service: General    TUBAL LIGATION       Current Outpatient Medications   Medication Sig Dispense Refill    ALPRAZolam (XANAX) 0.25 mg tablet Take 1 tablet (0.25 mg total) by mouth daily as needed for  anxiety 30 tablet 0    DULoxetine (CYMBALTA) 30 mg delayed release capsule TAKE 1 CAPSULE BY MOUTH ONCE DAILY [CONTINUE  CYMBALTA  60MG  DAILY] 90 capsule 2    DULoxetine (CYMBALTA) 60 mg delayed release capsule Take 1 capsule (60 mg total) by mouth 2 (two) times a day 1 Daily 180 capsule 2    hydrOXYzine HCL (ATARAX) 25 mg tablet Take 1 tablet (25 mg total) by mouth every 6 (six) hours as needed for anxiety 30 tablet 1    lamoTRIgine (LaMICtal) 25 mg tablet TAKE 2 TABLETS BY MOUTH DAILY 60 tablet 2    multivitamin (THERAGRAN) TABS Take 1 tablet by mouth daily      Probiotic Product (PROBIOTIC-10 PO) Take by mouth 1 daily       No current facility-administered medications for this visit.     Allergies   Allergen Reactions    Biaxin [Clarithromycin] GI Intolerance and Other (See Comments)    Sulfa Antibiotics     Sulfasalazine     Venlafaxine GI Intolerance, Vomiting and Other (See Comments)     Category: Allergy;   Category: Allergy;      Review of Systems    Video Exam    There were no vitals filed for this visit.    Physical Exam     (D) Corinne attended her follow up psychotherapy session today. Corinne reported that since her last session, she has noticed a decrease in symptoms. Corinne reported that on 02/16/24 it was a full month since she had her D&C done. Corinne reported that since the surgery she has had a yellow discharge with a certain odor at times, resulting in her having to wear a panty liner daily. Corinne reported that two weeks after the surgery she messaged her OBGYN in relation to this, who responded with saying that it can take 2-3 weeks to fully recover. Corinne reported that she plans to message him back to follow up in relation to this. Corinne and this writer processed this. Corinne reported that she tried to decrease her Cymbalta, and increase her Lamictal, at the recommendation at her psychiatry provider. Corinne reported that she noticed increase in anxiety, and decided to stop doing  this, reporting that she is most comfortable with her current dose of medications currently, and doesn't want to make any chances. Corinne noticed that when she gets her menstrual cycle, she does continue to get more anxiety. Corinne reported that she plans to follow up with her psychiatry provider in March to discuss this. Discussed and processed this. Corinne reported yesterday she was triggered at a her child's sporting event, identifying feeling overstimulated, noticing that she will start to experience mild dizziness, and get anxiety. Corinne reported that she has always experienced this in large crowds, and processed through skills used to ground herself. Corinne reported that noticing that drinking coffee with caffeine, and not eating, can trigger this too, and processed through this. 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 Corinne reported that there was a situation that occurred where a client paid in a check, and the check bounced two times. Corinne reported that she contacted that client, and didn't receive a response, resulting in her having to tell her that she would have to report her if she didn't make due with this. Corinne reported that she contacted the woman again recently, and the woman ended up paying by credit card, and processed through this. Corinne discussed stressors with her children being sick over the past month, reporting that each child had the flu, and processed through stressors surrounding this. Corinne reported that she has noticed an improvement with symptoms, feeling positive in relation to this, and processed this. 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 Discussed and processed this. Corinne reported that her and her  have talked about going on a cruise, where in the past she would have been consumed with anxiety and fear, reporting that she hasn't even been on one, and only has ever flown (1) time when she was (11) years old. Corinne reported that they booked the trip for 2025, and processed through this. Corinne reported that her father was at her daughter's soccer game yesterday, reporting that she was triggered by this, and didn't engage with him. Corinne and this writer processed this at length. Corinne discussed feeling triggered with her perception of her  as being negative at times, specifically when it comes to feedback he gives their daughter with soccer, that she sees as negative, and processed through interpersonal relationship stressors. Discussed ongoing skills. Reviewed limits and boundaries. Modeled effective forms of communication. Provided ongoing psychoeducation.     (A) Corinne's mood presented as anxious, and her affect appeared to be congruent. Corinne presented with good eye contact. Corinne presented as alert and oriented x3. Corinne's speech presented at a normal rate, volume, and rhythm. Corinne denies any symptoms of frank. Corinne denies any evident or immediate risk factors for self-harm, SI, or HI. Corinne describes anticipatory anxiety, describing some obsessive, intrusive, ruminating, and repetitive thoughts. Corinne describes feeling tired and having little energy. Corinne describes symptoms of irritability, poor frustration tolerance, and becoming easily annoyed.     (P) Corinne plans to continue to examine patterns, themes, and behaviors through the lens of anxiety, and co-dependency, journaling outside of session to increase self-awareness in relation to warning signs and triggers throughout her body.  Corinne plans to give herself permission to implement previously identified skills, to target ongoing symptoms. Corinne plans to monitor, track, and inventory symptoms, cycles, and stressors, gathering supportive evidence to support with decision-making surrounding pharmacology, when working with her psychiatry provider. Corinne plans to assert herself, advocate for herself, ask for what she needs, and target interpersonal relationship stressors with healthy and effective forms of communication, challenging co-dependency, aiming to break the cycle, and implement healthy patterns, and behaviors. Corinne plans to implement radical compassion, extending bogdan towards herself, identifying worth, giving herself credit, and implement positive self-talk, positive affirmations, self-affirming statements, and grounding statements. Corinne plans to decrease judgement towards herself, and continue to challenge negative thoughts, negative thinking traps, cognitive distortions, and harmful core beliefs with DBT Wise Mind, and CBT Challenging Negative Thoughts Assessment Questions. Corinne plans to examine pros and cons, along with risks verses benefits in order to make informed decisions, giving herself permission to align choices and decisions with what is in the best interest of her, implementing radical acceptance. Corinne plans to prioritize her mental health and physical health needs, engage in the use of self-care, take time for herself, be present in the moment, and lean into healthy natural supports. Corinne plans to structure her schedule with balance, routine, and consistency. Corinne plans to reach out for additional support as needed.     02/19/24  Start Time: 1105  Stop Time: 1150  Total Visit Time: 45 minutes         39.3

## 2024-02-21 PROBLEM — Z00.00 HEALTH CARE MAINTENANCE: Status: RESOLVED | Noted: 2018-04-15 | Resolved: 2024-02-21

## 2024-02-23 ENCOUNTER — OFFICE VISIT (OUTPATIENT)
Dept: FAMILY MEDICINE CLINIC | Facility: HOSPITAL | Age: 44
End: 2024-02-23
Payer: COMMERCIAL

## 2024-02-23 VITALS
OXYGEN SATURATION: 99 % | WEIGHT: 143 LBS | BODY MASS INDEX: 23.82 KG/M2 | DIASTOLIC BLOOD PRESSURE: 80 MMHG | HEIGHT: 65 IN | HEART RATE: 94 BPM | SYSTOLIC BLOOD PRESSURE: 110 MMHG

## 2024-02-23 DIAGNOSIS — G89.29 CHRONIC NECK PAIN: ICD-10-CM

## 2024-02-23 DIAGNOSIS — R53.82 CHRONIC FATIGUE: ICD-10-CM

## 2024-02-23 DIAGNOSIS — F32.2 CURRENT SEVERE EPISODE OF MAJOR DEPRESSIVE DISORDER WITHOUT PSYCHOTIC FEATURES WITHOUT PRIOR EPISODE (HCC): ICD-10-CM

## 2024-02-23 DIAGNOSIS — M54.2 CHRONIC NECK PAIN: ICD-10-CM

## 2024-02-23 DIAGNOSIS — F39 MOOD DISORDER (HCC): ICD-10-CM

## 2024-02-23 DIAGNOSIS — N92.0 MENORRHAGIA WITH REGULAR CYCLE: ICD-10-CM

## 2024-02-23 DIAGNOSIS — Z12.31 SCREENING MAMMOGRAM FOR BREAST CANCER: Primary | ICD-10-CM

## 2024-02-23 PROCEDURE — 99214 OFFICE O/P EST MOD 30 MIN: CPT | Performed by: STUDENT IN AN ORGANIZED HEALTH CARE EDUCATION/TRAINING PROGRAM

## 2024-02-23 RX ORDER — LAMOTRIGINE 25 MG/1
37.5 TABLET ORAL 2 TIMES DAILY
Qty: 60 TABLET | Refills: 2 | Status: SHIPPED | OUTPATIENT
Start: 2024-02-23

## 2024-02-23 RX ORDER — LAMOTRIGINE 25 MG/1
37.5 TABLET ORAL EVERY MORNING
Qty: 60 TABLET | Refills: 2 | Status: SHIPPED | OUTPATIENT
Start: 2024-02-23 | End: 2024-02-23

## 2024-02-23 RX ORDER — DULOXETIN HYDROCHLORIDE 60 MG/1
60 CAPSULE, DELAYED RELEASE ORAL DAILY
Qty: 180 CAPSULE | Refills: 2 | Status: SHIPPED | OUTPATIENT
Start: 2024-02-23

## 2024-02-23 NOTE — PROGRESS NOTES
[facility]   Laura Barr DO    Assessment/Plan:      Diagnosis ICD-10-CM Associated Orders   1. Screening mammogram for breast cancer  Z12.31 Mammo screening bilateral w 3d & cad      2. Current severe episode of major depressive disorder without psychotic features without prior episode (HCC)  F32.2 DULoxetine (CYMBALTA) 60 mg delayed release capsule      3. Mood disorder (HCC)  F39 lamoTRIgine (LaMICtal) 25 mg tablet      4. Chronic neck pain  M54.2     G89.29       5. Chronic fatigue  R53.82 CBC and Platelet     Fe+TIBC+Kyree     CBC and Platelet     Fe+TIBC+Kyree      6. Menorrhagia with regular cycle  N92.0 CBC and Platelet     Fe+TIBC+Kyree     CBC and Platelet     Fe+TIBC+Kyree        Non fasting labs ordered, reassess iron & Hb after D&C.   Mammo due.   Patient's medications were reviewed and reconciled.    Ordered/Re-ordered as above.  Half Life of lamictal 24 hrs, possibly feeling SE's of lamictal wearing off.   Change to bid dosing. Upcoming psychiatry appt in a few weeks. Can discuss further with her.   C/W large mug muro per day. 120 oz as of lately.   Return if symptoms worsen or fail to improve - August as scheduled.  Patient may call or return to office with any questions or concerns.   ______________________________________________________________________  Subjective:     Patient ID: Corinne S Kulp is a 44 y.o. female.  Headache  Dizziness  Associated symptoms include headaches and neck pain. Pertinent negatives include no chest pain, chills, coughing or fever.   Neck Pain   Associated symptoms include headaches. Pertinent negatives include no chest pain or fever.     Corinne S Kulp  Chief Complaint   Patient presents with   • Headache   • Dizziness   • Neck Pain     Yest the worst, feeling off all day too. Waking up with a headache.   Feeling mentally & physically tired.   Normally not getting HA's prior, noticed over past few months, worsening now.   Pressure at base of head/into  neck, & across both temporal lobes.    Saw chiro & massage not long ago.     Getting dizzy at work some too. Maybe longer, now.   Doing hair, moving on the mat. Brief, a few seconds.   Has been getting nauseous.     Not using mouthguard lately.     Mood feeling much better, lamictal does help.   Did therapy Monday, and discussed this issue.     Drinking coffee & only water.   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      The following portions of the patient's history were reviewed and updated as appropriate: allergies, current medications, past medical history, and problem list.    Review of Systems   Constitutional:  Negative for chills and fever.   Respiratory:  Negative for cough and shortness of breath.    Cardiovascular:  Negative for chest pain and palpitations.   Musculoskeletal:  Positive for neck pain and neck stiffness.   Neurological:  Positive for dizziness and headaches. Negative for light-headedness.       Objective:      Vitals:    02/23/24 0854   BP: 110/80   Pulse: 94   SpO2: 99%      Physical Exam  Vitals and nursing note reviewed.   Constitutional:       General: She is not in acute distress.     Appearance: Normal appearance. She is normal weight. She is not ill-appearing.   HENT:      Head: Normocephalic and atraumatic.   Eyes:      General: No scleral icterus.        Right eye: No discharge.         Left eye: No discharge.   Cardiovascular:      Rate and Rhythm: Normal rate and regular rhythm.      Pulses: Normal pulses.      Heart sounds: Normal heart sounds. No murmur heard.  Pulmonary:      Effort: Pulmonary effort is normal. No respiratory distress.      Breath sounds: Normal breath sounds. No stridor. No wheezing.   Musculoskeletal:      Cervical back: Normal range of motion and neck supple. No rigidity.      Right lower leg: No edema.      Left lower leg: No edema.   Neurological:      Mental Status: She is alert and oriented to person, place, and time.      Gait: Gait normal.   Psychiatric:         Mood and  "Affect: Mood normal.         Behavior: Behavior normal.         Thought Content: Thought content normal.         Judgment: Judgment normal.         Portions of the record may have been created with voice recognition software. Occasional wrong word or \"sound alike\" substitutions may have occurred due to the inherent limitations of voice recognition software. Please review the chart carefully and recognize, using context, where substitutions/typographical errors may have occurred.     "

## 2024-02-27 LAB
ERYTHROCYTE [DISTWIDTH] IN BLOOD BY AUTOMATED COUNT: 11.9 % (ref 11.7–15.4)
FERRITIN SERPL-MCNC: 27 NG/ML (ref 15–150)
HCT VFR BLD AUTO: 39.8 % (ref 34–46.6)
HGB BLD-MCNC: 12.4 G/DL (ref 11.1–15.9)
IRON SATN MFR SERPL: 22 % (ref 15–55)
IRON SERPL-MCNC: 69 UG/DL (ref 27–159)
MCH RBC QN AUTO: 29.7 PG (ref 26.6–33)
MCHC RBC AUTO-ENTMCNC: 31.2 G/DL (ref 31.5–35.7)
MCV RBC AUTO: 95 FL (ref 79–97)
PLATELET # BLD AUTO: 213 X10E3/UL (ref 150–450)
RBC # BLD AUTO: 4.18 X10E6/UL (ref 3.77–5.28)
TIBC SERPL-MCNC: 308 UG/DL (ref 250–450)
UIBC SERPL-MCNC: 239 UG/DL (ref 131–425)
WBC # BLD AUTO: 5.4 X10E3/UL (ref 3.4–10.8)

## 2024-03-04 ENCOUNTER — TELEMEDICINE (OUTPATIENT)
Dept: BEHAVIORAL/MENTAL HEALTH CLINIC | Facility: CLINIC | Age: 44
End: 2024-03-04
Payer: COMMERCIAL

## 2024-03-04 DIAGNOSIS — F33.0 MILD EPISODE OF RECURRENT MAJOR DEPRESSIVE DISORDER (HCC): Primary | ICD-10-CM

## 2024-03-04 DIAGNOSIS — F41.1 GAD (GENERALIZED ANXIETY DISORDER): ICD-10-CM

## 2024-03-04 PROCEDURE — 90837 PSYTX W PT 60 MINUTES: CPT | Performed by: SOCIAL WORKER

## 2024-03-04 NOTE — PSYCH
Psychotherapy Provided: Individual Psychotherapy 60 minutes.     Length of time in session: 60 minutes.     Current suicide risk : Low     Behavioral Health Treatment Plan [facility]: Diagnosis and Treatment Plan explained to Corinne, Corinne relates understanding diagnosis and is agreeable to Treatment Plan. Yes     Visit start and stop times:    03/04/24  Start Time: 1100  Stop Time: 1200  Total Visit Time: 60 minutes    Virtual Regular Visit    Verification of patient location: [place], [place].     Patient is located at Home in the following state in which I hold an active license [place].     Assessment/Plan:    Problem List Items Addressed This Visit          Other    Depression, major, recurrent (HCC) - Primary    NAVI (generalized anxiety disorder)     Goals addressed in session: Goal 1 Follow up psychotherapy session.     Reason for visit is   Chief Complaint   Patient presents with    Virtual Regular Visit     Encounter provider Veronica Sandoval    Provider located at [address]  [facility] [id number] [phone]    Recent Visits  No visits were found meeting these conditions.  Showing recent visits within past 7 days and meeting all other requirements  Today's Visits  Date Type Provider Dept   03/04/24 Telemedicine Veronica Sandoval  Psychiatric [facility]   Showing today's visits and meeting all other requirements  Future Appointments  No visits were found meeting these conditions.  Showing future appointments within next 150 days and meeting all other requirements     The patient was identified by name and date of birth. Corinne S Kulp was informed that this is a telemedicine visit and that the visit is being conducted through the [employer] platform. She agrees to proceed.  My office door was closed. No one else was in the room.  She acknowledged consent and understanding of privacy and  security of the video platform. The patient has agreed to participate and understands they can discontinue the visit at any time.    Patient is aware this is a billable service.     Subjective Corinne S Kulp is a 44 y.o. female.    HPI     Past Medical History:   Diagnosis Date    Anxiety     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HCC)      Past Surgical History:   Procedure Laterality Date    APPENDECTOMY      extensive lysis of adhesions, MAYURI's procedure (divisions of MAYURI's bands) detorsion of vulvulus, widening of mesenteric pedicle, cecopeexy, appendectomy       BACK SURGERY Right 2013    SF: L4-L5 hemilaminectomy for discectomy. Use of the microscope for microdissection, Use og 40mg of depo-medrol though thee exiting right L5 nerve root. Onset:13     SECTION      x2    CHOLECYSTECTOMY      FL INJECTION RIGHT SHOULDER (ARTHROGRAM)  2022    GALLBLADDER SURGERY      [facility], Onset:     LAPAROSCOPIC LYSIS INTESTINAL ADHESIONS      onset: 15    LUMBAR DISC SURGERY      VT HYSTEROSCOPY BX ENDOMETRIUM&/POLYPC W/WO D&C N/A 2024    Procedure: DILATATION AND CURETTAGE (D&C) WITH HYSTEROSCOPY, POLYPECTOMY;  Surgeon: Griffin Arriaga MD;  Location: [facility] MAIN OR;  Service: Gynecology    VT HYSTEROSCOPY ENDOMETRIAL ABLATION N/A 2024    Procedure: ABLATION ENDOMETRIAL VIPUL;  Surgeon: Griffin Arriaga MD;  Location: [facility];  Service: Gynecology    VT LAPS ABD PRTM&OMENTUM DX W/WO SPEC BR/WA SPX N/A 2016    Procedure: LAPAROSCOPY DIAGNOSTIC, EXTENSIVE LYSIS OF ADHESIONS, MAYURI'S PROCEDURE(DIVISION OF MAYURI'S BANDS,DETORSION OF VOLVULUS, WIDENING OF MESENTERIC PEDICLE, CECOPEXY, APPENDECTOMY);  Surgeon: Sudeep Espinoza MD;  Location:  MAIN OR;  Service: General    TUBAL LIGATION         Current Outpatient Medications   Medication Sig Dispense Refill    ALPRAZolam (XANAX) 0.25 mg tablet Take 1 tablet (0.25 mg total) by mouth daily as needed for anxiety 30 tablet 0    DULoxetine (CYMBALTA) 30 mg  "delayed release capsule TAKE 1 CAPSULE BY MOUTH ONCE DAILY [CONTINUE  CYMBALTA  60MG  DAILY] 90 capsule 2    DULoxetine (CYMBALTA) 60 mg delayed release capsule Take 1 capsule (60 mg total) by mouth daily 1 Daily 180 capsule 2    hydrOXYzine HCL (ATARAX) 25 mg tablet Take 1 tablet (25 mg total) by mouth every 6 (six) hours as needed for anxiety 30 tablet 1    lamoTRIgine (LaMICtal) 25 mg tablet Take 1.5 tablets (37.5 mg total) by mouth 2 (two) times a day TAKE 2 TABLETS BY MOUTH DAILY 60 tablet 2    multivitamin (THERAGRAN) TABS Take 1 tablet by mouth daily      Probiotic Product (PROBIOTIC-10 PO) Take by mouth 1 daily       No current facility-administered medications for this visit.        Allergies   Allergen Reactions    Biaxin [Clarithromycin] GI Intolerance and Other (See Comments)    Sulfa Antibiotics     Sulfasalazine     Venlafaxine GI Intolerance, Vomiting and Other (See Comments)     Category: Allergy;   Category: Allergy;      Review of Systems    Video Exam    There were no vitals filed for this visit.    Physical Exam    (D) Corinne reached out to this writer, and requested earlier follow up which this writer accommodated. Corinne attended this session today. Corinne reported that since her last session, more specifically (2) Sunday's ago, she has noticed an increase in symptoms, identifying irritability, poor frustration tolerance, becoming easily annoyed, depression, and anxiety. Corinne reported that she noticed this at [facility], feeling like [facility] was, \"dragging out,\" identifying feeling like she didn't want to be there, reporting that she was going for her daughter's confirmation. Corinne reported that in addition to this, that same Sunday she struggled with anticipatory anxiety surrounding having to go back to work the following day. Corinne reported that the following Monday, she started to feel somewhat better, noticing a decrease in symptoms. Corinne reported that she ended up taking a PRN of " xanax Tuesday 02/27/24, and reported that she took a xanax Tuesday 02/27/24 through Friday 03/01/24, and again Sunday 03/03/24. Roxanna reported that when she looked at her calendar, she is projected to get her menstrual cycle, the end of this week, reporting consistency with the week prior to her menstrual cycle, she noticed an increase in symptoms. Corinne reported that her psychiatry provider prescribed her a different PRN, and reported that she is too anxious to even try this. Corinne reported that she tried to talk to her  about this, and reported that she feels that he makes it about himself. Corinne reported that she wishes that her  would ask her how she is feeling throughout the day, and reported feeling let down, and disappointed that he doesn't ask, and communicated that she doesn't share with her  about what she needs, and what she would benefit from. Corinne reported that she feels like she is constantly over-thinking during this timeframe, and fearing that her  is upset with her. Corinne reported that the week leading up to her menstrual cycle, she feels more sad, depressed, and anxious, reporting irritability with obsessive, intrusive, ruminating, and repetitive thoughts. Corinne describes having a desire to sleep more during these times, and finding it hard to be around people, and not wanting to be at work. Corinne reported that last Wednesday her assistant at work gave her notice, and reported that this increased her anxiety with needing to find a replacement, and training someone. Corinne reported that this assistant gave her a month notice, and describes feeling overwhelmed by this. Discussed and processed this. Corinne reported that she takes a multivitamin, tumeric, probiotic, biotin, vitamin c, and elderberry already, and processed through this. Corinne reported that she has been experiencing headaches, dizziness, nausea, bloating, and fatigue. Corinne reported that  she sought out her PCP and her chiropractor, and reported that her PCP made some medication changes, and reported that she didn't follow through with this, wanting to talk to her psychiatrist first. Discussed and processed this. Corinne reported that there have been some stressors between her and her father, reporting that there was an incident that occurred since her last session. Corinne and this writer processed this at length. Discussed ongoing skills. Reviewed limits and boundaries. Provided ongoing psychoeducation. Modeled effective forms of communication.     (A) Corinne presented as alert and oriented x3. Corinne's speech presented at a normal rate, volume, and rhythm. Corinne presented with good eye contact. Corinne denies any symptoms of frank. Corinne denies any evident or immediate risk factors for self-harm, SI, or HI. Corinne's mood presented as anxious and depressed, and her affect appeared to be congruent, and tearful at times. Corinne describes anticipatory anxiety, feeling nervous, anxious, and on edge, and worrying. Corinne describes symptoms of irritability, poor frustration tolerance, and becoming easily annoyed, describing feeling stressed out and overwhelmed. Corinne describes feeling tired and having little energy. Corinne describes little interest and pleasure in doing things, reporting lower moods of sadness, and depression. Corinne describes feeling bad about herself, feeling like she is letting herself, and others down. Corinne describes obsessive, intrusive, ruminating, and repetitive thoughts.     (P) During session today, this writer provided psychoeducation in relation PMDD. This writer provided Corinne with two different resources through [facility], and [facility], to read more on PMDD, to identify areas that she connects with, verses areas that she doesn't connect with to further explore and process next session. Corinne plans to follow up with her psychiatry provider, PCP, and  OBGYN regarding possible PMDD, and inquire about diagnosis, symptoms, and treatment options, exploring hormone lab work, supplements, and ultimate recommendations to follow up. Corinne plans to seek out evidence to challenge negative thoughts, negative thinking traps, cognitive distortions, and harmful core beliefs with DBT Wise Mind, and CBT Challenging Negative Thoughts Assessment Questions. Corinne plans to meet herself with radical compassion, extending bogdan towards herself, identifying worth within herself, giving herself credit, and implementing grounding statements, positive self-talk, positive affirmations, and self-affirming statements. Corinne plans to target ongoing symptoms with continued skill use. Corinne plans to use healthy and effective forms of communication to reinforce limits and boundaries, challenging co-dependency, aiming to break the cycle, and implement healthy patterns, and behaviors. Corinne plans to examine pros and cons, along with risks verses benefits, in order to make informed decisions, implementing radical acceptance, and giving herself permission to align choices and decisions with what is in the best interest of her. Corinne plans to structure her schedule with balance, routine, and consistency, using DBT opposite action skills, leaning into healthy natural supports, engaging in the use of self-care, taking time for herself, and being present in the moment. Corinne plans to journal outside of session, examining patterns, themes, and behaviors, along with identifying, associating, honoring, validating, and making space for her feelings, and emotions. Corinne plans to reach out for additional support as needed.     03/04/24  Start Time: 1100  Stop Time: 1200  Total Visit Time: 60 minutes

## 2024-03-11 ENCOUNTER — TELEMEDICINE (OUTPATIENT)
Dept: PSYCHIATRY | Facility: CLINIC | Age: 44
End: 2024-03-11
Payer: COMMERCIAL

## 2024-03-11 DIAGNOSIS — F33.1 MODERATE EPISODE OF RECURRENT MAJOR DEPRESSIVE DISORDER (HCC): Primary | ICD-10-CM

## 2024-03-11 DIAGNOSIS — F39 MOOD DISORDER (HCC): ICD-10-CM

## 2024-03-11 PROCEDURE — 99214 OFFICE O/P EST MOD 30 MIN: CPT | Performed by: NURSE PRACTITIONER

## 2024-03-11 RX ORDER — LAMOTRIGINE 25 MG/1
TABLET ORAL
Start: 2024-03-11

## 2024-03-11 RX ORDER — DULOXETIN HYDROCHLORIDE 20 MG/1
CAPSULE, DELAYED RELEASE ORAL
Qty: 40 CAPSULE | Refills: 0 | Status: SHIPPED | OUTPATIENT
Start: 2024-03-11

## 2024-03-12 NOTE — PSYCH
Virtual Regular Visit    Verification of patient location:    Patient is located at Home in the following state in which I hold an active license [place]      Assessment/Plan:    Problem List Items Addressed This Visit          Other    Depression, major, recurrent (HCC) - Primary    Relevant Medications    DULoxetine (CYMBALTA) 20 mg capsule     Other Visit Diagnoses       Mood disorder (HCC)        Relevant Medications    DULoxetine (CYMBALTA) 20 mg capsule    lamoTRIgine (LaMICtal) 25 mg tablet            Goals addressed in session: To continue to do well.  I will be being symptoms of PMDD         Reason for visit is   Chief Complaint   Patient presents with    Virtual Regular Visit          Encounter provider SAKINA Bruno    Provider located at [address] [id number]      Recent Visits  No visits were found meeting these conditions.  Showing recent visits within past 7 days and meeting all other requirements  Today's Visits  Date Type Provider Dept   03/11/24 Telemedicine SAKINA Bruno  Psychiatric [facility]   Showing today's visits and meeting all other requirements  Future Appointments  No visits were found meeting these conditions.  Showing future appointments within next 150 days and meeting all other requirements       The patient was identified by name and date of birth. Corinne S Kulp was informed that this is a telemedicine visit and that the visit is being conducted throughthe [employer] platform. She agrees to proceed..  My office door was closed. No one else was in the room.  She acknowledged consent and understanding of privacy and security of the video platform. The patient has agreed to participate and understands they can discontinue the visit at any time.    Patient is aware this is a billable service.     Subjective  Corinne S Kulp is a 44 y.o. female who has a history of major  depressive disorder and anxiety disorder.  Has been doing very well.  Has not had any new issues or concerns until recently when she noticed some symptoms prior to her menstrual cycle.  She had become little more weepy, a little more irritable and some sadness but not depression.  She said she would notice that about a week before her menstrual cycle and then a day or 2 into the menstrual cycle, the symptoms would go away.  So she is asking if we can do something to help her during that time.  We discussed such as Prozac at 10 mg for 1 week prior to menstrual cycle.  We also discussed just increasing the Lamictal and see how that goes.  So for now, she is used to taking Lamictal without any side effects.  She is aware of the possible side effect of rash but it is not evident.  So we will increase Lamictal up to 100 mg the week before her menstrual cycle and if she wishes she can stay on 100mg or she can drop it back down to 75 mg.  If she so chooses after her menstrual cycle starts.  She agreed to see how she feels during that time and then make a decision.  Follow-up with me in 3 to 4 weeks or sooner if necessary.  The patient will continue on:  Xanax 0.25 mg daily as needed for anxiety  Patient is interested in reducing Cymbalta since she is feeling fairly well.  We will reduce the Cymbalta to 30 mg to 20 mg which she will take for 2 weeks and then take it every Monday Wednesday and Friday for 2 weeks and stop.    She will continue on the Cymbalta 60 mg daily  Hydroxyzine has been discontinued because of side effect.  If she wishes to cut the pill in half or a quarter she may try that  Follow-up with me in 3 to 4 weeks or sooner if necessary.  Mental status exam: Patient is awake and alert and oriented x 3.  Mood is stable.  Some mood dysphoria during menstrual cycle which we have addressed.  Associations are intact.  No overt delusions.  Speech is clear and thoughts are well organized, coherent and goal directed.   Attention span is good.  Impulse control is good.  Judgment and insight are good.  Memory is good.    Safety Plan: Patient is aware of the 24-hour on-call service offered by [facility].  Patient is also aware of the 24-hour crisis call service offered by [facility].  Patient has a very good support system with her significant other.  The patient agrees to call 911 or go directly to the emergency room if she begins to experience any suicidal ideation.           Past Medical History:   Diagnosis Date    Anxiety     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HCC)        Past Surgical History:   Procedure Laterality Date    APPENDECTOMY      extensive lysis of adhesions, MAYURI's procedure (divisions of MAYURI's bands) detorsion of vulvulus, widening of mesenteric pedicle, cecopeexy, appendectomy       BACK SURGERY Right 2013    SF: L4-L5 hemilaminectomy for discectomy. Use of the microscope for microdissection, Use og 40mg of depo-medrol though thee exiting right L5 nerve root. Onset:13     SECTION      x2    CHOLECYSTECTOMY      FL INJECTION RIGHT SHOULDER (ARTHROGRAM)  2022    GALLBLADDER SURGERY      [facility], Onset:     LAPAROSCOPIC LYSIS INTESTINAL ADHESIONS      onset: 15    LUMBAR DISC SURGERY      NC HYSTEROSCOPY BX ENDOMETRIUM&/POLYPC W/WO D&C N/A 2024    Procedure: DILATATION AND CURETTAGE (D&C) WITH HYSTEROSCOPY, POLYPECTOMY;  Surgeon: Griffin Arriaga MD;  Location: [facility];  Service: Gynecology    NC HYSTEROSCOPY ENDOMETRIAL ABLATION N/A 2024    Procedure: ABLATION ENDOMETRIAL VIPUL;  Surgeon: Griffin Arriaga MD;  Location: [facility];  Service: Gynecology    NC LAPS ABD PRTM&OMENTUM DX W/WO SPEC BR/WA SPX N/A 2016    Procedure: LAPAROSCOPY DIAGNOSTIC, EXTENSIVE LYSIS OF ADHESIONS, MAYURI'S PROCEDURE(DIVISION OF MAYURI'S BANDS,DETORSION OF VOLVULUS, WIDENING OF MESENTERIC PEDICLE, CECOPEXY, APPENDECTOMY);  Surgeon: Sudeep Espinoza MD;  Location: [facility];  Service: General    TUBAL  LIGATION         Current Outpatient Medications   Medication Sig Dispense Refill    ALPRAZolam (XANAX) 0.25 mg tablet Take 1 tablet (0.25 mg total) by mouth daily as needed for anxiety 30 tablet 0    DULoxetine (CYMBALTA) 20 mg capsule 1 capsule daily for 2 weeks, then 1 capsule Monday Wednesday and Friday morning only for 2 weeks and stop 40 capsule 0    lamoTRIgine (LaMICtal) 25 mg tablet 1.5 tabs BID.  May take 1 extra tab 1 week before her menstrual cycle, then stop her menstrual cycle begins.      DULoxetine (CYMBALTA) 60 mg delayed release capsule Take 1 capsule (60 mg total) by mouth daily 1 Daily 180 capsule 2    hydrOXYzine HCL (ATARAX) 25 mg tablet Take 1 tablet (25 mg total) by mouth every 6 (six) hours as needed for anxiety 30 tablet 1    multivitamin (THERAGRAN) TABS Take 1 tablet by mouth daily      Probiotic Product (PROBIOTIC-10 PO) Take by mouth 1 daily       No current facility-administered medications for this visit.        Allergies   Allergen Reactions    Biaxin [Clarithromycin] GI Intolerance and Other (See Comments)    Sulfa Antibiotics     Sulfasalazine     Venlafaxine GI Intolerance, Vomiting and Other (See Comments)     Category: Allergy;   Category: Allergy;        Review of Systems    Video Exam    There were no vitals filed for this visit.    Physical Exam     Visit Time    Visit Start Time: 10:00a  Visit Stop Time: 10:30a  Total Visit Duration: 30 minutes were spent in the visit.  Time was spent reviewing the treatment plan, reviewing her medications, reviewing probable symptoms of PMDD and adjusting medications accordingly.  Time was also spent completing the progress note.

## 2024-03-18 ENCOUNTER — SOCIAL WORK (OUTPATIENT)
Dept: BEHAVIORAL/MENTAL HEALTH CLINIC | Facility: CLINIC | Age: 44
End: 2024-03-18
Payer: COMMERCIAL

## 2024-03-18 DIAGNOSIS — F33.2 SEVERE EPISODE OF RECURRENT MAJOR DEPRESSIVE DISORDER, WITHOUT PSYCHOTIC FEATURES (HCC): Primary | ICD-10-CM

## 2024-03-18 DIAGNOSIS — F41.1 GAD (GENERALIZED ANXIETY DISORDER): ICD-10-CM

## 2024-03-18 PROCEDURE — 90837 PSYTX W PT 60 MINUTES: CPT | Performed by: SOCIAL WORKER

## 2024-03-18 NOTE — PSYCH
Psychotherapy Provided: Individual Psychotherapy 60 minutes.     Length of time in session: 60 minutes.     Goals addressed in session: Goal 1 Follow up psychotherapy.     Current suicide risk : Low     [facility]: Diagnosis and Treatment Plan explained to Corinne, Corinne relates understanding diagnosis and is agreeable to Treatment Plan. Yes     Visit start and stop times:    03/18/24       (D) Corinne attended her follow up psychotherapy session today. Corinne reported that since her last session, she has noticed ongoing symptoms. Corinne reported that after her last session Monday 03/04/24, that Thursday 03/07/24 she get her menstrual cycle. Corinne reported that it lasted for (5) days, and reported that it was really light, after her D&C, in comparison from prior to the D&C. Corinne reported that looking back, she believes that her increase in mental health symptoms, she attributed it to her menstrual cycle, considering a PMDD diagnosis. Corinne reported that she met with her psychiatry provider on Monday 03/11/24, and discussed her symptoms, and possible PMDD. Corinne reported that her psychiatry provider recommended that she take Prozac for a week prior to her cycle, when she starts to feel those symptoms, reporting that she didn't want to add another medication. Corinne reported that her psychiatry provider also offered that Corinne take the lamictal 50mg daily, and during the week leading up to her cycle, she either increase it to 75mg or 100mg, to assist in targeting these symptoms. Corinne reported that her psychiatry provider discussed her decreasing him Cymbalta, from 90mg to 80mg, reporting that she will do this for two weeks, and then after the two weeks, she will decrease it further to 60mg, before meeting with her psychiatry provider again, to review again. Corinne reported that her psychiatry provider recommended that she switch her xanax PRN to the atarax 25mg,  "reporting that she tried half of one, and described feeling more, \"loopy,\" not feeling comfortable. Corinne reported that since she got her period on 03/07/24, she didn't need to take the xanax PRN. Corinne and this writer processed this. Corinne reported that she was off Saturday, Sunday, and today on Monday. Corinne reported that her daughter had a soccer game on Saturday, and confirmation on Sunday. Corinne reported that she was triggered at her daughter's confirmation class, reporting that the  was negative towards the children, sending them messages that he didn't want to work with the kids anymore and that he plans to find a new  to work with the kids. Corinne reported that she was so triggered by this, that she ended up not going to [employer] yesterday. Corinne reported that she talked to one of her daughter's friend's mother's, who has had a similar experiences. Corinne and this writer processed this at length. 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 Corinne discussed stressors and triggers in relation to this, and processed this. Corinne reported that her  has been working full-time, and doing his side business, reporting that he is working long hours, and days, including weekends. Corinne reported that her and her  have been trying to consider her  as going into business for himself, full-time, and having anxiety about health insurance, and benefits. Corinne reported that in the process of switching her life insurance, she was triggered with having to answer questions about her medications, and mental health. Corinne reported that she had to have a woman come out to the house to meet with her, to do a medical evaluation, for her new life insurance policy. Corinne described feeling triggered by this whole process. Corinne reported struggling " with memories of her mother, being upset with her mother for being on medication, and self-medicating with alcohol, having memories of being angry with her mother, describing lack of insight and self-awareness as a child. Corinne describes now wondering if what she has gone through is what her mother went through, triggering negative thoughts, shame, and guilt. Corinne and this writer processed this. Corinne discussed stressors and triggers in relation to her father, and sending what she sees as inappropriate text messages. Corinne discussed using healthy and effective forms of communication to reinforce her limits and boundaries. Corinne describes anticipatory anxiety in relation to her father's physical health and mental health, fearing that he has a cognitive decline, worrying about his future, and the inability to care for himself. Corinne describes her father as being dishonest, and ongoing unhealthy patterns, themes, and behaviors for her father over the years. Discussed and processed this. Provided ongoing psychoeducation. Modeled effective forms of communication. Discussed ongoing skills. Reviewed limits and boundaries.     (A) Corinne presented with good eye contact. Corinne presented as alert and oriented x3. Corinne's speech presented at a normal rate, volume, and rhythm. Corinne denies any symptoms of frank. Corinne denies any evident or immediate risk factors for self-harm, SI, or HI. Corinne's mood presented as anxious and irritable, and her affect appeared to be congruent, and tearful at times. Corinne describes fluctuating symptoms, identifying anticipatory anxiety, feeling nervous, anxious, and on edge, and worrying too much about different things. Corinne describes obsessive, intrusive, ruminating, and repetitive thoughts. Corinne describes symptoms of irritability, poor frustration tolerance, and becoming easily annoyed. Corinne describes little interest and pleasure in doing things, reporting  lower moods of sadness, and depression.     (P) Corinne plans to increase her lamictal during the week leading up to her menstrual cycle, if she notices the warning signs and triggers of elevated symptoms, giving herself permission to to do this without judgement. Corinne plans to consider exploring functional medicine, for more of a holistic approach, through [facility], or [facility]. Corinne plans to give herself permission to prioritize her physical health and mental health needs, lean into healthy natural supports, take time for herself, be present in the moment, and engage in the use of self-care. Corinne plans to implement grounding statements, positive self-talk, positive affirmations, self-affirming statements, intentionally identifying worth, giving herself credit, meeting herself with radical compassion, while extending bogdan towards herself. Corinne plans to decrease judgement towards herself, and continue to challenge negative thoughts, negative thinking traps, cognitive distortions, and harmful core beliefs with DBT Wise Mind, and CBT Challenging Negative Thoughts Assessment Questions. Corinne plans to reinforce limits and boundaries, with healthy and effective forms of communication, asserting herself, advocating for herself, asking for what she needs, and targeting interpersonal relationship stressors, challenging co-dependency, aiming to break the cycle, and implement healthy patterns, and behaviors. Corinne plans to examine pros and cons, along with risks verses benefits in order to make informed decisions, and align choices and decisions with what is in the best interest of her, implementing radical acceptance. Corinne plans to target ongoing symptoms with continued skill use. Corinne plans to structure her schedule with balance, routine, and consistency, using DBT Opposite Action Skills. Corinne plans to identify, associate, honor,  validate, and make space for her feelings, and emotions, journaling outside of session, self-reflecting upon patterns, themes, and behaviors to further explore next session. Corinne plans to reach out for additional support as needed.     03/18/24  Start Time: 1059  Stop Time: 1159  Total Visit Time: 60 minutes

## 2024-03-27 DIAGNOSIS — F39 MOOD DISORDER (HCC): ICD-10-CM

## 2024-03-27 RX ORDER — LAMOTRIGINE 25 MG/1
TABLET ORAL
Qty: 120 TABLET | Refills: 2 | Status: SHIPPED | OUTPATIENT
Start: 2024-03-27

## 2024-04-01 ENCOUNTER — SOCIAL WORK (OUTPATIENT)
Dept: BEHAVIORAL/MENTAL HEALTH CLINIC | Facility: CLINIC | Age: 44
End: 2024-04-01
Payer: COMMERCIAL

## 2024-04-01 DIAGNOSIS — F33.2 SEVERE EPISODE OF RECURRENT MAJOR DEPRESSIVE DISORDER, WITHOUT PSYCHOTIC FEATURES (HCC): Primary | ICD-10-CM

## 2024-04-01 DIAGNOSIS — F41.1 GAD (GENERALIZED ANXIETY DISORDER): ICD-10-CM

## 2024-04-01 PROCEDURE — 90837 PSYTX W PT 60 MINUTES: CPT | Performed by: SOCIAL WORKER

## 2024-04-01 NOTE — PSYCH
Psychotherapy Provided: Individual Psychotherapy 59 minutes.     Length of time in session: 59 minutes.     Goals addressed in session: Goal 1 Follow up psychotherapy session.     Current suicide risk : Low     Behavioral Health Treatment Plan [facility]: Diagnosis and Treatment Plan explained to Corinne, Corinne relates understanding diagnosis and is agreeable to Treatment Plan. Yes     Visit start and stop times:    04/01/24  Start Time: 1100  Stop Time: 1159  Total Visit Time: 59 minutes    (D) Corinne attended her follow up psychotherapy session today. Corinne reported that since her last session, she has noticed ongoing symptoms. Corinne reported that she continues to remain on the lamictal 50mg and cymblata, reporting that she went from 90mg, that was lowered to 80mg, doing this for one week, and then wants her taking 60mg one day, and 80mg the next day, doing this for two weeks. COrinne reported that this week she is to be taking 80mg cymbalta Monday, Wednesday, and Friday, and 60mg the rest of the days, where she reports that this Saturday she will transition to 60mg of the cymbalta. Corinne reported that she hasn't noticed any changes thus far. Corinne reported that this week, is the week that she is leading up to her menstrual cycle, where she typically notices increased symptoms, including brain fog. Corinne reported that this past Saturday she noticed brain fog at work int he morning, that went away later in the day. Corinne reported that she took off work this Saturday, and next Saturday, reporting that she is looking forward to this, and also struggling with anticipatory anxiety with going back to Saturdays. Corinne reported that her psychiatry provider recommended that she increase her lamictal the week leading up to her menstrual cycle, and discussed anticipatory anxiety in relation to this. Discussed and processed this. Corinne reported that there were stressors and triggers last week, specifically  with her  and children. Corinne reported that it has been an ongoing issue with her  and children not putting their dirty dishes in the , leaving them int he sink. Corinne reported that last week, her  left his ice cream bowl, and  in the sink, and reported that she asked her  to put his dirty dishes in the . Corinne reported that rather than her  acknowledging this, taking accountability, and doing it, he started projecting, questioning, and deflecting onto Corinne, triggering her. Corinne reported that her and her  started arguing, and the kids got involved, reporting that the kids kept asking her to stop, and removed herself from the situation. Corinne reported that she left the kitchen, and went upstairs, and then her  went to work, and the kids went to school. Corinne reported that she texted the family group chat then, describing herself as being emotionally dysregulated. Corinne reported that she feels like her  is over simplifying the situation making it about the dish and , rather than the pattern, theme, and behavior of being dismissed, not considered, seen, valued, and supported. Corinne describes her  as not taking accountability, describing him as projecting, deflecting, and projecting, triggering her more. Corinne describes her  as being defensive, detached, and guarded, and reports that when they argue he shuts down, and she reports feeling left to resolve the conflict. Corinne describes feeling emotionally distressed with dirty dishes in the sink. Corinne and this writer processed this at length. Discussed limits and boundaries. Reviewed ongoing skills. Provided ongoing psychoeducation. Modeled effective forms of communication.     (A) Corinne denies any evident or immediate risk factors for self-harm, SI, or HI. Corinne denies any symptoms of frank. Corinne presented as alert  "and oriented x3. Corinne presented with good eye contact. Corinne's speech presented at a normal rate, volume, and rhythm. Corinne's mood presented as anxious, and depressed, and her affect appeared to be congruent, and tearful at times. Corinne describes anticipatory anxiety, feeling nervous, anxious, and on edge, not being able to stop and control worrying, anticipatory anxiety, and obsessive, intrusive, ruminating, and repetitive thoughts. Corinne describes symptoms of irritability, poor frustration tolerance, and becoming easily annoyed. Corinne describes little interest and pleasure in doing things, reporting lower moods of sadness, and depression. Corinne describes feeling bad about herself, feeling like she is letting herself, and her family down.     (P) This writer sent Corinne an article on, \"The Four Horsemen: Criticism, Contempt, Defensiveness, and Stonewalling,\" through [facility] to read outside of session, further exploring examining patterns, themes, and behaviors through the lens of co-dependency, identifying areas that she connects with verses areas that she doesn't connect with. Corinne plans to journal upon this further outside of session, to further explore next session. Corinne plans to challenge herself to use healthy and effective forms of communication to assert herself, advocate for herself, ask for what she needs, and target interpersonal relationship stressors, reinforcing limits and boundaries, challenging co-dependency, aiming to break the cycle, and implement healthy patterns, and behaviors. Corinne plans to target ongoing symptoms with continued skills. Corinne plans to focus on what is in her control, implementing radical acceptance, examining pros and cons along with risks verses benefits in order to make informed decisions, giving herself permission to align choices and decisions with what is in the best interest of her. Corinne plans to structure her schedule with " balance, routine, and consistency. Corinne plans to implement grounding statements, positive self-talk, positive affirmations, and self-affirming statements, meeting herself with radical compassion, extending bogdan towards herself, identifying worth, and giving herself credit. Corinne plans to decrease judgement towards herself, and continue to use DBT Wise Mind, and CBT Challenging Negative Thoughts Assessment Questions to challenge negative thoughts, negative thinking traps, cognitive distortions, and harmful core beliefs. Corinne plans to increase self-awareness in relation to warning signs and triggers throughout her body. Corinne plans to reach out additional support as needed.     04/01/24  Start Time: 1100  Stop Time: 1159  Total Visit Time: 59 minutes

## 2024-04-04 DIAGNOSIS — F32.2 CURRENT SEVERE EPISODE OF MAJOR DEPRESSIVE DISORDER WITHOUT PSYCHOTIC FEATURES WITHOUT PRIOR EPISODE (HCC): ICD-10-CM

## 2024-04-04 RX ORDER — DULOXETIN HYDROCHLORIDE 60 MG/1
60 CAPSULE, DELAYED RELEASE ORAL 2 TIMES DAILY
Qty: 180 CAPSULE | Refills: 1 | Status: SHIPPED | OUTPATIENT
Start: 2024-04-04

## 2024-04-15 ENCOUNTER — TELEMEDICINE (OUTPATIENT)
Dept: BEHAVIORAL/MENTAL HEALTH CLINIC | Facility: CLINIC | Age: 44
End: 2024-04-15
Payer: COMMERCIAL

## 2024-04-15 DIAGNOSIS — F41.1 GAD (GENERALIZED ANXIETY DISORDER): ICD-10-CM

## 2024-04-15 DIAGNOSIS — F33.2 SEVERE EPISODE OF RECURRENT MAJOR DEPRESSIVE DISORDER, WITHOUT PSYCHOTIC FEATURES (HCC): Primary | ICD-10-CM

## 2024-04-15 PROCEDURE — 90834 PSYTX W PT 45 MINUTES: CPT | Performed by: SOCIAL WORKER

## 2024-04-15 NOTE — PSYCH
Psychotherapy Provided: Individual Psychotherapy 50 minutes.     Length of time in session: 50 minutes.     Current suicide risk : Low .     Behavioral Health Treatment Plan [facility]: Diagnosis and Treatment Plan explained to Corinne, Corinne relates understanding diagnosis and is agreeable to Treatment Plan. Yes     Visit start and stop times:    04/15/24  Start Time: 0850  Stop Time: 0940  Total Visit Time: 50 minutes    Virtual Regular Visit    Verification of patient location: [place].     Patient is located at Home in the following state in which I hold an active license [place].     Assessment/Plan:    Problem List Items Addressed This Visit          Behavioral Health    NAVI (generalized anxiety disorder)    Severe episode of recurrent major depressive disorder, without psychotic features (HCC) - Primary     Goals addressed in session: Goal 1 Follow up psychotherapy session.     Reason for visit is   Chief Complaint   Patient presents with    Virtual Regular Visit     Encounter provider Veronica Sandoval    Provider located at [facility]  [address] [place] [address] [address]  [facility] [id number] [phone]    Recent Visits  No visits were found meeting these conditions.  Showing recent visits within past 7 days and meeting all other requirements  Today's Visits  Date Type Provider Dept   04/15/24 Telemedicine Veronica Sandoval  [facility]   Showing today's visits and meeting all other requirements  Future Appointments  No visits were found meeting these conditions.  Showing future appointments within next 150 days and meeting all other requirements     The patient was identified by name and date of birth. Corinne S Namita was informed that this is a telemedicine visit and that the visit is being conducted through the [employer] platform. She agrees to proceed. My office door was closed. No one else was in the  room.  She acknowledged consent and understanding of privacy and security of the video platform. The patient has agreed to participate and understands they can discontinue the visit at any time.    Patient is aware this is a billable service.     Subjective Corinne S Kulp is a 44 y.o. female.    HPI     Past Medical History:   Diagnosis Date    Anxiety     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HCC)      Past Surgical History:   Procedure Laterality Date    APPENDECTOMY      extensive lysis of adhesions, MAYURI's procedure (divisions of MAYURI's bands) detorsion of vulvulus, widening of mesenteric pedicle, cecopeexy, appendectomy       BACK SURGERY Right 2013    SF: L4-L5 hemilaminectomy for discectomy. Use of the microscope for microdissection, Use og 40mg of depo-medrol though thee exiting right L5 nerve root. Onset:13     SECTION      x2    CHOLECYSTECTOMY      FL INJECTION RIGHT SHOULDER (ARTHROGRAM)  2022    GALLBLADDER SURGERY      GV, Onset:     LAPAROSCOPIC LYSIS INTESTINAL ADHESIONS      onset: 15    LUMBAR DISC SURGERY      MS HYSTEROSCOPY BX ENDOMETRIUM&/POLYPC W/WO D&C N/A 2024    Procedure: DILATATION AND CURETTAGE (D&C) WITH HYSTEROSCOPY, POLYPECTOMY;  Surgeon: Griffin Arriaga MD;  Location: [facility];  Service: Gynecology    MS HYSTEROSCOPY ENDOMETRIAL ABLATION N/A 2024    Procedure: ABLATION ENDOMETRIAL VIPUL;  Surgeon: Griffin Arriaga MD;  Location: [facility];  Service: Gynecology    MS LAPS ABD PRTM&OMENTUM DX W/WO SPEC BR/WA SPX N/A 2016    Procedure: LAPAROSCOPY DIAGNOSTIC, EXTENSIVE LYSIS OF ADHESIONS, MAYURI'S PROCEDURE(DIVISION OF MAYURI'S BANDS,DETORSION OF VOLVULUS, WIDENING OF MESENTERIC PEDICLE, CECOPEXY, APPENDECTOMY);  Surgeon: Sudeep Espinoza MD;  Location: [facility];  Service: General    TUBAL LIGATION       Current Outpatient Medications   Medication Sig Dispense Refill    ALPRAZolam (XANAX) 0.25 mg tablet Take 1 tablet (0.25 mg total) by mouth daily as  needed for anxiety 30 tablet 0    DULoxetine (CYMBALTA) 20 mg capsule 1 capsule daily for 2 weeks, then 1 capsule Monday Wednesday and Friday morning only for 2 weeks and stop 40 capsule 0    DULoxetine (CYMBALTA) 60 mg delayed release capsule Take 1 capsule by mouth twice daily 180 capsule 1    hydrOXYzine HCL (ATARAX) 25 mg tablet Take 1 tablet (25 mg total) by mouth every 6 (six) hours as needed for anxiety 30 tablet 1    lamoTRIgine (LaMICtal) 25 mg tablet 1.5 tabs BID.  May take 1 extra tab 1 week before her menstrual cycle, then stop her menstrual cycle begins. 120 tablet 2    multivitamin (THERAGRAN) TABS Take 1 tablet by mouth daily      Probiotic Product (PROBIOTIC-10 PO) Take by mouth 1 daily       No current facility-administered medications for this visit.     Allergies   Allergen Reactions    Biaxin [Clarithromycin] GI Intolerance and Other (See Comments)    Sulfa Antibiotics     Sulfasalazine     Venlafaxine GI Intolerance, Vomiting and Other (See Comments)     Category: Allergy;   Category: Allergy;      Review of Systems    Video Exam    There were no vitals filed for this visit.    Physical Exam    (D) Corinne attended her follow up psychotherapy session today. Corinne reported that since her last session, she has noticed ongoing symptoms. Corinne reported that Sunday 04/07/24 she felt like she was starting to get sick, describing having body aches, chills, nausea, fatigue, head congestion, post nasal drip, headache, brain fog, and coughing. Corinne reported that she didn't have a temperature. Corinne reported that when she took her tempeture, she was triggered, feeling immediately stressed out, overwhelmed, and anxious, fearing that she was getting sick, and described having an anxiety attack. Corinne reported that she started to experience anxiety, then became flushed, describing herself as sweating, feeling really hot, and had nausea. Corinne reported that she didn't have any chest palpitations  this time, like she typically does during anxiety attacks. Corinne reported that when she was triggered, and noticed the warning signs and triggers throughout her body, and implemented skills. Corinne reported that she sat on the floor of the bathroom, and had her  Fredy get her water, and bring her, her phone to distract herself, using skills to ground. Corinne described struggling with obsessive, intrusive, ruminating, and repetitive thoughts about getting sick, fearing that she will not feel well, and also struggling with the idea of not being able to go to work. Corinne reported that she ended up getting her menstrual cycle last Tuesday, for about (4-5) days. Corinne reported that the week leading up to this, she felt more anxious that usual, compared to last month. Corinne reported that she didn't needed need to use her PRN medication or increase in Lamictal, reporting that this month wasn't as bad, as the last month, reporting that it is worse every other month. Discussed and processed this. 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 Corinne reported that her business account is now negative $200.00, reporting that she had to pay the taxes she owed for the last year. 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 Corinne reported feeling stressed out and overwhelmed with paying these taxes, and just struggling financially with making ends meet. Discussed and processed this. Corinne described struggling with interpersonal relationship stressors at work, describing having issues with one particular staff member, describing an incident that occurred where her and another co-worker said hello to another co-worker, who didn't acknowledge them. Corinne describes having  a desire for this particular co-worker to leave, reporting that she feels that this co-worker is negative, and inconsistent. Corinne reported that when she has tried to address this employee in the past, it has been a challenge. Through processing, Corinne described transference between this colleague and having reminders of her mother, describing her as hot and cold, triggering anxiety and feeling like she never knows what to expect with her. Discussed and processed this. Corinne reported that last week she learned that she was denied for life insurance based upon her being diagnosed with anxiety and depression, per the letter saying that this was released by her OBGYN. Corinne described feeling triggered in relation to this, identifying it as being discriminatory. Corinne reported that they are scheduled to meet with the life insurance company this week, reporting that her  was approved, and that she was denied. Corinne and this writer processed this at length. Discussed ongoing skills. Reviewed limits and boundaries. Modeled effective forms of communication. Provided ongoing psychoeducation.     (A) Corinne describes struggling with fluctuating symptoms, reporting that she had one anxiety/ panic attack since her last session. Corinne describes anticipatory anxiety, and worrying too much about different things. Corinne also described feeling nervous, anxious, and on edge, and fearing that something awful might happen, yet denied this on her [employer] screenings. Corinne describes symptoms of irritability, poor frustration tolerance, and becoming easily annoyed. Corinne describes sleep disturbances. Corinne describes some obsessive, intrusive, ruminating, and repetitive thoughts. Corinne's mood presented as anxious, and slightly down, and her affect appeared to be congruent, and tearful at times. Corinne's speech presented at a normal rate, volume, and rhythm. Corinne presented as alert and oriented x3. Corinne  presented with good eye contact. Corinne denies any symptoms of frank. Corinne denies any evident or immediate risk factors for self-harm, SI, or HI.     (P) Corinne plans to sit down and journal, processing through thoughts, feelings, and emotions. Corinne plans to examine met needs, verses unmet needs, aiming to insight self-awareness. Corinne plans to use healthy and effective forms of communication to assert herself, advocate for herself, ask for what she needs, and target interpersonal relationship stressors. Corinne plans to reinforce limits and boundaries, challenging co-dependency, aiming to break the cycle, and implement healthy patterns, and behaviors. Corinne plans to meet herself with radical compassion, extending bogdan towards herself, identifying worth within herself, and giving herself credit. Corinne plans to implement grounding statements, positive self-talk, positive affirmations, and self-affirming statements. Corinne plans to intentionally separate Rational Mind, identifying ration, reason, logic, and facts, along with Emotional Mind, identifying thoughts, feelings, and emotions, striking balance between the both, implementing DBT [place] Mind. Corinne plans to examine pros and cons along with risks verses benefits in order to make informed decisions, giving herself permission to align choices and decisions with what is in the best interest of her, implementing radical acceptance. Corinne plans to target ongoing symptoms with continued skill use. Corinne plans to decrease judgement towards herself, and continue to seek out evidence to challenge negative thoughts, negative thinking traps, cognitive distortions, and harmful core beliefs with DBT Challenging Negative Thoughts Assessment Questions. Corinne plans to examine patterns, themes, and behaviors through the lens of relationship trauma, specifically with family, examining transference with her mother, and co-worker, to further explore next  session. Corinne plans to reach out for additional support as needed.     04/15/24  Start Time: 0850  Stop Time: 0940  Total Visit Time: 50 minutes

## 2024-04-29 ENCOUNTER — TELEMEDICINE (OUTPATIENT)
Dept: BEHAVIORAL/MENTAL HEALTH CLINIC | Facility: CLINIC | Age: 44
End: 2024-04-29
Payer: COMMERCIAL

## 2024-04-29 DIAGNOSIS — F33.2 SEVERE EPISODE OF RECURRENT MAJOR DEPRESSIVE DISORDER, WITHOUT PSYCHOTIC FEATURES (HCC): Primary | ICD-10-CM

## 2024-04-29 DIAGNOSIS — F41.1 GAD (GENERALIZED ANXIETY DISORDER): ICD-10-CM

## 2024-04-29 PROCEDURE — 90837 PSYTX W PT 60 MINUTES: CPT | Performed by: SOCIAL WORKER

## 2024-04-29 NOTE — PSYCH
Psychotherapy Provided: Individual Psychotherapy 55 minutes.     Length of time in session: 55 minutes.     Current suicide risk : Low .     Behavioral Health Treatment Plan [facility]: Diagnosis and Treatment Plan explained to Corinne, Corinne relates understanding diagnosis and is agreeable to Treatment Plan. Yes     Visit start and stop times:    04/29/24  Start Time: 1100  Stop Time: 1155  Total Visit Time: 55 minutes    Virtual Regular Visit    Verification of patient location: [place], [place].     Patient is located at Home in the following state in which I hold an active license [place].     Assessment/Plan:    Problem List Items Addressed This Visit          Behavioral Health    NAVI (generalized anxiety disorder)    Severe episode of recurrent major depressive disorder, without psychotic features (HCC) - Primary     Goals addressed in session: Goal 1 Follow up psychotherapy session.     Reason for visit is   Chief Complaint   Patient presents with    Virtual Regular Visit     Encounter provider Veronica Sandoval    Recent Visits  No visits were found meeting these conditions.  Showing recent visits within past 7 days and meeting all other requirements  Today's Visits  Date Type Provider Dept   04/29/24 Telemedicine Veronica Sandoval  [facility]   Showing today's visits and meeting all other requirements  Future Appointments  No visits were found meeting these conditions.  Showing future appointments within next 150 days and meeting all other requirements     The patient was identified by name and date of birth. Corinne S Kulp was informed that this is a telemedicine visit and that the visit is being conducted through the [employer] platform. She agrees to proceed.  My office door was closed. No one else was in the room.  She acknowledged consent and understanding of privacy and security of the video platform. The patient has agreed to participate and understands they can discontinue the visit at  any time.    Patient is aware this is a billable service.     Subjective Corinne S Kulp is a 44 y.o. female.    HPI     Past Medical History:   Diagnosis Date    Anxiety     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HCC)      Past Surgical History:   Procedure Laterality Date    APPENDECTOMY      extensive lysis of adhesions, MAYURI's procedure (divisions of MAYURI's bands) detorsion of vulvulus, widening of mesenteric pedicle, cecopeexy, appendectomy       BACK SURGERY Right 2013    SF: L4-L5 hemilaminectomy for discectomy. Use of the microscope for microdissection, Use og 40mg of depo-medrol though thee exiting right L5 nerve root. Onset:13     SECTION      x2    CHOLECYSTECTOMY      FL INJECTION RIGHT SHOULDER (ARTHROGRAM)  2022    GALLBLADDER SURGERY      GV, Onset:     LAPAROSCOPIC LYSIS INTESTINAL ADHESIONS      onset: 15    LUMBAR DISC SURGERY      NV HYSTEROSCOPY BX ENDOMETRIUM&/POLYPC W/WO D&C N/A 2024    Procedure: DILATATION AND CURETTAGE (D&C) WITH HYSTEROSCOPY, POLYPECTOMY;  Surgeon: Griffin Arriaga MD;  Location: [facility];  Service: Gynecology    NV HYSTEROSCOPY ENDOMETRIAL ABLATION N/A 2024    Procedure: ABLATION ENDOMETRIAL VIPUL;  Surgeon: Griffin Arriaga MD;  Location: [facility];  Service: Gynecology    NV LAPS ABD PRTM&OMENTUM DX W/WO SPEC BR/WA SPX N/A 2016    Procedure: LAPAROSCOPY DIAGNOSTIC, EXTENSIVE LYSIS OF ADHESIONS, MAYURI'S PROCEDURE(DIVISION OF MAYURI'S BANDS,DETORSION OF VOLVULUS, WIDENING OF MESENTERIC PEDICLE, CECOPEXY, APPENDECTOMY);  Surgeon: Sudeep Espinoza MD;  Location: [facility];  Service: General    TUBAL LIGATION       Current Outpatient Medications   Medication Sig Dispense Refill    ALPRAZolam (XANAX) 0.25 mg tablet Take 1 tablet (0.25 mg total) by mouth daily as needed for anxiety 30 tablet 0    DULoxetine (CYMBALTA) 20 mg capsule 1 capsule daily for 2 weeks, then 1 capsule  and Friday morning only for 2 weeks and stop 40 capsule 0     DULoxetine (CYMBALTA) 60 mg delayed release capsule Take 1 capsule by mouth twice daily 180 capsule 1    hydrOXYzine HCL (ATARAX) 25 mg tablet Take 1 tablet (25 mg total) by mouth every 6 (six) hours as needed for anxiety 30 tablet 1    lamoTRIgine (LaMICtal) 25 mg tablet 1.5 tabs BID.  May take 1 extra tab 1 week before her menstrual cycle, then stop her menstrual cycle begins. 120 tablet 2    multivitamin (THERAGRAN) TABS Take 1 tablet by mouth daily      Probiotic Product (PROBIOTIC-10 PO) Take by mouth 1 daily       No current facility-administered medications for this visit.     Allergies   Allergen Reactions    Biaxin [Clarithromycin] GI Intolerance and Other (See Comments)    Sulfa Antibiotics     Sulfasalazine     Venlafaxine GI Intolerance, Vomiting and Other (See Comments)     Category: Allergy;   Category: Allergy;      Review of Systems    Video Exam    There were no vitals filed for this visit.    Physical Exam    (D) Corinne attended her follow up psychotherapy session today. Corinne reported that since her last session, she has noticed ongoing symptoms. Corinne describes ongoing stressors and triggers in relation to life insurance, reporting that the current plans that her and her  have are a little over $200,000, and they only pay around $40 a month for this. Corinne reported that they were looking for new insurance, to increase their policy limits, in order to cover their house, assets, and finances if something were to happen to her or her . Corinne reported that she continues to be denied at the new life insurance company they tried to go with, reporting that her  was approved, yet the policy is $200.00 a month- reporting that they cannot afford it. Corinne reported that after her last session she went back and forth with the , on her denial for mental health, reporting that she looked into this further. Corinne reported that to her knowledge, none of her  medical providers even released her medical records, and believes that she was denied based upon her own disclosure of mental health. Corinne describes feeling triggered in relation to this, describing feeling discriminated against. Corinne reported that she feels like the life insurance policy place was being dishonest, and reported that she started to look into it, confirmed with her medical providers that they didn't release her medical records like the life insurance policy company said. Corinne reported that she was supposed to follow up with medical records to additionally confirm this, and said that she didn't follow through with this, describing feeling stressed out, overwhelmed, and triggered in relation to this. Discussed and processed this. Corinne reported that when it comes to her 's side business, he has been getting more offers for side work. Corinne reported that the more side work her  gets, the more consideration he has about leaving his full-time job, triggering anxiety in Corinne. Corinne describes wanting her  to do his own business, yet wants to guarantee the jobs, and income is steady, and secure.  Corinne describes observing her  as stressed out working his full time job, and then using his off time nights and weekends to do side jobs, describing her  as never being home. Corinne describes worrying about her , and discussed stressors and triggers in relation to this. Discussed and processed this. Corinne reported that this weekend, her and her  went down to their beach cam[place], reporting that she worked on the entire inside while her  worked on the entire outside, preparing to list and sell the place. Corinne describes feeling conflicted with this, describing an increase in inflation and finances, struggling to afford it, yet also recognizing that with her kids schedules, they may not be able to get down there as much as what they would  want to. Corinne discussed stressors with their camper, with the new policies and procedures. Corinne reported that she has their lot rent paid for the season already, and reported that the organization alluded to the fact that when they sell, they will be charging the lot rent to the new owners again, despite having them paid throughout the season. Corinne describes feeling frustrated and triggered with them continuing to increase the prices, describing feeling triggered with trying to sell the camper on their own, describing barriers with the association. Corinne reported that they got the camper (6) years ago, describing the cost as being around $5,600 a season for the lot rent, and now it's up to $8,600. Corinne reported that if they sell it, it will pay off their camper, and the home equity loan, still having to pay a portion of that and the furniture. Corinne describes feeling stressed out and overwhelmed with finances, describing herself as constantly feeling like she is just barley making ends meet financially at home, and at work. Corinne described when her father was living with her, his finances contributed to their lifestyle, and identified that she feels like the finances he was contributing although were helpful, and were not worth her mental health. Corinne describes having a desire to consult with a , and/or an , to explore options with her and her  potentially both being small business owners. Corinne describes feeling stressed out and overwhelmed with being busy, reporting her oldest son is turning 19 years old this week, is going to prom, preparing to go to [employer] for college, her daughter Randi's confirmation, schooling, sports, while navigating their home, her small business, her  working full-time and his side business. Discussed and processed this. Corinne reported that when her and her  went down to the [facility] last  weekend, she was triggered fearing that something awful will happen to her and her  while driving, giving an example of fearing that her and her  will be killed in a car accident, leaving the children parentless. Corinne reported that she used grounding statements to assist in this. Corinne reported that her and her  Fredy's wedding anniversary was last Wednesday, celebrating (20) years of marriage, reporting that her  got her a card, and described feeling disappointed in relation to this. Corinne describes desiring her  to be more intentional, thoughtful, and considerate, describing having different expectations, and then feeling let down. Corinne describes desiring more emotional intimacy from her , desiring words of affirmation, quality time, and acts of service. Corinne and this writer processed this. Discussed limits and boundaries. Reviewed ongoing skills. Provided ongoing psychoeducation. Modeled effective forms of communication.     (A) Corinne denies any symptoms of frank. Corinne's speech presented at a normal rate, volume, and rhythm. Corinne presented as alert and oriented x3. Corinne presented with good eye contact. Corinne denies any evident or immediate risk factors for self-harm, SI, or HI. Corinne's mood presented as irritable, anxious, and slightly down, and her affect appeared to be congruent. Corinne describes symptoms of irritability, poor frustration tolerance, and becoming easily annoyed. Corinne describes fearing that something awful might happen. Corinne describes anticipatory anxiety, feeling nervous, anxious, and on edge, worrying, along with obsessive, intrusive, ruminating, and repetitive thoughts- denying this on her [employer] screening. Corinne describes feeling tired and having little energy. Corinne describes symptoms of grief in relation to symbolic loss.     (P) Corinne plans to make a list of things that need to be done, and prioritize them by  number. Corinne plans to schedule them out with measurable milestones, and goals, to decrease feelings of being overwhelmed, and stressed out. Corinne plans to use DBT Opposite Action Skills to challenge herself to follow through with this. Corinne plans to decrease judgement towards herself, and continue to seek out evidence with the support of DBT Wise Mind, and CBT Challenging Negative Thoughts Assessment Questions to challenge negative thoughts, negative thinking traps, cognitive distortions, and harmful core beliefs. Corinne plans to be intentional about implementing positive self-talk, positive affirmations, self-affirming statements, and grounding statements, meeting herself with radical compassion, while extending bogdan towards herself. Corinne plans to examine patterns, themes, and behaviors through the lens of trauma, and co-dependency, identifying, associating, honoring, validating, and making space for her feelings, and emotions. Corinne plans to pay attention to her body, giving herself permission to increase self-awareness in relation to warning signs and triggers. Corinne plans to implement deep breathing techniques, mindfulness/ meditation techniques, self-soothing techniques, sensory related skills, deep breathing techniques, mindfulness/ meditation techniques, coping skills, and grounding techniques to target ongoing symptoms. Corinne plans to examine pros and cons, along with risks verses benefits in order to make informed decisions, giving herself permission to align choices and decisions with what is in the best interest of her, implementing Radical Acceptance. Corinne plans to reach out for additional support as needed.     04/29/24  Start Time: 1100  Stop Time: 1155  Total Visit Time: 55 minutes

## 2024-05-13 ENCOUNTER — SOCIAL WORK (OUTPATIENT)
Dept: BEHAVIORAL/MENTAL HEALTH CLINIC | Facility: CLINIC | Age: 44
End: 2024-05-13
Payer: COMMERCIAL

## 2024-05-13 ENCOUNTER — TELEMEDICINE (OUTPATIENT)
Dept: PSYCHIATRY | Facility: CLINIC | Age: 44
End: 2024-05-13
Payer: COMMERCIAL

## 2024-05-13 DIAGNOSIS — F33.2 SEVERE EPISODE OF RECURRENT MAJOR DEPRESSIVE DISORDER, WITHOUT PSYCHOTIC FEATURES (HCC): Primary | ICD-10-CM

## 2024-05-13 DIAGNOSIS — F41.1 GAD (GENERALIZED ANXIETY DISORDER): ICD-10-CM

## 2024-05-13 DIAGNOSIS — F39 MOOD DISORDER (HCC): ICD-10-CM

## 2024-05-13 PROCEDURE — 99442 PR PHYS/QHP TELEPHONE EVALUATION 11-20 MIN: CPT | Performed by: NURSE PRACTITIONER

## 2024-05-13 PROCEDURE — 90834 PSYTX W PT 45 MINUTES: CPT | Performed by: SOCIAL WORKER

## 2024-05-13 RX ORDER — LAMOTRIGINE 25 MG/1
TABLET ORAL
Start: 2024-05-13

## 2024-05-13 NOTE — PSYCH
"Virtual Brief Visit    This Visit is being completed by telephone. The Patient is located at Home and in the following state in which I hold an active license [place]    The patient was identified by name and date of birth. Corinne S Kulp was informed that this is a telemedicine visit and that the visit is being conducted through the [employer] platform. She agrees to proceed..  My office door was closed. No one else was in the room.  She acknowledged consent and understanding of privacy and security of the video platform. The patient has agreed to participate and understands they can discontinue the visit at any time.    Patient is aware this is a billable service.       Assessment/Plan:  Attempted to connect via video the patient had difficulty logging onto my chart.  On examination today, the patient reports that this is \"the best combination of medication to help keep her symptoms under control\" she continues in therapy which is very helpful.  She is sleeping well and eating well.  She reports \"normal, everyday stress with work and home.\"  He has not tried the Lamictal 1 week prior to her menstrual cycle but she says it is now, she will definitely give it a try since she does become more emotional during that time.  Otherwise, all is going well and we will follow up with each other in September 2024.    Safety plan: The patient is aware of the 24-hour on-call service offered by [facility].  The patient is also aware of the 24-hour crisis call service offered by the [facility].  The patient agrees to contact 911 and go directly to the emergency room if ever in the event she experiences any suicidal ideation.        Problem List Items Addressed This Visit    None  Visit Diagnoses       Mood disorder (HCC)        Relevant Medications    lamoTRIgine (LaMICtal) 25 mg tablet            Recent Visits  No visits were found meeting these conditions.  Showing recent visits within past 7 days and meeting all other " requirements  Today's Visits  Date Type Provider Dept   05/13/24 Telemedicine [facility]   Showing today's visits and meeting all other requirements  Future Appointments  No visits were found meeting these conditions.  Showing future appointments within next 150 days and meeting all other requirements         Visit Time  Total Visit Duration: The visit started at 10:30 AM and ended at 10:45 AM.  Time was spent reviewing the treatment plan, medications and completing progress note.

## 2024-05-13 NOTE — PSYCH
"Psychotherapy Provided: Individual Psychotherapy 57 minutes.     Length of time in session: 57 minutes.     Goals addressed in session: Goal 1 Follow up psychotherapy session.     Current suicide risk : Low     Behavioral Health Treatment Plan [facility]: Diagnosis and Treatment Plan explained to Corinne, Corinne relates understanding diagnosis and is agreeable to Treatment Plan. Yes     Visit start and stop times:    05/13/24  Start Time: 0901  Stop Time: 0958  Total Visit Time: 57 minutes    (D) Corinne attended her follow up psychotherapy session today. Corinne reported that since her last session, she has noticed ongoing symptoms. Corinne reported that last week was the week leading up to her menstrual cycle, where she notices elevated symptoms, intensifying the irritability, poor frustration tolerance, emotional dysregulation and depressive thoughts, reporting that right now she is a day late on her cycle. Corinne reported that despite noticing the increased symptoms, she felt that it wasn't as intense as two months ago, and reported that she didn't take any additional Lamictal like her psychiatry provider recommended for PMDD. Corinne reported that she was stressed last week, reporting that she took off last Friday and Saturday with plans to go to their beach camper for an extended weekend. Corinne reported that multiple plans changed with her daughter Randi, and her son John's work schedule, resulting in them deciding not to go down, and stayed home for those days, and the weekend. Corinne reported that the wether wasn't good, impacting their plans. Corinne described struggling with feeling disappointed, and struggled with feeling, \"bored,\" almost describing grief in relation to symbolic loss. Corinne reported that her and her  Fabian spent the day together Friday, went to a few stores, grabbed lunch, and then Saturday she went to her niece's softball game, and went to dinner with her brother's family for " Mother's Day. Corinne reported that Sunday her daughter had another confirmation class, and a soccer game, describing herself as doing things, yet not doing the things that she visioned and wanted to do. Corinne described grief in relation to symbolic loss. 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 Corinne reported that she was triggered by her daughter Randi on Saturday when she tried to put her arm around Randi, and her Randi was disgruntled by this, pushing Corinne off her, triggering her. Corinne described feeling triggered with it being Mother's Day, reporting that her daughter Randi never acknowledged Mother's Day, and her son John got her kaur. Corinne reported that her  Fredy didn't get her a card, and said he doesn't get her flowers because they just die, not feeling seen, heard, considered. Discussed and processed this. 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 Corinne describes feeling stressed out and overwhelmed in relation to this. 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 Corinne describes patterns, themes, and behaviors of not feeling valued, appreciated, considered, prioritized, or thought of when it comes to her relationship with her , her children, and people at work. Corinne describes feeling disappointed, and left down. Corinne reported that they haven't had any offers on their beach camper to sell, triggering  feelings of being stressed out and overwhelmed with finances, and grief in relation to symbolic loss with giving up their place at the beach. Corinne discussed her son John graduating from high school in June, and preparing to leave for college at [employer] in August. Corinne described anticipatory anxiety and feeling nervous for her son John who is having anxiety in relation to this. Corinne describes grief in relation to her son John graduating, and moving forward in life. Corinne describes feeling overwhelmed with staying on top of the e-mails, and the finances with school. Discussed and processed this at length. Corinne describes struggling with being physically affectionate, even with her children, describing her upbringing with her mother and fearing that she repeated the same cycle as her mother with not being affectionate. Corinne described symptoms of grief triggers with Mother's Day, missing her mother, and her grandparents. Corinne and this writer processed this at length. Discussed ongoing skills. Provided ongoing psychoeducation. Modeled effective forms of communication. Reviewed limits and boundaries.     (A) Corinne's speech presented at a normal rate, volume, and rhythm. Corinne presented as alert and oriented x3. Corinne presented with good eye contact. Corinne denies any symptoms of frank. Corinne denies any evident or immediate risk factors for self-harm, SI, or HI. Corinne's mood presented as anxious, and depressed, and her affect appeared to be congruent. Corinne describes grief in relation to symbolic loss, describing little interest and pleasure in doing things, reporting lower moods of sadness, and depression. Corinne describes symptoms of anticipatory anxiety, and worrying too much about different things. Corinne describes fatigue, and feeling tired and having little energy. Corinne describes feeling stressed out and overwhelmed, describing obsessive, intrusive, ruminating, and  repetitive thoughts.     (P) Corinne plans to meet with her psychiatry provider later today, virtually, and plan to discuss her symptom presentation, in comparison to pharmacology management. Corinne plans to honor her feelings, and emotions, identifying, and associating them, and holding space for them without judgement. Corinne plan to journal to further self-reflect, examining patterns, themes, and behaviors through the lens of trauma, and co-dependency, aiming to increase self-awareness in relation to this. Corinne plans to intentionally meet herself with radical compassion, extending bogdan towards herself, identifying worth within herself, and giving herself credit. Corinne plans to implement positive self-talk, positive affirmations, and self-affirming statements. Corinne plans to decrease judgement towards herself, and continue to use DBT Wise Mind, and CBT Challenging Negative Thoughts Assessment Questions, to seek out evidence to challenge negative thoughts, negative thinking traps, cognitive distortions, and harmful core beliefs. Corinne plans to examine pros and cons, along with risks verses benefits in order to make informed decisions, giving herself permission to algin choices and decisions with what is in the best interest of her, implementing Radical Acceptance. Corinne plans to pay attention to her body, increasing self-awareness in relation to warning signs and triggers, intentionally implementing coping skills, distraction techniques, distress tolerance skills, grounding techniques, self-soothing techniques, sensory related skills, deep breathing techniques, and mindfulness/ meditation techniques to target ongoing symptoms. Corinne plans to structure her schedule with balance, routine, and consistency. Corinne plans to intentionally lean into healthy natural supports, engage in the use of self-care, take time for herself, be present in the moment, and prioritize her mental health and physical  health needs. Corinne plans to implement the concept of DBT Dialects, specifically the both/ and concept, allowing for two opposing thoughts, feelings, and emotions to both co-exist and be true at the same time. Corinne plans to reach out for additional support as needed.     05/13/24  Start Time: 0901  Stop Time: 0958  Total Visit Time: 57 minutes

## 2024-05-26 DIAGNOSIS — F32.2 CURRENT SEVERE EPISODE OF MAJOR DEPRESSIVE DISORDER WITHOUT PSYCHOTIC FEATURES WITHOUT PRIOR EPISODE (HCC): ICD-10-CM

## 2024-05-26 DIAGNOSIS — F39 MOOD DISORDER (HCC): ICD-10-CM

## 2024-05-27 RX ORDER — DULOXETIN HYDROCHLORIDE 60 MG/1
60 CAPSULE, DELAYED RELEASE ORAL 2 TIMES DAILY
Qty: 60 CAPSULE | Refills: 5 | Status: SHIPPED | OUTPATIENT
Start: 2024-05-27

## 2024-05-28 RX ORDER — LAMOTRIGINE 25 MG/1
TABLET ORAL
Qty: 60 TABLET | Refills: 0 | Status: SHIPPED | OUTPATIENT
Start: 2024-05-28

## 2024-06-03 ENCOUNTER — TELEMEDICINE (OUTPATIENT)
Dept: BEHAVIORAL/MENTAL HEALTH CLINIC | Facility: CLINIC | Age: 44
End: 2024-06-03
Payer: COMMERCIAL

## 2024-06-03 DIAGNOSIS — F41.1 GAD (GENERALIZED ANXIETY DISORDER): Primary | ICD-10-CM

## 2024-06-03 DIAGNOSIS — F33.0 MILD EPISODE OF RECURRENT MAJOR DEPRESSIVE DISORDER (HCC): ICD-10-CM

## 2024-06-03 PROCEDURE — 90834 PSYTX W PT 45 MINUTES: CPT | Performed by: SOCIAL WORKER
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
## 2024-06-24 ENCOUNTER — TELEMEDICINE (OUTPATIENT)
Dept: BEHAVIORAL/MENTAL HEALTH CLINIC | Facility: CLINIC | Age: 44
End: 2024-06-24
Payer: COMMERCIAL

## 2024-06-24 DIAGNOSIS — F33.0 MILD EPISODE OF RECURRENT MAJOR DEPRESSIVE DISORDER (HCC): ICD-10-CM

## 2024-06-24 DIAGNOSIS — F41.1 GAD (GENERALIZED ANXIETY DISORDER): Primary | ICD-10-CM

## 2024-06-24 PROCEDURE — 90837 PSYTX W PT 60 MINUTES: CPT | Performed by: SOCIAL WORKER

## 2024-06-24 NOTE — PSYCH
Psychotherapy Provided: Individual Psychotherapy 55 minutes.     Length of time in session: 55 minutes.     Current suicide risk : Low .     Behavioral Health Treatment Plan [facility]: Diagnosis and Treatment Plan explained to Corinne, Corinne relates understanding diagnosis and is agreeable to Treatment Plan. Yes     Visit start and stop times:    06/24/24  Start Time: 1100  Stop Time: 1155  Total Visit Time: 55 minutes    Virtual Regular Visit    Verification of patient location: [place].     Patient is located at Home in the following state in which I hold an active license [place].     Assessment/Plan:    Problem List Items Addressed This Visit          Behavioral Health    Depression, major, recurrent (HCC)    NAVI (generalized anxiety disorder) - Primary     Goals addressed in session: Goal 1 Follow up psychotherapy session.     Reason for visit is   Chief Complaint   Patient presents with    Virtual Regular Visit     Encounter provider Veronica Sandoval    Recent Visits  No visits were found meeting these conditions.  Showing recent visits within past 7 days and meeting all other requirements  Today's Visits  Date Type Provider Dept   06/24/24 Telemedicine Veronica Sandoval  [facility]   Showing today's visits and meeting all other requirements  Future Appointments  No visits were found meeting these conditions.  Showing future appointments within next 150 days and meeting all other requirements     The patient was identified by name and date of birth. Corinne S Kulp was informed that this is a telemedicine visit and that the visit is being conducted through the [employer] platform. She agrees to proceed.  My office door was closed. No one else was in the room.  She acknowledged consent and understanding of privacy and security of the video platform. The patient has agreed to participate and understands they can discontinue the visit at any time.    Patient is aware this is a billable  service.     Subjective  Corinne S Kulp is a 44 y.o. female.    HPI     Past Medical History:   Diagnosis Date    Anxiety     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HCC)      Past Surgical History:   Procedure Laterality Date    APPENDECTOMY      extensive lysis of adhesions, MAYURI's procedure (divisions of MAYURI's bands) detorsion of vulvulus, widening of mesenteric pedicle, cecopeexy, appendectomy       BACK SURGERY Right 2013    SF: L4-L5 hemilaminectomy for discectomy. Use of the microscope for microdissection, Use og 40mg of depo-medrol though thee exiting right L5 nerve root. Onset:13     SECTION      x2    CHOLECYSTECTOMY      FL INJECTION RIGHT SHOULDER (ARTHROGRAM)  2022    GALLBLADDER SURGERY      [facility], Onset:     LAPAROSCOPIC LYSIS INTESTINAL ADHESIONS      onset: 15    LUMBAR DISC SURGERY      KS HYSTEROSCOPY BX ENDOMETRIUM&/POLYPC W/WO D&C N/A 2024    Procedure: DILATATION AND CURETTAGE (D&C) WITH HYSTEROSCOPY, POLYPECTOMY;  Surgeon: Griffin Arriaga MD;  Location: [facility];  Service: Gynecology    KS HYSTEROSCOPY ENDOMETRIAL ABLATION N/A 2024    Procedure: ABLATION ENDOMETRIAL VIPUL;  Surgeon: Griffin Arriaga MD;  Location: [facility];  Service: Gynecology    KS LAPS ABD PRTM&OMENTUM DX W/WO SPEC BR/WA SPX N/A 2016    Procedure: LAPAROSCOPY DIAGNOSTIC, EXTENSIVE LYSIS OF ADHESIONS, MAYURI'S PROCEDURE(DIVISION OF MAYURI'S BANDS,DETORSION OF VOLVULUS, WIDENING OF MESENTERIC PEDICLE, CECOPEXY, APPENDECTOMY);  Surgeon: Sudeep Espinoza MD;  Location: [facility];  Service: General    TUBAL LIGATION       Current Outpatient Medications   Medication Sig Dispense Refill    ALPRAZolam (XANAX) 0.25 mg tablet Take 1 tablet (0.25 mg total) by mouth daily as needed for anxiety 30 tablet 0    DULoxetine (CYMBALTA) 60 mg delayed release capsule Take 1 capsule by mouth twice daily 60 capsule 5    hydrOXYzine HCL (ATARAX) 25 mg tablet Take 1 tablet (25 mg total) by mouth every 6 (six) hours as  "needed for anxiety 30 tablet 1    lamoTRIgine (LaMICtal) 25 mg tablet TAKE 2 TABLETS BY MOUTH DAILY 60 tablet 0    multivitamin (THERAGRAN) TABS Take 1 tablet by mouth daily      Probiotic Product (PROBIOTIC-10 PO) Take by mouth 1 daily       No current facility-administered medications for this visit.     Allergies   Allergen Reactions    Biaxin [Clarithromycin] GI Intolerance and Other (See Comments)    Sulfa Antibiotics     Sulfasalazine     Venlafaxine GI Intolerance, Vomiting and Other (See Comments)     Category: Allergy;   Category: Allergy;      Review of Systems    Video Exam    There were no vitals filed for this visit.    Physical Exam    (D) Corinne attended her follow up psychotherapy session today. Corinne reported that since her last session, she has noticed ongoing symptoms. Corinne reported that her son John graduated from High School earlier in the month. Corinne described symptoms of grief in relation to symbolic loss with John getting older, graduating from high school, and getting ready to go away to college at [employer] [employer]. Corinne described herself as not externally getting, \"too emotional like some other parents,\" and described herself as internally experiencing this. Corinne reported that her daughter Randi will be moving up to the high school, planning to start high school in the fall. Corinne reported that Randi has Summer high school soccer now, and described some stressors and triggers with scheduling, and navigating this. Corinne discussed some anticipatory anxiety in relation to John going off to college, navigating John's independence, finances with student loans, picking meal plans, him living on campus, etc. Corinne discussed challenges with navigating these changes. Discussed and processed this. Corinne reported that they had John's graduation party on Father's Day, reporting that overall the party went well. Discussed and processed this. Corinne discussed stressors and " triggers with John and his girlfriend, describing interpersonal relationship conflict. Corinne reported that his son's girlfriend is going on vacation with them this coming week, and described some anticipatory anxiety in relation to this. Discussed and processed this. Corinne reported that in relation to their [place] [facility] they are in the process of selling it. Corinne reported that they asked $70,000 for the camper, and reported that they have a  that is interested. Corinne reported that she went down to the camper to give this couple that is interested in buying the camper a tour. Corinne reported that the couple wants to buy the camper, and also wanted to be in by the end of the month. Corinne reported that they also offered a lower price at $65,000, in addition to wanting to be in by the end of the month. Corinne reported that they have their own family vacation scheduled for the first week in July, at the [facility]. Corinne described anticipatory anxiety in relation to decision-making, wanting to sell it, yet wanting to have their week long family vacation as planned, and also wanting to get $70,000 for the camper to pay off the camper itself, the loan for the addition they put on, along with their home equity loan line of credit they took out on their home itself. Corinne reported that she had to go down to the camper itself, and pack up their stuff. Corinne reported that she found a cabin to rent for the week of their vacation, putting out more money. Corinne reported that with selling the camper for $65,000, she will be able to pay off the camper, and the loan for the addition to put on, yet not enough to pay off their home equity line of credit. Corinne reported that she wanted to sell the camper, and feared that if she didn't take the offer, it would delay selling the camper, fearing that they would be stuck with the camper. Corinne described feeling stressed out and overwhelmed with this  process. Corinne reported that she has mixed feelings with selling the camper, having relief with not having to worrying about the camper [place] the beach with finances, and the pressure to get down to the beach with their kids schedules. Corinne reported that they had many good years there, and processed through the grief in relation to symbolic loss. Corinne describes feeling hopeful that they will find something else in the future. Discussed and processed this. Corinne reported that she had another week on in July with plans to go to the camper, and [place]ne reported that they are going on a family vacation on a cruise in  with extended family as well, and described some anticipatory anxiety in relation to this. Discussed and processed this. Corinne discussed financial stressors, reporting that she got rid of cable and is using [employer], paid off all their cell phones, and once the camper sells she will pay off more debt. Corinne discussed stressors surrounding finances, and describing anticipatory anxiety in relation to this. Discussed and processed this. Corinne described anticipatory anxiety in relation to the sale of the camper going through, reporting that she will feel less anxious once the check is in hand. Corinne describes obsessive, intrusive, ruminating, and repetitive thoughts surrounding anticipatory anxiety, and decision-making. Discussed and processed this. 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 Corinne describes stressors in relation to finances with taxes, and processed through this. Corinne discussed stressors in relation to navigating personal issues at work with staff. Discussed and processed. Corinne reported that she hasn't spoken to her employee who's  recently  by suicide, describing some anxiety in relation to  this. Corinne and this writer processed this at length. Discussed ongoing skills. Reviewed limits and boundaries. Provided ongoing psychoeducation. Modeled effective forms of communication.     (A) Corinne presented as alert and oriented x3. Corinne presented with good eye contact. Corinne's speech presented at a normal rate, volume, and rhythm. Corinne denies any symptoms of frank. Corinne denies any evident or immediate risk factors for self-harm, SI, or HI. Corinne's mood presented as anxious and depressed, and her affect appeared to be congruent, and tearful at times. Corinne describes symptoms of grief in relation to symbolic loss, describing  fatigue, and feeling tired and having little energy. Corinne describes trouble relaxing. Corinne describes anticipatory anxiety, feeling nervous, anxious, and on edge, and worrying. Roxanna describes symptoms of irritability, poor frustration tolerance, and becoming easily annoyed. Corinne describes some intrusive, ruminating, and repetitive thoughts.     (P) Corinne plans to work on targeting increased symptoms of anticipatory anxiety with DBT Radical Acceptance, focusing on what she has control over, examining pros and cons, along with risks verses benefits, in order to make informed decisions, giving herself permission to align choices and decisions with what is in the best interest of her. Corinne plans to implement grounding statements, positive self-talk, positive affirmations, self-affirming statements, and grounding statements. Corinne plans to meet herself with radical compassion, extending bogdan towards herself, identifying worth, and giving herself credit. Corinne plans to decrease judgement towards herself, and continue to utilize CBT Challenging Negative Thoughts Assessment Questions, DBT Wise Mind, and CBT Cognitive Restructuring to seek out evidence to challenge negative thoughts, negative thinking traps, cognitive distortions, and harmful core beliefs.  Corinne plans to lean into healthy natural supports, engage in the use of self-care, take time for herself, be present in the moment, and prioritize her mental health and physical health needs. Corinne plans to structure her schedule with balance, routine, and consistency. Corinne plans to target fluctuating symptoms with self-soothing techniques, sensory related skills, distraction techniques, distress tolerance skills, coping skills, deep breathing techniques, mindfulness/ meditation techniques, and grounding techniques to target ongoing symptoms. Corinne plans to use healthy and effective forms of communication to assert herself, advocate for herself, ask for what she needs, and reinforce limits and boundaries, challenging co-dependency, aiming to break the cycle implementing healthy patterns, and behaviors. Corinne plans to implement DBT Dialects, specifically the both/ and concept, allowing for two opposing thoughts, feelings, and emotions to both co-exist and be true at the same time. Corinne plans to identify, associate, honor, validate, and make space for her feelings, and emotions, examining patterns, themes, and behaviors through the lens of co-dependency, journaling outside of session to further explore and self-reflect. Corinne plans to reach out for additional support as needed.     06/24/24  Start Time: 1100  Stop Time: 1155  Total Visit Time: 55 minutes

## 2024-07-11 DIAGNOSIS — Z00.6 ENCOUNTER FOR EXAMINATION FOR NORMAL COMPARISON OR CONTROL IN CLINICAL RESEARCH PROGRAM: ICD-10-CM

## 2024-07-15 ENCOUNTER — TELEMEDICINE (OUTPATIENT)
Dept: BEHAVIORAL/MENTAL HEALTH CLINIC | Facility: CLINIC | Age: 44
End: 2024-07-15
Payer: COMMERCIAL

## 2024-07-15 ENCOUNTER — APPOINTMENT (OUTPATIENT)
Dept: LAB | Facility: CLINIC | Age: 44
End: 2024-07-15

## 2024-07-15 DIAGNOSIS — Z00.6 ENCOUNTER FOR EXAMINATION FOR NORMAL COMPARISON OR CONTROL IN CLINICAL RESEARCH PROGRAM: ICD-10-CM

## 2024-07-15 DIAGNOSIS — F33.2 SEVERE EPISODE OF RECURRENT MAJOR DEPRESSIVE DISORDER, WITHOUT PSYCHOTIC FEATURES (HCC): Primary | ICD-10-CM

## 2024-07-15 DIAGNOSIS — F41.1 GAD (GENERALIZED ANXIETY DISORDER): ICD-10-CM

## 2024-07-15 PROCEDURE — 90834 PSYTX W PT 45 MINUTES: CPT | Performed by: SOCIAL WORKER

## 2024-07-15 PROCEDURE — 36415 COLL VENOUS BLD VENIPUNCTURE: CPT

## 2024-07-15 NOTE — PSYCH
Psychotherapy Provided: Individual Psychotherapy 50 minutes.     Length of time in session: 50 minutes.     Current suicide risk : Low     Behavioral Health Treatment Plan [facility]: Diagnosis and Treatment Plan explained to Corinne, Corinne relates understanding diagnosis and is agreeable to Treatment Plan. Yes     Visit start and stop times:    07/15/24       Virtual Regular Visit    Verification of patient location: [place].     Patient is located at Home in the following state in which I hold an active license [place].     Assessment/Plan:    Problem List Items Addressed This Visit          Behavioral Health    NAVI (generalized anxiety disorder)    Severe episode of recurrent major depressive disorder, without psychotic features (HCC) - Primary     Goals addressed in session: Goal 1 Follow up psychotherapy session.     Reason for visit is   Chief Complaint   Patient presents with    Virtual Regular Visit     Encounter provider Veronica Sandoval    Recent Visits  No visits were found meeting these conditions.  Showing recent visits within past 7 days and meeting all other requirements  Today's Visits  Date Type Provider Dept   07/15/24 Telemedicine Veronica Sandoval  [facility]   Showing today's visits and meeting all other requirements  Future Appointments  No visits were found meeting these conditions.  Showing future appointments within next 150 days and meeting all other requirements     The patient was identified by name and date of birth. Corinne S Kulp was informed that this is a telemedicine visit and that the visit is being conducted through the [employer] platform. She agrees to proceed.  My office door was closed. No one else was in the room.  She acknowledged consent and understanding of privacy and security of the video platform. The patient has agreed to participate and understands they can discontinue the visit at any time.    Patient is aware this is a billable service.      Subjective Corinne S Kulp is a 44 y.o. female.    HPI     Past Medical History:   Diagnosis Date    Anxiety     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HCC)      Past Surgical History:   Procedure Laterality Date    APPENDECTOMY      extensive lysis of adhesions, MAYURI's procedure (divisions of MAYURI's bands) detorsion of vulvulus, widening of mesenteric pedicle, cecopeexy, appendectomy       BACK SURGERY Right 2013    SF: L4-L5 hemilaminectomy for discectomy. Use of the microscope for microdissection, Use og 40mg of depo-medrol though thee exiting right L5 nerve root. Onset:13     SECTION      x2    CHOLECYSTECTOMY      FL INJECTION RIGHT SHOULDER (ARTHROGRAM)  2022    GALLBLADDER SURGERY      GV, Onset:     LAPAROSCOPIC LYSIS INTESTINAL ADHESIONS      onset: 15    LUMBAR DISC SURGERY      CT HYSTEROSCOPY BX ENDOMETRIUM&/POLYPC W/WO D&C N/A 2024    Procedure: DILATATION AND CURETTAGE (D&C) WITH HYSTEROSCOPY, POLYPECTOMY;  Surgeon: Griffin Arriaga MD;  Location: [facility];  Service: Gynecology    CT HYSTEROSCOPY ENDOMETRIAL ABLATION N/A 2024    Procedure: ABLATION ENDOMETRIAL VIPUL;  Surgeon: Griffin Arriaga MD;  Location: [facility];  Service: Gynecology    CT LAPS ABD PRTM&OMENTUM DX W/WO SPEC BR/WA SPX N/A 2016    Procedure: LAPAROSCOPY DIAGNOSTIC, EXTENSIVE LYSIS OF ADHESIONS, MAYURI'S PROCEDURE(DIVISION OF MAYURI'S BANDS,DETORSION OF VOLVULUS, WIDENING OF MESENTERIC PEDICLE, CECOPEXY, APPENDECTOMY);  Surgeon: Sudeep Espinoza MD;  Location: [facility];  Service: General    TUBAL LIGATION         Current Outpatient Medications   Medication Sig Dispense Refill    ALPRAZolam (XANAX) 0.25 mg tablet Take 1 tablet (0.25 mg total) by mouth daily as needed for anxiety 30 tablet 0    DULoxetine (CYMBALTA) 60 mg delayed release capsule Take 1 capsule by mouth twice daily 60 capsule 5    hydrOXYzine HCL (ATARAX) 25 mg tablet Take 1 tablet (25 mg total) by mouth every 6 (six) hours as needed for  anxiety 30 tablet 1    lamoTRIgine (LaMICtal) 25 mg tablet TAKE 2 TABLETS BY MOUTH DAILY 60 tablet 0    multivitamin (THERAGRAN) TABS Take 1 tablet by mouth daily      Probiotic Product (PROBIOTIC-10 PO) Take by mouth 1 daily       No current facility-administered medications for this visit.        Allergies   Allergen Reactions    Biaxin [Clarithromycin] GI Intolerance and Other (See Comments)    Sulfa Antibiotics     Sulfasalazine     Venlafaxine GI Intolerance, Vomiting and Other (See Comments)     Category: Allergy;   Category: Allergy;      Review of Systems    Video Exam    There were no vitals filed for this visit.    Physical Exam    (D) Corinne attended her follow up psychotherapy session today. Corinne reported that since her last session, she has noticed ongoing symptoms. Corinne reported that her and her  Fredy officially sold their camper at the [employer], reporting that the people that bought it from them, drove up to her  Fredy, gave them some cash, and gave them a check to manage the finances. Corinne reported that Fredy brought her the check and the cash, and she went to the bank after work to deposit it before their family vacation. Corinne reported that she was able to pay of the loan on her beach camper, and put money towards their home equity line of credit that they took out to renovate their camper, and purchase furniture. Corinne reported that she was able to pay off her main credit card, and her son John's macbook for school. Corinne describes some relief in relation to this. Corinne reported that although she feels positive about not having as much of the financial burden, she desires to have had it all paid off. Corinne describes symptoms of grief in relation to symbolic loss with selling the camper, while also identifying relief with not having the pressure of going down to get their monies worth from the camper. Discussed and processed this. Corinne reported that the first two  days of their vacation, Corinne and her family stayed at her client's beach house, and were able to watch their daughter's soccer game at the beach. Corinne reported that after these two days, they stayed at a small cabin at the [place]. Corinne reported that there was (6) of them, her and her , both children, her daughter's friend, and her son's girlfriend. Corinne reported that it was very different than what they are used to, and described feeling stressed out, triggered, and overwhelmed, describing grief in relation to symbolic loss with selling their camper. Corinne and this writer processed this. Corinne discussed stressors last week, reporting that her daughter Randi sprained her ankle playing soccer, reporting that they took her to the doctor and confirmed that it is not broke, just sprained. Corinne reported that she is taking her daughter to the  at the high school, and working through this. Corinne reported that there was various stressors last week, and noticed increased fatigue, not physically feeling well, and processed through this. Corinne reported that she applied for a loan through [employer] for her son John's schooling, reporting that her and her  are paying for the interest during those (4) years, and her son John will be responsible for his college student loans after he graduates. Corinne reported that she is waiting to hear back, and describes struggling with anxiety having never gone through this process, and worrying about it. Discussed and processed this. Corinne reported stressors in relation to her dog's allergies, reporting that there is a financial stress with this, along with worrying about her dogs symptoms. Discussed and processed this. Corinne reported that she is going to the [facility] for a few days, describing mixed feelings with this. Discussed and processed this. Corinne discussed some stressors at work, reporting that she had a jar out at work,  collecting financial donations for the woman that works for her, that her   by suicide. Corinne reported that she collected $850.00 and had one of the women at work go out and get gift cards and a card. Corinne reported that she received feedback from one of her co-worker's saying that she thinks that the card needs to come directly from Corinne, and believes that she should be the one to go over to her house to give it to her. Corinne reported that she felt triggered by this, reporting that she has the card, and has the gift cards and is having anxiety about going to see this co-worker, fearing that this will trigger her more. Corinne reported feeling like she isn't close with this woman, and doesn't describe them as friends, fearing that if she goes there she will feel fake. Corinne describes having empathy for this woman, and also feeling like it will be uncomfortable and awkward. Corinne describes anticipatory anxiety in relation to this, also fearing that if she gets too close to this, she will stay hyperfocused, and consumed with ruminating thoughts of grief, sadness, and depression. Corinne describes having a desire to avoid this,and fearing the uncomfortable conversation with her co-worker. Corinne and this writer processed this at length. Discussed ongoing skills. Reviewed limits and boundaries. Provided ongoing psychoeducation. Modeled effective forms of communication.     (A) Corinne presented with good eye contact. Corinne presented as alert and oriented x3. Corinne's speech presented at a normal rate, volume, and rhythm. Corinne denies any symptoms of frank. Corinne denies any evident or immediate risk factors for self-harm, SI, or HI. Corinne's mood presented as anxious, and slightly down, and her affect appeared to be congruent and tearful. Corinne describes symptoms of irritability, poor frustration tolerance, and becoming easily annoyed. Corinne describes feeling fatigued, and feeling  tired and having little energy. Corinne describes anticipatory anxiety, feeling nervous, anxious, and on edge, reporting lower moods of sadness, and depression- despite denying this on her NAVI-7 and PHQ-9 screenings. Corinne describes symptoms of obsessive, intrusive, ruminating, and repetitive thoughts.     (P) Corinne plans to journal outside of session, specifically writing a letter to her co-worker, expressing how she feels through words, giving herself the opportunity to further explore, and self-reflect. Corinne plans to identify, associate, honor, validate, and making space for her feelings, and emotions, decreasing judgement towards herself. Corinne plans to meet herself with radical compassion, extending bogdan towards herself, identifying worth, and giving herself credit. Corinne plans to continue to seek out evidence to challenge negative thoughts, negative thinking traps, cognitive distortions, and harmful core beliefs with CBT Cognitive Restructuring, DBT Wise Mind, and CBT Challenging Negative Thoughts Assessment Questions. Corinne plans to target ongoing symptoms with previously identified skills. Corinne plans to structure her schedule with balance, routine, and consistency, giving herself permission to utilize DBT Opposite Action Skills to prioritize her mental health and physical health needs, engage in the use of self-care, taking time for herself, and being present in the moment. Corinne plans to lean into healthy natural supports. Corinne plans to reinforce limits and boundaries, using healthy and effective forms of communication, challenging co-dependency, aiming to break the cycle, and implement healthy patterns, and behaviors. Corinne plans to implement positive self-talk, positive affirmations, self-affirming statements, and grounding statements. Corinne plans to implement radical acceptance, examining pros and cons, along with risks verses in order to make informed decisions, giving herself  permission to align choices and decisions with what is in the best interest of her. Corinne plans to implement DBT Dialects, allowing for two opposing thoughts, feelings,and emotions to both co-exist and be true at the same time. Corinne plans to assert herself, advocate for herself, ask for what she needs, targeting interpersonal relationship stressors. Corinne plans to reach out for additional support as needed.     07/15/24  Start Time: 1100  Stop Time: 1150  Total Visit Time: 50 minutes

## 2024-07-29 ENCOUNTER — TELEMEDICINE (OUTPATIENT)
Dept: BEHAVIORAL/MENTAL HEALTH CLINIC | Facility: CLINIC | Age: 44
End: 2024-07-29
Payer: COMMERCIAL

## 2024-07-29 DIAGNOSIS — F33.0 MILD EPISODE OF RECURRENT MAJOR DEPRESSIVE DISORDER (HCC): ICD-10-CM

## 2024-07-29 DIAGNOSIS — F41.1 GAD (GENERALIZED ANXIETY DISORDER): Primary | ICD-10-CM

## 2024-07-29 PROCEDURE — 90837 PSYTX W PT 60 MINUTES: CPT | Performed by: SOCIAL WORKER

## 2024-07-29 NOTE — PSYCH
Psychotherapy Provided: Individual Psychotherapy 53 minutes.     Length of time in session: 53 minutes.     Current suicide risk : Low .     Behavioral Health Treatment Plan [facility]: Diagnosis and Treatment Plan explained to Corinne, Corinne relates understanding diagnosis and is agreeable to Treatment Plan. Yes     Visit start and stop times:    07/29/24  Start Time: 1100  Stop Time: 1153  Total Visit Time: 53 minutes    Virtual Regular Visit    [facility]     Patient is located at Other in the following state in which I hold an active license [place].     Assessment/Plan:    Problem List Items Addressed This Visit          Behavioral Health    Depression, major, recurrent (HCC)    NAVI (generalized anxiety disorder) - Primary     Goals addressed in session: Goal 1 Follow up psychotherapy session.     Reason for visit is No chief complaint on file.     Encounter provider Veronica Sandoval    Recent Visits  No visits were found meeting these conditions.  Showing recent visits within past 7 days and meeting all other requirements  Today's Visits  Date Type Provider Dept   07/29/24 Telemedicine Veronica Sandoval  [facility]   Showing today's visits and meeting all other requirements  Future Appointments  No visits were found meeting these conditions.  Showing future appointments within next 150 days and meeting all other requirements     The patient was identified by name and date of birth. Corinne S Kulp was informed that this is a telemedicine visit and that the visit is being conducted through the [employer] platform. She agrees to proceed.  My office door was closed. No one else was in the room.  She acknowledged consent and understanding of privacy and security of the video platform. The patient has agreed to participate and understands they can discontinue the visit at any time.    Patient is aware this is a billable service.      Subjective Corinne S Kulp is a 44 y.o. female.    HPI     Past Medical History:   Diagnosis Date    Anxiety     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HCC)      Past Surgical History:   Procedure Laterality Date    APPENDECTOMY      extensive lysis of adhesions, MAYURI's procedure (divisions of MAYURI's bands) detorsion of vulvulus, widening of mesenteric pedicle, cecopeexy, appendectomy       BACK SURGERY Right 2013    SF: L4-L5 hemilaminectomy for discectomy. Use of the microscope for microdissection, Use og 40mg of depo-medrol though thee exiting right L5 nerve root. Onset:13     SECTION      x2    CHOLECYSTECTOMY      FL INJECTION RIGHT SHOULDER (ARTHROGRAM)  2022    GALLBLADDER SURGERY      GV, Onset:     LAPAROSCOPIC LYSIS INTESTINAL ADHESIONS      onset: 15    LUMBAR DISC SURGERY      NH HYSTEROSCOPY BX ENDOMETRIUM&/POLYPC W/WO D&C N/A 2024    Procedure: DILATATION AND CURETTAGE (D&C) WITH HYSTEROSCOPY, POLYPECTOMY;  Surgeon: Griffin Arriaga MD;  Location: [facility];  Service: Gynecology    NH HYSTEROSCOPY ENDOMETRIAL ABLATION N/A 2024    Procedure: ABLATION ENDOMETRIAL VIPUL;  Surgeon: Griffin Arriaga MD;  Location: [facility];  Service: Gynecology    NH LAPS ABD PRTM&OMENTUM DX W/WO SPEC BR/WA SPX N/A 2016    Procedure: LAPAROSCOPY DIAGNOSTIC, EXTENSIVE LYSIS OF ADHESIONS, MAYURI'S PROCEDURE(DIVISION OF MAYURI'S BANDS,DETORSION OF VOLVULUS, WIDENING OF MESENTERIC PEDICLE, CECOPEXY, APPENDECTOMY);  Surgeon: Sudeep Espinoza MD;  Location: [facility];  Service: General    TUBAL LIGATION         Current Outpatient Medications   Medication Sig Dispense Refill    ALPRAZolam (XANAX) 0.25 mg tablet Take 1 tablet (0.25 mg total) by mouth daily as needed for anxiety 30 tablet 0    DULoxetine (CYMBALTA) 60 mg delayed release capsule Take 1 capsule by mouth twice daily 60 capsule 5    hydrOXYzine HCL (ATARAX) 25 mg tablet Take 1 tablet (25 mg total) by mouth every 6 (six) hours as needed for  anxiety 30 tablet 1    lamoTRIgine (LaMICtal) 25 mg tablet TAKE 2 TABLETS BY MOUTH DAILY 60 tablet 0    multivitamin (THERAGRAN) TABS Take 1 tablet by mouth daily      Probiotic Product (PROBIOTIC-10 PO) Take by mouth 1 daily       No current facility-administered medications for this visit.        Allergies   Allergen Reactions    Biaxin [Clarithromycin] GI Intolerance and Other (See Comments)    Sulfa Antibiotics     Sulfasalazine     Venlafaxine GI Intolerance, Vomiting and Other (See Comments)     Category: Allergy;   Category: Allergy;      Review of Systems    Video Exam    There were no vitals filed for this visit.    Physical Exam    (D) Corinne attended her follow up psychotherapy session today. Corinne reported that since her last session, she has noticed ongoing symptoms. Corinne reported that she is currently on vacation in [place], [place]- near [place]. Corinne described stressors and triggers in relation to this vacation, giving an example of yesterday they tried to get on the beach, yet were given the wrong tags by the office, resulting in them not being allowed on the beach. Corinne described it as being a beautiful day outside yesterday and simba outside, describing it as being a perfect beach day. Corinne reported that they had to change plans at the last minute and ended up going on a hike instead. Corinne reported that they were able to get the tag situation rectified and have plans to go to the beach today, yet describes it as cloudy there. Corinne describes feeling disappointed in this trip, describing missing the beach, and being in her own camper at the beach. Corinne describes grief in relation to symbolic loss. Discussed and processed this. Corinne reported that after her last session, she spent some time journaling, and writing about her colleague who's   by suicide. 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 Corinne reported that they were not home, describing feeling relieved. Corinne reported that it took a few days for the colleague to respond, acknowledging that the card and gift cards were received, and thanking her. Corinne reported that in addition to this, the colleague acknowledged her experience with Corinne preferring to talk through text or writing, and she prefers to talk over the phone or in person. Corinne described feeling triggered by this. 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 Corinne describes having an overwhelming desire to avoid this colleague and not talk to her in person, describing her as being someone who talks over her, and feels intimated by her, triggering her, and having a history of interpersonal relationship conflict with this colleague. Corinne describes feeling relieved that this colleague had decided to resign, then struggles with shame, and guilt, identifying having empathy and compassion for her, and her family, especially with the loss of her . Corinne describes struggling with interpersonal relationship conflict in general, and feeling intimated by disagreement, and conflict. Corinne describes patterns, themes, and behaviors of this in her life, processing through this personally and professionally. 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 Corinne describes feeling overwhelmed by anticipatory anxiety with people's responses to this. Corinne describes stressors when it comes to decision-making with the business. Corinne reported that one of her assistant's graduated, and  got her licenses, and noticing her having a desire to grow within the salon. Corinne describes having balance with wanting to grow her and then also needing to replace her, describing stressors, challenges, and barriers when it comes to hiring, training, and maintaining staff. Corinne reported that the salon is busy, and notices feeling tired and overwhelmed. 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 Corinne describes overbooking herself, struggling with saying no, and having more limits and boundaries. Corinne and this writer processed this. Corinne describes preparing for her son John and going to college, describing grief in relation to symbolic loss. Discussed and processed this. Discussed ongoing skills. Reviewed limits and boundaries. Modeled effective forms of communication. Provided ongoing psychoeducation.     (A) Corinne denies any evident or immediate risk factors for self-harm, SI, or HI. Corinne denies any symptoms of frank. Corinne presented as alert and oriented x3. Corinne presented with good eye contact. Corinne's speech presented at a normal rate, volume, and rhythm. Corinne's mood presented as anxious, and slightly down, and her affect appeared to be congruent, and tearful at times. Corinne describes anticipatory anxiety, feeling nervous, anxious, and on edge, and worrying. Corinne describes obsessive, intrusive, ruminating, and repetitive thoughts, identifying feeling stressed out and overwhelmed at times. Corinne describes symptoms of grief in relation to symbolic loss. Corinne describes worrying too much about different things. Corinne describes symptoms of irritability, poor frustration tolerance, and becoming easily annoyed, noticing emotional dysregulation at times.     (P) Corinne plans to intentionally use the concept [facility]. [facility]. [facility]. [facility]. through the lens of DBT, this writer sent her a PDF to review further outside of  session, and complete by next session.  Corinne plans to use healthy and effective forms of communication to assert herself, advocate for herself, ask for what she needs, targeting interpersonal relationship stressors, and reinforce limits and boundaries, challenging co-dependency, aiming to break the cycle, and implementing healthy patterns, and behaviors. Corinne plans to use DBT Opposite Action Skills to prioritize her mental health and physical health needs, leaning into healthy natural supports, engaging in the use of self-care, taking time for herself, and being present in the moment. Corinne plans to work on decreasing judgement towards herself, and continue to seek out evidence to challenge negative thoughts, negative thinking traps, cognitive distortions, and harmful core beliefs with the support of DBT Wise Mind, CBT Cognitive Restructuring, and CBT Challenging Negative Thoughts Assessment Questions. Corinne plans to meet herself with Radical Compassion, extending bogdan towards herself, identifying her worth, and giving herself credit. Corinne plans to implement the grounding statements, positive self-talk, positive affirmations, and self-affirming statements. Corinne plans to examine pros and cons, along with risks verses benefits in order to make informed decisions, giving herself permission to align choices and decisions with what is in the best interest of her, implementing Radical Acceptance. Corinne plans to structure her schedule with balance, routine, and consistency. Corinne plans to utilize DBT Dialects, specifically the both/ and concept, allowing for two opposing thoughts, feelings, and emotions to both co-exist and be true at the same time. Corinne plans to address ongoing symptoms with previously identified skills. Corinne plans to identify, associate, honor, validate, and make space for her feelings, and emotions, examining patterns, themes, and behaviors through the lens of relationship  trauma, and co-dependency, journaling outside of session to further self-reflect and explore next session. Corinne plans to reach out for additional support as needed.       07/29/24  Start Time: 1100  Stop Time: 1153  Total Visit Time: 53 minutes

## 2024-07-30 LAB
APOB+LDLR+PCSK9 GENE MUT ANL BLD/T: NOT DETECTED
BRCA1+BRCA2 DEL+DUP + FULL MUT ANL BLD/T: NOT DETECTED
MLH1+MSH2+MSH6+PMS2 GN DEL+DUP+FUL M: NOT DETECTED

## 2024-08-05 ENCOUNTER — HOSPITAL ENCOUNTER (OUTPATIENT)
Dept: MAMMOGRAPHY | Facility: CLINIC | Age: 44
Discharge: HOME | End: 2024-08-05
Payer: COMMERCIAL

## 2024-08-05 ENCOUNTER — OFFICE VISIT (OUTPATIENT)
Dept: FAMILY MEDICINE CLINIC | Facility: HOSPITAL | Age: 44
End: 2024-08-05
Payer: COMMERCIAL

## 2024-08-05 VITALS
WEIGHT: 146 LBS | HEART RATE: 82 BPM | SYSTOLIC BLOOD PRESSURE: 120 MMHG | BODY MASS INDEX: 24.32 KG/M2 | HEIGHT: 65 IN | OXYGEN SATURATION: 99 % | DIASTOLIC BLOOD PRESSURE: 78 MMHG

## 2024-08-05 DIAGNOSIS — Z00.00 ROUTINE ADULT HEALTH MAINTENANCE: Primary | ICD-10-CM

## 2024-08-05 DIAGNOSIS — Z12.31 SCREENING MAMMOGRAM FOR BREAST CANCER: ICD-10-CM

## 2024-08-05 DIAGNOSIS — Z13.29 SCREENING FOR THYROID DISORDER: ICD-10-CM

## 2024-08-05 DIAGNOSIS — Z13.220 SCREENING FOR HYPERLIPIDEMIA: ICD-10-CM

## 2024-08-05 DIAGNOSIS — F33.0 MILD EPISODE OF RECURRENT MAJOR DEPRESSIVE DISORDER (HCC): ICD-10-CM

## 2024-08-05 DIAGNOSIS — F32.2 CURRENT SEVERE EPISODE OF MAJOR DEPRESSIVE DISORDER WITHOUT PSYCHOTIC FEATURES WITHOUT PRIOR EPISODE (HCC): ICD-10-CM

## 2024-08-05 DIAGNOSIS — D64.9 ANEMIA, UNSPECIFIED TYPE: ICD-10-CM

## 2024-08-05 DIAGNOSIS — G89.29 CHRONIC NECK PAIN: ICD-10-CM

## 2024-08-05 DIAGNOSIS — F41.1 GAD (GENERALIZED ANXIETY DISORDER): ICD-10-CM

## 2024-08-05 DIAGNOSIS — M54.2 CHRONIC NECK PAIN: ICD-10-CM

## 2024-08-05 DIAGNOSIS — Z12.11 SCREEN FOR COLON CANCER: ICD-10-CM

## 2024-08-05 PROBLEM — Z79.899 ENCOUNTER FOR LONG-TERM CURRENT USE OF MEDICATION: Status: RESOLVED | Noted: 2020-10-12 | Resolved: 2024-08-05

## 2024-08-05 PROCEDURE — 77063 BREAST TOMOSYNTHESIS BI: CPT

## 2024-08-05 PROCEDURE — 77067 SCR MAMMO BI INCL CAD: CPT

## 2024-08-05 PROCEDURE — 99396 PREV VISIT EST AGE 40-64: CPT | Performed by: STUDENT IN AN ORGANIZED HEALTH CARE EDUCATION/TRAINING PROGRAM

## 2024-08-05 RX ORDER — DULOXETIN HYDROCHLORIDE 60 MG/1
60 CAPSULE, DELAYED RELEASE ORAL DAILY
Qty: 90 CAPSULE | Refills: 0 | Status: SHIPPED | OUTPATIENT
Start: 2024-08-05

## 2024-08-05 NOTE — PROGRESS NOTES
Adult Annual Physical  Name: Corinne S Kulp      : 1980      MRN: [id number]  Encounter Provider: Laura Barr DO  Encounter Date: 2024   Encounter department: [facility] [address]    Assessment & Plan   1. Routine adult health maintenance  -     Comprehensive metabolic panel; Future  -     Comprehensive metabolic panel  2. Chronic neck pain  3. Mild episode of recurrent major depressive disorder (HCC)  4. Current severe episode of major depressive disorder without psychotic features without prior episode (HCC)  5. NAVI (generalized anxiety disorder)  -     DULoxetine (CYMBALTA) 60 mg delayed release capsule; Take 1 capsule (60 mg total) by mouth daily  6. Screen for colon cancer  -     Cologuard  7. Anemia, unspecified type  -     CBC and Platelet; Future  -     Ferritin; Future  -     CBC and Platelet  -     Ferritin  8. Screening for hyperlipidemia  -     Lipid Panel with Direct LDL reflex; Future  -     Lipid Panel with Direct LDL reflex  9. Screening for thyroid disorder  -     TSH, 3rd generation; Future  -     TSH, 3rd generation    Labs & visit in 6 months.   Doing well with psych & current meds. No changes there.   Re-Apply for the life insurance.     Tdap UTD.   Immunizations and preventive care screenings were discussed with patient today. Appropriate education was printed on patient's after visit summary.    Counseling:  Alcohol/drug use: discussed moderation in alcohol intake, the recommendations for healthy alcohol use, and avoidance of illicit drug use.  Dental Health: discussed importance of regular tooth brushing, flossing, and dental visits.  Injury prevention: discussed safety/seat belts, safety helmets, smoke detectors, carbon dioxide detectors, and smoking near bedding or upholstery.  Sexual health: discussed sexually transmitted diseases, partner selection, use of condoms, avoidance of unintended pregnancy, and contraceptive alternatives.  Exercise: the  importance of regular exercise/physical activity was discussed. Recommend exercise 3-5 times per week for at least 30 minutes.     Return in about 6 months (around 2/5/2025) for F/U Chronic DX.         History of Present Illness     Adult Annual Physical:  Patient presents for annual physical. Son about to leave for [employer] daughter starting HS, busy with her sports too.   Does see psych, anxiety is better. .     Diet and Physical Activity:  - Diet/Nutrition: well balanced diet and consuming 3-5 servings of fruits/vegetables daily. good water  - Exercise: vigorous cardiovascular exercise, strength training exercises and 5-7 times a week on average.    General Health:  - Sleep: sleeps well.  - Hearing: normal hearing bilateral ears.  - Vision: no vision problems.  - Dental: regular dental visits and brushes teeth twice daily.    /GYN Health:  - Follows with GYN: yes.   - History of STDs: no  - Contraception:. one male partner, tubal        Review of Systems   Constitutional:  Negative for chills and fever.   Respiratory:  Negative for cough and shortness of breath.    Cardiovascular:  Negative for chest pain and palpitations.   Gastrointestinal:  Negative for constipation and diarrhea.   Genitourinary:  Negative for frequency and urgency.   Musculoskeletal:  Negative for arthralgias and back pain.   Neurological:  Negative for dizziness, light-headedness and headaches.      Current Outpatient Medications on File Prior to Visit   Medication Sig Dispense Refill   • ALPRAZolam (XANAX) 0.25 mg tablet Take 1 tablet (0.25 mg total) by mouth daily as needed for anxiety 30 tablet 0   • lamoTRIgine (LaMICtal) 25 mg tablet TAKE 2 TABLETS BY MOUTH DAILY 60 tablet 0   • multivitamin (THERAGRAN) TABS Take 1 tablet by mouth daily     • Probiotic Product (PROBIOTIC-10 PO) Take by mouth 1 daily     • [DISCONTINUED] DULoxetine (CYMBALTA) 60 mg delayed release capsule Take 1 capsule by mouth twice daily 60 capsule 5   •  "[DISCONTINUED] hydrOXYzine HCL (ATARAX) 25 mg tablet Take 1 tablet (25 mg total) by mouth every 6 (six) hours as needed for anxiety (Patient not taking: Reported on 8/5/2024) 30 tablet 1     No current facility-administered medications on file prior to visit.      Social History     Tobacco Use   • Smoking status: Former     Current packs/day: 0.00     Average packs/day: 0.3 packs/day for 5.0 years (1.3 ttl pk-yrs)     Types: Cigarettes     Start date: 1/1/2004     Quit date: 1/1/2009     Years since quitting: 15.6   • Smokeless tobacco: Never   Vaping Use   • Vaping status: Never Used   Substance and Sexual Activity   • Alcohol use: Not Currently   • Drug use: No   • Sexual activity: Yes     Partners: Male     Birth control/protection: None     Comment: no new partner in past year     Objective     /78   Pulse 82   Ht 5' 5\" (1.651 m)   Wt 66.2 kg (146 lb)   SpO2 99%   BMI 24.30 kg/m²     Physical Exam  Vitals reviewed.   Constitutional:       General: She is not in acute distress.     Appearance: Normal appearance. She is normal weight. She is not ill-appearing.   HENT:      Head: Normocephalic and atraumatic.      Right Ear: Tympanic membrane, ear canal and external ear normal. There is no impacted cerumen.      Left Ear: Tympanic membrane, ear canal and external ear normal. There is no impacted cerumen.      Nose: Nose normal. No congestion or rhinorrhea.      Mouth/Throat:      Mouth: Mucous membranes are moist.      Pharynx: Oropharynx is clear. No oropharyngeal exudate or posterior oropharyngeal erythema.   Eyes:      General: No scleral icterus.        Right eye: No discharge.         Left eye: No discharge.      Conjunctiva/sclera: Conjunctivae normal.   Neck:      Comments: No thyroid nodules or thyromegaly.  Cardiovascular:      Rate and Rhythm: Normal rate and regular rhythm.      Pulses: Normal pulses.      Heart sounds: Normal heart sounds. No murmur heard.     No friction rub. No gallop. "   Pulmonary:      Effort: Pulmonary effort is normal. No respiratory distress.      Breath sounds: Normal breath sounds. No stridor. No wheezing.   Musculoskeletal:         General: Normal range of motion.      Cervical back: Normal range of motion and neck supple. No rigidity or tenderness.      Right lower leg: No edema.      Left lower leg: No edema.   Lymphadenopathy:      Cervical: No cervical adenopathy.   Skin:     General: Skin is warm and dry.      Capillary Refill: Capillary refill takes less than 2 seconds.      Coloration: Skin is not jaundiced or pale.   Neurological:      Mental Status: She is alert and oriented to person, place, and time.      Gait: Gait normal.   Psychiatric:         Mood and Affect: Mood normal.         Behavior: Behavior normal.         Thought Content: Thought content normal.         Judgment: Judgment normal.

## 2024-08-06 ENCOUNTER — TELEPHONE (OUTPATIENT)
Age: 44
End: 2024-08-06

## 2024-08-06 DIAGNOSIS — Z12.83 SCREENING EXAM FOR SKIN CANCER: Primary | ICD-10-CM

## 2024-08-06 NOTE — TELEPHONE ENCOUNTER
Patient call to get an insurance referral for Dermatology.     [facility] [place]  [address] [facility] [address] Tel: [phone]   Back date - Date of Service: May 20, 2024   NPI: [id number]  Best phone number to reach patient [phone]

## 2024-08-12 ENCOUNTER — TELEMEDICINE (OUTPATIENT)
Dept: BEHAVIORAL/MENTAL HEALTH CLINIC | Facility: CLINIC | Age: 44
End: 2024-08-12
Payer: COMMERCIAL

## 2024-08-12 DIAGNOSIS — F33.0 MILD EPISODE OF RECURRENT MAJOR DEPRESSIVE DISORDER (HCC): Primary | ICD-10-CM

## 2024-08-12 DIAGNOSIS — F41.1 GAD (GENERALIZED ANXIETY DISORDER): Chronic | ICD-10-CM

## 2024-08-12 PROCEDURE — 90837 PSYTX W PT 60 MINUTES: CPT | Performed by: SOCIAL WORKER

## 2024-08-12 NOTE — PSYCH
Psychotherapy Provided: Individual Psychotherapy 55 minutes.     Length of time in session: 55 minutes.     Current suicide risk : Low .     Behavioral Health Treatment Plan [facility]: Diagnosis and Treatment Plan explained to Corinne, Corinne relates understanding diagnosis and is agreeable to Treatment Plan. Yes.     Visit start and stop times:    08/12/24  Start Time: 1100  Stop Time: 1155  Total Visit Time: 55 minutes    Virtual Regular Visit    Verification of patient location: [place], [place].     Patient is located at Home in the following state in which I hold an active license [place].     Assessment/Plan:    Problem List Items Addressed This Visit          Behavioral Health    NAVI (generalized anxiety disorder) (Chronic)    Mild episode of recurrent major depressive disorder (HCC) - Primary     Goals addressed in session: Goal 1 Follow up psychotherapy session.     Reason for visit is   Chief Complaint   Patient presents with    Virtual Regular Visit     Encounter provider Veronica Sandoval    Recent Visits  Date Type Provider Dept   08/05/24 Office Visit Laura Barr DO [facility]   Showing recent visits within past 7 days and meeting all other requirements  Today's Visits  Date Type Provider Dept   08/12/24 Telemedicine Veronica Sandoval [facility]   Showing today's visits and meeting all other requirements  Future Appointments  No visits were found meeting these conditions.  Showing future appointments within next 150 days and meeting all other requirements     The patient was identified by name and date of birth. Corinne S Kulp was informed that this is a telemedicine visit and that the visit is being conducted through the [employer] platform. She agrees to proceed.  My office door was closed. No one else was in the room.  She acknowledged consent and understanding of privacy and security of the video platform. The patient has agreed to participate and  understands they can discontinue the visit at any time.    Patient is aware this is a billable service.     Subjective Corinne S Kulp is a 44 y.o. female.    HPI     Past Medical History:   Diagnosis Date    Anxiety     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HCC)      Past Surgical History:   Procedure Laterality Date    APPENDECTOMY      extensive lysis of adhesions, MAYURI's procedure (divisions of MAYURI's bands) detorsion of vulvulus, widening of mesenteric pedicle, cecopeexy, appendectomy       BACK SURGERY Right 2013    SF: L4-L5 hemilaminectomy for discectomy. Use of the microscope for microdissection, Use og 40mg of depo-medrol though thee exiting right L5 nerve root. Onset:13     SECTION      x2    CHOLECYSTECTOMY      FL INJECTION RIGHT SHOULDER (ARTHROGRAM)  2022    GALLBLADDER SURGERY      [facility], Onset:     LAPAROSCOPIC LYSIS INTESTINAL ADHESIONS      onset: 15    LUMBAR DISC SURGERY      OH HYSTEROSCOPY BX ENDOMETRIUM&/POLYPC W/WO D&C N/A 2024    Procedure: DILATATION AND CURETTAGE (D&C) WITH HYSTEROSCOPY, POLYPECTOMY;  Surgeon: Griffin Arriaga MD;  Location: [facility];  Service: Gynecology    OH HYSTEROSCOPY ENDOMETRIAL ABLATION N/A 2024    Procedure: ABLATION ENDOMETRIAL VIPUL;  Surgeon: Griffin Arriaga MD;  Location: [facility];  Service: Gynecology    OH LAPS ABD PRTM&OMENTUM DX W/WO SPEC BR/WA SPX N/A 2016    Procedure: LAPAROSCOPY DIAGNOSTIC, EXTENSIVE LYSIS OF ADHESIONS, MAYURI'S PROCEDURE(DIVISION OF MAYURI'S BANDS,DETORSION OF VOLVULUS, WIDENING OF MESENTERIC PEDICLE, CECOPEXY, APPENDECTOMY);  Surgeon: Sudeep Espinoza MD;  Location: [facility];  Service: General    TUBAL LIGATION       Current Outpatient Medications   Medication Sig Dispense Refill    ALPRAZolam (XANAX) 0.25 mg tablet Take 1 tablet (0.25 mg total) by mouth daily as needed for anxiety 30 tablet 0    DULoxetine (CYMBALTA) 60 mg delayed release capsule Take 1 capsule (60 mg total) by mouth daily 90 capsule 0     lamoTRIgine (LaMICtal) 25 mg tablet TAKE 2 TABLETS BY MOUTH DAILY 60 tablet 0    multivitamin (THERAGRAN) TABS Take 1 tablet by mouth daily      Probiotic Product (PROBIOTIC-10 PO) Take by mouth 1 daily       No current facility-administered medications for this visit.     Allergies   Allergen Reactions    Biaxin [Clarithromycin] GI Intolerance and Other (See Comments)    Sulfa Antibiotics     Sulfasalazine     Venlafaxine GI Intolerance, Vomiting and Other (See Comments)     Category: Allergy;   Category: Allergy;      Review of Systems    Video Exam    There were no vitals filed for this visit.    Physical Exam    (D) Corinne attended her follow up psychotherapy session today. Corinne reported that since her last session, she has noticed ongoing symptoms. Corinne reported that after her last session, she finished up her vacation. Corinne reported that she didn't enjoy herself, describing herself as feeling bored, and comparing it to their beach camper, and experiences there. Corinne reported that she will not do that vacation again, and reported that she struggles with down time, and felt like there wasn't enough to do at this specific place. Corinne described herself as feeling frustrated while away, feeling like the time off that she took wasn't worth the experience that she had away. Discussed and processed this. Corinne reported her son John goes away to college at [employer] in about a week and a half, reporting that she took him back to school shopping, and processed through this. Corinne reported that John's student loan hasn't been fully approved just yet, creating stress and anxiety for herself in relation to finances, college, and processed through this. Corinne reported that her daughter Randi is in pre-season of high school soccer, having practice 2x a day, 9:30am-11:30am and then again 6:00pm-8:00pm. COrinne describes stressors and triggers in relation to getting Randi back and forth.  "Corinne reported that a while ago she bought concert tickets to see [employer], reporting that her and her girlfriend go every year, and last year Randi said that she wanted to go, resulting in Corinne getting her a ticket this year. Corinne reported that she spent over $200.00 a ticket and had t-shirts made. Corinne reported that her and her  Fredy don't agree with the mandatory guidelines that sports have. Corinne reported that she reached out to Randi's  months ago, making him aware of the concert ticket, and was assured that her going to this concert will not negatively affect Randi on the soccer team. Corinne reported that her  Fredy doesn't agree with this, feeling like Randi shouldn't miss any practices, sending messages that Randi is to entirely commit herself and not do anything else. Corinne reported that this as resulted in interpersonal relationship stressors between her and her  Fredy. Corinne has described her  Fredy as argumentative, defensive, oppositional, disgruntled, irritable and on edge. Corinne reported that there was a situation that occurred yesterday where her and Fredy got into an argument, when she was trying to address this, and described him as dismissive. Corinne reported that this lead into text message communication back and forth, after Fredy had to leave for work. Corinne describes recently feeling like there is tension between her and Fredy, describing herself as ruminating on this, and worrying. Corinne reported that when it comes to the concert, she had a conversation with her daughter Randi and told her that she is going to let her decide whether or not she wants to go to the concert or the soccer practice, because she, \"is tired of your father throwing it in my face and making me feel guilty.\" Corinne reported that Randi made the decision to go to soccer practice, and wonders if she made this decision because she really " wants to prioritize the soccer practice over the concert, verses not wanting to upset her father, verses not wanting her parents to be fighting. Corinne describes Fredy as being hard on Randi when it comes to soccer, describing him as critiquing her, criticizing her, and putting a lot of pressure on her. Corinne describes Fredy as having worked as a head varsity  for football for years, and have certain expectations, describing them as being disaligned. Discussed and processed this. Corinne and this writer discussed co-dependency, avoidance, and not wanting to upset her , feeling triggered. Discussed and processed this. Corinne describes Fredy as overworking, working his full-time job, and doing extra side work on the side every day to bring in additional financial income, compensating for her father not being in the house anymore and contributing to finances. Corinne and this writer processed this. Discussed ongoing skills. Reviewed limits and boundaries. Provided ongoing psychoeducation. Modeled effective forms of communication.     (A) Corinne describes fluctuating symptoms since her last session. Corinne describes symptoms of irritability, poor frustration tolerance, and becoming easily annoyed, describing emotional dysregulation. Corinne describes anticipatory anxiety, feeling nervous, anxious, and on edge, and worrying too much about different things. Corinne describes symptoms of obsessive, intrusive, ruminating, and repetitive thoughts. Corinne describes fatigue, and feeling tired and having little energy. Corinne describes lower moods of sadness, and depression. Corinne's mood presented as anxious, and depressed, and her affect appeared to be congruent, and tearful at times. Corinne presented as alert and oriented x3. Corinne presented with good eye contact. Corinne's speech presented at a normal rate, volume, and rhythm. Corinne denies any symptoms of frank. Corinne denies any evident or  immediate risk factors for self-harm, SI, or HI.     (P) Corinne plans to journal to further explore and self-reflect upon the situation with her daughter Randi with the soccer practice, the concert, and the differing opinions between her and her . Corinne plans to identify, associate, honor, validate, and make space for her feelings, and emotions, examining patterns, themes, and behaviors through the lens of co-dependency, and trauma response. Corinne plans to challenge herself, decreasing judgement towards herself, and intentionally meeting herself with radical compassion, while extending bogdan towards herself. Corinne plans to implement grounding statements, positive self-self, positive affirmations, and self-affirming statements. Corinne plans to continue to seek out evidence to challenge negative thoughts, negative thinking traps, cognitive distortions, and harmful core beliefs, with the support of DBT Wise Mind, CBT Cognitive Restructuring, and CBT Challenging Negative Thoughts Assessment Questions. Corinne plans to target ongoing symptoms with previously identified skills. Corinne plans to use DBT Opposite Action Skills, challenging herself to prioritize her mental health and physical health needs, engaging in the use of self-care, taking time for herself, being present in the moment, and leaning into healthy natural supports. Corinne plans to utilize the concept of DBT Dialects, specifically the both/ and concept, allowing for two opposing thoughts, feelings, and emotions to both co-exist and be true at the same time. Corinne plans to implement the concept of Radical Acceptance, examining pros and cons, along with risks verses benefits in order to make informed decisions, giving herself permission to align choices and decisions with what is in the best interest of her. Corinne plans to use healthy and effective forms of communication to assert herself, advocate for herself, ask for what she needs,  reinforcing limits and boundaries, challenging co-dependency, and aiming to break the cycle, and implement healthy patterns, and behaviors. Corinne plans to structure her schedule with balance, routine, and consistency. Corinne plans to reach out for additional support.     08/12/24  Start Time: 1100  Stop Time: 1155  Total Visit Time: 55 minutes

## 2024-08-16 LAB — COLOGUARD RESULT REPORTABLE: NEGATIVE

## 2024-08-24 DIAGNOSIS — F39 MOOD DISORDER (HCC): ICD-10-CM

## 2024-08-26 RX ORDER — LAMOTRIGINE 25 MG/1
TABLET ORAL
Qty: 60 TABLET | Refills: 0 | Status: SHIPPED | OUTPATIENT
Start: 2024-08-26

## 2024-09-09 ENCOUNTER — TELEMEDICINE (OUTPATIENT)
Dept: PSYCHIATRY | Facility: CLINIC | Age: 44
End: 2024-09-09
Payer: COMMERCIAL

## 2024-09-09 ENCOUNTER — TELEPHONE (OUTPATIENT)
Dept: PSYCHIATRY | Facility: CLINIC | Age: 44
End: 2024-09-09

## 2024-09-09 ENCOUNTER — TELEMEDICINE (OUTPATIENT)
Dept: BEHAVIORAL/MENTAL HEALTH CLINIC | Facility: CLINIC | Age: 44
End: 2024-09-09
Payer: COMMERCIAL

## 2024-09-09 DIAGNOSIS — F39 MOOD DISORDER (HCC): ICD-10-CM

## 2024-09-09 DIAGNOSIS — F41.1 GAD (GENERALIZED ANXIETY DISORDER): ICD-10-CM

## 2024-09-09 DIAGNOSIS — F33.1 MAJOR DEPRESSIVE DISORDER, RECURRENT, MODERATE (HCC): Primary | ICD-10-CM

## 2024-09-09 DIAGNOSIS — F41.1 GAD (GENERALIZED ANXIETY DISORDER): Chronic | ICD-10-CM

## 2024-09-09 DIAGNOSIS — F33.0 MILD EPISODE OF RECURRENT MAJOR DEPRESSIVE DISORDER (HCC): Primary | ICD-10-CM

## 2024-09-09 PROCEDURE — 99214 OFFICE O/P EST MOD 30 MIN: CPT | Performed by: NURSE PRACTITIONER

## 2024-09-09 PROCEDURE — 90834 PSYTX W PT 45 MINUTES: CPT | Performed by: SOCIAL WORKER

## 2024-09-09 RX ORDER — ALPRAZOLAM 0.25 MG
0.25 TABLET ORAL DAILY PRN
Qty: 15 TABLET | Refills: 1 | Status: SHIPPED | OUTPATIENT
Start: 2024-09-09

## 2024-09-09 RX ORDER — DULOXETIN HYDROCHLORIDE 60 MG/1
60 CAPSULE, DELAYED RELEASE ORAL DAILY
Qty: 90 CAPSULE | Refills: 2 | Status: SHIPPED | OUTPATIENT
Start: 2024-09-09

## 2024-09-09 RX ORDER — LAMOTRIGINE 25 MG/1
TABLET ORAL
Qty: 75 TABLET | Refills: 2 | Status: SHIPPED | OUTPATIENT
Start: 2024-09-09

## 2024-09-09 NOTE — PSYCH
Psychotherapy Provided: Individual Psychotherapy 50 minutes.     Length of time in session: 50 minutes.     Current suicide risk : Low .     Behavioral Health Treatment Plan [facility]: Diagnosis and Treatment Plan explained to Corinne, Corinne relates understanding diagnosis and is agreeable to Treatment Plan. Yes     Visit start and stop times:    09/09/24  Start Time: 1106  Stop Time: 1156  Total Visit Time: 50 minutes    Virtual Regular Visit    Verification of patient location: [place], [place].     Patient is located at Home in the following state in which I hold an active license [place].     Assessment/Plan:    Problem List Items Addressed This Visit          Behavioral Health    NAVI (generalized anxiety disorder) (Chronic)    Mild episode of recurrent major depressive disorder (HCC) - Primary     Goals addressed in session: Goal 1 Follow up psychotherapy session.     Reason for visit is   Chief Complaint   Patient presents with    Virtual Regular Visit     Encounter provider Veronica Sandoval    Recent Visits  No visits were found meeting these conditions.  Showing recent visits within past 7 days and meeting all other requirements  Today's Visits  Date Type Provider Dept   09/09/24 Telemedicine Veronica Sandoval  [facility]   Showing today's visits and meeting all other requirements  Future Appointments  No visits were found meeting these conditions.  Showing future appointments within next 150 days and meeting all other requirements     The patient was identified by name and date of birth. Corinne S Kulp was informed that this is a telemedicine visit and that the visit is being conducted through the GageIn platform. She agrees to proceed.  My office door was closed. No one else was in the room.  She acknowledged consent and understanding of privacy and security of the video platform. The patient has agreed to participate and understands they can discontinue the visit at any time.    Patient  is aware this is a billable service.     Subjective Corinne S Kulp is a 44 y.o. female.    HPI     Past Medical History:   Diagnosis Date    Anxiety     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HCC)      Past Surgical History:   Procedure Laterality Date    APPENDECTOMY      extensive lysis of adhesions, MAYURI's procedure (divisions of MAYURI's bands) detorsion of vulvulus, widening of mesenteric pedicle, cecopeexy, appendectomy       BACK SURGERY Right 2013    SF: L4-L5 hemilaminectomy for discectomy. Use of the microscope for microdissection, Use og 40mg of depo-medrol though thee exiting right L5 nerve root. Onset:13     SECTION      x2    CHOLECYSTECTOMY      FL INJECTION RIGHT SHOULDER (ARTHROGRAM)  2022    GALLBLADDER SURGERY      [facility], Onset:     LAPAROSCOPIC LYSIS INTESTINAL ADHESIONS      onset: 15    LUMBAR DISC SURGERY      [place] HYSTEROSCOPY BX ENDOMETRIUM&/POLYPC W/WO D&C N/A 2024    Procedure: DILATATION AND CURETTAGE (D&C) WITH HYSTEROSCOPY, POLYPECTOMY;  Surgeon: Griffin Arriaga MD;  Location: [facility];  Service: Gynecology    [place] HYSTEROSCOPY ENDOMETRIAL ABLATION N/A 2024    Procedure: ABLATION ENDOMETRIAL VIPUL;  Surgeon: Griffin Arriaga MD;  Location: [facility];  Service: Gynecology    [place] LAPS ABD PRTM&OMENTUM DX W/WO SPEC BR/WA SPX N/A 2016    Procedure: LAPAROSCOPY DIAGNOSTIC, EXTENSIVE LYSIS OF ADHESIONS, MAYURI'S PROCEDURE(DIVISION OF MAYURI'S BANDS,DETORSION OF VOLVULUS, WIDENING OF MESENTERIC PEDICLE, CECOPEXY, APPENDECTOMY);  Surgeon: Sudeep Espinoza MD;  Location: [facility];  Service: General    TUBAL LIGATION       Current Outpatient Medications   Medication Sig Dispense Refill    ALPRAZolam (XANAX) 0.25 mg tablet Take 1 tablet (0.25 mg total) by mouth daily as needed for anxiety 15 tablet 1    DULoxetine (CYMBALTA) 60 mg delayed release capsule Take 1 capsule (60 mg total) by mouth daily 90 capsule 2    lamoTRIgine (LaMICtal) 25 mg tablet TAKE 2 TABLETS BY MOUTH  DAILY.  May take an extra tablet daily the week prior to menstrual cycle.  Stop when menstrual cycle starts. 75 tablet 2    multivitamin (THERAGRAN) TABS Take 1 tablet by mouth daily      Probiotic Product (PROBIOTIC-10 PO) Take by mouth 1 daily       No current facility-administered medications for this visit.     Allergies   Allergen Reactions    Biaxin [Clarithromycin] GI Intolerance and Other (See Comments)    Sulfa Antibiotics     Sulfasalazine     Venlafaxine GI Intolerance, Vomiting and Other (See Comments)     Category: Allergy;   Category: Allergy;      Review of Systems    Video Exam    There were no vitals filed for this visit.    Physical Exam    (D) Corinne attended her follow up psychotherapy session today. Corinne reported that since her last session, she has noticed ongoing symptoms. Corinne reported that she ended up going to the concert without her daughter Randi, reporting that she decided to go to soccer practice instead. Corinne reported that she talked to her  Fredy about this, and worked through the interpersonal relationship conflict. Discussed and processed this. Corinne reported that she has been struggling with work related stressors, specifically with credit card fees, costing her $1,200 a month, and switching companies for accepting payments from clients, changing work flows and processes. 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 Corinne describes worrying about finances, at the salon, and then at home. 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 Corinne reported that since her last session, she took her son John to college, moving him into [place]. Corinne described anticipatory anxiety leading up to this, which she reports she didn't anticipate, and reported that she feels it stemmed from  John being worried and anxious, and worrying about him. Corinne reported that she used a PRN to help her on move in day. Corinne reported that after a few days of texting and facetiming John, he felt less anxious, less worried, and adjusting more. Corinne reported that she is financially worrying about John, not having money for extra curricular activities. Discussed and processed this. Corinne reported that her daughter Randi who is a freshmen in high school made the varsity soccer game. Corinne reported that she has been noticing anxiety during her daughter Randi's games, worrying that she is 14 years old, and wanting her to do well, compete, and be on the same level of her peers on varsity. Corinne describes observing Randi as holding herself back, and worries about the parents in the stands making negative comments. Corinne describes struggling with hearing her  Fredy being critical of Randi, describing this as triggering for her. Discussed and processed this. Corinne reported that two weeks ago there was an incident that occurred. Corinne reported that the state police showed up at their house in relation to Randi, who was at the football game, reporting that Fredy talked to the state police. Corinne reported that the state police reported that they received a text message through the [employer] texting program, reporting that it was reported that Randi said she was going to kill someone. Corinne reported that her and Fredy went to the football game and picked up Randi, and met the  at the [facility] to speak with them. Corinne described this as a misunderstanding reporting that what Randi said was taken out of context, and manipulated into something different than what it was. Corinne reported that the state police said that they were investigating this. Corinne reported that afterwards they took Randi back to the high school, and reported that Randi swore up and down that she didn't say that.  Corinne reported that later they learned the the  went down to the football game, spoke with the students, and their parents, reporting that the person that reported it confirmed that they made it up. Corinne reported that with the [employer], kids cannot be held legally accountable for making false reports. Corinne reported that last Tuesday, her daughter Randi was called down to the principals office. Corinne reported that the  called Corinne and told her that he had a screen shot of the conversation and Randi stated that she wished that certain peers would die, resulting in her being investigated at the school, and evaluated. Corinne reported that she was upset with Randi for lying about saying that she didn't say anything in relation to what she was being accused of, yet still saying something inappropriate. Corinne reported that this resulted in Randi and her friend arguing, not being friends, and Randi blocking him on all social media. Corinne describes feeling conflicted, being upset with Randi about saying something inappropriate, yet also finding herself being upset with Randi's friend for sharing a private conversation between the two of them publicly. Corinne reported that her and Fredy talked to Randi, reinforced expectations, and limits and boundaries, describing this as her final warning regarding social media, and if anything happens again, they will restrict her access to it. Corinne describes feeling embarrassed by this, with the school, the police, and other people knowing what happened. Corinne and this writer processed this. Corinne described constantly wanting to shelter, and protect her children, struggling with following through with punishment feeling guilty, and uncomfortable with them being upset with her, processing this through the lens of co-dependency. Discussed ongoing skills. Reviewed limits and boundaries. Provided ongoing psychoeducation. Modeled  effective forms of communication.     (A) Corinne denies any symptoms of frank. Corinne denies any evident or immediate risk factors for self-harm, SI, or HI. Corinne presented as alert and oriented x3. Corinne presented with good eye contact. Corinne's speech presented at a normal rate, volume, and rhythm. Corinne's mood presented as anxious, and depressed and her affect appeared to be congruent. Corinne describes lower moods of sadness, and depression. Corinne describes symptoms of grief in relation to symbolic loss. Corinne describes fatigue, and feeling tired and having little energy. Corinne describes feeling bad about herself, feeling like she is letting herself, and her family down. Corinne describes symptoms of irritability, poor frustration tolerance, becoming easily annoyed, and emotional dysregulation. Corinne describes symptoms of anticipatory anxiety, feeling nervous, anxious, and on edge, worrying too much about different things, and reports symptoms of obsessive, intrusive, ruminating, and repetitive thoughts.     (P) Corinne plans to work on  her self-worth as a parent, from choices and decisions her children make. Corinne plans to journal upon her internal discomfort with others being upset with her, specifically through the lens of co-dependency, journaling upon this more to further explore and self-reflect upon her discomfort with being uncomfortable when others are upset with her. Corinne plans to stick with the facts, intentionally  Rational Mind, identifying ration, reason, logic, and facts, along with Emotional Mind, identifying thoughts, feelings, and emotions, striking balance between the both, implementing the concept of DBT [place] Mind. Corinne plans to meet herself with radical compassion, extending bogdan towards herself, identifying her worth, and giving herself credit. Corinne plans to implement positive self-talk, positive affirmations, self-affirming statements, and  grounding statements. Corinne plans to decrease judgement towards herself, and continue to seek out evidence to challenge negative thoughts, negative thinking traps, cognitive distortions, and harmful core beliefs with DBT Wise Mind, CBT Cognitive Restructuring, and CBT Challenging Negative Thoughts Assessment Questions. Corinne plans to use DBT Opposite Action Skills, challenging herself to structure her schedule with balance, routine, and consistency, giving herself permission to prioritize her mental health and physical health needs, engage in the use of self-care, take time for herself, be present in the moment, and lean into healthy natural supports. Corinne plans to utilize DBT Dialects, specifically the both/ and concept, allowing for two opposing thoughts, feelings, and emotions to both co-exist and be true a tthe same time. Corinne plans to examine pros and cons, along with risks verses benefits in order to make informed decisions, giving herself permission to align choices and decisions with what is in the best interest of her, implementing Radical Acceptance. Corinne plans to utilize DBT Dialects, specifically the both/ and concept, allowing for two opposing thoughts, feelings, and emotions to both co-exist and be true at the same time. Corinne plans to utilize healthy and effective forms of communication to assert herself, advocate for herself, ask for what she needs, targeting interpersonal relationship stressors, reinforcing limits and boundaries, challenging co-dependency, aiming to break the cycle, and implement healthy patterns, and behaviors. Corinne plans to target ongoing symptoms with previously identified skills. Corinne plans to reach out for additional support as needed.      09/09/24  Start Time: 1106  Stop Time: 1156  Total Visit Time: 50 minutes

## 2024-09-09 NOTE — PSYCH
"TREATMENT PLAN (Medication Management Only)        [facility]    Name and Date of Birth:  Corinne S Kulp 44 y.o. 1980  Date of Treatment Plan: September 9, 2024  Diagnosis/Diagnoses:    1. Major depressive disorder, recurrent, moderate (HCC)    2. NAVI (generalized anxiety disorder)    3. Mood disorder (HCC)      Strengths/Personal Resources for Self-Care: supportive family, supportive friends.  Area/Areas of need (in own words): \" I am really doing well.  I feel good.\".  1. Long Term Goal: \" To continue to do well functionally best\".   Target Date: 6 months - 3/9/2025  Person/Persons responsible for completion of goal: Corinne  2.  Short Term Objective (s) - How will we reach this goal?:   A.  Provider new recommended medication/dosage changes and/or continue medication(s): continue current medications as prescribed.  B.  N/A.    Target Date: 1 week - 6 months  Person/Persons Responsible for Completion of Goal: Corinne  Progress Towards Goals: continuing treatment  Treatment Modality: medication education at every visit  Review due 6 months from date of this plan: 6 months - 3/9/2025  Expected length of service: ongoing treatment unless revised  My Physician/PA/NP and I have developed this plan together and I agree to work on the goals and objectives. I understand the treatment goals that were developed for my treatment.  "

## 2024-09-09 NOTE — PSYCH
Virtual Regular Visit    Verification of patient location:    Patient is located at Home in the following state in which I hold an active license [place]      Assessment/Plan:    Problem List Items Addressed This Visit          Behavioral Health    NAVI (generalized anxiety disorder) (Chronic)    Relevant Medications    ALPRAZolam (XANAX) 0.25 mg tablet    DULoxetine (CYMBALTA) 60 mg delayed release capsule    Mood disorder (HCC)    Relevant Medications    ALPRAZolam (XANAX) 0.25 mg tablet    DULoxetine (CYMBALTA) 60 mg delayed release capsule    lamoTRIgine (LaMICtal) 25 mg tablet    Major depressive disorder, recurrent, moderate (HCC) - Primary    Relevant Medications    ALPRAZolam (XANAX) 0.25 mg tablet    DULoxetine (CYMBALTA) 60 mg delayed release capsule       Goals addressed in session: Maintain current level of debility         Reason for visit is   Chief Complaint   Patient presents with    Virtual Regular Visit          Encounter provider SAKINA Bruno      Recent Visits  No visits were found meeting these conditions.  Showing recent visits within past 7 days and meeting all other requirements  Today's Visits  Date Type Provider Dept   09/09/24 Telephone SAKINA Bruno  [facility]   09/09/24 Telemedicine [facility]INA Bruno [facility] [place]   Showing today's visits and meeting all other requirements  Future Appointments  No visits were found meeting these conditions.  Showing future appointments within next 150 days and meeting all other requirements       The patient was identified by name and date of birth. Corinne S Kulp was informed that this is a telemedicine visit and that the visit is being conducted throughthe iStoryTime platform. She agrees to proceed..  My office door was closed. No one else was in the room.  She acknowledged consent and understanding of privacy and security of the video platform. The patient has agreed to participate and  understands they can discontinue the visit at any time.    Patient is aware this is a billable service.     Subjective  Corinne S Kulp is a 44 y.o. female with history of major depressive disorder and mood disorder and anxiety disorder.  She is doing remarkably well on her current medication regimen.  She is offering no complaint and no concerns.  Her mood is stable.  She is happy and content.  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  All is going well there.  She is sleeping well and eating well.  Her children are also doing very well.  Her son is in college at [employer] and her daughter has started her freshman year in high school..  She and her  are also very supportive of each other.  No other new clinical issues or concerns were expressed by patient.  She follows up with her medical doctor on a regular basis.  Follow-up with me in 3 months or sooner if necessary.  Mental status exam: Patient is awake and alert and oriented x 3.  Mood stable.  Patient is not suicidal, not homicidal and not psychotic.  Tolerating all medications well.  Associations are intact.  No overt delusions.  Thoughts are well-organized, coherent and goal-directed.  Attention span is very good.  Impulse control is good.  Judgment and insight are good.  Memory is good.  The patient will continue on:  Xanax 0.25 mg one quarter to half tab daily as needed for anxiety.  Patient uses rarely.  Cymbalta 60 mg daily  Lamictal 50 mg daily.  She may take an extra tablet if needed 1 week prior to her menstrual cycle.  She can stop it when the menstrual cycle starts.  Follow-up with me in 3 months or sooner if necessary.    We have discussed their safety plan and pt agrees that if they experience unsafe thoughts that they will reach out to their supports including this office, the suicide hotline, and emergency services if necessary.           Past Medical History:    Diagnosis Date    Anxiety     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HCC)        Past Surgical History:   Procedure Laterality Date    APPENDECTOMY      extensive lysis of adhesions, MAYURI's procedure (divisions of MAYURI's bands) detorsion of vulvulus, widening of mesenteric pedicle, cecopeexy, appendectomy       BACK SURGERY Right 2013    SF: L4-L5 hemilaminectomy for discectomy. Use of the microscope for microdissection, Use og 40mg of depo-medrol though thee exiting right L5 nerve root. Onset:13     SECTION      x2    CHOLECYSTECTOMY      FL INJECTION RIGHT SHOULDER (ARTHROGRAM)  2022    GALLBLADDER SURGERY      [facility], Onset:     LAPAROSCOPIC LYSIS INTESTINAL ADHESIONS      onset: 15    LUMBAR DISC SURGERY      AL HYSTEROSCOPY BX ENDOMETRIUM&/POLYPC W/WO D&C N/A 2024    Procedure: DILATATION AND CURETTAGE (D&C) WITH HYSTEROSCOPY, POLYPECTOMY;  Surgeon: Griffin Arriaga MD;  Location: [facility];  Service: Gynecology    AL HYSTEROSCOPY ENDOMETRIAL ABLATION N/A 2024    Procedure: ABLATION ENDOMETRIAL VIPUL;  Surgeon: Griffin Arriaga MD;  Location: [facility];  Service: Gynecology    AL LAPS ABD PRTM&OMENTUM DX W/WO SPEC BR/WA SPX N/A 2016    Procedure: LAPAROSCOPY DIAGNOSTIC, EXTENSIVE LYSIS OF ADHESIONS, MAYURI'S PROCEDURE(DIVISION OF MAYURI'S BANDS,DETORSION OF VOLVULUS, WIDENING OF MESENTERIC PEDICLE, CECOPEXY, APPENDECTOMY);  Surgeon: Sudeep Espinoza MD;  Location: [facility];  Service: General    TUBAL LIGATION         Current Outpatient Medications   Medication Sig Dispense Refill    ALPRAZolam (XANAX) 0.25 mg tablet Take 1 tablet (0.25 mg total) by mouth daily as needed for anxiety 15 tablet 1    DULoxetine (CYMBALTA) 60 mg delayed release capsule Take 1 capsule (60 mg total) by mouth daily 90 capsule 2    lamoTRIgine (LaMICtal) 25 mg tablet TAKE 2 TABLETS BY MOUTH DAILY.  May take an extra tablet daily the week prior to menstrual cycle.  Stop when menstrual cycle starts. 75 tablet 2     multivitamin (THERAGRAN) TABS Take 1 tablet by mouth daily      Probiotic Product (PROBIOTIC-10 PO) Take by mouth 1 daily       No current facility-administered medications for this visit.        Allergies   Allergen Reactions    Biaxin [Clarithromycin] GI Intolerance and Other (See Comments)    Sulfa Antibiotics     Sulfasalazine     Venlafaxine GI Intolerance, Vomiting and Other (See Comments)     Category: Allergy;   Category: Allergy;        Review of Systems    Video Exam    There were no vitals filed for this visit.    Physical Exam     Visit Time    Visit Start Time: 10:30a  Visit Stop Time: 11:00a  Total Visit Duration: 30 minutes were spent in the visit.  Time was spent reviewing the treatment plan, reviewing medications, ordering medications and completing progress note.

## 2024-09-17 LAB
ALBUMIN SERPL-MCNC: 4.2 G/DL (ref 3.9–4.9)
ALP SERPL-CCNC: 48 IU/L (ref 44–121)
ALT SERPL-CCNC: 13 IU/L (ref 0–32)
AST SERPL-CCNC: 20 IU/L (ref 0–40)
BILIRUB SERPL-MCNC: 0.5 MG/DL (ref 0–1.2)
BUN SERPL-MCNC: 14 MG/DL (ref 6–24)
BUN/CREAT SERPL: 17 (ref 9–23)
CALCIUM SERPL-MCNC: 9.1 MG/DL (ref 8.7–10.2)
CHLORIDE SERPL-SCNC: 106 MMOL/L (ref 96–106)
CHOLEST SERPL-MCNC: 201 MG/DL (ref 100–199)
CO2 SERPL-SCNC: 26 MMOL/L (ref 20–29)
CREAT SERPL-MCNC: 0.83 MG/DL (ref 0.57–1)
EGFR: 89 ML/MIN/1.73
ERYTHROCYTE [DISTWIDTH] IN BLOOD BY AUTOMATED COUNT: 11.8 % (ref 11.7–15.4)
FERRITIN SERPL-MCNC: 61 NG/ML (ref 15–150)
GLOBULIN SER-MCNC: 2.2 G/DL (ref 1.5–4.5)
GLUCOSE SERPL-MCNC: 87 MG/DL (ref 70–99)
HCT VFR BLD AUTO: 37.8 % (ref 34–46.6)
HDLC SERPL-MCNC: 58 MG/DL
HGB BLD-MCNC: 12.2 G/DL (ref 11.1–15.9)
LDLC SERPL CALC-MCNC: 132 MG/DL (ref 0–99)
LDLC/HDLC SERPL: 2.3 RATIO (ref 0–3.2)
MCH RBC QN AUTO: 30.1 PG (ref 26.6–33)
MCHC RBC AUTO-ENTMCNC: 32.3 G/DL (ref 31.5–35.7)
MCV RBC AUTO: 93 FL (ref 79–97)
PLATELET # BLD AUTO: 240 X10E3/UL (ref 150–450)
POTASSIUM SERPL-SCNC: 4.5 MMOL/L (ref 3.5–5.2)
PROT SERPL-MCNC: 6.4 G/DL (ref 6–8.5)
RBC # BLD AUTO: 4.05 X10E6/UL (ref 3.77–5.28)
SL AMB VLDL CHOLESTEROL CALC: 11 MG/DL (ref 5–40)
SODIUM SERPL-SCNC: 142 MMOL/L (ref 134–144)
TRIGL SERPL-MCNC: 62 MG/DL (ref 0–149)
TSH SERPL DL<=0.005 MIU/L-ACNC: 1.08 UIU/ML (ref 0.45–4.5)
WBC # BLD AUTO: 3.6 X10E3/UL (ref 3.4–10.8)

## 2024-09-23 ENCOUNTER — TELEMEDICINE (OUTPATIENT)
Dept: BEHAVIORAL/MENTAL HEALTH CLINIC | Facility: CLINIC | Age: 44
End: 2024-09-23
Payer: COMMERCIAL

## 2024-09-23 DIAGNOSIS — F33.1 MAJOR DEPRESSIVE DISORDER, RECURRENT, MODERATE (HCC): ICD-10-CM

## 2024-09-23 DIAGNOSIS — F41.1 GAD (GENERALIZED ANXIETY DISORDER): Primary | Chronic | ICD-10-CM

## 2024-09-23 PROCEDURE — 90837 PSYTX W PT 60 MINUTES: CPT | Performed by: SOCIAL WORKER
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
## 2024-09-24 DIAGNOSIS — F39 MOOD DISORDER (HCC): ICD-10-CM

## 2024-09-24 RX ORDER — LAMOTRIGINE 25 MG/1
TABLET ORAL
Qty: 60 TABLET | Refills: 0 | Status: SHIPPED | OUTPATIENT
Start: 2024-09-24

## 2024-10-07 ENCOUNTER — TELEMEDICINE (OUTPATIENT)
Dept: BEHAVIORAL/MENTAL HEALTH CLINIC | Facility: CLINIC | Age: 44
End: 2024-10-07
Payer: COMMERCIAL

## 2024-10-07 DIAGNOSIS — F41.1 GAD (GENERALIZED ANXIETY DISORDER): Chronic | ICD-10-CM

## 2024-10-07 DIAGNOSIS — F33.0 MILD EPISODE OF RECURRENT MAJOR DEPRESSIVE DISORDER (HCC): Primary | ICD-10-CM

## 2024-10-07 PROCEDURE — 90834 PSYTX W PT 45 MINUTES: CPT | Performed by: SOCIAL WORKER

## 2024-10-07 NOTE — PSYCH
Psychotherapy Provided: Individual Psychotherapy 50 minutes.     Length of time in session: 50 minutes.     Current suicide risk : Low .     Behavioral Health Treatment Plan [facility]: Diagnosis and Treatment Plan explained to Corinne, Corinne relates understanding diagnosis and is agreeable to Treatment Plan. Yes.      Visit start and stop times:    10/07/24       Virtual Regular Visit    Verification of patient location: [place].     Patient is located at Home in the following state in which I hold an active license [place].     Assessment/Plan:    Problem List Items Addressed This Visit          Behavioral Health    NAVI (generalized anxiety disorder) (Chronic)    Mild episode of recurrent major depressive disorder (HCC) - Primary     Goals addressed in session: Goal 1 Follow up psychotherapy session.     Reason for visit is   Chief Complaint   Patient presents with    Virtual Regular Visit     Encounter provider Veronica Sandoval    Recent Visits  No visits were found meeting these conditions.  Showing recent visits within past 7 days and meeting all other requirements  Today's Visits  Date Type Provider Dept   10/07/24 Telemedicine Veronica Sandoval  [facility]   Showing today's visits and meeting all other requirements  Future Appointments  No visits were found meeting these conditions.  Showing future appointments within next 150 days and meeting all other requirements     The patient was identified by name and date of birth. Corinne S Kulp was informed that this is a telemedicine visit and that the visit is being conducted through the [employer] platform. She agrees to proceed.  My office door was closed. No one else was in the room.  She acknowledged consent and understanding of privacy and security of the video platform. The patient has agreed to participate and understands they can discontinue the visit at any time.    Patient is aware this is a billable service.      Subjective Corinne S Kulp is a 44 y.o. female.    HPI     Past Medical History:   Diagnosis Date    Anxiety     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HCC)      Past Surgical History:   Procedure Laterality Date    APPENDECTOMY      extensive lysis of adhesions, MAYURI's procedure (divisions of MAYURI's bands) detorsion of vulvulus, widening of mesenteric pedicle, cecopeexy, appendectomy       BACK SURGERY Right 2013    SF: L4-L5 hemilaminectomy for discectomy. Use of the microscope for microdissection, Use og 40mg of depo-medrol though thee exiting right L5 nerve root. Onset:13     SECTION      x2    CHOLECYSTECTOMY      FL INJECTION RIGHT SHOULDER (ARTHROGRAM)  2022    GALLBLADDER SURGERY      GV, Onset:     LAPAROSCOPIC LYSIS INTESTINAL ADHESIONS      onset: 15    LUMBAR DISC SURGERY      SC HYSTEROSCOPY BX ENDOMETRIUM&/POLYPC W/WO D&C N/A 2024    Procedure: DILATATION AND CURETTAGE (D&C) WITH HYSTEROSCOPY, POLYPECTOMY;  Surgeon: Griffin Arriaga MD;  Location: [facility];  Service: Gynecology    SC HYSTEROSCOPY ENDOMETRIAL ABLATION N/A 2024    Procedure: ABLATION ENDOMETRIAL VIPUL;  Surgeon: Griffin Arriaga MD;  Location: [facility];  Service: Gynecology    SC LAPS ABD PRTM&OMENTUM DX W/WO SPEC BR/WA SPX N/A 2016    Procedure: LAPAROSCOPY DIAGNOSTIC, EXTENSIVE LYSIS OF ADHESIONS, MYAURI'S PROCEDURE(DIVISION OF MAYURI'S BANDS,DETORSION OF VOLVULUS, WIDENING OF MESENTERIC PEDICLE, CECOPEXY, APPENDECTOMY);  Surgeon: Sudeep Espinoza MD;  Location: [facility];  Service: General    TUBAL LIGATION       Current Outpatient Medications   Medication Sig Dispense Refill    ALPRAZolam (XANAX) 0.25 mg tablet Take 1 tablet (0.25 mg total) by mouth daily as needed for anxiety 15 tablet 1    DULoxetine (CYMBALTA) 60 mg delayed release capsule Take 1 capsule (60 mg total) by mouth daily 90 capsule 2    lamoTRIgine (LaMICtal) 25 mg tablet TAKE 2 TABLETS BY MOUTH DAILY 60 tablet 0    multivitamin  (THERAGRAN) TABS Take 1 tablet by mouth daily      Probiotic Product (PROBIOTIC-10 PO) Take by mouth 1 daily       No current facility-administered medications for this visit.     Allergies   Allergen Reactions    Biaxin [Clarithromycin] GI Intolerance and Other (See Comments)    Sulfa Antibiotics     Sulfasalazine     Venlafaxine GI Intolerance, Vomiting and Other (See Comments)     Category: Allergy;   Category: Allergy;      Review of Systems    Video Exam    There were no vitals filed for this visit.    Physical Exam    (D) Corinne attended her follow up psychotherapy session today. Corinne reported that since her last session, she has noticed ongoing symptoms. Corinne described feeling triggered today, reporting that she had a nail appointment this morning that went longer, resulting in her being late to her session. Corinne described it as being stressful, describing her feeling stressed out and overwhelmed, describing feeling overstimulated, and emotionally dysregulated. 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 Corinne reported that she was dealing with issues at work while at the salon, reporting that she was responding to e-mails, text messages, and managing business related issues. Corinne reported that she didn't work this past Saturday, reporting that she went to friends and family weekend for her son John at [place], and her daughter had a soccer game. Corinne reported that she had (2) weddings scheduled for this Saturday at work, reporting that she left it to her employees to manage this. Corinne reported that there were issues with the two weddings, reporting that the first wedding she received an e-mail from one of the people feeling like they were overcharged for their service that they had done. Corinne reported that the second wedding party the mother of the  keisha and the mother of the groom didn't show to their appointment, didn't call, reach out, or notify anyone. Corinne reported that they learned that they didn't come to their appointment because they got behind and were dealing with wedding party issues. Corinne reported that the employees assigned to this party ended up missing out of the time, service, and income for this service. Corinne described her employees are being upset, wanting Corinne to implement bridal contracts, with deposits and clear policies and procedures, with expectations and commitments. 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 Corinne reported that she has been dealing with issues with inflation, finances, and managing the operational end of things when it comes to the salon. Corinne describes not wanting to have help with the operational end of things, not wanting anyone to have access to her finances, describing not trusting anyone. Corinne also describes struggling with not being able to financially afford to pay someone to assist her with administrative tasks. Corinne describes feeling stressed out and overwhelmed in relation to finances. Corinne describes being in the negative over the past few months, and has decided to raise the prices in January. Corinne describes even when she has a day off from work, there is always something to do and manage for the salon. Corinne describes struggling with anticipatory anxiety every Monday with the idea of having to go back into work on Tuesday's. Corinne describes feeling isolated and alone at work, describing herself as guarded, reporting  lack of trust with others. Corinne describes not wanting to feel disappointed and/or hurt by others; therefore, doesn't allow people to get close to her, and keeps everyone at a distance. Corinne and this writer processed this. Discussed ongoing skills. Reviewed limits and boundaries. Modeled effective forms of communication. Provided ongoing psychoeducation.     (A) Corinne's mood presented as anxious, and depressed, and her affect appeared to be congruent, and tearful at times. Corinne presented with good eye contact. Corinne presented as alert and oriented x3. Corinne's speech presented at a normal rate, volume, and rhythm. Corinne denies any symptoms of frank. Corinne denies any evident or immediate risk factors for self-harm, SI, or HI. Corinne describes fluctuating symptoms, reporting little interest and pleasure in doing things. Corinne describes lower moods of sadness, and depression. Corinne describes fatigue, and feeling tired and having little energy. Corinne describes reporting irritability, poor frustration tolerance, becoming easily annoyed, and describes emotional dysregulation. Corinne describes worrying too much about different things, along with anticipatory anxiety. Corinne describes symptoms of obsessive, intrusive, ruminating, and repetitive thoughts.     (P) Corinne plans to consider what it would take for her to work on prioritizing relationships and socialization, working through trauma triggers and responses. Corinne plans to examine help that helps verses help that hurts, journaling to further explore and self-reflect upon patterns, themes, and behaviors through the lens of co-dependency, identifying, associating, honoring, validating, and making space for her feelings, and emotions. Corinne plans to decrease judgement towards herself, and intentionally meet herself with radical compassion, and extending bogdan towards herself. Corinne plans to identify her worth, giving herself credit, and  implement positive self-talk, positive affirmations, self-affirming statements, and grounding statements. Corinne plans to seek out evidence to challenge negative thoughts, negative thinking traps, cognitive distortions, and harmful core beliefs with DBT Wise Mind, CBT Cognitive Restructuring, and CBT Challenging Negative Thoughts Assessment Questions. Corinne plans to target ongoing symptoms with deep breathing techniques, meditation/ mindfulness techniques, coping skills, distress tolerance skills, distraction techniques, self-soothing techniques, sensory related skills, and grounding techniques. Corinne plans to implement radical acceptance, examining pros and cons, along with risks verses benefits in order to make informed decisions, giving herself permission to align choices and decisions with what is in the best interest of her. Corinne plans to stick with the facts, intentionally  Rational Mind, identifying ration, reason, logic, and facts, along with Emotional Mind, identifying thoughts, feelings, and emotions, implementing the concept of DBT [place] Mind. Corinne plans to utilize the concept of DBT Dialects, specifically the both/ and concept, allowing for two opposing thoughts, feelings, and emotions to both co-exist and be true at the same time. Corinne plans to structure her schedule with balance, routine, and consistency, using DBT Opposite Action Skills to challenge negative thoughts, negative thinking traps, cognitive distortions, and harmful core beliefs. Corinne plans to utilize healthy and effective forms of communication, asserting herself, advocating for herself, asking for what she needs, targeting interpersonal relationship stressors, reinforcing limits and boundaries, challenging co-dependency, aiming to break the cycle, and implement healthy patterns, and behaviors. Corinne plans to reach out for additional support as needed.     10/07/24  Start Time: 1109  Stop Time: 1159  Total Visit  Time: 50 minutes

## 2024-10-19 DIAGNOSIS — F39 MOOD DISORDER (HCC): ICD-10-CM

## 2024-10-21 ENCOUNTER — TELEMEDICINE (OUTPATIENT)
Dept: BEHAVIORAL/MENTAL HEALTH CLINIC | Facility: CLINIC | Age: 44
End: 2024-10-21
Payer: COMMERCIAL

## 2024-10-21 DIAGNOSIS — F41.1 GAD (GENERALIZED ANXIETY DISORDER): Primary | Chronic | ICD-10-CM

## 2024-10-21 DIAGNOSIS — F33.1 MAJOR DEPRESSIVE DISORDER, RECURRENT, MODERATE (HCC): ICD-10-CM

## 2024-10-21 PROCEDURE — 90837 PSYTX W PT 60 MINUTES: CPT | Performed by: SOCIAL WORKER

## 2024-10-21 RX ORDER — LAMOTRIGINE 25 MG/1
TABLET ORAL
Qty: 60 TABLET | Refills: 0 | Status: SHIPPED | OUTPATIENT
Start: 2024-10-21

## 2024-10-21 NOTE — PSYCH
Psychotherapy Provided: Individual Psychotherapy 55 minutes.     Length of time in session: 55 minutes.    Current suicide risk : Low .    Behavioral Health Treatment Plan [facility]: Diagnosis and Treatment Plan explained to Corinne, Corinne relates understanding diagnosis and is agreeable to Treatment Plan. Yes.     Visit start and stop times:    10/21/24  Start Time: 1100  Stop Time: 1155  Total Visit Time: 55 minutes    Virtual Regular Visit    Verification of patient location: [place], [place].     Patient is located at Home in the following state in which I hold an active license [place].     Assessment/Plan:    Problem List Items Addressed This Visit          Behavioral Health    NAVI (generalized anxiety disorder) - Primary (Chronic)    Major depressive disorder, recurrent, moderate (HCC)     Goals addressed in session: Goal 1 Follow up psychotherapy session.     Reason for visit is   Chief Complaint   Patient presents with    Virtual Regular Visit     Encounter provider Veronica Sandoval    Recent Visits  No visits were found meeting these conditions.  Showing recent visits within past 7 days and meeting all other requirements  Today's Visits  Date Type Provider Dept   10/21/24 Telemedicine Veronica Sandoval  [facility]   Showing today's visits and meeting all other requirements  Future Appointments  No visits were found meeting these conditions.  Showing future appointments within next 150 days and meeting all other requirements     The patient was identified by name and date of birth. Corinne S Kulp was informed that this is a telemedicine visit and that the visit is being conducted through the [employer] platform. She agrees to proceed.  My office door was closed. No one else was in the room.  She acknowledged consent and understanding of privacy and security of the video platform. The patient has agreed to participate and understands they can discontinue the visit at any time.    Patient is  aware this is a billable service.     Subjective Corinne S Kulp is a 44 y.o. female.    HPI     Past Medical History:   Diagnosis Date    Anxiety     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HCC)      Past Surgical History:   Procedure Laterality Date    APPENDECTOMY      extensive lysis of adhesions, MAYURI's procedure (divisions of MAYURI's bands) detorsion of vulvulus, widening of mesenteric pedicle, cecopeexy, appendectomy       BACK SURGERY Right 2013    SF: L4-L5 hemilaminectomy for discectomy. Use of the microscope for microdissection, Use og 40mg of depo-medrol though thee exiting right L5 nerve root. Onset:13     SECTION      x2    CHOLECYSTECTOMY      FL INJECTION RIGHT SHOULDER (ARTHROGRAM)  2022    GALLBLADDER SURGERY      GV, Onset:     LAPAROSCOPIC LYSIS INTESTINAL ADHESIONS      onset: 15    LUMBAR DISC SURGERY      TX HYSTEROSCOPY BX ENDOMETRIUM&/POLYPC W/WO D&C N/A 2024    Procedure: DILATATION AND CURETTAGE (D&C) WITH HYSTEROSCOPY, POLYPECTOMY;  Surgeon: Griffin Arriaga MD;  Location: [facility];  Service: Gynecology    TX HYSTEROSCOPY ENDOMETRIAL ABLATION N/A 2024    Procedure: ABLATION ENDOMETRIAL VIPUL;  Surgeon: Griffin Arriaga MD;  Location: [facility];  Service: Gynecology    TX LAPS ABD PRTM&OMENTUM DX W/WO SPEC BR/WA SPX N/A 2016    Procedure: LAPAROSCOPY DIAGNOSTIC, EXTENSIVE LYSIS OF ADHESIONS, MAYURI'S PROCEDURE(DIVISION OF MAYURI'S BANDS,DETORSION OF VOLVULUS, WIDENING OF MESENTERIC PEDICLE, CECOPEXY, APPENDECTOMY);  Surgeon: Sudeep Espinoza MD;  Location: [facility];  Service: General    TUBAL LIGATION       Current Outpatient Medications   Medication Sig Dispense Refill    ALPRAZolam (XANAX) 0.25 mg tablet Take 1 tablet (0.25 mg total) by mouth daily as needed for anxiety 15 tablet 1    DULoxetine (CYMBALTA) 60 mg delayed release capsule Take 1 capsule (60 mg total) by mouth daily 90 capsule 2    lamoTRIgine (LaMICtal) 25 mg tablet TAKE 2 TABLETS BY MOUTH DAILY 60  tablet 0    multivitamin (THERAGRAN) TABS Take 1 tablet by mouth daily      Probiotic Product (PROBIOTIC-10 PO) Take by mouth 1 daily       No current facility-administered medications for this visit.     Allergies   Allergen Reactions    Biaxin [Clarithromycin] GI Intolerance and Other (See Comments)    Sulfa Antibiotics     Sulfasalazine     Venlafaxine GI Intolerance, Vomiting and Other (See Comments)     Category: Allergy;   Category: Allergy;      Review of Systems    Video Exam    There were no vitals filed for this visit.    Physical Exam     (D) Corinne attended her follow up psychotherapy session today. Corinne reached out to this writer last Wednesday and inquired about earlier follow up and this writer offered her options, and Corinne declined due to scheduling conflicts with her own work schedule. Corinne reported that since her last session, she has noticed ongoing symptoms. Corinne reported that since her last session, she had an episode of depression, noticing the trigger being from work. Corinne described a situation where a woman that had her hair done as part of a wedding party felt that she was overcharged, and Corinne had to deal and manage this. 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 Corinne reported that she ended up going to salon, looking in their computer system, and checked to make sure that she was charged the correct keene. Corinne reported that she had responded to the woman, saying that she was in fact charged correctly for there salon service, and the same price as everyone else. Corinne reported that she ended up speaking with the woman directly, in addition to the stylist, and navigated various stressors and triggers in relation to this. Corinne described getting customer complaints with a man calling saying that he felt that $20 for a men's hair cut was too much, triggering negative thoughts, and  anxiety in relation to this. Corinne describes trying to have balance with managing a business, having her own clients, and managing her employees. Corinne reported that she also believes that the episode of depression was hormonal related, reporting that she was leading up to her cycle. Corinne describes increased depression, and irritability during this timeframe, yet felt that this was worse then it has been recently over the past several months. Discussed and processed this. 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 Corinne reported that she immediately struggled with negative thoughts towards herself, worrying that this employee left because of something she did, said, or didn't do, creating more anxiety and self-doubt. Corinne reported that this is where she had the intrusive thought, see the assessment section below for further detail. Discussed and processed this. Corinne describes feeling conflicted with wanting to own her own business, yet not wanting to deal with the administrative responsibilities with managing people and customers, and processed through this. Corinne reported that during her episode of depression, she reported that she talked to her  Fredy, and then her friend who is part of a prayer group. Corinne reported that on Thursday's is when there is a prayer group that she has a desire to attend, and then has anxiety because that is a free night for her that she isn't working and would like to be home. Corinne described anticipatory anxiety in relation to committing to things, and following through with things, creating stress and anxiety for herself. Corinne describes stressors with her  Fredy's work schedule, feeling like he is missing out on things with the kids, and her,  because he is working, earning income, and trying to address financial stress and constraints they are experiencing personally and professionally right now. Discussed and processed this. Corinne reported that she recently was looking for a picture of her grandfather in the service, and came across an envelope of handwritten journal entries from her mother and her own therapy sessions. Corinne reported that she read them, and was triggering for her, reading her mother's journal entries feeling like everyone around her was working, and didn't prioritize family time. Corinne describes triggers in relation to this,  describing countertransference with her late mother, wondering if they are having similar thoughts, feelings, and emotions. Corinne reported that she ended up deciding to stop reading the writings, reporting that it was too triggering for her, describing conflicting memories of her grandparents in comparison to her mother's thoughts, feelings, and emotions. Corinne describes worrying that in many ways, she is struggling with similar thoughts, feelings, emotions, and mental health, having not wanted that for herself. Corinne described mixed feelings with not wanting to be similar to her mother, yet also having compassion for her mother, reporting that she has memories of her mother having episodes of emotional dysregulation, and rage, and then acted as if nothing ever happened. Corinne and this writer processed this. Modeled effective forms of communication. Provided ongoing psychoeducation. Discussed ongoing skills. Reviewed limits and boundaries.     (A) Corinne presented as alert and oriented x3. Corinne's speech presented at a normal rate, volume, and rhythm. Corinne presented with good eye contact. Corinne denies any symptoms of frank. Corinne denies any or immediate risk factors for self-harm, SI, or HI in the moment. Corinne described having an intrusive thought of driving her car into oncoming  "traffic, describing it as an intrusive thoughts, denying any plan or intention to act upon this or follow through with this. Corinne was able to verbally contract for safety and confirmed having the ability to reach out for additional support as needed. Corinne presented as forward thinking, and discussed upcoming plans, and identified reasons to live. Corinne's mood presented as anxious, and depressed, and her affect appeared to be congruent, and tearful at times. Corinne describes irritability, poor frustration tolerance, becoming easily annoyed, and emotional dysregulation. Corinne describes obsessive, intrusive, ruminating, and repetitive thoughts. Corinne describes anticipatory anxiety, feeling nervous, anxious, and on edge, not being abl to stop and control worrying, worrying too much about different things, and had episodes of crying. Corinne describes lower moods of sadness, and depression. Corinne describes little interest and pleasure in doing things. Corinne describes sleep disturbances, fatigue, and feeling tired and having little energy. Corinne describes feeling bad about herself, feeling like she is letting herself, and others down. Corinne describes symptoms of grief in relation to symbolic loss.     (P) Corinne plans to read the book, \"Radical Compassion,\" by Janette Guzman outside of session, identifying areas that she connects with verses areas that she doesn't connect with to further explore and self-reflect upon this outside of session. Corinne plans to be intentional about challenging herself to use DBT Opposite Action Skills to follow through with going to the prayer group Thursday night for additional resources, services, and supports outside of therapy. Corinne plans to spend time journaling upon her thoughts, feelings, and emotions when it comes to identifying similarities and differences between her experience with her mother, in comparison to her own experience with mental health for herself, " journaling to further explore and self-reflect more, to review more next session. Corinne plans to identify, associate, honor, validate, and make space for her feelings and emotions, intentionally decreasing judgement towards herself and meeting herself with radical compassion, and extending bogdan towards herself. Corinne plans to intentionally identify her worth, and give herself credit. Corinne plans to implement positive self-talk, positive affirmations, self-affirming statements, and grounding statements. Corinne plans to stick with the facts, and intentionally separate Rational Mind, identifying ration, reason, logic, and facts, along with Emotional Mind, identifying thoughts, feelings, and emotions, striking balance between the both, implementing the concept of DBT [place] Mind. Corinne plans to implement the concept of DBT Dialects, specifically the both/ and concept, allowing for two opposing thoughts, feelings, and emotions to both co-exist and be true a the same time. Corinne plans to implement the concept of Radical Acceptance, examining pros and cons, along with risks verses benefits in order to make informed decisions, giving herself permission to align choices and decisions with what is in the best interest of her. Corinne plans to target ongoing symptoms with deep breathing techniques, meditation/ mindfulness techniques, deep breathing techniques, meditation/ mindfulness techniques, coping skills, distress tolerance skills, distraction techniques, and grounding techniques. Corinne plans to structure her schedule with balance, routine, and consistency, using DBT Opposite Action Skills to challenge herself to prioritize her mental health and physical health needs, engage in the use of self-care, take time for herself, be present in the moment, and lean into healthy natural supports. Corinne plans to use healthy and effective forms of communication, asserting herself, advocating for herself, asking for what  she needs, targeting interpersonal relationship stressors, reinforcing limits and boundaries, challenging co-dependency, aiming to break the cycle, and implement healthy patterns, and behaviors. Corinne plans to reach out for additional support as needed.     10/21/24  Start Time: 1100  Stop Time: 1155  Total Visit Time: 55 minutes

## 2024-11-04 ENCOUNTER — TELEPHONE (OUTPATIENT)
Age: 44
End: 2024-11-04

## 2024-11-04 ENCOUNTER — TELEMEDICINE (OUTPATIENT)
Dept: BEHAVIORAL/MENTAL HEALTH CLINIC | Facility: CLINIC | Age: 44
End: 2024-11-04
Payer: COMMERCIAL

## 2024-11-04 DIAGNOSIS — F33.1 MAJOR DEPRESSIVE DISORDER, RECURRENT, MODERATE (HCC): Primary | ICD-10-CM

## 2024-11-04 DIAGNOSIS — F41.1 GAD (GENERALIZED ANXIETY DISORDER): Chronic | ICD-10-CM

## 2024-11-04 PROCEDURE — 90834 PSYTX W PT 45 MINUTES: CPT | Performed by: SOCIAL WORKER

## 2024-11-04 NOTE — TELEPHONE ENCOUNTER
Patient contacted the office to schedule a follow up visit with provider. Patient is now scheduled for 11/11  at 8am virtually.

## 2024-11-04 NOTE — PSYCH
Psychotherapy Provided: Individual Psychotherapy 50 minutes.     Length of time in session: 50 minutes.     Current suicide risk : Low .     Behavioral Health Treatment Plan [facility]: Diagnosis and Treatment Plan explained to Corinne, Corinne relates understanding diagnosis and is agreeable to Treatment Plan. Yes.      Visit start and stop times:    11/04/24       Virtual Regular Visit    Verification of patient location: [place].     Patient is located at Home in the following state in which I hold an active license [place].     Assessment/Plan:    Problem List Items Addressed This Visit          Behavioral Health    NAVI (generalized anxiety disorder) (Chronic)    Major depressive disorder, recurrent, moderate (HCC) - Primary     Goals addressed in session: Goal 1 Follow up psychotherapy session.     Reason for visit is   Chief Complaint   Patient presents with    Virtual Regular Visit     Encounter provider Veronica Sandoval    Recent Visits  No visits were found meeting these conditions.  Showing recent visits within past 7 days and meeting all other requirements  Today's Visits  Date Type Provider Dept   11/04/24 Telemedicine Veronica Sandoval  [facility]   Showing today's visits and meeting all other requirements  Future Appointments  No visits were found meeting these conditions.  Showing future appointments within next 150 days and meeting all other requirements     The patient was identified by name and date of birth. Corinne S Namita was informed that this is a telemedicine visit and that the visit is being conducted through the [employer] platform. She agrees to proceed.  My office door was closed. No one else was in the room.  She acknowledged consent and understanding of privacy and security of the video platform. The patient has agreed to participate and understands they can discontinue the visit at any time.    Patient is aware this is a billable service.     Subjective  Corinne S  Namita is a 44 y.o. female.    HPI     Past Medical History:   Diagnosis Date    Anxiety     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HCC)      Past Surgical History:   Procedure Laterality Date    APPENDECTOMY      extensive lysis of adhesions, MAYURI's procedure (divisions of MAYURI's bands) detorsion of vulvulus, widening of mesenteric pedicle, cecopeexy, appendectomy       BACK SURGERY Right 2013    SF: L4-L5 hemilaminectomy for discectomy. Use of the microscope for microdissection, Use og 40mg of depo-medrol though thee exiting right L5 nerve root. Onset:13     SECTION      x2    CHOLECYSTECTOMY      FL INJECTION RIGHT SHOULDER (ARTHROGRAM)  2022    GALLBLADDER SURGERY      [facility], Onset:     LAPAROSCOPIC LYSIS INTESTINAL ADHESIONS      onset: 15    LUMBAR DISC SURGERY      NJ HYSTEROSCOPY BX ENDOMETRIUM&/POLYPC W/WO D&C N/A 2024    Procedure: DILATATION AND CURETTAGE (D&C) WITH HYSTEROSCOPY, POLYPECTOMY;  Surgeon: Griffin Arriaga MD;  Location: [facility];  Service: Gynecology    NJ HYSTEROSCOPY ENDOMETRIAL ABLATION N/A 2024    Procedure: ABLATION ENDOMETRIAL VIPUL;  Surgeon: Griffin Arriaga MD;  Location: [facility];  Service: Gynecology    NJ LAPS ABD PRTM&OMENTUM DX W/WO SPEC BR/WA SPX N/A 2016    Procedure: LAPAROSCOPY DIAGNOSTIC, EXTENSIVE LYSIS OF ADHESIONS, MAYURI'S PROCEDURE(DIVISION OF MAYURI'S BANDS,DETORSION OF VOLVULUS, WIDENING OF MESENTERIC PEDICLE, CECOPEXY, APPENDECTOMY);  Surgeon: Sudeep Espinoza MD;  Location: [facility];  Service: General    TUBAL LIGATION       Current Outpatient Medications   Medication Sig Dispense Refill    ALPRAZolam (XANAX) 0.25 mg tablet Take 1 tablet (0.25 mg total) by mouth daily as needed for anxiety 15 tablet 1    DULoxetine (CYMBALTA) 60 mg delayed release capsule Take 1 capsule (60 mg total) by mouth daily 90 capsule 2    lamoTRIgine (LaMICtal) 25 mg tablet Take 2 tablets by mouth once daily 60 tablet 0    multivitamin (THERAGRAN) TABS Take 1  "tablet by mouth daily      Probiotic Product (PROBIOTIC-10 PO) Take by mouth 1 daily       No current facility-administered medications for this visit.     Allergies   Allergen Reactions    Biaxin [Clarithromycin] GI Intolerance and Other (See Comments)    Sulfa Antibiotics     Sulfasalazine     Venlafaxine GI Intolerance, Vomiting and Other (See Comments)     Category: Allergy;   Category: Allergy;      Review of Systems    Video Exam    There were no vitals filed for this visit.    Physical Exam    (D) Corinne attended her follow up psychotherapy session today. Corinne reported that since her last session, she has noticed ongoing symptoms. Corinne reported that her and her  Fredy were at a friends house for a Friendsiving and reported that she received a text message from one of her co-workers that work for her named Renetta that she identified as triggering to her. Corinne described herself as feeling, \"annoyed,\" by Renetta's text message. Corinne reported that Renetta texted her sending her a message that she wants to meet up and talk in person, this past Sunday or Monday despite recognizing that Corinne struggles to do this, yet wanting to make sure that nothing is misinterpreted through text, feeling like Corinne is upset with her about something, or that she doesn't like her, and wanting to address these feelings, and work through this. Corinne reported that she responded to the text message by text message, saying that she didn't want to talk in person fearing that she will get upset, or not feel like she will be able to say what she wants to say, or forget what she wants to say. Corinne reported that her and Jaylin went back in forth via text message regarding the details of the things that are upsetting to Corinne, that Corinne read during session for context. Corinne reported that she knows that she comes of as, \"standoffish,\" and describes feeling like she doesn't have a desire to change this, describing " "it as a defense mechanism, trying to protect herself from allowing others in, out of fear that she doesn't want to get hurt. Corinne describes having a desire to feel connected with people, yet also wanting to isolate from others as a trauma response to avoid other people. 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 Corinne describes little instances that have happened over time at the salon, that have built up over time, that has created a wall between her and her employees. Corinne describes feeling like if she lets her guard down, and lets other people in, she will be disappointed and hurt by others. Corinne describes feeling like her employees are uncomfortable in her presence, and reported that the entire reason she started the salon was to not work in an environment like this. Corinne describes not wanting to be, \"rude,\" or, \"mean,\" to her employees yet describes struggling with letting this go. Corinne reported that it wasn't always like this at work, and noticed this shift when she expanded the salon, increasing stress for her. Corinne describes feeling overwhelmed with financial stress at home and at work, and processed through this. Corinne reported that she is triggered by people taking time off at work, creating stress and anxiety with the financial impact that this has on the business. Corinne reported that she started reading the book, \"Radical Compassion,\" listening to it on audible, and described not being able to, \"stand,\" the tone of voice with the narrator, resulting in her stopping listening to this. Corinne reported that she got the book them, and describes feeling like she doesn't fully connect with the concept. Corinne and this writer processed this. Discussed ongoing skills. Reviewed limits and boundaries. Provided ongoing psychoeducation. Modeled effective " "forms of communication.     (A) Corinne presented with good eye contact. Corinne presented as alert and oriented x3. Corinne's speech presented at a normal rate, volume, and rhythm. Corinne denies any symptoms of frank. Corinne denies any evident or immediate risk factors for self-harm, SI, or HI. Corinne's mood presented as anxious and depressed, and her affect appeared to be congruent, and tearful at times. Corinne describes sleep disturbances, fatigue, and feeling tired and having little energy. Corinne describes symptoms of irritability, poor frustration tolerance, becoming easily annoyed, and describes emotional dysregulation. Corinne describes symptoms of anticipatory anxiety, feeling nervous, anxious, and on edge, and worrying. Corinne describes symptoms of obsessive, intrusive, ruminating, and repeetitive thoughts.     (P) Corinne plans to reach out to her psychiatrist and schedule follow up, challenging herself to review her symptoms and review her medication, with noticing an increase in depression. Corinne plans to consider reading the book, \"Crucial Conversations,\" and, \"Moca to Lead,\" outside of session, aiming to increase self-awareness in relation to navigating through interpersonal relationship conflict with healthy and effective forms of communication. Corinne plans to journal outside of session, specifically upon her discomfort when it comes to managing interpersonal relationship conflict, through the lens of trauma responses and co-dependency, journaling to further explore outside of session, aiming to increase self-awareness in relation to this. Corinne plans to be intentional about identifying, associating, honoring, validating, and making space for her feelings, and emotions, examining patterns, themes, and behaviors. Corinne plans to intentionally meet herself with radical compassion, extending bogdan towards herself, identifying her worth, giving herself credit, and implement grounding " statements, positive self-talk, positive affirmations, and self-affirming statements. Corinne plans to decrease judgement towards herself, and continue to rely on supportive evidence of challenging negative thoughts, negative thinking traps, cognitive distortions, and harmful core beliefs with DBT Wise Mind, CBT Cognitive Restructuring, and CBT Challenging Negative Thoughts Assessment Questions. Corinne plans to intentionally separate Rational Mind, identifying ration, reason, logic, and facts, along with Emotional Mind, identifying thoughts, feelings, and emotions, striking balance between the both, implementing the concept of DBT [place] Mind. Corinne plans to examine pros and cons, along with risks verses benefits in order to make informed decisions, giving herself permission to align choices and decisions with what is in the best interest of her, implementing the concept of Radical Acceptance. Corinne plans to utilize the concept of DBT Dialects, specifically the both/ and concept, allowing for two opposing thoughts, feelings, and emotions to both co-exist and be true at the same time. Corinne plans to target ongoing symptoms with self-soothing techniques, sensory related skills, deep breathing techniques, mediation/ mindfulness techniques, coping skills, distraction techniques, distress tolerance skills, and grounding techniques. Corinne plans to utilize healthy and effective forms of communication, reinforcing limits and boundaries, challenging co-dependency, aiming to break the cycle, and implement healthy patterns, and behaviors. Corinne plans to assert herself, advocate for herself, ask for what she needs, and target interpersonal relationship conflict. Corinne plans to reach out for additional support as needed.     11/04/24  Start Time: 1100  Stop Time: 1150  Total Visit Time: 50 minutes

## 2024-11-11 ENCOUNTER — TELEMEDICINE (OUTPATIENT)
Dept: BEHAVIORAL/MENTAL HEALTH CLINIC | Facility: CLINIC | Age: 44
End: 2024-11-11
Payer: COMMERCIAL

## 2024-11-11 ENCOUNTER — TELEMEDICINE (OUTPATIENT)
Dept: PSYCHIATRY | Facility: CLINIC | Age: 44
End: 2024-11-11
Payer: COMMERCIAL

## 2024-11-11 DIAGNOSIS — F39 MOOD DISORDER (HCC): ICD-10-CM

## 2024-11-11 DIAGNOSIS — F33.0 MILD EPISODE OF RECURRENT MAJOR DEPRESSIVE DISORDER (HCC): Primary | ICD-10-CM

## 2024-11-11 DIAGNOSIS — F41.1 GAD (GENERALIZED ANXIETY DISORDER): Chronic | ICD-10-CM

## 2024-11-11 PROCEDURE — 90837 PSYTX W PT 60 MINUTES: CPT | Performed by: SOCIAL WORKER

## 2024-11-11 PROCEDURE — 99214 OFFICE O/P EST MOD 30 MIN: CPT | Performed by: NURSE PRACTITIONER

## 2024-11-11 RX ORDER — LAMOTRIGINE 25 MG/1
TABLET ORAL
Qty: 90 TABLET | Refills: 1 | Status: SHIPPED | OUTPATIENT
Start: 2024-11-11

## 2024-11-11 NOTE — PSYCH
Psychotherapy Provided: Individual Psychotherapy 55 minutes.     Length of time in session: 55 minutes.     Current suicide risk : Low .     Behavioral Health Treatment Plan [facility]: Diagnosis and Treatment Plan explained to Corinne, Corinne relates understanding diagnosis and is agreeable to Treatment Plan. Yes.      Visit start and stop times:    11/11/24  Start Time: 1400  Stop Time: 1455  Total Visit Time: 55 minutes    Virtual Regular Visit    Verification of patient location: [place], [place].     Patient is located at Home in the following state in which I hold an active license [place].     Assessment/Plan:    Problem List Items Addressed This Visit          Behavioral Health    NAVI (generalized anxiety disorder) (Chronic)    Mild episode of recurrent major depressive disorder (HCC) - Primary     Goals addressed in session: Goal 1 Follow up psychotherapy session.     Reason for visit is   Chief Complaint   Patient presents with    Virtual Regular Visit     Encounter provider Veronica Sandoval    Recent Visits  Date Type Provider Dept   11/04/24 [facility]icine [facility]   Showing recent visits within past 7 days and meeting all other requirements  Today's Visits  Date Type Provider Dept   11/11/24 [facility]medicine [facility]   Showing today's visits and meeting all other requirements  Future Appointments  No visits were found meeting these conditions.  Showing future appointments within next 150 days and meeting all other requirements     The patient was identified by name and date of birth. Corinne S Namita was informed that this is a telemedicine visit and that the visit is being conducted through the [employer] platform. She agrees to proceed.  My office door was closed. No one else was in the room.  She acknowledged consent and understanding of privacy and security of the video platform. The patient has agreed to participate and  understands they can discontinue the visit at any time.    Patient is aware this is a billable service.     Subjective Corinne S Kulp is a 44 y.o. female.    HPI     Past Medical History:   Diagnosis Date    Anxiety     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HCC)      Past Surgical History:   Procedure Laterality Date    APPENDECTOMY      extensive lysis of adhesions, MAYURI's procedure (divisions of MAYURI's bands) detorsion of vulvulus, widening of mesenteric pedicle, cecopeexy, appendectomy       BACK SURGERY Right 2013    SF: L4-L5 hemilaminectomy for discectomy. Use of the microscope for microdissection, Use og 40mg of depo-medrol though thee exiting right L5 nerve root. Onset:13     SECTION      x2    CHOLECYSTECTOMY      FL INJECTION RIGHT SHOULDER (ARTHROGRAM)  2022    GALLBLADDER SURGERY      [facility], Onset:     LAPAROSCOPIC LYSIS INTESTINAL ADHESIONS      onset: 15    LUMBAR DISC SURGERY      SD HYSTEROSCOPY BX ENDOMETRIUM&/POLYPC W/WO D&C N/A 2024    Procedure: DILATATION AND CURETTAGE (D&C) WITH HYSTEROSCOPY, POLYPECTOMY;  Surgeon: Griffin Arriaga MD;  Location: [facility];  Service: Gynecology    SD HYSTEROSCOPY ENDOMETRIAL ABLATION N/A 2024    Procedure: ABLATION ENDOMETRIAL VIPUL;  Surgeon: Griffin Arriaga MD;  Location: [facility];  Service: Gynecology    SD LAPS ABD PRTM&OMENTUM DX W/WO SPEC BR/WA SPX N/A 2016    Procedure: LAPAROSCOPY DIAGNOSTIC, EXTENSIVE LYSIS OF ADHESIONS, MAYURI'S PROCEDURE(DIVISION OF MAYURI'S BANDS,DETORSION OF VOLVULUS, WIDENING OF MESENTERIC PEDICLE, CECOPEXY, APPENDECTOMY);  Surgeon: Sudeep Espinoza MD;  Location: [facility];  Service: General    TUBAL LIGATION       Current Outpatient Medications   Medication Sig Dispense Refill    ALPRAZolam (XANAX) 0.25 mg tablet Take 1 tablet (0.25 mg total) by mouth daily as needed for anxiety 15 tablet 1    DULoxetine (CYMBALTA) 60 mg delayed release capsule Take 1 capsule (60 mg total) by mouth daily 90 capsule 2     lamoTRIgine (LaMICtal) 25 mg tablet 3 tabs Daily 90 tablet 1    multivitamin (THERAGRAN) TABS Take 1 tablet by mouth daily      Probiotic Product (PROBIOTIC-10 PO) Take by mouth 1 daily       No current facility-administered medications for this visit.     Allergies   Allergen Reactions    Biaxin [Clarithromycin] GI Intolerance and Other (See Comments)    Sulfa Antibiotics     Sulfasalazine     Venlafaxine GI Intolerance, Vomiting and Other (See Comments)     Category: Allergy;   Category: Allergy;      Review of Systems    Video Exam    There were no vitals filed for this visit.    Physical Exam     (D) Corinne attended her follow up psychotherapy session today. Corinne reported that since her last session, she has noticed ongoing symptoms, with a slight reduction in the intensity and frequency of them. Corinne reported that she challenged herself to be more mindful of her mood, presence, and engagement specifically at work, and with her colleagues. Corinne reported that she has noticed that this has improved. Discussed and processed this. Corinne reported that she had an appointment with her psychiatry provider this morning, who recommended that her mood stabilizer of her lamictal be increased, describing that Corinne's psychiatry provider utilizing the the term of a mood disorder. Corinne reported that her psychiatry provider increased the lamictal from 50mg to 75mg, and has follow up in (6) weeks to reassess. Corinne reported that when she first started her lamictal, she looked it up and read bipolar disorder, triggering her and fearing that she had this in the same way that her mother did. Corinne reported that more recently her son John and his therapist were talking about him possibly having more of a mood disorder verses a personality disorder, leaving Corinne wondering about this for herself, family history, etc. Discussed and processed this. Corinne reported that this Friday she is off from work, and  "her and her  Fredy are going to stay at her friend's [facility] house Friday in Saturday, and describes looking forward to this. Corinne reported that she unexpectedly had the day off last Friday, describing feeling relieved in relation to this. Discussed and processed this.  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 Corinne describes Fredy as being stressed with his current job, and this new job has much more earning potential. Corinne reported that if Fredy gets this job, he will no longer have to do side work, giving him more opportunity to be at home. Corinne describes struggling with financial stress. Corinne and this writer processed this. Reviewed limits and boundaries. Modeled effective forms of communication. Discussed ongoing skills. Provided ongoing psychoeducation.     (A) Corinne denies any evident or immediate risk factors for self-harm, SI, or HI. Corinne denies any symptoms of frank. Corinne presented with good eye contact. Corinne presented as alert and oriented x3. Corinne's speech presented at a normal rate, volume, and rhythm. Corinne's mood presented as anxious and depressed, and her affect appeared to be congruent. Corinne describes fatigue, and feeling tired and having little energy. Corinne describes emotional dysregulation, irritability, becoming easily annoyed, fluctuating moods, and poor frustration tolerance. Corinne describes worrying too much about different things, describing anticipatory anxiety. Corinne describes feeling nervous, anxious, and on edge despite denying that on her [employer]-7 screening. Corinne describes symptoms of obsessive, intrusive, ruminating, and repetitive thoughts, describing feeling stressed out and overwhelmed at times.     (P) Corinne plans to read the article from [employer], \"The Emotional Roller Coaster of " "Menopause,\" outside of session, further exploring areas that she connects with verse areas that she doesn't connect with to further explore and review next session. Corinne plans to continue to monitor, track, and inventory symptoms, cycles, and stressors to gather more data and supportive evidence to assist with decision-making surrounding pharmacology, when working with her psychiatry provider. Corinne plans to identify, associate, honor, validate, and make space for her feelings, and emotions, examining patterns, themes, and behaviors through the lens of trauma, journaling to further explore and self-reflect upon this outside of session. Corinne plans to intentionally meet herself with radical compassion, extending bogdan towards herself, identifying her worth, giving herself credit, and implementing grounding statements, positive self-talk, positive affirmations, and self-affirming statements. Corinne plans to decrease judgement towards herself, and continue to challenge negative thoughts, negative thinking traps, cognitive distortions, and harmful core beliefs with DBT Wise Mind, CBT Cognitive Restructuring, and CBT Challenging Negative Thoughts Assessment Questions. Corinne plans to utilize DBT Opposite Action Skills, intentionally structuring her schedule with balance, routine, and consistency, and prioritizing her mental health and physical health needs, engage in the use of self-care, take time for herself, be present in the moment, and lean into healthy natural supports. Corinne plans to internalize the concept of DBT Dialects, specifically the both/ and concept, allowing for two opposing thoughts, feelings, and emotions to both co-exist and be true at the same time. Corinne plans to target ongoing symptoms with deep breathing techniques, meditation/ mindfulness techniques, self-soothing techniques, sensory related skills, coping skills, distraction techniques, distress tolerance skills, and grounding " techniques. Corinne plans to utilize healthy and effective forms of communication to reinforce limits and boundaries, challenging co-dependency, aiming to break the cycle, and implement healthy patterns, and behaviors. Corinne plans to assert herself, advocate for herself, ask for what she needs, and target interpersonal relationship conflict. Corinne plans to examine pros and cons, along with risks verses benefits in order to make informed decisions, giving herself permission to align choices and decisions with what is in the best interest of her, implementing Radical Acceptance. Corinne plans to reach out for additional support as needed.     11/11/24  Start Time: 1400  Stop Time: 1455  Total Visit Time: 55 minutes

## 2024-11-11 NOTE — PSYCH
Virtual Regular Visit    Verification of patient location:    Patient is located at Home in the following state in which I hold an active license [place]      Assessment/Plan:    Problem List Items Addressed This Visit       Mild episode of recurrent major depressive disorder (HCC) - Primary    NAVI (generalized anxiety disorder) (Chronic)    Mood disorder (HCC)    Relevant Medications    lamoTRIgine (LaMICtal) 25 mg tablet       Goals addressed in session: Continue to monitor mood.  Adjust medications accordingly.         Reason for visit is   Chief Complaint   Patient presents with    Virtual Regular Visit          Encounter provider SAKINA Bruno      Recent Visits  No visits were found meeting these conditions.  Showing recent visits within past 7 days and meeting all other requirements  Today's Visits  Date Type Provider Dept   11/11/24 Telemedicine SAKINA Bruno  [facility]   Showing today's visits and meeting all other requirements  Future Appointments  No visits were found meeting these conditions.  Showing future appointments within next 150 days and meeting all other requirements       The patient was identified by name and date of birth. Corinne S Kulp was informed that this is a telemedicine visit and that the visit is being conducted throughthe [employer] platform. She agrees to proceed..  My office door was closed. No one else was in the room.  She acknowledged consent and understanding of privacy and security of the video platform. The patient has agreed to participate and understands they can discontinue the visit at any time.    Patient is aware this is a billable service.     Subjective  Corinne S Kulp is a 44 y.o. female who has a history of bipolar 2 disorder and depressive disorder.  Having some mild mood fluctuations over the last month or so.  Could be that she needs an increase of Lamictal so we will increase it to 75 mg daily and then probably next visit  "get up to 100 mg daily.  She tells me that mid-September, she was feeling some mild depression but that is now resolving.  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  She is trying to resolve the issues there and we will see how she does with the increase of Lamictal.  Follow-up with me in mid December.  Mental status exam: Patient is awake and alert and oriented x 3.  Mood is mildly fluctuating.  Patient is not suicidal, not homicidal and not psychotic.  Associations are intact.  No overt delusions.  Speech is clear and thoughts are fairly well-organized, coherent and goal-directed.  Attention span is good.  Impulse control is good.  Judgment and insight are good.  Memory is good.  The patient will continue on:  Cymbalta 60 mg daily  Increase Lamictal 75 mg daily with the intent of going up to 100 mg daily  Xanax 0.25 mg, 1/4 to 1 tablet daily as needed for anxiety  Follow-up with me mid December    We have discussed their safety plan and pt agrees that if they experience unsafe thoughts that they will reach out to their supports including this office, the suicide hotline, and emergency services if necessary.     Assessment & Plan  Mood disorder (HCC)    Orders:    lamoTRIgine (LaMICtal) 25 mg tablet; 3 tabs Daily    Mild episode of recurrent major depressive disorder (HCC)  Cymbalta 60 mg daily       NAVI (generalized anxiety disorder)  Xanax 0.25 mg, 1/4 to 1 tablet daily as needed for anxiety.           Past Medical History:   Diagnosis Date    Anxiety     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HCC)        Past Surgical History:   Procedure Laterality Date    APPENDECTOMY      extensive lysis of adhesions, MAYURI's procedure (divisions of MAYURI's bands) detorsion of vulvulus, widening of mesenteric pedicle, cecopeexy, appendectomy       BACK SURGERY Right 09/13/2013    SF: L4-L5 hemilaminectomy for discectomy. Use of the microscope for " microdissection, Use og 40mg of depo-medrol though thee exiting right L5 nerve root. Onset:13     SECTION      x2    CHOLECYSTECTOMY      FL INJECTION RIGHT SHOULDER (ARTHROGRAM)  2022    GALLBLADDER SURGERY      [facility], Onset:     LAPAROSCOPIC LYSIS INTESTINAL ADHESIONS      onset: 15    LUMBAR DISC SURGERY      IN HYSTEROSCOPY BX ENDOMETRIUM&/POLYPC W/WO D&C N/A 2024    Procedure: DILATATION AND CURETTAGE (D&C) WITH HYSTEROSCOPY, POLYPECTOMY;  Surgeon: Griffin Arriaga MD;  Location: [facility];  Service: Gynecology    IN HYSTEROSCOPY ENDOMETRIAL ABLATION N/A 2024    Procedure: ABLATION ENDOMETRIAL VIPUL;  Surgeon: Griffin Arriaga MD;  Location: [facility];  Service: Gynecology    IN LAPS ABD PRTM&OMENTUM DX W/WO SPEC BR/WA SPX N/A 2016    Procedure: LAPAROSCOPY DIAGNOSTIC, EXTENSIVE LYSIS OF ADHESIONS, MAYURI'S PROCEDURE(DIVISION OF MAYURI'S BANDS,DETORSION OF VOLVULUS, WIDENING OF MESENTERIC PEDICLE, CECOPEXY, APPENDECTOMY);  Surgeon: Sudeep Espinoza MD;  Location: [facility];  Service: General    TUBAL LIGATION         Current Outpatient Medications   Medication Sig Dispense Refill    ALPRAZolam (XANAX) 0.25 mg tablet Take 1 tablet (0.25 mg total) by mouth daily as needed for anxiety 15 tablet 1    DULoxetine (CYMBALTA) 60 mg delayed release capsule Take 1 capsule (60 mg total) by mouth daily 90 capsule 2    lamoTRIgine (LaMICtal) 25 mg tablet 3 tabs Daily 90 tablet 1    multivitamin (THERAGRAN) TABS Take 1 tablet by mouth daily      Probiotic Product (PROBIOTIC-10 PO) Take by mouth 1 daily       No current facility-administered medications for this visit.        Allergies   Allergen Reactions    Biaxin [Clarithromycin] GI Intolerance and Other (See Comments)    Sulfa Antibiotics     Sulfasalazine     Venlafaxine GI Intolerance, Vomiting and Other (See Comments)     Category: Allergy;   Category: Allergy;        Review of Systems    Video Exam    There were no vitals filed  for this visit.    Physical Exam     Visit Time    Visit Start Time: 8:00a  Visit Stop Time:8:30a  Total Visit Duration: 30 minutes were spent in the visit.  Time spent reviewing the treatment plan, reviewing medications, ordering medications and completing the progress note.

## 2024-12-02 ENCOUNTER — TELEMEDICINE (OUTPATIENT)
Dept: BEHAVIORAL/MENTAL HEALTH CLINIC | Facility: CLINIC | Age: 44
End: 2024-12-02
Payer: COMMERCIAL

## 2024-12-02 DIAGNOSIS — F33.0 MILD EPISODE OF RECURRENT MAJOR DEPRESSIVE DISORDER (HCC): Primary | ICD-10-CM

## 2024-12-02 DIAGNOSIS — F41.1 GAD (GENERALIZED ANXIETY DISORDER): Chronic | ICD-10-CM

## 2024-12-02 PROCEDURE — 90834 PSYTX W PT 45 MINUTES: CPT | Performed by: SOCIAL WORKER

## 2024-12-02 NOTE — PSYCH
Psychotherapy Provided: Individual Psychotherapy 50 minutes.     Length of time in session: 50 minutes.     Current suicide risk : Low .     Behavioral Health Treatment Plan [facility]: Diagnosis and Treatment Plan explained to Corinne, Corinne relates understanding diagnosis and is agreeable to Treatment Plan. Yes.      Visit start and stop times:    12/02/24       Virtual Regular Visit    Verification of patient location: [place].     Patient is located at Home in the following state in which I hold an active license [place].     Assessment/Plan:    Problem List Items Addressed This Visit          Behavioral Health    NAVI (generalized anxiety disorder) (Chronic)    Mild episode of recurrent major depressive disorder (HCC) - Primary     Goals addressed in session: Goal 1 Follow up psychotherapy session.     Reason for visit is   Chief Complaint   Patient presents with    Virtual Regular Visit     Encounter provider Veronica Sandoval    Recent Visits  No visits were found meeting these conditions.  Showing recent visits within past 7 days and meeting all other requirements  Today's Visits  Date Type Provider Dept   12/02/24 Telemedicine Veronica Sandoval  [facility]   Showing today's visits and meeting all other requirements  Future Appointments  No visits were found meeting these conditions.  Showing future appointments within next 150 days and meeting all other requirements     The patient was identified by name and date of birth. Corinne S Kulp was informed that this is a telemedicine visit and that the visit is being conducted through the [employer] platform. She agrees to proceed.  My office door was closed. No one else was in the room.  She acknowledged consent and understanding of privacy and security of the video platform. The patient has agreed to participate and understands they can discontinue the visit at any time.    Patient is aware this is a billable service.      Subjective Corinne S Kulp is a 44 y.o. female.    HPI     Past Medical History:   Diagnosis Date    Anxiety     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HCC)      Past Surgical History:   Procedure Laterality Date    APPENDECTOMY      extensive lysis of adhesions, MAYURI's procedure (divisions of MAYURI's bands) detorsion of vulvulus, widening of mesenteric pedicle, cecopeexy, appendectomy       BACK SURGERY Right 2013    SF: L4-L5 hemilaminectomy for discectomy. Use of the microscope for microdissection, Use og 40mg of depo-medrol though thee exiting right L5 nerve root. Onset:13     SECTION      x2    CHOLECYSTECTOMY      FL INJECTION RIGHT SHOULDER (ARTHROGRAM)  2022    GALLBLADDER SURGERY      [facility], Onset:     LAPAROSCOPIC LYSIS INTESTINAL ADHESIONS      onset: 15    LUMBAR DISC SURGERY      IA HYSTEROSCOPY BX ENDOMETRIUM&/POLYPC W/WO D&C N/A 2024    Procedure: DILATATION AND CURETTAGE (D&C) WITH HYSTEROSCOPY, POLYPECTOMY;  Surgeon: Griffin Arriaga MD;  Location: [facility];  Service: Gynecology    IA HYSTEROSCOPY ENDOMETRIAL ABLATION N/A 2024    Procedure: ABLATION ENDOMETRIAL VIPUL;  Surgeon: Griffin Arriaga MD;  Location: [facility];  Service: Gynecology    IA LAPS ABD PRTM&OMENTUM DX W/WO SPEC BR/WA SPX N/A 2016    Procedure: LAPAROSCOPY DIAGNOSTIC, EXTENSIVE LYSIS OF ADHESIONS, MAYURI'S PROCEDURE(DIVISION OF MAYURI'S BANDS,DETORSION OF VOLVULUS, WIDENING OF MESENTERIC PEDICLE, CECOPEXY, APPENDECTOMY);  Surgeon: Sudeep Espinoza MD;  [facility];  Service: General    TUBAL LIGATION       Current Outpatient Medications   Medication Sig Dispense Refill    ALPRAZolam (XANAX) 0.25 mg tablet Take 1 tablet (0.25 mg total) by mouth daily as needed for anxiety 15 tablet 1    DULoxetine (CYMBALTA) 60 mg delayed release capsule Take 1 capsule (60 mg total) by mouth daily 90 capsule 2    lamoTRIgine (LaMICtal) 25 mg tablet 3 tabs Daily 90 tablet 1    multivitamin (THERAGRAN) TABS Take 1  tablet by mouth daily      Probiotic Product (PROBIOTIC-10 PO) Take by mouth 1 daily       No current facility-administered medications for this visit.     Allergies   Allergen Reactions    Biaxin [Clarithromycin] GI Intolerance and Other (See Comments)    Sulfa Antibiotics     Sulfasalazine     Venlafaxine GI Intolerance, Vomiting and Other (See Comments)     Category: Allergy;   Category: Allergy;      Review of Systems    Video Exam    There were no vitals filed for this visit.    Physical Exam     (D) Corinne attended her follow up psychotherapy session today. Corinne reported that since her last session, she has noticed a reduction in the intensity and frequency of her symptoms. Corinne reported that she noticed that this her psychiatry provider increased her lamictal, she has noticed a slight reduction in the intensity and frequency of her symptoms. Corinne reported that she still notices symptoms, cycles, and stressors; however, reports feeling more grounded and less emotionally dysregulated than she usually is. Corinne reported that she has been monitoring and tracking her symptoms, cycles, and stressors, specifically in relation to her menstrual cycle, and reported that this past month is the milder month, reporting that next month with her ovulation schedule, it is anticipated that her worse symptoms will be then. Discussed and processed this. 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 Corinne reported that this new employee was only there for a few weeks, before she gave her (2) week notice. Corinne reported that this new employee ended up not even completing her two week notice, reporting that the employee left her a note stating that she was done effectively immediately, triggering Corinne. Corinne reported that she is in the process of hiring another part-time employee who is in high  school in 11th grade in [employer], describing her as having some experience. Corinne reported that this person isn't able to come to the salon until after school, and describes working with it. Corinne reported that she also has another employee that used to work there that can fill in more hours this time of year, reporting that her other job is slow right now. Corinne reported that she was recently triggered by her one previously employee Shanice, who's   by suicide, reporting that she came into the salon to get her hair done, triggering Corinne. Corinne reported that she also ran into Shanice last night at the [employer], triggering her. Corinne reported that she is supposed to have her holiday work party at her house this weekend, and reported that she got this thought in her head that one of her current employees would ask her to invite her old employee Shanice, reporting that she was hyperfixiated on the fear of being asked this. Corinne reported that she was able to use skills to stick with the facts, and use supportive evidence with the support of DBT and CBT skills to challenge these thoughts, expressing that they were effective for her. Discussed and processed this. Corinne describes this time of year as being stressful and overwhelming for her, specifically in relation to the holidays. Corinne reported that her  Fredy left to go on a hunting trip on Friday and is scheduled to come home tomorrow. Corinne reported that she feels stressed out and overwhelmed in relation to decorating for [place], planning, preparing, shopping, and wrapping. Corinne describes feeling pressure, reported that she feels like everything is on her for the holidays, reporting that she doesn't have help from her  Fredy. Corinne describes often time feeling isolated and alone in this process, resulting in her self-reflecting upon her own childhood and recognizing that her mother did everything,  "and her father doing nothing, having wanted different for herself. Discussed and processed this. Corinne reported that on Friday she was off from work, and reported that she went and got tattoos, on each arm, of her children's handwriting saying, \"I love you mom,\" with a heart and their name. Discussed and processed this. Corinne reported that her son John came home from [employer] for Thanksgiving, and reported some stressors and triggers with noticing that he was sleeping in, and her observation on him not making good use of his time and energy, being behind on things, not working ahead, and struggling with planning. Corinne reported that she is triggered by John with his financial spending habits, and processed through this. Corinne reported that she hosted [place] for her family, and reported that her father came. Corinne reported that there were some stressors and triggers in relation to her father being present, reporting that she felt some shame, and guilt, self-reflecting upon him once living there, and no longer living there. Corinne and this writer processed this. Provided ongoing psychoeducation. Modeled effective forms of communication. Discussed ongoing skills. Reviewed limits and boundaries.     (A) Corinne describes fluctuating symptoms, reporting a slight reduction in the intensity and frequency of her symptoms. Corinne describes anticipatory anxiety, feeling nervous, anxious, and on edge, along with worrying. Corinne describes symptoms of obsessive, intrusive, ruminating, and repetitive thoughts. Corinne describes emotional dysregulation, reporting irritability, poor frustration tolerance, and becoming easily annoyed. Corinne describes some symptoms of grief in relation to symbolic loss. Corinne's mood presented as anxious, and depressed, and her affect appeared to be congruent, and tearful at times. Corinne describes lower moods of sadness, and depressed, despite denying this on her PHQ-9 " screenings. Corinne denies any symptoms of frank. Corinne denies any evident or immediate risk factors for self-harm, SI, or HI. Corinne presented presented with good eye contact. Corinne presented as alert and oriented x3. Corinne's speech presented at a normal rate, volume, and rhythm.     (P) Corinne plans to challenge herself to internalize the concept of DBT Dialects, specifically the both/ and concept, allowing for two opposing thoughts, feelings, and emotions to both co-exist and be true at the same time, specifically when it comes to trauma triggers with her father. Corinne plans to challenge herself to stick with the facts, and intentionally separate Rational Mind, identifying ration, reason, logic, and facts, along with Emotional Mind, identifying thoughts, feelings, and emotions, striking balance between the both, implementing the concept of DBT [place] Mind. Corinne plans to examine pros and cons, along with risks verses benefit sin order to make informed decisions, giving herself permission to align choices and decisions with what is in the best interest of her, implementing the concept of Radical Acceptance. Corinne plans to use DBT Opposite Action Skills, intentionally structuring her schedule with balance, routine, and consistency, challenging herself to lean into healthy natural supports, engage in the use of self-care, take time for herself, be present in the moment, and prioritize her mental health and physical health needs. Corinne plans to identify, associate, honor, validate, and make space for her feelings, and emotions, without judgement, intentionally meeting herself with radical compassion, extending bogdan towards herself, giving herself credit, and identifying her worth. Corinne plans to continue to utilize DBT Wise Mind, CBT Cognitive Restructuring, and CBT Challenging Negative Thoughts Assessment Questions, challenging negative thoughts, negative thinking traps, cognitive distortions, and  harmful core beliefs. Corinne plans to continue to pay attention to her body, increasing self-awareness in relation to warning signs and triggers throughout her body. Corinne plans to target ongoing symptoms with self-soothing techniques, sensory related skills, deep breathing techniques, meditation/ mindfulness techniques, coping skills, grounding techniques, distress tolerance skills, and distraction techniques. Corinne plans to utilize healthy and effective forms of communication, asserting herself, advocating for herself, asking for what she needs, targeting interpersonal relationship stressors, reinforcing limits and boundaries, challenging co-dependency, aiming to break the cycle, and implement healthy patterns, and behaviors. Corinne plans to examine patterns, themes, and behaviors through the lens of family trauma, journaling to further explore and process outside of session. Corinne plans to reach out for additional support as needed.     12/02/24  Start Time: 1109  Stop Time: 1159  Total Visit Time: 50 minutes

## 2024-12-16 ENCOUNTER — TELEMEDICINE (OUTPATIENT)
Dept: PSYCHIATRY | Facility: CLINIC | Age: 44
End: 2024-12-16
Payer: COMMERCIAL

## 2024-12-16 ENCOUNTER — TELEMEDICINE (OUTPATIENT)
Dept: BEHAVIORAL/MENTAL HEALTH CLINIC | Facility: CLINIC | Age: 44
End: 2024-12-16
Payer: COMMERCIAL

## 2024-12-16 DIAGNOSIS — F41.1 GAD (GENERALIZED ANXIETY DISORDER): Chronic | ICD-10-CM

## 2024-12-16 DIAGNOSIS — F33.0 MILD EPISODE OF RECURRENT MAJOR DEPRESSIVE DISORDER (HCC): Primary | ICD-10-CM

## 2024-12-16 DIAGNOSIS — F39 MOOD DISORDER (HCC): ICD-10-CM

## 2024-12-16 PROCEDURE — 90834 PSYTX W PT 45 MINUTES: CPT | Performed by: SOCIAL WORKER

## 2024-12-16 PROCEDURE — 99214 OFFICE O/P EST MOD 30 MIN: CPT | Performed by: NURSE PRACTITIONER

## 2024-12-16 NOTE — PSYCH
Virtual Regular Visit    Verification of patient location:    Patient is located at Home in the following state in which I hold an active license [place]      Assessment/Plan:    Problem List Items Addressed This Visit       Mild episode of recurrent major depressive disorder (HCC) - Primary    NAVI (generalized anxiety disorder) (Chronic)    Mood disorder (HCC)       Goals addressed in session: Maintain current level of stability    Depression Follow-up Plan Completed: Yes    Reason for visit is   Chief Complaint   Patient presents with    Mood Swings    Medication Management    Follow-up    Depression    Anxiety    Virtual Regular Visit          Encounter provider SAKINA Bruno      Recent Visits  No visits were found meeting these conditions.  Showing recent visits within past 7 days and meeting all other requirements  Today's Visits  Date Type Provider Dept   12/16/24 Telemedicine SAKINA Bruno  [facility]   Showing today's visits and meeting all other requirements  Future Appointments  No visits were found meeting these conditions.  Showing future appointments within next 150 days and meeting all other requirements       The patient was identified by name and date of birth. Corinne S Kulp was informed that this is a telemedicine visit and that the visit is being conducted throughthe [employer] platform. She agrees to proceed..  My office door was closed. No one else was in the room.  She acknowledged consent and understanding of privacy and security of the video platform. The patient has agreed to participate and understands they can discontinue the visit at any time.    Patient is aware this is a billable service.     Subjective  Corinne S Kulp is a 44 y.o. female who has a history of depressive disorder, mood disorder and anxiety disorder.  Last visit, we increased Lamictal because of mood instability issues and she is doing fine now.  She does tell me that the week before  her menstrual cycle, she still gets a little more irritable more edgy and she can take another half of Lamictal during that time if she wishes for a whole tab.  If she takes the whole tab, after menstrual cycle starts, I would like her to go on a half tab for the next 3 days and then she could stop the extra tab.  Otherwise, she feels good.  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  Managing her stress level much better.  Mental status exam: Patient is awake and alert and oriented x 3.  Mood is improved.  Patient is not suicidal, not homicidal not psychotic.  Associations are intact.  No overt delusions.  Speech is clear and thoughts are well-organized, coherent and goal-directed.  Attention span is good.  Impulse control is improved.  Judgment and insight are good.  Memory is good.  The patient will continue on:  Lamictal 75 mg daily.  May take an extra tab the week before her menstrual cycle and then stop once menstrual cycle starts  Cymbalta 60 mg daily  Xanax 0.25 mg, 1 tab daily as needed for anxiety    We have discussed their safety plan and pt agrees that if they experience unsafe thoughts that they will reach out to their supports including this office, the suicide hotline, and emergency services if necessary. .      Assessment & Plan  Mild episode of recurrent major depressive disorder (HCC)  Cymbalta 60 mg daily       Mood disorder (HCC)  Lamictal 75 mg daily       NAVI (generalized anxiety disorder)  Xanax 0.25 mg daily as needed for anxiety           Past Medical History:   Diagnosis Date    Anxiety     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HCC)        Past Surgical History:   Procedure Laterality Date    APPENDECTOMY      extensive lysis of adhesions, MAYURI's procedure (divisions of MAYURI's bands) detorsion of vulvulus, widening of mesenteric pedicle, cecopeexy, appendectomy       BACK SURGERY Right 09/13/2013    SF: L4-L5 hemilaminectomy for discectomy. Use of the  microscope for microdissection, Use og 40mg of depo-medrol though thee exiting right L5 nerve root. Onset:13     SECTION      x2    CHOLECYSTECTOMY      FL INJECTION RIGHT SHOULDER (ARTHROGRAM)  2022    GALLBLADDER SURGERY      [facility], Onset:     LAPAROSCOPIC LYSIS INTESTINAL ADHESIONS      onset: 15    LUMBAR DISC SURGERY      AR HYSTEROSCOPY BX ENDOMETRIUM&/POLYPC W/WO D&C N/A 2024    Procedure: DILATATION AND CURETTAGE (D&C) WITH HYSTEROSCOPY, POLYPECTOMY;  Surgeon: Griffin Arriaga MD;  Location: [facility];  Service: Gynecology    AR HYSTEROSCOPY ENDOMETRIAL ABLATION N/A 2024    Procedure: ABLATION ENDOMETRIAL VIPUL;  Surgeon: Griffin Arriaga MD;  Location: [facility];  Service: Gynecology    AR LAPS ABD PRTM&OMENTUM DX W/WO SPEC BR/WA SPX N/A 2016    Procedure: LAPAROSCOPY DIAGNOSTIC, EXTENSIVE LYSIS OF ADHESIONS, MAYURI'S PROCEDURE(DIVISION OF MAYURI'S BANDS,DETORSION OF VOLVULUS, WIDENING OF MESENTERIC PEDICLE, CECOPEXY, APPENDECTOMY);  Surgeon: Sudeep Espinoza MD;  Location: [facility];  Service: General    TUBAL LIGATION         Current Outpatient Medications   Medication Sig Dispense Refill    ALPRAZolam (XANAX) 0.25 mg tablet Take 1 tablet (0.25 mg total) by mouth daily as needed for anxiety 15 tablet 1    DULoxetine (CYMBALTA) 60 mg delayed release capsule Take 1 capsule (60 mg total) by mouth daily 90 capsule 2    lamoTRIgine (LaMICtal) 25 mg tablet 3 tabs Daily 90 tablet 1    multivitamin (THERAGRAN) TABS Take 1 tablet by mouth daily      Probiotic Product (PROBIOTIC-10 PO) Take by mouth 1 daily       No current facility-administered medications for this visit.        Allergies   Allergen Reactions    Biaxin [Clarithromycin] GI Intolerance and Other (See Comments)    Sulfa Antibiotics     Sulfasalazine     Venlafaxine GI Intolerance, Vomiting and Other (See Comments)     Category: Allergy;   Category: Allergy;        Review of Systems    Video Exam    There were  no vitals filed for this visit.    Physical Exam     Visit Time    Visit Start Time: 10:00a  Visit Stop Time: 10:30a  Total Visit Duration: 30 minutes were spent in the visit.  Time spent reviewing the treatment plan, reviewing medications, ordering medications and completing the progress note.

## 2024-12-16 NOTE — PSYCH
Psychotherapy Provided: Individual Psychotherapy 50 minutes.     Length of time in session: 50 minutes.     Current suicide risk : Low .     Behavioral Health Treatment Plan [facility]: Diagnosis and Treatment Plan explained to Corinne, Corinne relates understanding diagnosis and is agreeable to Treatment Plan. Yes.      Visit start and stop times:    12/16/24       Virtual Regular Visit    Verification of patient location: [place].     Patient is located at Home in the following state in which I hold an active license [place].     Assessment/Plan:    Problem List Items Addressed This Visit          Behavioral Health    NAVI (generalized anxiety disorder) (Chronic)    Mild episode of recurrent major depressive disorder (HCC) - Primary     Goals addressed in session: Goal 1 Follow up psychotherapy session.     Depression Follow-up Plan Completed: Not applicable    Reason for visit is   Chief Complaint   Patient presents with    Virtual Regular Visit     Encounter provider Veronica Sandoval    Recent Visits  No visits were found meeting these conditions.  Showing recent visits within past 7 days and meeting all other requirements  Today's Visits  Date Type Provider Dept   12/16/24 Telemedicine Veronica Sandoval  [facility]   Showing today's visits and meeting all other requirements  Future Appointments  No visits were found meeting these conditions.  Showing future appointments within next 150 days and meeting all other requirements     The patient was identified by name and date of birth. Corinne S Kulp was informed that this is a telemedicine visit and that the visit is being conducted through the [employer] platform. She agrees to proceed. My office door was closed. No one else was in the room.  She acknowledged consent and understanding of privacy and security of the video platform. The patient has agreed to participate and understands they can discontinue the visit at any time.    Patient is  aware this is a billable service.     Subjective Corinne S Kulp is a 44 y.o. female.    HPI     Past Medical History:   Diagnosis Date    Anxiety     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HCC)      Past Surgical History:   Procedure Laterality Date    APPENDECTOMY      extensive lysis of adhesions, MAYURI's procedure (divisions of MAYURI's bands) detorsion of vulvulus, widening of mesenteric pedicle, cecopeexy, appendectomy       BACK SURGERY Right 2013    SF: L4-L5 hemilaminectomy for discectomy. Use of the microscope for microdissection, Use og 40mg of depo-medrol though thee exiting right L5 nerve root. Onset:13     SECTION      x2    CHOLECYSTECTOMY      FL INJECTION RIGHT SHOULDER (ARTHROGRAM)  2022    GALLBLADDER SURGERY      [facility], Onset:     LAPAROSCOPIC LYSIS INTESTINAL ADHESIONS      onset: 15    LUMBAR DISC SURGERY      [place] HYSTEROSCOPY BX ENDOMETRIUM&/POLYPC W/WO D&C N/A 2024    Procedure: DILATATION AND CURETTAGE (D&C) WITH HYSTEROSCOPY, POLYPECTOMY;  Surgeon: Griffin Arriaga MD;  Location: [facility];  Service: Gynecology    WY HYSTEROSCOPY ENDOMETRIAL ABLATION N/A 2024    Procedure: ABLATION ENDOMETRIAL VIPUL;  Surgeon: Griffin Arriaga MD;  Location: [facility];  Service: Gynecology    [place] LAPS ABD PRTM&OMENTUM DX W/WO SPEC BR/WA SPX N/A 2016    Procedure: LAPAROSCOPY DIAGNOSTIC, EXTENSIVE LYSIS OF ADHESIONS, MAYURI'S PROCEDURE(DIVISION OF MAYURI'S BANDS,DETORSION OF VOLVULUS, WIDENING OF MESENTERIC PEDICLE, CECOPEXY, APPENDECTOMY);  Surgeon: Sudeep Espinoza MD;  Location: [facility];  Service: General    TUBAL LIGATION       Current Outpatient Medications   Medication Sig Dispense Refill    ALPRAZolam (XANAX) 0.25 mg tablet Take 1 tablet (0.25 mg total) by mouth daily as needed for anxiety 15 tablet 1    DULoxetine (CYMBALTA) 60 mg delayed release capsule Take 1 capsule (60 mg total) by mouth daily 90 capsule 2    lamoTRIgine (LaMICtal) 25 mg tablet 3 tabs Daily 90 tablet 1     multivitamin (THERAGRAN) TABS Take 1 tablet by mouth daily      Probiotic Product (PROBIOTIC-10 PO) Take by mouth 1 daily       No current facility-administered medications for this visit.     Allergies   Allergen Reactions    Biaxin [Clarithromycin] GI Intolerance and Other (See Comments)    Sulfa Antibiotics     Sulfasalazine     Venlafaxine GI Intolerance, Vomiting and Other (See Comments)     Category: Allergy;   Category: Allergy;      Review of Systems    Video Exam    There were no vitals filed for this visit.    Physical Exam     (D) Corinne attended her follow up psychotherapy session today. Corinne reported that since her last session, she has noticed ongoing symptoms. Corinne reported that today she had a psychiatry appointment her provider. Corinne reported that she is noticing an increase in irritability and anxiety starting the end of last week into this week, reporting that she is projected to get her menstrual cycle this week. Corinne reported that this is the month where she believes that her PMDD symptoms are worse, reporting that this occurs bi-weekly. Corinne reported that her psychiatry provider told her that she should increase her Lamictal from 75mg to 100mg to target these symptoms. Corinne describes symptoms of anticipatory anxiety in relation to this. Corinne reported that she had to utilize a PRN of xanax on Saturday, describing elevated levels of anxiety. Corinne reported that she talked to her psychiatry provider about hearing about certain forms of Cymbalta being recalled, having anxiety in relation to this. Corinne reported that she plans to reach out to her pharmacy in relation to this, aiming to get the facts, and processed through this. Corinne reported that she has been struggling with feeling itchy throughout her body on her skin for the past couple of days, creating stress and anxiety. Corinne reported that she has been taking her vitamins and over the counter supplements, and  "reported that she has been noticing changes in her hair. Corinne describes worrying that there is something physiological going one with her from a menopause standpoint, wondering if all of her symptoms are connection. Discussed and processed this. Corinne describes being worried about her son John, reporting that he is home from college now. Corinne describes John as being, \"m[place],\" and, \"tired all of the time,\" and reported that John and his psychiatry provider have been making medication changes. Corinne reported that his therapist is leaning more towards John having a diagnosis of bipolar disorder, rather than ADHD. Corinne reported that she notices a lack of motivation with John, describing him as, \"miserable,\" at home. Corinne reported that John's grades are not in yet, and describes worrying about how he did this semester. Discussed and processed this. Corinne reported that she reached out to John's psychiatrist last week, because in his online portal through the pharmacy that the pharmacy tried to contact the psychiatry provider 3x to refill his medication, with no response. Corinne reported that John doesn't have a psychiatry appointment until 01/07/24. Corinne reported that she also e-mailed John's therapist and reports that she is waiting to hear back from her, describing some anxiety in relation to this. Discussed options with higher levels of care through intensive outpatient services, and partial hospital programming, and processed through this. Corinne describes stressors in relation her  Fredy's health, reporting that he had lab work done that came back abnormal triggering Corinne's anxiety. Corinne describes constantly worrying that something awful will happen to Fredy, and describes anxiety in relation to this. Discussed and processed this. Corinne describes stressors with her daughter Randi, reporting that she wasn't handing in school work impacting her grades. Corinne and this " writer processed this. Discussed ongoing skills. Reviewed limits and boundaries. Provided ongoing psychoeducation. Modeled effective forms of communication.      (A) Corinne denies any symptoms of frank. Corinne denies any evident or immediate risk factors for self-harm, SI, or HI. Corinne presented with good eye contact. Corinne presented as alert and oriented x3. Corinne's speech presented at a normal rate, volume, and rhythm. Corinne's mood presented as anxious and depressed, and her affect appeared to be congruent. Corinne describes fluctuating symptoms, reporting anticipatory anxiety, feeling nervous, anxious, and on edge, and worrying too much about different things. Corinne describes symptoms of irritability, poor frustration tolerance, and becoming easily annoyed, describing emotional dysregulation. Corinne describes feeling bad about herself, feeling like she is letting herself, and her family down. Corinne describes fatigue, and feeling tired and having little energy. Corinne describes feeling stressed out and overwhelmed. Corinne describes symptoms of obsessive, intrusive, ruminating,and repetitive thoughts.     (P) This writer referred Corinne to [facility] to explore further specialty services. This writer also gave Corinne information on [facility], and [facility] for her son John to explore higher levels of care, and additional testing for diagnosis. Corinne plans to implement the concept of Radical Acceptance, examining pros and cons, along with risks verses benefits in order to make informed decisions, giving herself permission to align choices and decisions with what is in the best interest in her. Corinne plans to implement the concept of DBT Dialects, specifically the both/ and concept, allowing for two opposing thoughts, feelings, and emotions to both co-exist and be true at the same time. Corinne plans to use DBT Opposite Action  Skills, intentionally structuring his schedule with balance, routine, and consistency, giving herself permission to prioritize her mental health and physical health needs, engage in the use of self-care, take time for herself, be present in the moment, and lean into healthy natural supports. Corinne plans to intentionally separate Rational Mind, identifying ration, reason, logic, and facts, along with Emotional Mind, identifying thoughts, feelings, and emotions, striking balance between the both, implementing the concept of DBT [place] Mind. Corinne plans to implement positive self-talk, positive affirmations, self-affirming statements, and grounding statements, intentionally identifying her worth, giving herself credit, meeting herself with radical compassion, and extending bogdan towards herself. Corinne plans to target ongoing symptoms with self-soothing techniques, sensory related skills, distraction techniques, distress tolerance skills, deep breathing techniques, distress tolerance skills, coping skills, and grounding techniques. Corinne plans to decrease judgement towards herself, and continue to seek out evidence to challenge negative thoughts, negative thinking traps, cognitive distortions, and harmful core beliefs with the support of DBT Wise Mind, CBT Cognitive Restructuring, and CBT Challenging Negative Thoughts Assessment Questions. Corinne plans to utilize healthy and effective forms of communication, asserting herself, advocating for herself, asking for what she needs, targeting interpersonal relationship stressors, reinforcing limits and boundaries, challenging co-dependency, aiming to break the cycle, and implement healthy patterns, and behaviors.     12/16/24  Start Time: 1100  Stop Time: 1150  Total Visit Time: 50 minutes

## 2024-12-30 ENCOUNTER — TELEMEDICINE (OUTPATIENT)
Dept: BEHAVIORAL/MENTAL HEALTH CLINIC | Facility: CLINIC | Age: 44
End: 2024-12-30
Payer: COMMERCIAL

## 2024-12-30 DIAGNOSIS — F33.0 MILD EPISODE OF RECURRENT MAJOR DEPRESSIVE DISORDER (HCC): Primary | ICD-10-CM

## 2024-12-30 DIAGNOSIS — F41.1 GAD (GENERALIZED ANXIETY DISORDER): Chronic | ICD-10-CM

## 2024-12-30 PROCEDURE — 90837 PSYTX W PT 60 MINUTES: CPT | Performed by: SOCIAL WORKER

## 2024-12-30 NOTE — PSYCH
Psychotherapy Provided: Individual Psychotherapy 55 minutes.     Length of time in session: 55 minutes.     Current suicide risk : Low .     Behavioral Health Treatment Plan [facility]: Diagnosis and Treatment Plan explained to Corinne, Corinne relates understanding diagnosis and is agreeable to Treatment Plan. Yes.      Visit start and stop times:    12/30/24  Start Time: 0100  Stop Time: 1055  Total Visit Time: 55 minutes    Virtual Regular Visit    Verification of patient location: [place].     Patient is located at Home in the following state in which I [place] an active license [place].     Assessment/Plan:    Problem List Items Addressed This Visit          Behavioral Health    NAVI (generalized anxiety disorder) (Chronic)    Mild episode of recurrent major depressive disorder (HCC) - Primary       Goals addressed in session: Goal 1 Follow up psychotherapy session.     Depression Follow-up Plan Completed: Not applicable    Reason for visit is No chief complaint on file.     Encounter provider Veronica Sandoval    Recent Visits  No visits were found meeting these conditions.  Showing recent visits within past 7 days and meeting all other requirements  Today's Visits  Date Type Provider Dept   12/30/24 Telemedicine Veronica [facility]oval [facility]   Showing today's visits and meeting all other requirements  Future Appointments  No visits were found meeting these conditions.  Showing future appointments within next 150 days and meeting all other requirements     The patient was identified by name and date of birth. Corinne S Kulp was informed that this is a telemedicine visit and that the visit is being conducted through the ATEME platform. She agrees to proceed.  My office door was closed. No one else was in the room.  She acknowledged consent and understanding of privacy and security of the video platform. The patient has agreed to participate and understands they can discontinue the visit at  any time.    Patient is aware this is a billable service.     Subjective Corinne S Kulp is a 44 y.o. female.    HPI     Past Medical History:   Diagnosis Date    Anxiety     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HCC)      Past Surgical History:   Procedure Laterality Date    APPENDECTOMY      extensive lysis of adhesions, MAYURI's procedure (divisions of MAYURI's bands) detorsion of vulvulus, widening of mesenteric pedicle, cecopeexy, appendectomy       BACK SURGERY Right 2013    SF: L4-L5 hemilaminectomy for discectomy. Use of the microscope for microdissection, Use og 40mg of depo-medrol though thee exiting right L5 nerve root. Onset:13     SECTION      x2    CHOLECYSTECTOMY      FL INJECTION RIGHT SHOULDER (ARTHROGRAM)  2022    GALLBLADDER SURGERY      [facility], Onset:     LAPAROSCOPIC LYSIS INTESTINAL ADHESIONS      onset: 15    LUMBAR DISC SURGERY      [place] HYSTEROSCOPY BX ENDOMETRIUM&/POLYPC W/WO D&C N/A 2024    Procedure: DILATATION AND CURETTAGE (D&C) WITH HYSTEROSCOPY, POLYPECTOMY;  Surgeon: Griffin Arriaag MD;  Location: [facility];  Service: Gynecology    [place] HYSTEROSCOPY ENDOMETRIAL ABLATION N/A 2024    Procedure: ABLATION ENDOMETRIAL VIPUL;  Surgeon: Griffin Arriaga MD;  Location: [facility];  Service: Gynecology    [place] LAPS ABD PRTM&OMENTUM DX W/WO SPEC BR/WA SPX N/A 2016    Procedure: LAPAROSCOPY DIAGNOSTIC, EXTENSIVE LYSIS OF ADHESIONS, MAYURI'S PROCEDURE(DIVISION OF MAYURI'S BANDS,DETORSION OF VOLVULUS, WIDENING OF MESENTERIC PEDICLE, CECOPEXY, APPENDECTOMY);  Surgeon: Sudeep Espinoza MD;  [facility];  Service: General    TUBAL LIGATION       Current Outpatient Medications   Medication Sig Dispense Refill    ALPRAZolam (XANAX) 0.25 mg tablet Take 1 tablet (0.25 mg total) by mouth daily as needed for anxiety 15 tablet 1    DULoxetine (CYMBALTA) 60 mg delayed release capsule Take 1 capsule (60 mg total) by mouth daily 90 capsule 2    lamoTRIgine (LaMICtal) 25 mg tablet 3 tabs  "Daily 90 tablet 1    multivitamin (THERAGRAN) TABS Take 1 tablet by mouth daily      Probiotic Product (PROBIOTIC-10 PO) Take by mouth 1 daily       No current facility-administered medications for this visit.     Allergies   Allergen Reactions    Biaxin [Clarithromycin] GI Intolerance and Other (See Comments)    Sulfa Antibiotics     Sulfasalazine     Venlafaxine GI Intolerance, Vomiting and Other (See Comments)     Category: Allergy;   Category: Allergy;      Review of Systems    Video Exam    There were no vitals filed for this visit.    Physical Exam    (D) Corinne attended her follow up psychotherapy session today. Corinne reported that since her last session, she has noticed fluctuating symptoms. Corinne described stressors and triggers leading up to the holidays, describing herself as, \"exhausted,\" when it came to working and managing through the holidays. Corinne described noticing more anxiety since her last session. Corinne reported that she noticed a decrease in symptoms with the increased Lamictal believing that it was helpful throughout her menstrual cycle.  Corinne reported that she noticed that she took her PRN of xanax throughout this time, and noticed anxiety with taking more medication. Corinne and this writer processed this. Corinne reported that when it comes to her son John, she reported that he wasn't interested in a [facility] hospital program. Corinne reported that she contacted [facility], considering having him seen by a new psychiatry provider; however, there was a wait list. Corinne reported that she contacted her son's psychiatry provider and spoke with the staff, in addition to sending an e-mail regarding his symptoms, and concerns she had. Corinne reported that the psychiatrist contacted John to get a release of information, which he agreed to. Corinne reported that in consulting with the psychiatrist he missed his last appointment, resulting in him not being seen " "since October. Corinne reported that the psychiatrist also expressed concerns that John stopped his ADHD medication, and recommended a mood stabilizer, which John refused. Corinne reported that the psychiatrist wanted John to take a urine drug screen to inquire about whether or not he was self-medicating with marijuana, which John said he wasn't. Corinne reported that once John went and took the urine drug screen, after the fact he admitted to using marijuana during finals week. Corinne reported that through speaking with the psychiatrist, and John he agreed to go to his pharmacy, get his ADHD medication, and start taking it. Corinne reported that he also agreed to start a mood stabilizer of Lamictal. Corinne reported that John noticed that once he started his ADHD medication again he started to feel more clam, less depressed, less overwhelmed, and anxious. Corinne describes stressors with John, reporting that he forgets his medications, isn't consistent with it, and is also in college and uses recreational marijuana. Corinne described mixed feelings, and emotions in relation to this. Corinne reported that when John was younger he struggled with reading comprehension, processing, and required intervention. Corinne describes struggling with guilt, feeling like John's mental health symptoms are her, \"fault,\" with her own struggles, and family history. Corinne reported that when John was tested when he was younger they recommended ADHD testing, and she declined it, believing that he didn't have it, and describes now feeling regretful of this now. Discussed and processed this. Corinne reported that she contacted [facility] regarding a neuropsych evaluation for John for ADHD, and reported that he was scheduled for January. Corinne described mixed feelings and emotions in relation to this. Corinne describes stressors with her perception of her 's lack of insight and self-awareness in relation to " "John and ADHD, creating interpersonal relationship conflict. Discussed and processed this. Corinne reported that her son John failed two classes, reporting that he failed Drawling (1) because he didn't hand in one project completely, and failed a second one. Corinne reported that one of his professors reached out and told him there was a Drawling (1) class over the winter break that he could take, resulting in signing him up, and paying for this, creating stressors and triggers in relation to this. Corinne reported that she put this class on a credit card, creating financial stress and anxiety. Corinne and this writer processed this. Discussed ongoing skills. Reviewed limits and boundaries. Modeled effective forms of communication. Provided ongoing psychoeducation.     (A) Corinne's speech presented at a normal rate, volume, and rhythm. Corinne presented as alert and oriented x3. Corinne presented with good eye contact. Corinne denies any symptoms of frank. Corinne denies any evident or immediate risk factors for self-harm, SI, or HI. Corinne's mood presented as depressed and anxious and her affect appeared to be congruent, and tearful at times. Corinne describes fatigue, and feeling tired and having little energy. Corinne describes symptoms of anticipatory anxiety, feeling nervous, anxious, and on edge, and worrying too much about different things. Corinne describes symptoms of irritability, poor frustration tolerance, and becoming easily annoyed. Corinne describes symptoms of obsessive, intrusive, ruminating, and repetitive thoughts.     (P) Corinne plans to read the book, \"Your Child is Not Broken,\" outside of session, to further explore and self-reflect upon outside of session. Corinne plans to journal, intentionally self-reflecting upon progress that she has made over the past year, and identifying short-term and long-term goals to review and discuss next session. Corinne plans to implement the concept of " Radical Acceptance, examining pros and cons, along with risks verses benefits in order to make informed decisions, giving herself permission to align choices and decisions with what is in the best interest of her. Corinne plans to internalize the concept of DBT Dialects, specifically the both/ and concept, allowing for two opposing thoughts, feelings, and emotions to both co-exist and be true at the same time. Corinne plans to structure her schedule with balance, routine, and consistency, using DBT Opposite Action Skills to lean into healthy natural supports, engage in the use of self-care, take time for herself, be present in the moment, and prioritize her mental health and physical health needs. Corinne plans to decrease judgement towards herself, and continue to seek out supportive evidence to challenge negative thoughts, negative thinking traps, cognitive distortions, and harmful core beliefs with the support of DBT Wise Mind, CBT Cognitive Restructure, and CBT Challenging Negative Thoughts Assessment Questions. Corinne plans to meet herself with radical compassion, extending bogdan towards herself, identifying her worth and giving herself credit. Corinne plans to intentionally identify her worth, giving herself credit, and implementing positive self-talk, positive affirmations, self-affirming statements, and grounding statements. Corinne plans to pay attention to her body, aiming to increase self-awareness in relation to warning signs and triggers through her body. Corinne plans to implement deep breathing techniques, meditation/ mindfulness techniques, coping skills, grounding techniques, distraction techniques, distress tolerance skills, self-soothing techniques, and sensory related skills to target ongoing symptoms. Corinne plans to use healthy and effective forms of communication, intentionally asserting herself, advocating for herself, asking for what she needs, targeting interpersonal relationship  stressors, reinforcing limits and boundaries, challenging co-dependency, co-dependency, aiming to break the cycle, and implement healthy patterns, and behaviors. Corinne plans to identify, associate, honor, validate, and make space for her feelings, and emotions, examining patterns, themes, and behaviors through the lens of trauma, journaling to further explore and self-reflect upon outside of session. Corinne plans to intentionally separate Rational Mind, identifying ration, reason, logic, and facts, along with Emotional Mind, identifying thoughts, feelings, and emotions, striking balance between the both, and implementing the concept of DBT [place] Mind. Corinne plans to reach out for additional support as needed.    12/30/24  Start Time: 0100  Stop Time: 1055  Total Visit Time: 55 minutes

## 2025-01-05 DIAGNOSIS — F39 MOOD DISORDER (HCC): ICD-10-CM

## 2025-01-07 RX ORDER — LAMOTRIGINE 25 MG/1
75 TABLET ORAL DAILY
Qty: 90 TABLET | Refills: 0 | Status: SHIPPED | OUTPATIENT
Start: 2025-01-07

## 2025-01-13 ENCOUNTER — TELEMEDICINE (OUTPATIENT)
Dept: BEHAVIORAL/MENTAL HEALTH CLINIC | Facility: CLINIC | Age: 45
End: 2025-01-13

## 2025-01-13 DIAGNOSIS — F33.0 MILD EPISODE OF RECURRENT MAJOR DEPRESSIVE DISORDER (HCC): Primary | ICD-10-CM

## 2025-01-13 DIAGNOSIS — F41.1 GAD (GENERALIZED ANXIETY DISORDER): Chronic | ICD-10-CM

## 2025-01-13 NOTE — PSYCH
Psychotherapy Provided: Individual Psychotherapy 55 minutes.     Length of time in session: 55 minutes.     Current suicide risk : Low .     Behavioral Health Treatment Plan [facility]: Diagnosis and Treatment Plan explained to Corinne, Corinne relates understanding diagnosis and is agreeable to Treatment Plan. Yes.      Visit start and stop times:    01/13/25  Start Time: 1300  Stop Time: 1355  Total Visit Time: 55 minutes    Virtual Regular Visit    Verification of patient location: [place].     Patient is located at Home in the following state in which [place] an [place] license [place].     Assessment/Plan:    Problem List Items Addressed This Visit          Behavioral Health    NAVI (generalized anxiety disorder) (Chronic)    Mild episode of recurrent major depressive disorder (HCC) - Primary     Goals addressed in session: Goal 1 Follow up psychotherapy session.     Depression Follow-up Plan Completed: Not applicable    Reason for visit is   Chief Complaint   Patient presents with    Virtual Regular Visit     Encounter provider Veronica Sandoval    Recent Visits  No visits were found meeting these conditions.  Showing recent visits within past 7 days and meeting all other requirements  Today's Visits  Date Type Provider Dept   01/13/25 Telemedicine Veronica Sandoval  [facility]   Showing today's visits and meeting all other requirements  Future Appointments  No visits were found meeting these conditions.  Showing future appointments within next 150 days and meeting all other requirements     The patient was identified by name and date of birth. Corinne S Namita was informed that this is a telemedicine visit and that the visit is being conducted through the Xmybox platform. She agrees to proceed.  My office door was closed. No one else was in the room.  She acknowledged consent and understanding of privacy and security of the video platform. The patient has agreed to participate and  understands they can discontinue the visit at any time.    Patient is aware this is a billable service.     Subjective Corinne S Kulp is a 44 y.o. female.    HPI     Past Medical History:   Diagnosis Date    Anxiety     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HCC)      Past Surgical History:   Procedure Laterality Date    APPENDECTOMY      extensive lysis of adhesions, MAYURI's procedure (divisions of MAYURI's bands) detorsion of vulvulus, widening of mesenteric pedicle, cecopeexy, appendectomy       BACK SURGERY Right 2013    SF: L4-L5 hemilaminectomy for discectomy. Use of the microscope for microdissection, Use og 40mg of depo-medrol though thee exiting right L5 nerve root. Onset:13     SECTION      x2    CHOLECYSTECTOMY      FL INJECTION RIGHT SHOULDER (ARTHROGRAM)  2022    GALLBLADDER SURGERY      [facility] [address], Onset:     LAPAROSCOPIC LYSIS INTESTINAL ADHESIONS      onset: 15    LUMBAR DISC SURGERY      [place] HYSTEROSCOPY BX ENDOMETRIUM&/POLYPC W/WO D&C N/A 2024    Procedure: DILATATION AND CURETTAGE (D&C) WITH HYSTEROSCOPY, POLYPECTOMY;  Surgeon: Griffin Arriaga MD;  Location: [facility];  Service: Gynecology    [place] HYSTEROSCOPY ENDOMETRIAL ABLATION N/A 2024    Procedure: ABLATION ENDOMETRIAL VIPUL;  Surgeon: Griffin Arriaga MD;  Location: [facility];  Service: Gynecology    [place] LAPS ABD PRTM&OMENTUM DX W/WO SPEC BR/WA SPX N/A 2016    Procedure: LAPAROSCOPY DIAGNOSTIC, EXTENSIVE LYSIS OF ADHESIONS, MAYURI'S PROCEDURE(DIVISION OF MAYURI'S BANDS,DETORSION OF VOLVULUS, WIDENING OF MESENTERIC PEDICLE, CECOPEXY, APPENDECTOMY);  Surgeon: Sudeep Espinoza MD;  [facility];  Service: General    TUBAL LIGATION       Current Outpatient Medications   Medication Sig Dispense Refill    ALPRAZolam (XANAX) 0.25 mg tablet Take 1 tablet (0.25 mg total) by mouth daily as needed for anxiety 15 tablet 1    DULoxetine (CYMBALTA) 60 mg delayed release capsule Take 1 capsule (60 mg total) by mouth daily 90 capsule 2     lamoTRIgine (LaMICtal) 25 mg tablet Take 3 tablets by mouth once daily 90 tablet 0    multivitamin (THERAGRAN) TABS Take 1 tablet by mouth daily      Probiotic Product (PROBIOTIC-10 PO) Take by mouth 1 daily       No current facility-administered medications for this visit.     Allergies   Allergen Reactions    Biaxin [Clarithromycin] GI Intolerance and Other (See Comments)    Sulfa Antibiotics     Sulfasalazine     Venlafaxine GI Intolerance, Vomiting and Other (See Comments)     Category: Allergy;   Category: Allergy;      Review of Systems    Video Exam    There were no vitals filed for this visit.    Physical Exam    (D) Corinne attended her follow up psychotherapy session today. Corinne reported that since her last session, she has noticed ongoing symptoms. Corinne reported that her friend's mother passed away on 12/19/24, and reported that they had the services on Saturday 01/11/24, that she attended. Corinne described some triggers in relation to this, reminding her of own mother's passing, and processed through this. 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 Corinne reported that in addition to this, she implemented a cancellation policy with a $50.00 non-refundable deposit. Corinne reported that she raised the price for haircuts, reporting that a few people made comments about this, triggering her. 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 Corinne describes feeling annoyed, and frustrated by people in general, describing herself as not even wanting to hear people's comments, let alone deal with them, professionally and personally. Discussed and processed this. Corinne reported that today she supported her son John in  "attending in (2) appointments, one for dermatology, and one for neuropsychology. Corinne reported that they thought her son John had acne; however, was diagnosed with a strawberry eczema. Corinne reported that they are making some changes, with topicals, and laundry detergent. Discussed and processed this. Corinne reported that she attended her son John's initial appointment today with Dr. Ratna Archuleta, neuropsychologist through [facility]. Corinne reported that she was impressed with them, and described feeling like it was a good initial in-take assessment. Corinne reported that her and John both participated in this initial in-take assessment, to then move forward with the neuropsych testing for next Wednesday. Corinne describes struggling with some negative thoughts towards herself as a parent, feeling like she should have done this earlier in life and feeling like she has, \"failed,\" [place]. Corinne discussed her journey in supporting John, and the process. Corinne reported that although her mother was diagnosed with bipolar disorder, she wonders now if she truly had bipolar disorder, verses something else, considering her mother struggling with alcoholism at times, and smoking marijuana. Corinne describes past family trauma, and the impacts that this has had. Corinne and this writer processed this at length. Discussed limits and boundaries. Reviewed ongoing skills. Provided ongoing psychoeducation. Modeled effective forms of communication.     (A) Corinne's mood presented as anxious and depressed, and her affect appeared to be congruent. Corinne presented with good eye contact. Corinne presented as alert and oriented x3. Corinne's speech presented at a normal rate, volume, and rhythm. Corinne denies any symptoms of frank. Corinne denies any evident or immediate risk factors for self-harm, SI, or HI. Corinne describes symptoms of emotional dysregulation, reporting irritability, poor frustration tolerance, " and becoming easily annoyed. Corinne describes feeling stressed out and overwhelmed at times, describing anticipatory anxiety, feeling nervous, anxious, and on edge, along with worrying, despite denying this on her [employer] screenings. Corinne describes symptoms of grief in relation to symbolic loss.     (P) Corinne plans to consider doing neuropsych testing for herself, for further support as assessment and diagnosis for herself. Corinne plans to journal through the lens of family trauma, and co-dependency, without judgement. Corinne plans to identify, associate, honor, validate, and make space for her feelings, and emotions, examining patterns, themes, and behaviors through these lenses as well. Corinne plans to meet herself with radical compassion, extending bogdan towards herself, identifying her worth, and giving herself credit. Corinne plans to implement positive self-talk, positive affirmations, self-affirming statements, and grounding statements. Corinne plans to continue to challenge herself to stick with the facts, and seek out evidence to challenge negative thoughts, negative thinking traps, cognitive distortions, and harmful core beliefs with the support of DBT Wise Mind, CBT Challenging Negative Thoughts Assessment Questions, and CBT Cognitive Restructuring. Corinne plans to continue to pay attention to her body, aiming to increase self-awareness in relation to warning signs and triggers throughout her body. Corinne plans to implement coping skills, distraction techniques, distress tolerance skills, self-soothing techniques, sensory related skills, grounding techniques, deep breathing techniques, and meditation/ mindfulness techniques to target ongoing symptoms. Corinne plans to internalize the concept of DBT Dialects, specifically the both/ and concept, allowing for two opposing thoughts, feelings, and emotions to both co-exist and be true a the same time. Corinne plans to implement the concept of DBT  Opposite Action Skills, intentionally structuring her schedule with balance, routine, and consistency, intentionally leaning into healthy natural supports, taking time for herself, being present in the moment, engaging in the use of self-care, and prioritizing her mental health and physical health needs. Corinne plans to examine pros and cons, along with risks verses benefits in order to make informed decisions, giving herself permission to align choices and decisions with what is in the best interest of her.  Corinne plans to utilize healthy and effective forms of communication, asserting herself, advocating for herself, asking for what she needs, targeting interpersonal relationship stressors, reinforcing limits and boundaries, challenging co-dependency, aiming to break the cycle, and implement healthy patterns, and behaviors. Corinne plans to intentionally separate Rational mind, identifying ration, reason, logic, and facts, along with Emotional Mind, identifying thoughts, feelings, and emotions, striking balance between the both, implementing the concept of DBT [place] Mind. Corinne plans to reach out for additional support as needed.    01/13/25  Start Time: 1300  Stop Time: 1355  Total Visit Time: 55 minutes

## 2025-01-20 ENCOUNTER — PATIENT MESSAGE (OUTPATIENT)
Dept: FAMILY MEDICINE CLINIC | Facility: HOSPITAL | Age: 45
End: 2025-01-20

## 2025-01-27 ENCOUNTER — TELEMEDICINE (OUTPATIENT)
Dept: BEHAVIORAL/MENTAL HEALTH CLINIC | Facility: CLINIC | Age: 45
End: 2025-01-27
Payer: COMMERCIAL

## 2025-01-27 DIAGNOSIS — F33.0 MILD EPISODE OF RECURRENT MAJOR DEPRESSIVE DISORDER (HCC): Primary | ICD-10-CM

## 2025-01-27 DIAGNOSIS — F41.1 GAD (GENERALIZED ANXIETY DISORDER): Chronic | ICD-10-CM

## 2025-01-27 PROCEDURE — 90834 PSYTX W PT 45 MINUTES: CPT | Performed by: SOCIAL WORKER

## 2025-01-27 NOTE — PSYCH
Psychotherapy Provided: Individual Psychotherapy 50 minutes.     Length of time in session: 50 minutes.     Current suicide risk : Low     Behavioral Health Treatment Plan [facility]: Diagnosis and Treatment Plan explained to Corinne, Corinne relates understanding diagnosis and is agreeable to Treatment Plan. Yes.      Visit start and stop times:    01/27/25       Virtual Regular Visit    Verification of patient location: [place].     Patient is located at Home in the following state in which I hold an active license [place].     Assessment/Plan:    Problem List Items Addressed This Visit          Behavioral Health    NAVI (generalized anxiety disorder) (Chronic)    Mild episode of recurrent major depressive disorder (HCC) - Primary     Goals addressed in session: Goal 1 Follow up psychotherapy session.     Depression Follow-up Plan Completed: Not applicable    Reason for visit is   Chief Complaint   Patient presents with    Virtual Regular Visit     Encounter provider Veronica Sandoval    Recent Visits  No visits were found meeting these conditions.  Showing recent visits within past 7 days and meeting all other requirements  Today's Visits  Date Type Provider Dept   01/27/25 Telemedicine Veronica Sandoval  [facility]   Showing today's visits and meeting all other requirements  Future Appointments  No visits were found meeting these conditions.  Showing future appointments within next 150 days and meeting all other requirements      The patient was identified by name and date of birth. Corinne S Kulp was informed that this is a telemedicine visit and that the visit is being conducted through the [employer] platform. She agrees to proceed.  My office door was closed. No one else was in the room.  She acknowledged consent and understanding of privacy and security of the video platform. The patient has agreed to participate and understands they can discontinue the visit at any time.    Patient is  aware this is a billable service.     Subjective Corinne S Kulp is a 44 y.o. female.    HPI     Past Medical History:   Diagnosis Date    Anxiety     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HCC)      Past Surgical History:   Procedure Laterality Date    APPENDECTOMY      extensive lysis of adhesions, MAYURI's procedure (divisions of MAYURI's bands) detorsion of vulvulus, widening of mesenteric pedicle, cecopeexy, appendectomy       BACK SURGERY Right 2013    SF: L4-L5 hemilaminectomy for discectomy. Use of the microscope for microdissection, Use og 40mg of depo-medrol though thee exiting right L5 nerve root. Onset:13     SECTION      x2    CHOLECYSTECTOMY      FL INJECTION RIGHT SHOULDER (ARTHROGRAM)  2022    GALLBLADDER SURGERY      GV, Onset:     LAPAROSCOPIC LYSIS INTESTINAL ADHESIONS      onset: 15    LUMBAR DISC SURGERY      CT HYSTEROSCOPY BX ENDOMETRIUM&/POLYPC W/WO D&C N/A 2024    Procedure: DILATATION AND CURETTAGE (D&C) WITH HYSTEROSCOPY, POLYPECTOMY;  Surgeon: Griffin Arriaga MD;  Location: [facility];  Service: Gynecology    CT HYSTEROSCOPY ENDOMETRIAL ABLATION N/A 2024    Procedure: ABLATION ENDOMETRIAL VIPUL;  Surgeon: Griffin Arriaga MD;  Location: [facility];  Service: Gynecology    CT LAPS ABD PRTM&OMENTUM DX W/WO SPEC BR/WA SPX N/A 2016    Procedure: LAPAROSCOPY DIAGNOSTIC, EXTENSIVE LYSIS OF ADHESIONS, MAYURI'S PROCEDURE(DIVISION OF MAYURI'S BANDS,DETORSION OF VOLVULUS, WIDENING OF MESENTERIC PEDICLE, CECOPEXY, APPENDECTOMY);  Surgeon: Sudeep Espinoza MD;  Location: [facility];  Service: General    TUBAL LIGATION         Current Outpatient Medications   Medication Sig Dispense Refill    ALPRAZolam (XANAX) 0.25 mg tablet Take 1 tablet (0.25 mg total) by mouth daily as needed for anxiety 15 tablet 1    DULoxetine (CYMBALTA) 60 mg delayed release capsule Take 1 capsule (60 mg total) by mouth daily 90 capsule 2    lamoTRIgine (LaMICtal) 25 mg tablet Take 3 tablets by mouth once  daily 90 tablet 0    multivitamin (THERAGRAN) TABS Take 1 tablet by mouth daily      Probiotic Product (PROBIOTIC-10 PO) Take by mouth 1 daily       No current facility-administered medications for this visit.     Allergies   Allergen Reactions    Biaxin [Clarithromycin] GI Intolerance and Other (See Comments)    Sulfa Antibiotics     Sulfasalazine     Venlafaxine GI Intolerance, Vomiting and Other (See Comments)     Category: Allergy;   Category: Allergy;      Review of Systems    Video Exam    There were no vitals filed for this visit.    Physical Exam     (D) Corinne attended her follow up psychotherapy session today. Corinne reported that since her last session, she has noticed a decrease in symptoms. Corinne reported that her son John went to [facility] to move forward with the actual neuropsych testing last week. Corinne reported that John has follow up with [facility] on 02/03/25 to review his results. Corinne reported that she completed the parent observer form to support the neuropsych testing. Corinne reported that after she completed the observer form, she felt even more that her son John's symptoms were consistent with ADHD. Corinne describes some anticipatory anxiety in relation to getting the results; however, describes feeling good about his movement forward with this. Corinne reported that she herself reached out to [facility] for neuropsych testing for herself, reporting that she scheduled this initial appointment on 02/24/25. Corinne reported that she couldn't be with the same person with Dr. Ratna Archuleta, like John had, and was assigned with someone else. Corinne and this writer processed this. Corinne reported that she has noticed a reduction in the intensity and frequency of her symptoms since the increase of the lamictal to 75mg. Corinne reported that she had her menstrual cycle was last week, and reported that she didn't have the intense mental health symptoms leading up to  this like she usually does, just noticing a slight increase in anxiety. Corinne reported that she didn't even need to take any of her PRN of xanax during this time, surprising her.  Discussed and processed this. Corinne reported that her son John went back to college at [employer] last night, and describes mixed feelings in relation to this. Corinne reported that she wanted John to get back to get into a routine for himself, and work on structuring his schedule. Corinne reported that her daughter Randi is home from school today, reporting that yesterday she was hit in the face with a soccer ball. Corinne reported that the ball hit her across the face, chipping her tooth, and complaining of feeling lightheaded, dizziness, feeling sore, and describing fatigue. Corinne reported that she kept her home from school today, to further evaluate this, considering taking her to the doctor if her symptoms continue or worsen. Corinne reported that her  Fredy was less than compassionate, believing that she was fine, and should go to school, triggering Corinne. Discussed and processed this. 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 Corinne reported that her  Fredy and a friend were at the salon yesterday, moving plumbing around, to switch up some of the rooms to make space for this. Discussed and processed this. 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 Corinne reported that in addition to this, on New Years Tari she received a check from the  government, and it was the final check, plus interest, totaling $19,000, from an overpayment during the global pandemic. Corinne reported that in the past she had to put money aside from the other checks for tax purposes, and reported that her tax woman spoke with her. Corinne reported that she has a large line of credit for the remodel at the salon, in addition to credit cards. Corinne reported that she took a large portion of the $19,000 check on the credit card, to decrease the card amount, decreasing her monthly total. Corinne reported that she also paid off her vacation, and put money in their vacation fund, describing relief with being able to pay a lot of it off. Corinne described feeling relieved in relation to this. Corinne reported that her main assistant was out for the past two weeks, as a result of her mother dying from cancer that progressed quickly. Corinne described some stressors and triggers in relation to this. Corinne reported that some of her employees stepped in and helped out, and then there was one that didn't help at all, triggering Corinne. Discussed and processed this. Corinne reported that her and her  Fredy helped their son John out with purchasing a newer car that their family friend was selling. Corinne reported that they bought the car, with a plan for John to sell his car, and also for John to work and pay them $150.00 a month towards the car. Corinne describes feeling worried about John going back to school, and working. Corinne describes feeling like John is over medicated, and describes constantly worrying about his mental health. Corinne describes some stressors and triggers with her father-in-law, with her perception of passing judgement when how much her and Fredy help their children, when and where they can. Corinne described a history of interpersonal relationship conflict with her and her father-in-law, resulting in them not talking for years, and only recently  reconnected over the past few years, describing them as amicable. Corinne describes feeling at times seeing some of her father-in-law in her  Fredy at times, triggering her. Discussed and processed this. Corinne reported that over the holidays, she saw her father, and described feeling less angry towards him. Corinne reported that her father asked to come over to her house to use the computer/ printer, to print out stuff to help him move forward with his divorce, noticing that she felt less resentful towards him. Corinne reported that her father initiated a family group text about him taking Corinne and her brother and their families out to dinner for their birthdays, triggering her, describing mixed feelings. Corinne and this writer processed this. Discussed ongoing skills. Reviewed limits and boundaries. Provided ongoing psychoeducation. Modeled effective forms of communication.     (A) Corinne presented as alert and oriented x3. Corinne presented with good eye contact. Corinne's speech presented at a normal rate, volume, and rhythm. Corinne denies any symptoms of frank. Corinne denies any evident or immediate risk factors for self-harm, SI, or HI. Corinne's mood presented as anxious and depressed, and her affect appeared to be congruent. Corinne describes symptoms of irritability, poor frustration tolerance, and becoming easily annoyed, describing some emotional dysregulation. Corinne describes a reduction in the intensity and frequency of her symptoms, cycles, and stressors. Corinne describes some symptoms of anticipatory anxiety, feeling nervous, anxious, and on edge, along with worrying, despite denying this on her NAVI-7 screenings. Corinne describes feeling stressed out and overwhelmed at times. Corinne describes lower moods of sadness, and depression at times, despite denying this on her PHQ-9 screening.     (P) Corinne plans to be intentional about examining met needs verses unmet needs when it comes  to her relationship with her  Fredy, her father-in-law, and her own father, comparing similarities and differences, journaling upon this further to further explore and self-reflect upon next session. Corinne plans to examine negative thoughts, negative thinking traps, cognitive distortions, and harmful core beliefs, and intentionally challenge herself to seek out evidence and check the facts with the support of CBT Cognitive Restructuring, DBT Wise Mind, and CBT Challenging Negative Thoughts Assessment Questions. Corinne plans to radically meet herself with compassion, extending bogdan towards herself, giving herself credit, and identifying her worth. Corinne plans to implement positive self-talk, positive affirmations, self-affirming statements, and grounding statements. Corinne plans to internalize the concept of DBT Dialects, specifically the both/ and concept, allowing for two opposing thoughts, feelings, and emotions to both co-exist and be true at the same time. Corinne plans to implement the concept of Radical Acceptance, examining pros and cons, along with risks verses benefits in order to make informed decisions, giving herself permission to align choices and decisions with what is in the best interest of her. Corinne plans to implement the concept of DBT Opposite Action Skills, intentionally challenging herself to accept support from others, lean into healthy natural supports, take time for herself, be present in the moment, engage in the use of self-care, and prioritize her mental health and physical health needs. Corinne plans to continue to pay attention to her central nervous system, aiming to increase self-awareness in relation to warning signs and triggers throughout her body. Corinne plans to implement distraction techniques, distress tolerance skills, coping skills, grounding techniques, self-soothing techniques, sensory related skills, deep breathing techniques, and meditation/ mindfulness  techniques to target ongoing symptoms. Corinne plans to utilize healthy and effective forms of communication, intentionally asserting herself, advocating for herself, asking for what she needs, targeting interpersonal relationship stressors, reinforcing limits and boundaries, challenging co-dependency, aiming to break the cycle, and implement healthy patterns, and behaviors. Corinne plans to identify, associate, honor, validate, and make space for her feelings, and emotions, examining patterns, themes, and behaviors through the lens of grief, trauma, and co-dependency. Corinne plans to do this through a non-judgmental lens. Corinne plans to reach out for additional support as needed.    01/27/25  Start Time: 0900  Stop Time: 0950  Total Visit Time: 50 minutes

## 2025-02-03 DIAGNOSIS — F39 MOOD DISORDER (HCC): ICD-10-CM

## 2025-02-04 RX ORDER — LAMOTRIGINE 25 MG/1
75 TABLET ORAL DAILY
Qty: 90 TABLET | Refills: 0 | Status: SHIPPED | OUTPATIENT
Start: 2025-02-04

## 2025-02-10 ENCOUNTER — TELEMEDICINE (OUTPATIENT)
Dept: BEHAVIORAL/MENTAL HEALTH CLINIC | Facility: CLINIC | Age: 45
End: 2025-02-10

## 2025-02-10 ENCOUNTER — TELEMEDICINE (OUTPATIENT)
Dept: PSYCHIATRY | Facility: CLINIC | Age: 45
End: 2025-02-10
Payer: COMMERCIAL

## 2025-02-10 DIAGNOSIS — F41.1 GAD (GENERALIZED ANXIETY DISORDER): Chronic | ICD-10-CM

## 2025-02-10 DIAGNOSIS — F33.0 MILD EPISODE OF RECURRENT MAJOR DEPRESSIVE DISORDER (HCC): Primary | ICD-10-CM

## 2025-02-10 DIAGNOSIS — F39 MOOD DISORDER (HCC): ICD-10-CM

## 2025-02-10 PROCEDURE — 99214 OFFICE O/P EST MOD 30 MIN: CPT | Performed by: NURSE PRACTITIONER

## 2025-02-10 NOTE — PSYCH
Psychotherapy Provided: Individual Psychotherapy 50 minutes.     Length of time in session: 50 minutes.     Current suicide risk : Low .     Behavioral Health Treatment Plan [facility]: Diagnosis and Treatment Plan explained to Corinne, Corinne relates understanding diagnosis and is agreeable to Treatment Plan. Yes.      Visit start and stop times:    02/10/25       Virtual Regular Visit    Verification of patient location: [place].     Patient is located at Home in the following state in which I hold an active license [place].     Assessment/Plan:    Problem List Items Addressed This Visit          Behavioral Health    NAVI (generalized anxiety disorder) (Chronic)    Mild episode of recurrent major depressive disorder (HCC) - Primary     Goals addressed in session: Goal 1 Follow up psychotherapy session.     Depression Follow-up Plan Completed: Not applicable    Reason for visit is   Chief Complaint   Patient presents with    Virtual Regular Visit     Encounter provider Veronica Sandoval    Recent Visits  No visits were found meeting these conditions.  Showing recent visits within past 7 days and meeting all other requirements  Today's Visits  Date Type Provider Dept   02/10/25 Telemedicine Veronica Sandoval  [facility]   Showing today's visits and meeting all other requirements  Future Appointments  No visits were found meeting these conditions.  Showing future appointments within next 150 days and meeting all other requirements     The patient was identified by name and date of birth. Corinne S Kulp was informed that this is a telemedicine visit and that the visit is being conducted through the [employer] platform. She agrees to proceed.  My office door was closed. No one else was in the room.  She acknowledged consent and understanding of privacy and security of the video platform. The patient has agreed to participate and understands they can discontinue the visit at any time.    Patient is  aware this is a billable service.     Subjective Corinne S Kulp is a 45 y.o. female.    HPI     Past Medical History:   Diagnosis Date    Anxiety     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HCC)      Past Surgical History:   Procedure Laterality Date    APPENDECTOMY      extensive lysis of adhesions, MAYURI's procedure (divisions of MAYURI's bands) detorsion of vulvulus, widening of mesenteric pedicle, cecopeexy, appendectomy       BACK SURGERY Right 2013    SF: L4-L5 hemilaminectomy for discectomy. Use of the microscope for microdissection, Use og 40mg of depo-medrol though thee exiting right L5 nerve root. Onset:13     SECTION      x2    CHOLECYSTECTOMY      FL INJECTION RIGHT SHOULDER (ARTHROGRAM)  2022    GALLBLADDER SURGERY      GV, Onset:     LAPAROSCOPIC LYSIS INTESTINAL ADHESIONS      onset: 15    LUMBAR DISC SURGERY      [place] HYSTEROSCOPY BX ENDOMETRIUM&/POLYPC W/WO D&C N/A 2024    Procedure: DILATATION AND CURETTAGE (D&C) WITH HYSTEROSCOPY, POLYPECTOMY;  Surgeon: Griffin Arriaga MD;  Location: [facility];  Service: Gynecology    [place] HYSTEROSCOPY ENDOMETRIAL ABLATION N/A 2024    Procedure: ABLATION ENDOMETRIAL VIPUL;  Surgeon: Griffin Arriaga MD;  Location: [facility];  Service: Gynecology    [place] LAPS ABD PRTM&OMENTUM DX W/WO SPEC BR/WA SPX N/A 2016    Procedure: LAPAROSCOPY DIAGNOSTIC, EXTENSIVE LYSIS OF ADHESIONS, MAYURI'S PROCEDURE(DIVISION OF MAYURI'S BANDS,DETORSION OF VOLVULUS, WIDENING OF MESENTERIC PEDICLE, CECOPEXY, APPENDECTOMY);  Surgeon: Sudeep Espinoza MD;  Location: [facility];  Service: General    TUBAL LIGATION       Current Outpatient Medications   Medication Sig Dispense Refill    ALPRAZolam (XANAX) 0.25 mg tablet Take 1 tablet (0.25 mg total) by mouth daily as needed for anxiety 15 tablet 1    DULoxetine (CYMBALTA) 60 mg delayed release capsule Take 1 capsule (60 mg total) by mouth daily 90 capsule 2    lamoTRIgine (LaMICtal) 25 mg tablet Take 3 tablets by mouth once  daily 90 tablet 0    multivitamin (THERAGRAN) TABS Take 1 tablet by mouth daily      Probiotic Product (PROBIOTIC-10 PO) Take by mouth 1 daily       No current facility-administered medications for this visit.     Allergies   Allergen Reactions    Biaxin [Clarithromycin] GI Intolerance and Other (See Comments)    Sulfa Antibiotics     Sulfasalazine     Venlafaxine GI Intolerance, Vomiting and Other (See Comments)     Category: Allergy;   Category: Allergy;      Review of Systems    Video Exam    There were no vitals filed for this visit.    Physical Exam     (D) Corinne attended her follow up psychotherapy session today. Corinne reported that since her last session, she has noticed ongoing symptoms. Corinne describes stressors and triggers in relation to her son John. Corinne reported that last week, her son John had his follow up appointment with [facility] from the results of his neuropsych evaluation. Corinne reported that the neuropsychologist communicated beliefs that John exhibits Cognitive Impairments of ADHD. Corinne reported that she was triggered when the neuropsychologist asked to meet with John independently during this session, reporting that she went to great lengths to block her schedule to be there and attend. Corinne reported that she understands why they wanted to meet with him, and describes feeling mixed feelings in relation to this. Corinne reported that after she met with John again, she came back on and communicated that she also believes that John struggles with, significant to Moderate Severe Depression, in addition to Anxiety, along with possible mood fluctuations, verses Borderline Personality Disorder. Corinne describes feeling triggered in relation to all of this, feeling unsure what exactly is the primary diagnosis, and where to do from there. Corinne describes worrying about John, describing ongoing symptomatology, and her perception of it negatively impacting John. Corinne  describes feeling frustrated with John, describing her perception of lack of motivation, observing sadness, depression, anxiety, identifying feeling stressed out and overwhelmed. Corinne reported that John has been missing classes, triggering her, resulting in her feeling hopeless and helpless, triggering frustration and anger. Corinne reported that out of frustration she told John that if he misses any more classes they are pulling him from college, and then told him that he needs to call his part-time job of [employer] and tell them that he needs to be scheduled for work Friday, Saturday, and Sunday to work and earn money, and pay them back for the car that they helped him buy. Corinne reported that also said that she was going to call John every single day to wake up him. Corinne describes interpersonal relationship conflict between her and John, constantly finding herself feeling frustrated with John, and processed through this. Corinne reported that back around Thanksgiving, John was experiencing testicle pain, and he went to the doctor, and was prescribed an antibiotic, and an ultrasound. Corinne reported that the ultrasound came back clear, and the antibiotic helped. Corinne reported that in addition to this, John recently reported that the pain was back, resulting in Corinne helping him get an appointment, and reported that they considered another antibiotic, verses going to a urologist. Corinne reported that along with this, John had an issue with a chipped tooth, and she helped him get an appointment, and described feeling triggered by John, and helping him. Corinne reported that they are working on trying to get his psychiatry appointment moved up, and describes feeling overwhelmed with all of John's struggles, challenges, and barriers right now. Corinne describes various instances over the past two weeks of conflict between her and John, creating triggers, and emotional dysregulation. Discussed  "and processed this. Corinne reported that John is supposed to meet with a success navigator at [employer] to review options to support him. Corinne and this writer processed this. Modeled effective forms of communication. Provided ongoing psychoeducation. Discussed limits and boundaries. Reviewed ongoing skills.     (A) Corinne presented with good eye contact. Corinne's speech presented at a normal rate, volume, and rhythm. Corinne presented as alert and oriented x3. Corinne denies any symptoms of frank. Corinne denies any evident or immediate risk factors for self-harm, SI, or HI. Corinne's mood presented as irritable, anxious, and depressed, and her affect appeared to be congruent, and tearful at times. Corinne describes symptoms of emotional dysregulation, reporting irritability, poor frustration tolerance, and becoming easily annoyed. Corinne describes symptoms of anticipatory anxiety, worrying too much about different things, and fearing that something awful might happen. Corinne describes symptoms of feeling nervous, anxious, and on edge, despite denying this on her NAVI-7 screening. Corinne describes feeling tired having little energy, and fatigue. Corinne describes feeling bad about herself, feeling like she is letting herself, and her family down.     (P) Corinne plans to consider reading the book, \"The Let Them Theory,\" by Carla Valencia outside of session, aiming to increase self-awareness identifying areas that she connects with verses areas that she doesn't connect with to further explore next session. This writer recommended that Corinne consider asking about a potential family session with John's mental health provider for consultation and support. This writer recommended that Corinne listen to podcasts and/or read books on parenting ADHD. Corinne plans to examine the triggers with John, and journal to further explore outside of session. Corinne plans to intentionally separate Rational Mind, " identifying ration, reason, logic, and facts, along with Emotional Mind, identifying thoughts, feelings, and emotions, striking balance between the both, implementing the concept of DBT Radical Acceptance. Corinne plans to internalize the concept of DBT Dialects, specifically the both/ and concept, allowing for two opposing thoughts, feelings, and emotions to both co-exist and be true at the same time. Corinne plans to examine pros and cons, along with risks verses benefits in order to make informed decisions, giving herself permission to align choices and decisions with what is in the best interest of her, implementing the concept of DBT [place] Mind. Corinne plans to intentionally structure her schedule her balance, routine, and consistency, using DBT Opposite Action Skills, giving herself permission to prioritize her mental health and physical health needs, engage in the use of self-care, take time for herself, be present in the moment, and lean into healthy natural supports. Corinne plans to target ongoing symptoms with grounding techniques, distraction techniques, distress tolerance skills, coping skills, self-soothing techniques, sensory related skills, deep breathing techniques, and meditation/ mindfulness techniques. Corinne plans to utilize healthy and effective forms of communication, intentionally asserting herself, advocating for herself, asking for what she needs, targeting interpersonal relationship stressors, reinforcing limits and boundaries, challenging co-dependency, aiming to break the cycle, and implement healthy patterns, and behaviors. Corinne plans to implement positive self-talk, positive affirmations, self-affirming statements, and grounding statements, being mindful about intentionally giving herself credit, identifying her worth, extending bogdan towards herself, and meeting herself with radical compassion. Corinne plans to decrease judgement towards herself, and continue to challenge negative  thoughts, negative thinking traps, cognitive distortions, and harmful core beliefs with the support of DBT Wise Mind, CBT Cognitive Restructuring, and CBT Challenging Negative Thoughts Assessment Questions. Corinne plans to identify, associate, honor, validate, and make space for her feelings, and emotions, examining patterns, themes, and behaviors through the lens of co-dependency, and grief in relation to symbolic loss, journaling to further explore and process this next session. Corinne plans to reach out for additional support as needed.    02/10/25  Start Time: 1105  Stop Time: 1155  Total Visit Time: 50 minutes

## 2025-02-10 NOTE — PSYCH
Virtual Regular Visit    Verification of patient location:    Patient is located at Home in the following state in which I hold an active license [place]      Assessment/Plan:    Problem List Items Addressed This Visit       Mild episode of recurrent major depressive disorder (HCC) - Primary    NAVI (generalized anxiety disorder) (Chronic)    Mood disorder (HCC)       Goals addressed in session: Maintain current level of stability    Depression Follow-up Plan Completed: Yes    Reason for visit is   Chief Complaint   Patient presents with    Anxiety    Medication Management    Follow-up    Virtual Regular Visit          Encounter provider SAKINA Bruno      Recent Visits  No visits were found meeting these conditions.  Showing recent visits within past 7 days and meeting all other requirements  Today's Visits  Date Type Provider Dept   02/10/25 Telemedicine SAKINA Bruno  [facility]   Showing today's visits and meeting all other requirements  Future Appointments  No visits were found meeting these conditions.  Showing future appointments within next 150 days and meeting all other requirements       The patient was identified by name and date of birth. Corinne S Kulp was informed that this is a telemedicine visit and that the visit is being conducted throughthe [employer] platform. She agrees to proceed..  My office door was closed. No one else was in the room.  She acknowledged consent and understanding of privacy and security of the video platform. The patient has agreed to participate and understands they can discontinue the visit at any time.    Patient is aware this is a billable service.     Subjective  Corinne S Kulp is a 45 y.o. female has history of depressive disorder, mood disorder and anxiety disorder.  Doing very well on this combination of medication.  She is sleeping well and eating well.  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  Offering no  complaints regarding herself.  She does have a son, John that is seeing psychiatry.  At times she is concerned about him and his progress.  I advised if John is okay with it, that she is able to speak with psychiatrist or at least attend his next appointment with him.  She agrees and she will work on that.  Otherwise continue current meds and treatment and follow-up with me in 3 months or sooner if necessary  Mental status exam: Patient is awake and alert and oriented x 3.  Mood is stable.  Patient is not suicidal, not homicidal and not psychotic.  Associations are intact.  No overt delusions.  Speech is clear and thoughts are well-organized, coherent and goal-directed.  Attention span is good.  Impulse control is good.  Judgment and insight are good.  Memory is good.  The patient will continue on:  Cymbalta 60 mg daily.  Lamictal 75 mg daily  Xanax 0.25 mg daily as needed for anxiety.  Patient uses this rarely.  Follow-up with me in 3 months or sooner if necessary.    We have discussed their safety plan and pt agrees that if they experience unsafe thoughts that they will reach out to their supports including this office, the suicide hotline, and emergency services if necessary.     Assessment & Plan  Mild episode of recurrent major depressive disorder (HCC)  Cymbalta 60 mg daily       Mood disorder (HCC)  Lamictal 75 mg daily       NAVI (generalized anxiety disorder)  Xanax 0.25 mg daily as needed for anxiety                 Past Medical History:   Diagnosis Date    Anxiety     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HCC)        Past Surgical History:   Procedure Laterality Date    APPENDECTOMY      extensive lysis of adhesions, MAYURI's procedure (divisions of MAYURI's bands) detorsion of vulvulus, widening of mesenteric pedicle, cecopeexy, appendectomy       BACK SURGERY Right 09/13/2013    SF: L4-L5 hemilaminectomy for discectomy. Use of the microscope for microdissection, Use og 40mg of depo-medrol though thee exiting right L5 nerve  root. Onset:13     SECTION      x2    CHOLECYSTECTOMY      FL INJECTION RIGHT SHOULDER (ARTHROGRAM)  2022    GALLBLADDER SURGERY      [facility], Onset:     LAPAROSCOPIC LYSIS INTESTINAL ADHESIONS      onset: 15    LUMBAR DISC SURGERY      [place] HYSTEROSCOPY BX ENDOMETRIUM&/POLYPC W/WO D&C N/A 2024    Procedure: DILATATION AND CURETTAGE (D&C) WITH HYSTEROSCOPY, POLYPECTOMY;  Surgeon: Griffin Arriaga MD;  Location: [facility];  Service: Gynecology    [place] HYSTEROSCOPY ENDOMETRIAL ABLATION N/A 2024    Procedure: ABLATION ENDOMETRIAL VIPUL;  Surgeon: Griffin Arriaga MD;  Location: [facility];  Service: Gynecology    [place] LAPS ABD PRTM&OMENTUM DX W/WO SPEC BR/WA SPX N/A 2016    Procedure: LAPAROSCOPY DIAGNOSTIC, EXTENSIVE LYSIS OF ADHESIONS, MAYURI'S PROCEDURE(DIVISION OF MAYURI'S BANDS,DETORSION OF VOLVULUS, WIDENING OF MESENTERIC PEDICLE, CECOPEXY, APPENDECTOMY);  Surgeon: Sudeep Espinoza MD;  Location: [facility];  Service: General    TUBAL LIGATION         Current Outpatient Medications   Medication Sig Dispense Refill    ALPRAZolam (XANAX) 0.25 mg tablet Take 1 tablet (0.25 mg total) by mouth daily as needed for anxiety 15 tablet 1    DULoxetine (CYMBALTA) 60 mg delayed release capsule Take 1 capsule (60 mg total) by mouth daily 90 capsule 2    lamoTRIgine (LaMICtal) 25 mg tablet Take 3 tablets by mouth once daily 90 tablet 0    multivitamin (THERAGRAN) TABS Take 1 tablet by mouth daily      Probiotic Product (PROBIOTIC-10 PO) Take by mouth 1 daily       No current facility-administered medications for this visit.        Allergies   Allergen Reactions    Biaxin [Clarithromycin] GI Intolerance and Other (See Comments)    Sulfa Antibiotics     Sulfasalazine     Venlafaxine GI Intolerance, Vomiting and Other (See Comments)     Category: Allergy;   Category: Allergy;        Review of Systems    Video Exam    There were no vitals filed for this visit.    Physical Exam     Visit Time    Visit  Start Time: 10:30a  Visit Stop Time: 11:00a  Total Visit Duration: 30 minutes were spent in the visit.  Time spent reviewing the treatment plan, reviewing medications, ordering medications and completing the progress note.

## 2025-02-24 ENCOUNTER — TELEMEDICINE (OUTPATIENT)
Dept: BEHAVIORAL/MENTAL HEALTH CLINIC | Facility: CLINIC | Age: 45
End: 2025-02-24

## 2025-02-24 DIAGNOSIS — F41.1 GAD (GENERALIZED ANXIETY DISORDER): Primary | Chronic | ICD-10-CM

## 2025-02-24 DIAGNOSIS — F33.0 MILD EPISODE OF RECURRENT MAJOR DEPRESSIVE DISORDER (HCC): ICD-10-CM

## 2025-03-03 ENCOUNTER — TELEMEDICINE (OUTPATIENT)
Dept: BEHAVIORAL/MENTAL HEALTH CLINIC | Facility: CLINIC | Age: 45
End: 2025-03-03

## 2025-03-03 DIAGNOSIS — F33.0 MILD EPISODE OF RECURRENT MAJOR DEPRESSIVE DISORDER (HCC): Primary | ICD-10-CM

## 2025-03-03 DIAGNOSIS — F39 MOOD DISORDER (HCC): ICD-10-CM

## 2025-03-03 DIAGNOSIS — F41.1 GAD (GENERALIZED ANXIETY DISORDER): Chronic | ICD-10-CM

## 2025-03-03 RX ORDER — LAMOTRIGINE 25 MG/1
75 TABLET ORAL DAILY
Qty: 90 TABLET | Refills: 0 | Status: SHIPPED | OUTPATIENT
Start: 2025-03-03
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
## 2025-03-18 ENCOUNTER — TELEPHONE (OUTPATIENT)
Age: 45
End: 2025-03-18

## 2025-03-18 DIAGNOSIS — B37.31 YEAST VAGINITIS: Primary | ICD-10-CM

## 2025-03-18 RX ORDER — FLUCONAZOLE 150 MG/1
150 TABLET ORAL EVERY OTHER DAY
Qty: 2 TABLET | Refills: 0 | Status: SHIPPED | OUTPATIENT
Start: 2025-03-18 | End: 2025-03-21

## 2025-03-18 NOTE — TELEPHONE ENCOUNTER
Called patient to let her know prescription has been sent. Scheduled for annual exam 3/31. Patient thankful.

## 2025-03-18 NOTE — TELEPHONE ENCOUNTER
"Dr Arriaga please see the thread in patient [facility] msg encounter to determine if script is appropriate for [place]     \"I believe I have a yeast infection starting. Itching and burning. In the past none of the over the counter medications work for me. I’ve always needed to take an oral prescription in the past. \"    Last seen in office 11/20/23- no upcoming appointment scheduled. ( Out of range for RN's to send in medication)       Last annual was 11/17/22  Thank you   "

## 2025-03-18 NOTE — TELEPHONE ENCOUNTER
I have sent a prescription for Diflucan to her pharmacy. Patient needs to be seen in office for annual exam before any additional treatment will be prescribed.     Griffin Arriaga MD  3/18/2025 10:03 AM

## 2025-03-24 ENCOUNTER — TELEMEDICINE (OUTPATIENT)
Dept: BEHAVIORAL/MENTAL HEALTH CLINIC | Facility: CLINIC | Age: 45
End: 2025-03-24
Payer: COMMERCIAL

## 2025-03-24 DIAGNOSIS — F33.0 MILD EPISODE OF RECURRENT MAJOR DEPRESSIVE DISORDER (HCC): Primary | ICD-10-CM

## 2025-03-24 DIAGNOSIS — F41.1 GAD (GENERALIZED ANXIETY DISORDER): Chronic | ICD-10-CM

## 2025-03-24 PROCEDURE — 90834 PSYTX W PT 45 MINUTES: CPT | Performed by: SOCIAL WORKER

## 2025-03-24 NOTE — PSYCH
Virtual Regular Visit Name: Corinne S Kulp      : 1980      MRN: [id number]  Encounter Provider: Veronica Sanodval  Encounter Date: 3/24/2025   Encounter department: [facility]  :  Assessment & Plan  Mild episode of recurrent major depressive disorder (HCC)         NAVI (generalized anxiety disorder)           Goals addressed in session: Goal 1 Follow up psychotherapy session.     Behavioral Health Treatment Plan [facility]: Diagnosis and Treatment Plan explained to Corinne, Corinne relates understanding diagnosis and is agreeable to Treatment Plan. Yes.      Depression Follow-up Plan Completed: Not applicable     Reason for visit is No chief complaint on file.     Recent Visits  No visits were found meeting these conditions.  Showing recent visits within past 7 days and meeting all other requirements  Future Appointments  No visits were found meeting these conditions.  Showing future appointments within next 150 days and meeting all other requirements     History of Present Illness     HPI    Past Medical History   Past Medical History:   Diagnosis Date    Anxiety     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HCC)      Past Surgical History:   Procedure Laterality Date    APPENDECTOMY      extensive lysis of adhesions, MAYURI's procedure (divisions of MAYURI's bands) detorsion of vulvulus, widening of mesenteric pedicle, cecopeexy, appendectomy       BACK SURGERY Right 2013    SF: L4-L5 hemilaminectomy for discectomy. Use of the microscope for microdissection, Use og 40mg of depo-medrol though thee exiting right L5 nerve root. Onset:13     SECTION      x2    CHOLECYSTECTOMY      FL INJECTION RIGHT SHOULDER (ARTHROGRAM)  2022    GALLBLADDER SURGERY      [facility], Onset:     LAPAROSCOPIC LYSIS INTESTINAL ADHESIONS      onset: 15    LUMBAR DISC SURGERY      CT HYSTEROSCOPY BX ENDOMETRIUM&/POLYPC W/WO D&C N/A 2024    Procedure: DILATATION AND CURETTAGE (D&C) WITH  HYSTEROSCOPY, POLYPECTOMY;  Surgeon: Griffin Arriaga MD;  Location: [facility];  Service: Gynecology    CA HYSTEROSCOPY ENDOMETRIAL ABLATION N/A 1/16/2024    Procedure: ABLATION ENDOMETRIAL VIPUL;  Surgeon: Griffin Arriaga MD;  Location: [facility];  Service: Gynecology    CA LAPS ABD PRTM&OMENTUM DX W/WO SPEC BR/WA SPX N/A 9/23/2016    Procedure: LAPAROSCOPY DIAGNOSTIC, EXTENSIVE LYSIS OF ADHESIONS, MAYURI'S PROCEDURE(DIVISION OF MAYURI'S BANDS,DETORSION OF VOLVULUS, WIDENING OF MESENTERIC PEDICLE, CECOPEXY, APPENDECTOMY);  Surgeon: Sudeep Espinoza MD;  Location: [facility];  Service: General    TUBAL LIGATION       Current Outpatient Medications   Medication Instructions    ALPRAZolam (XANAX) 0.25 mg, Oral, Daily PRN    DULoxetine (CYMBALTA) 60 mg, Oral, Daily    lamoTRIgine (LAMICTAL) 75 mg, Oral, Daily    multivitamin (THERAGRAN) TABS 1 tablet, Oral, Daily    Probiotic Product (PROBIOTIC-10 PO) Oral, 1 daily     Allergies   Allergen Reactions    Biaxin [Clarithromycin] GI Intolerance and Other (See Comments)    Sulfa Antibiotics     Sulfasalazine     Venlafaxine GI Intolerance, Vomiting and Other (See Comments)     Category: Allergy;   Category: Allergy;      Objective   There were no vitals taken for this visit.    Video Exam  Physical Exam     Administrative Statements   Encounter provider Veronica Sandoval    The Patient is located at Home and in the following state in which I hold an active license [place].    The patient was identified by name and date of birth. Corinne S Kulp was informed that this is a telemedicine visit and that the visit is being conducted through the [facility] platform. She agrees to proceed.  My office door was closed. No one else was in the room.  She acknowledged consent and understanding of privacy and security of the video platform. The patient has agreed to participate and understands they can discontinue the visit at any time.    I have spent a total time of 50 minutes in caring for  this patient on the day of the visit/encounter including Counseling / Coordination of care, not including the time spent for establishing the audio/video connection.    Visit Time       (D) Corinne attended her follow up psychotherapy session today. Corinne reported that since her last session, she has noticed ongoing symptoms. Corinne reported that at the end of January, she and her family were fostering (2) kittens, that they recently fully adopted. Corinne reported that the kittens play time is in the middle of the night, reporting that the kittens are jumping on the bed, scratching, and playing her in bedroom, resulting in her experiencing sleep disturbances, negative impacting fatigue, and feeling tired and having little energy. Corinne described stressors and triggers in relation to this, and processed through this. Corinne reported that she followed through with her actual neuropsych testing through [facility] on 03/10/25. Corinne described the process as long, tedious, cumbersome, exhausting, triggering, and noticed emotional dysregulation. Corinne reported that she started off in a room that had a window in it, and reported that she felt that this was good for her. Corinne reported that she was then moved to a room with no window, triggering her. Corinne reported that while she was there, she developed a headache, resulting in her taking ibuprofen while she was there. Corinne reported that this is normally effective for treating a headache and reported that the headache didn't go away, resulting in her taking more ibuprofen later in the day before it went away. Corinne reported that the first test that she did was counting dots on a page, and described another test of having to draw lines to numbers and do it in order. Corinne reported that she had to read words on a page and the further along she got the harder it was for her to do. Corinne reported that she had a draw pictures of shapes and was timed.  Corinne described feeling so emotionally exhausted, drained, fatigued, and annoyed. Corinne described feeling frustrated with the details of the testing. Corinne reported that she was there for a total of (4) hours, and reported that when her son did it, he was only there for (2) hours. Corinne reported that the testing took much longer than she anticipated. Corinne reported that she did a (30) minute computer test, and described it as so frustrated to her, that she felt like she shut down, and started to fall asleep. Corinne described feeling emotionally dysregulated throughout the entire process, and had a strong desire to just be done with the testing before it was over. Corinne reported that she has follow up on Monday 03/31/25 to review the results of her neuropscyh testing. Corinne describes feeling unsure what the results will come back with; however, describes anticipatory anxiety in relation to this. Corinne reported that after her testing was done, the clinical staff interviewed her again to further assess her. Corinne described feeling more stressed and anxious in relation to this, and processed through this. Corinne reported that the office wanted her to give (2) assessment questions to people in her life, her  currently now, and someone from her childhood. Corinne reported that she doesn't want to reach out to her father, as he wasn't around as a child, and her brother is (6) months younger than she is, believing that he wouldn't have the information necessary to complete it. Discussed and processed this. Corinne reported that she gave her  Fredy the assessment to complete and is worried that her perception of his lack of insight and self-awareness in relation to ADHD and mental health in general, won't be an accurate reflection of her true symptoms, fearing that this will result in a different outcome. Discussed and processed this. Corinne reported that she was triggered by daylights  savings, missing an hour of sleep, the week of the neuropsych testing, and described feeling stressed out and overwhelmed, and processed through this. Corinne reported that with her son John going through this process of doing his own neuropsych testing and learning of his ADHD inattention, and her doing her own now, she is now believing that her and John's symptoms are opposite of one another, wondering if that is why John's symptoms are so triggering to her. Corinne and this writer processed this. Modeled effective forms of communication. Provided ongoing psychoeducation. Reviewed ongoing skills. Discussed limits and boundaries.     (A) Corinne denies any symptoms of frank. Corinne denies any evident or immediate risk factors for self-harm, SI, or HI. Corinne presented with good eye contact. Corinne presented as alert and oriented x3. Corinne's speech presented at a normal rate, volume, and rhythm. Corinne's mood presented as anxious and depressed and her affect appeared to be congruent. Corinne described emotional dysregulation, reporting irritability, poor frustration tolerance, and becoming easily annoyed. Corinne describes lower moods of sadness, and depression. Corinne describes sleep disturbances, fatigue, and feeling tired and having little energy. Corinne describes little interest and pleasure in doing things. Corinne describes feeling bad about herself, feeling like she was letting herself, and her family down. Corinne describes trouble relaxing. Corinne describes symptoms of anticipatory anxiety, feeling nervous, anxious, and on edge, and worrying too much about different things. Corinne describes symptoms of obsessive, intrusive, ruminating, and repetitive thoughts. Corinne describes symptoms of grief in relation to symbolic loss.     (P) During session today. Corinne and this writer completed The Adult ADHD ASRS-v1.1 Symptom Checklist together, and Corinne plans to send this over to [facility]  to add to her assessment and evaluation. Corinne plans to work on listening to podcasts on ADHD for women, to listen to outside of session to further explore and self-reflect upon outside of session, and follow up with this writer next session, identifying areas she connects with verses areas that she doesn't connect with. Corinne plans to identify, associate, honor, validate, and make space for her feelings, and emotions, through journaling, aiming to further explore and self-reflect upon this outside of session, and explore further in session. Corinne plans to intentionally meet herself with radical compassion, extending bogdan towards herself, identifying her worth, and giving herself credit. Corinne plans to implement grounding statements, positive self-talk, positive affirmations, and self-affirming statements. Corinne plans to intentionally decrease judgement towards herself, challenging herself to stick with the facts, and continue to seek out supportive evidence to challenge negative thoughts, negative thinking traps, cognitive distortions, and harmful core beliefs. Corinne plans to be mindful and aware of trauma triggers, aiming to increase self-awareness in relation to warning signs and triggers throughout her body. Corinne plans to be intentional about targeting fluctuating symptoms with previously identified skills, specifically through self-soothing techniques, sensory related skills, coping skills, distraction techniques, distress tolerance skills, grounding techniques, deep breathing techniques, and meditation/ mindfulness techniques. Corinne plans to internalize the concept of DBT Dialects, specifically the both/ and concept, allowing for two opposing thoughts, feelings, and emotions to both co-exist and be true at the same time. Corinne plans to use DBT Opposite Action Skills, intentionally structuring her schedule with balance, routine, and consistency, and prioritizing her mental health and  physical health needs, engage in the use of self-care, take time for herself, lean into healthy natural supports, and be present in the moment. Corinne plans to examine pros and cons, along with risks verses benefits in order to make informed decisions, giving herself permission to align choices and decisions with what is in the best interest of her, implementing the concept of Radical Acceptance. Corinne plans to utilize healthy and effective forms of communication, asserting herself, advocating for herself, asking for what she needs, targeting interpersonal relationship stressors, reinforcing limits and boundaries, challenging co-dependency, aiming to break the cycle, and implement healthy patterns, and behaviors. Corinne plans to intentionally separate Rational Mind, identifying ration, reason, logic, and facts, along with Emotional Mind, identifying thoughts, feelings, and emotions, striking balance between the both and implementing the concept of DBT [place] Mind. Corinne plans to reach out for additional support as needed.      03/24/25 03/24/25  Start Time: 1100  Stop Time: 1150  Total Visit Time: 50 minutes

## 2025-03-31 ENCOUNTER — PATIENT MESSAGE (OUTPATIENT)
Dept: FAMILY MEDICINE CLINIC | Facility: HOSPITAL | Age: 45
End: 2025-03-31

## 2025-03-31 ENCOUNTER — TELEMEDICINE (OUTPATIENT)
Dept: BEHAVIORAL/MENTAL HEALTH CLINIC | Facility: CLINIC | Age: 45
End: 2025-03-31
Payer: COMMERCIAL

## 2025-03-31 DIAGNOSIS — N92.0 MENORRHAGIA WITH REGULAR CYCLE: ICD-10-CM

## 2025-03-31 DIAGNOSIS — Z13.220 SCREENING FOR HYPERLIPIDEMIA: Primary | ICD-10-CM

## 2025-03-31 DIAGNOSIS — F33.0 MILD EPISODE OF RECURRENT MAJOR DEPRESSIVE DISORDER (HCC): Primary | ICD-10-CM

## 2025-03-31 DIAGNOSIS — F41.1 GAD (GENERALIZED ANXIETY DISORDER): Chronic | ICD-10-CM

## 2025-03-31 DIAGNOSIS — R53.82 CHRONIC FATIGUE: ICD-10-CM

## 2025-03-31 DIAGNOSIS — Z13.1 SCREENING FOR DIABETES MELLITUS: ICD-10-CM

## 2025-03-31 DIAGNOSIS — F39 MOOD DISORDER (HCC): ICD-10-CM

## 2025-03-31 PROCEDURE — 90837 PSYTX W PT 60 MINUTES: CPT | Performed by: SOCIAL WORKER

## 2025-03-31 RX ORDER — LAMOTRIGINE 25 MG/1
75 TABLET ORAL DAILY
Qty: 90 TABLET | Refills: 0 | Status: SHIPPED | OUTPATIENT
Start: 2025-03-31

## 2025-03-31 NOTE — PSYCH
Virtual Regular Visit Name: Corinne S Kulp      : 1980      MRN: [id number]  Encounter Provider: Veronica Sandoval  Encounter Date: 3/31/2025   Encounter department: [facility]  :  Assessment & Plan  Mild episode of recurrent major depressive disorder (HCC)         NAVI (generalized anxiety disorder)           Goals addressed in session: Goal 1 Follow up psychotherapy session.     Behavioral Health Treatment Plan [facility]: Diagnosis and Treatment Plan explained to Corinne, Corinne relates understanding diagnosis and is agreeable to Treatment Plan. Yes.      Depression Follow-up Plan Completed: Not applicable     Reason for visit is   Chief Complaint   Patient presents with    Virtual Regular Visit      Recent Visits  Date Type Provider Dept   25 Telemedicine Veronica Sandoval  [facility]   Showing recent visits within past 7 days and meeting all other requirements  Today's Visits  Date Type Provider Dept   25 Telemedicine Veronica Sandoval  [facility]   Showing today's visits and meeting all other requirements  Future Appointments  No visits were found meeting these conditions.  Showing future appointments within next 150 days and meeting all other requirements     History of Present Illness     HPI    Past Medical History   Past Medical History:   Diagnosis Date    Anxiety     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HCC)      Past Surgical History:   Procedure Laterality Date    APPENDECTOMY      extensive lysis of adhesions, MAYURI's procedure (divisions of MAYURI's bands) detorsion of vulvulus, widening of mesenteric pedicle, cecopeexy, appendectomy       BACK SURGERY Right 2013    SF: L4-L5 hemilaminectomy for discectomy. Use of the microscope for microdissection, Use og 40mg of depo-medrol though thee exiting right L5 nerve root. Onset:13     SECTION      x2    CHOLECYSTECTOMY      FL INJECTION RIGHT SHOULDER  (ARTHROGRAM)  4/19/2022    GALLBLADDER SURGERY      [facility], Onset: 2011    LAPAROSCOPIC LYSIS INTESTINAL ADHESIONS      onset: 9/23/16    LUMBAR DISC SURGERY      NH HYSTEROSCOPY BX ENDOMETRIUM&/POLYPC W/WO D&C N/A 1/16/2024    Procedure: DILATATION AND CURETTAGE (D&C) WITH HYSTEROSCOPY, POLYPECTOMY;  Surgeon: Griffin Arriaga MD;  Location: [facility];  Service: Gynecology    NH HYSTEROSCOPY ENDOMETRIAL ABLATION N/A 1/16/2024    Procedure: ABLATION ENDOMETRIAL VIPUL;  Surgeon: Griffin Arriaga MD;  Location: [facility];  Service: Gynecology    NH LAPS ABD PRTM&OMENTUM DX W/WO SPEC BR/WA SPX N/A 9/23/2016    Procedure: LAPAROSCOPY DIAGNOSTIC, EXTENSIVE LYSIS OF ADHESIONS, MAYURI'S PROCEDURE(DIVISION OF MAYURI'S BANDS,DETORSION OF VOLVULUS, WIDENING OF MESENTERIC PEDICLE, CECOPEXY, APPENDECTOMY);  Surgeon: Sudeep Espinoza MD;  Location: [facility];  Service: General    TUBAL LIGATION       Current Outpatient Medications   Medication Instructions    ALPRAZolam (XANAX) 0.25 mg, Oral, Daily PRN    DULoxetine (CYMBALTA) 60 mg, Oral, Daily    lamoTRIgine (LAMICTAL) 75 mg, Oral, Daily    multivitamin (THERAGRAN) TABS 1 tablet, Oral, Daily    Probiotic Product (PROBIOTIC-10 PO) Oral, 1 daily     Allergies   Allergen Reactions    Biaxin [Clarithromycin] GI Intolerance and Other (See Comments)    Sulfa Antibiotics     Sulfasalazine     Venlafaxine GI Intolerance, Vomiting and Other (See Comments)     Category: Allergy;   Category: Allergy;        Objective   There were no vitals taken for this visit.    Video Exam  Physical Exam     Administrative Statements   Encounter provider Veronica Sandoval    The Patient is located at Home and in the following state in which I hold an active license [place].    The patient was identified by name and date of birth. Corinne S Kulp was informed that this is a telemedicine visit and that the visit is being conducted through the thesweetlink platform. She agrees to proceed.  My office door was closed.  "No one else was in the room.  She acknowledged consent and understanding of privacy and security of the video platform. The patient has agreed to participate and understands they can discontinue the visit at any time.    I have spent a total time of 55 minutes in caring for this patient on the day of the visit/encounter including Counseling / Coordination of care, not including the time spent for establishing the audio/video connection.    Visit Time  Start Time: 1000  Stop Time: 1055  Total Visit Time: 55 minutes    (D) Corinne reached out to this writer and requested earlier follow up, that this writer was able to accommodate. Corinne attended her follow up psychotherapy session today. Corinne reported that since her last session, she has noticed an increase in symptoms. Corinne reported that last Monday night, she woke up around 1:00am to go to the bathroom. Corinne reported that she woke up feeling, \"achy,\" and described having a sinus headache. Corinne reported that she immediately became anxious, resulting in her grabbing her purse with her PRN medication, grabbed water, and her phone. Corinne was worried that she was getting physically sick, triggering memories of when she had COVID and described this as unbearable for her, not wanting to get sick again. Corinne reported that she also had a yeast infection, and wondered if this was part of her symptoms. Corinne reported that she sat on the floor of the bathroom, in attempts to ground herself. Corinne reported that she felt that she was going to vomit, and then became more anxious, forced herself to throw up, and then put a piece of gum in her mouth. Corinne reported that she was sweating a lot, and reported that she didn't have a fever, yet felt like she had a fever. Corinne described feeling like she had symptoms of an sinus infection and/or the flu. Corinne reported that she took 1/4 of her xanax PRN to assist in her anxiety, and went to bed. Corinne " reported that the following day on Wednesday, she noticed a reduction in the intensity and frequency of her symptoms. Corinne describes believing that work is a trigger for her, describing her as not wanting to go to work, not wanting to be at work, and cannot wait to leave work and come home. Corinne reported that she ended up getting her menstrual cycle yesterday, and wonders if her anxiety attack was triggered by hormonal shifts and symptoms, creating anxiety for her. Corinne reported that she also has been home from work since Saturday, and reports that she feels she has less symptoms when she is at home, compared to work. Corinne reported that over the weekend she challenged herself to get out of the house, go on a walk, go to the store, and go some housework outside. Discussed and processed this. Corinne reported that one of her clients at work last week, showed up (10) minutes late, triggering her and resulted in internal emotional dysregulation, describing this as throwing off her day. Discussed and processed this. Corinne reported that after a series of events, she vented to her  Fredy by text message, saying that she couldn't do this anymore, and expressed a desire to jump off a bridge, reporting that she didn't actually want to do this, yet was expressing her reported frustration, and processed through this. Corinne describes feeling like her  doesn't always know how to respond to her, leaving her feeling dismissed, invalidated, unseen, and not supported. Discussed and processed this. Corinne reported that when her son John received his [facility] testing results, he had an episode of depression, and wonders if something similar will happen to her. Discussed and processed this. Corinne describes worrying about menopause, and describes her cycles as being very mild ever since her D&C, slowly decreasing even more over the last few months. Corinne and this writer processed this. Discussed  limits and boundaries. Reviewed ongoing skills. Provided ongoing psychoeducation. Modeled effective forms of communcation.     (A) Corinne's speech presented at a normal rate, volume, and rhythm. Corinne presented as alert and oriented x3. Corinne presented with good eye contact. Corinne denies any symptoms of frank. Corinne denies any evident or immediate risk factors for self-harm, SI, or HI. Corinne denies any plan or intent to follow through with anything, was able to verbally contract for safety, and confirmed having the ability to reach out for additional support as needed. Corinne presented as forward thinking during session, identifying upcoming plans, and reasons to live. Corinne's mood presented as depressed, irritable, and anxious, and her affect appeared to be congruent, and tearful throughout the session. Corinne describes symptoms of obsessive, intrusive, ruminating, and repetitive thoughts. Corinne describes trouble relaxing, and being fidgety and restless that it's hard to sit still. Corinne describes emotional dysregulation, reporting irritability, poor frustration tolerance, and becoming easily annoyed. Corinne describes symptoms of anticipatory anxiety, feeling nervous, anxious, and on edge, not being able to stop and control worrying, worrying too much about different things, and fearing that something awful might happen. Corinne describes little interest and pleasure in doing things, reporting lower moods of sadness, and depression. Corinne describes symptoms of grief in relation to symbolic loss. Corinne describes sleep disturbances, fatigue, and feeling tired and having little energy. Corinne describes feeling bad about herself, feeling like she is letting herself, and others down.     (P) Corinne plans to follow up with her [facility] appointment today at 1:00pm, to receive her results from her testing. Corinne plans to reach out to her psychiatry provider to schedule earlier follow up  than May. Corinne plans to reach out to her OBGYN and/or consider meeting with a hormone specialist, exploring menopause symptoms. Corinne plans to be intentional about developing a travel distress tolerance toolkit to keep at her bedside, and be intentional about a healthy sleep hygiene for herself. Corinne plans to implement specific grounding statements, positive self-talk, positive affirmations, and self-affirming statements, intentionally giving herself credit, identifying her worth, meeting herself with radical compassion, and extending bogdan towards herself. Corinne plans to decrease judgement towards herself, and continue to seek out supportive evidence challenging negative thoughts, negative thinking traps, cognitive distortions, and harmful core beliefs with the support of CBT Cognitive Restructuring, CBT Challenging Negative Thoughts Assessment Questions, and DBT [place] Mind. Corinne plans to implement the concept of Radical Acceptance, examining pros and cons, along with risks verses benefits in order to make informed decisions, giving herself permission to align choices and decisions with what is in the best interest of her. Corinne plans to implement DBT Opposite Action Skills, intentionally structuring her schedule with balance, routine, and consistency, intentionally engaging in the use of self-care, taking time for herself, being present in the moment, prioritizing her mental health and physical health needs, and leaning into healthy natural supports. Corinne plans to internalize the concept of DBT Dialects, specifically the both/ and concept, allowing for two opposing thoughts, feelings, and emotions to both co-exist and be true at the same time. Corinne plans to pay attention to her body, aiming to increase self-awareness in relation to warning signs and triggers throughout her body, implementing deep breathing techniques, meditation/ mindfulness techniques, coping skills, grounding techniques,  self-soothing techniques, sensory related skills, distraction techniques, and distress tolerance skills to target ongoing symptoms. Corinne plans to identify, associate, honor, validate, and make space for her feelings, and emotions, examining patterns, themes, and behaviors through the lens C-PTSD, journaling to further explore and self-reflect upon this outside out session. Corinne plans to utilize healthy and effective forms of communication, intentionally asserting herself, advocating for herself, asking for what she needed, and targeting interpersonal relationship stressors, reinforcing limits and boundaries, challenging co-dependency, aiming to break the cycle, and implement healthy patterns, and behaviors. Corinne plans to intentionally separate Rational Mind, identifying ration, reason, logic, and facts, ,along with Emotional Mind, identifying thoughts, feelings, and emotions, striking balance between the both, implementing the concept of DBT [place] Mind. Corinne plans to reach out for additional support as needed.       03/31/25  Start Time: 1000  Stop Time: 1055  Total Visit Time: 55 minutes

## 2025-04-10 DIAGNOSIS — N92.0 MENORRHAGIA WITH REGULAR CYCLE: ICD-10-CM

## 2025-04-10 DIAGNOSIS — R53.82 CHRONIC FATIGUE: Primary | ICD-10-CM

## 2025-04-14 ENCOUNTER — TELEMEDICINE (OUTPATIENT)
Dept: BEHAVIORAL/MENTAL HEALTH CLINIC | Facility: CLINIC | Age: 45
End: 2025-04-14
Payer: COMMERCIAL

## 2025-04-14 DIAGNOSIS — F41.1 GAD (GENERALIZED ANXIETY DISORDER): Chronic | ICD-10-CM

## 2025-04-14 DIAGNOSIS — F33.0 MILD EPISODE OF RECURRENT MAJOR DEPRESSIVE DISORDER (HCC): Primary | ICD-10-CM

## 2025-04-14 PROCEDURE — 90834 PSYTX W PT 45 MINUTES: CPT | Performed by: SOCIAL WORKER

## 2025-04-14 NOTE — PSYCH
Virtual Regular Visit Name: Corinne S Kulp      : 1980      MRN: [id number]  Encounter Provider: Veronica Sandoval  Encounter Date: 2025   Encounter department: [facility]  :  Assessment & Plan  Mild episode of recurrent major depressive disorder (HCC)         NAVI (generalized anxiety disorder)           Goals addressed in session: Goal 1 Follow up psychotherapy session.     Behavioral Health Treatment Plan [facility]: Diagnosis and Treatment Plan explained to Corinne, Corinne relates understanding diagnosis and is agreeable to Treatment Plan. Yes     Depression Follow-up Plan Completed: Not applicable     Reason for visit is No chief complaint on file.     Recent Visits  No visits were found meeting these conditions.  Showing recent visits within past 7 days and meeting all other requirements  Future Appointments  No visits were found meeting these conditions.  Showing future appointments within next 150 days and meeting all other requirements     History of Present Illness     HPI    Past Medical History   Past Medical History:   Diagnosis Date    Anxiety     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HCC)      Past Surgical History:   Procedure Laterality Date    APPENDECTOMY      extensive lysis of adhesions, MAYURI's procedure (divisions of MAYURI's bands) detorsion of vulvulus, widening of mesenteric pedicle, cecopeexy, appendectomy       BACK SURGERY Right 2013    SF: L4-L5 hemilaminectomy for discectomy. Use of the microscope for microdissection, Use og 40mg of depo-medrol though thee exiting right L5 nerve root. Onset:13     SECTION      x2    CHOLECYSTECTOMY      FL INJECTION RIGHT SHOULDER (ARTHROGRAM)  2022    GALLBLADDER SURGERY      [facility], Onset:     LAPAROSCOPIC LYSIS INTESTINAL ADHESIONS      onset: 15    LUMBAR DISC SURGERY      OH HYSTEROSCOPY BX ENDOMETRIUM&/POLYPC W/WO D&C N/A 2024    Procedure: DILATATION AND CURETTAGE (D&C) WITH  HYSTEROSCOPY, POLYPECTOMY;  Surgeon: Griffin Arriaga MD;  Location: [facility];  Service: Gynecology    AZ HYSTEROSCOPY ENDOMETRIAL ABLATION N/A 1/16/2024    Procedure: ABLATION ENDOMETRIAL VIPUL;  Surgeon: Griffin Arriaga MD;  Location: [facility];  Service: Gynecology    AZ LAPS ABD PRTM&OMENTUM DX W/WO SPEC BR/WA SPX N/A 9/23/2016    Procedure: LAPAROSCOPY DIAGNOSTIC, EXTENSIVE LYSIS OF ADHESIONS, MAYURI'S PROCEDURE(DIVISION OF MAYURI'S BANDS,DETORSION OF VOLVULUS, WIDENING OF MESENTERIC PEDICLE, CECOPEXY, APPENDECTOMY);  Surgeon: Sudeep Espinoza MD;  Location: [facility];  Service: General    TUBAL LIGATION       Current Outpatient Medications   Medication Instructions    ALPRAZolam (XANAX) 0.25 mg, Oral, Daily PRN    DULoxetine (CYMBALTA) 60 mg, Oral, Daily    lamoTRIgine (LAMICTAL) 75 mg, Oral, Daily    multivitamin (THERAGRAN) TABS 1 tablet, Oral, Daily    Probiotic Product (PROBIOTIC-10 PO) Oral, 1 daily     Allergies   Allergen Reactions    Biaxin [Clarithromycin] GI Intolerance and Other (See Comments)    Sulfa Antibiotics     Sulfasalazine     Venlafaxine GI Intolerance, Vomiting and Other (See Comments)     Category: Allergy;   Category: Allergy;        Objective   There were no vitals taken for this visit.    Video Exam  Physical Exam     Administrative Statements   Encounter provider Veronica Sandoval    The Patient is located at Other and in the following state in which I hold an active license [place].    The patient was identified by name and date of birth. Corinne S Kulp was informed that this is a telemedicine visit and that the visit is being conducted through the [employer] platform. She agrees to proceed.  My office door was closed. No one else was in the room.  She acknowledged consent and understanding of privacy and security of the video platform. The patient has agreed to participate and understands they can discontinue the visit at any time.    I have spent a total time of 50 minutes in caring  for this patient on the day of the visit/encounter including Counseling / Coordination of care, not including the time spent for establishing the audio/video connection.    Visit Time       (D) Corinne attended her follow up psychotherapy session today. Corinne reported that since her last session, she has noticed ongoing symptoms. Corinne reported that she is feeling stressed out today, reporting that her daughter Randi had an OBGYN appointment for the first time this morning. Corinne reported that Randi reports heaving bleeding, cramping, and having issues with her menstrual cycle and her period. Corinne reported that her daughter was placed on birth control to try to help regular her cycles. Corinne and this writer processed this. Corinne reported that she then herself had a massage, and then her daughter Randi was able to get in for a massage of her groin, reporting that she has had issues in the past and this was helpful for her. Corinne describes feeling stressed out and overwhelmed in relation to this, and processed through this. Corinne reported that since her last session, she also had her follow up appointment with [facility] regarding her neuropsych testing. Corinne reported that the neuropsychologist verbally said that all of her symptoms are leading towards ADHD; however, reported that the neuropsychologist said that it could also be contributed to an undiagnosed learning disability, geared towards executive functioning issues from childhood. Corinne reported that the neuropsychologist said that she can come in to do additional testing; however, it wouldn't be covered by insurance, and she would have to pay out of pocket. Corinne reported that the neuropsychologist verbally shared her diagnosis of depression, anxiety, panic disorder, and PTSD. Corinne reported that the neuropsychologist also recommended that Corinne see if her psychiatrist would trial her on ADHD medication, and conveyed that there  would be more supportive evidence to diagnose ADHD depending on her response to medication. Corinne reported that she is worried that she is in premenopausal symptoms, and wondering if this is impacting possible ADHD symptoms. Corinne reported that she doesn't have the full report back; however, reported that when she does, she plans to forward it to this writer. Discussed and processed this. Corinne reported that she has been experiencing ongoing stressors in relation to work, specifically with feeling stressed out, and overwhelmed, and having a desire to not work in general. 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 Corinne describes some interpersonal relationship conflicts with employees at the salon, creating stress and anxiety for her. Discussed and processed this. Corinne reported that this past Saturday she was triggered when she found a vape in her daughter Randi's room. Corinne reported that the night before, Randi had a friend sleep over, and reported that Randi said it was her friend Jeannette. Corinne reported that she was triggered by this, reporting that she is close with her daughter Randi, and her friend Rafia, and described feeling upset, disrespected, and dysregulated by this. Corinne reported that she set some clear limits and boundaries with Randi about using a vape in general, and using it in her house. Corinne described struggling with anxiety in relation to this, wondering what kind of vape it was, wondering where she got it, and feeling overwhelmed with the idea of her daughter Randi even using this. Corinne reported that she talked to Rafia's mother, and processed through stressors in relation to this. Corinne describes  feeling triggered remembering when she caught her son John with one around the same age, and also processed through the transference with her father when he lived there with sneaking and hiding alcohol and marijuana in her house, triggering her more. Discussed and processed this. Corinne reported that her sister-in-law was texting her recently, asking to get together for dinner. Corinne reported that she didn't want to have everyone at her house, and decided to go out to eat. Corinne reported that there is this one local restaurant they go to frequently that they decided to go. Corinne reported that when they got there the  said that it was a (40) minute wait and after this wait, there still were no tables. Corinne reported that the manager came out and apologized, saying that they were busy and it would be another (25) minutes, triggering Corinne. Corinne reported that she verbally advocated for herself, addressing this, and reported that she shut down in the moment. Corinne reported that she didn't want to wait any longer, and wanted to just go home. Corinne reported that her  Fredy was upset by this, resulting in interpersonal relationship conflict. Corinne reported they decided to come home, they ordered take out from the same place, and picked it up within (20) minutes. Corinne described feeling so triggered and dysregulated by this. Corinne described getting stuck on the principal and ruminating on this. Corinne reported that she was also hungry, and recognized this too was a trigger for her. Discussed and processed this. Corinne and this writer processed this. Modeled effective forms of communication. Provided ongoing psychoeducation. Discussed ongoing skills. Reviewed limits and boundaries.     (A) Corinne's mood presented as anxious, and depressed, and her affect appeared to be congruent, and tearful at times. Corinne presented with good eye contact. Corinne presented as alert and oriented  x3. Corinne's speech presented at a normal rate, volume, and rhythm. Corinne denies any symptoms of frank. Corinne denies any evident immediate risk factors for self-harm, SI, or HI. Corinne describes fluctuating symptoms. Corinne describes symptoms of salo fog. Corinne describes fatigue, and feeling tired and having little energy. Corinne describes little interest and pleasure in doing things. Corinne describes symptoms of anticipatory anxiety. Corinne describes feeling nervous, anxious, and on edge, and worrying. Corinne describes emotional dysregulation, reporting irritability, poor frustration tolerance, and becoming easily annoyed. Corinne describes trouble relaxing. Corinne describes symptoms of obsessive, intrusive, ruminating, and repetitive thoughts. Corinne describes symptoms of grief in relation to symbolic loss. Corinne describes lower moods of sadness, and depression, despite denying it on her PHQ-9 screening. Corinne describes hyperfixiating on this, struggling with shifting from one thing to the next, and then shutting down, and avoiding.     (P) Corinne plans to reach out to [facility] to follow up on obtaining her testing to forward to this writer and her psychiatry provider, with a plan for this writer and Beena to consult with one another for continuity of care and coordination of services. Corinne plans to challenge herself to stick with the facts, and give herself permission to prioritize her needs. Corinne plans to internalize the concept of DBT Dialects, specifically the both/ and concept, allowing for two opposing thoughts, feelings, and emotions to both co-exist and be true at the same time. Corinne plans to challenge herself to stick with the facts, intentionally  Rational Mind, identifying ration, reason, logic, and facts, and Emotional Mind, identifying thoughts, feelings, and emotions, striking balance between the both, implementing the concept of DBT [place] Mind. Corinne  plans to continue to seek out evidence to challenge negative thoughts, negative thinking traps, cognitive distortions, and harmful core beliefs, with the support of DBT Wise Mind, CBT Cognitive Restructuring, and CBT Challenging Negative Thoughts Assessment Questions. Corinne plans to intentionally identify her worth, give herself credit, meet herself with radical compassion, and extend bogdan towards herself. Corinne plans to implement positive self-talk, positive affirmations, self-affirming statements, and grounding statements. Corinne plans to intentionally structure her schedule with balance, routine, and consistency, using DBT Opposite Action Skills, giving herself permission to lean into healthy natural supports, engage in the use of self-care, take time for herself, be present in the moment, and prioritize her mental health and physical health needs. Corinne plans to pay attention to her body, aiming to increase self-awareness in relation to warning signs and triggers throughout her body. Corinne plans to target ongoing symptoms with meditation/ mindfulness techniques, deep breathing techniques, self-soothing techniques, sensory related skills, coping skills, grounding techniques, distress tolerance skills, and distraction techniques. Corinne plans to examine pros and cons, along with risks verses benefits in order to make informed decisions, giving herself permission to align choices and decisions with what is in the best interest of her, implementing the concept of Radical Acceptance. Corinne plans to identify, associate, honor, validate, and make space for her feelings, and emotions, without judgement, examining patterns, themes, and behaviors through the lens of transference with her daughter, and her father, and trauma triggers, and co-dependency, journaling to further explore and self-reflect upon this outside of session. Corinne plans to reach out for additional support as needed.     04/14/25  Start  Time: 1330  Stop Time: 1420  Total Visit Time: 50 minutes

## 2025-04-22 LAB
ALBUMIN SERPL-MCNC: 4.1 G/DL (ref 3.9–4.9)
ALP SERPL-CCNC: 48 IU/L (ref 44–121)
ALT SERPL-CCNC: 20 IU/L (ref 0–32)
AST SERPL-CCNC: 22 IU/L (ref 0–40)
BILIRUB SERPL-MCNC: 0.4 MG/DL (ref 0–1.2)
BUN SERPL-MCNC: 12 MG/DL (ref 6–24)
BUN/CREAT SERPL: 17 (ref 9–23)
CALCIUM SERPL-MCNC: 9 MG/DL (ref 8.7–10.2)
CHLORIDE SERPL-SCNC: 104 MMOL/L (ref 96–106)
CHOLEST SERPL-MCNC: 193 MG/DL (ref 100–199)
CO2 SERPL-SCNC: 25 MMOL/L (ref 20–29)
CREAT SERPL-MCNC: 0.71 MG/DL (ref 0.57–1)
EGFR: 106 ML/MIN/1.73
ERYTHROCYTE [DISTWIDTH] IN BLOOD BY AUTOMATED COUNT: 11.8 % (ref 11.7–15.4)
FERRITIN SERPL-MCNC: 65 NG/ML (ref 15–150)
FSH SERPL-ACNC: 8.6 MIU/ML
GLOBULIN SER-MCNC: 1.9 G/DL (ref 1.5–4.5)
GLUCOSE SERPL-MCNC: 90 MG/DL (ref 70–99)
HCT VFR BLD AUTO: 35.8 % (ref 34–46.6)
HDLC SERPL-MCNC: 55 MG/DL
HGB BLD-MCNC: 12 G/DL (ref 11.1–15.9)
IRON SATN MFR SERPL: 38 % (ref 15–55)
IRON SERPL-MCNC: 93 UG/DL (ref 27–159)
LDLC SERPL CALC-MCNC: 126 MG/DL (ref 0–99)
LDLC/HDLC SERPL: 2.3 RATIO (ref 0–3.2)
LH SERPL-ACNC: 17.6 MIU/ML
MCH RBC QN AUTO: 30.5 PG (ref 26.6–33)
MCHC RBC AUTO-ENTMCNC: 33.5 G/DL (ref 31.5–35.7)
MCV RBC AUTO: 91 FL (ref 79–97)
PLATELET # BLD AUTO: 216 X10E3/UL (ref 150–450)
POTASSIUM SERPL-SCNC: 4 MMOL/L (ref 3.5–5.2)
PROT SERPL-MCNC: 6 G/DL (ref 6–8.5)
RBC # BLD AUTO: 3.94 X10E6/UL (ref 3.77–5.28)
SL AMB VLDL CHOLESTEROL CALC: 12 MG/DL (ref 5–40)
SODIUM SERPL-SCNC: 140 MMOL/L (ref 134–144)
T4 FREE SERPL-MCNC: 0.98 NG/DL (ref 0.82–1.77)
TIBC SERPL-MCNC: 246 UG/DL (ref 250–450)
TRIGL SERPL-MCNC: 63 MG/DL (ref 0–149)
TSH SERPL DL<=0.005 MIU/L-ACNC: 1.2 UIU/ML (ref 0.45–4.5)
UIBC SERPL-MCNC: 153 UG/DL (ref 131–425)
WBC # BLD AUTO: 3.7 X10E3/UL (ref 3.4–10.8)

## 2025-04-27 ENCOUNTER — RESULTS FOLLOW-UP (OUTPATIENT)
Dept: FAMILY MEDICINE CLINIC | Facility: HOSPITAL | Age: 45
End: 2025-04-27

## 2025-04-28 ENCOUNTER — TELEMEDICINE (OUTPATIENT)
Dept: BEHAVIORAL/MENTAL HEALTH CLINIC | Facility: CLINIC | Age: 45
End: 2025-04-28
Payer: COMMERCIAL

## 2025-04-28 ENCOUNTER — ANNUAL EXAM (OUTPATIENT)
Dept: OBGYN CLINIC | Facility: CLINIC | Age: 45
End: 2025-04-28
Payer: COMMERCIAL

## 2025-04-28 VITALS
BODY MASS INDEX: 25.39 KG/M2 | HEIGHT: 65 IN | SYSTOLIC BLOOD PRESSURE: 114 MMHG | WEIGHT: 152.4 LBS | DIASTOLIC BLOOD PRESSURE: 66 MMHG

## 2025-04-28 DIAGNOSIS — F41.1 GAD (GENERALIZED ANXIETY DISORDER): Primary | Chronic | ICD-10-CM

## 2025-04-28 DIAGNOSIS — B37.31 YEAST VAGINITIS: ICD-10-CM

## 2025-04-28 DIAGNOSIS — Z01.419 ENCOUNTER FOR ANNUAL ROUTINE GYNECOLOGICAL EXAMINATION: Primary | ICD-10-CM

## 2025-04-28 DIAGNOSIS — Z12.31 ENCOUNTER FOR SCREENING MAMMOGRAM FOR MALIGNANT NEOPLASM OF BREAST: ICD-10-CM

## 2025-04-28 DIAGNOSIS — F33.0 MILD EPISODE OF RECURRENT MAJOR DEPRESSIVE DISORDER (HCC): ICD-10-CM

## 2025-04-28 DIAGNOSIS — F39 MOOD DISORDER (HCC): ICD-10-CM

## 2025-04-28 PROCEDURE — 90834 PSYTX W PT 45 MINUTES: CPT | Performed by: SOCIAL WORKER

## 2025-04-28 PROCEDURE — S0612 ANNUAL GYNECOLOGICAL EXAMINA: HCPCS | Performed by: STUDENT IN AN ORGANIZED HEALTH CARE EDUCATION/TRAINING PROGRAM

## 2025-04-28 RX ORDER — LAMOTRIGINE 25 MG/1
75 TABLET ORAL DAILY
Qty: 90 TABLET | Refills: 0 | Status: SHIPPED | OUTPATIENT
Start: 2025-04-28

## 2025-04-28 RX ORDER — FLUCONAZOLE 150 MG/1
150 TABLET ORAL ONCE AS NEEDED
Qty: 3 TABLET | Refills: 1 | Status: SHIPPED | OUTPATIENT
Start: 2025-04-28 | End: 2026-04-28

## 2025-04-28 NOTE — PROGRESS NOTES
Name: Corinne S Kulp      : 1980      MRN: [id number]  Encounter Provider: Griffin Arriaga MD  Encounter Date: 2025   Encounter department: [facility] [place]  :  Assessment & Plan  Encounter for annual routine gynecological examination  - Routine well gynecologic completed today.  - Cervical Cancer Screening: Current ASCCP Guidelines reviewed. Last Pap: 2022. History of abnormal: None.  - STI screening offered including HIV testing: offered, pt declined  - Contraceptive counseling discussed. Current contraception: bilateral tubal ligation:   - Breast Cancer Screening: Last Mammogram 2024, repeat recommended and ordered today.   - Colorectal cancer screening was not ordered, as she is up to date.   - The following were reviewed in today's visit: breast self exam, mammography screening ordered, exercise, and healthy diet       Encounter for screening mammogram for malignant neoplasm of breast    Orders:  •  Mammo screening bilateral w 3d and cad; Future    Yeast vaginitis  - Rx Diflucan provided to be used PRN. If occurring more than a few times per year, she should call office for appointment for further evaluation.   Orders:  •  fluconazole (DIFLUCAN) 150 mg tablet; Take 1 tablet (150 mg total) by mouth once as needed (Yeast infection) for up to 3 doses Repeat as needed, no more frequently than once per month        History of Present Illness   HPI  Corinne S Kulp is a 45 y.o. female who presents for routine gynecologic preventative health visit. She reports that her periods have substantially improved following endometrial ablation. She reports occasional yeast infections a few times per year. No symptoms today.  History obtained from: patient    Period Cycle (Days): 28  Period Duration (Days): 3  Period Pattern: Regular  Menstrual Flow: Light  Menstrual Control: Thin pad  Dysmenorrhea: None      Review of Systems   All other systems reviewed and are  "negative.    Medical History Reviewed by provider this encounter:  Med Hx  Surg Hx  Fam Hx     .     Objective   /66 (BP Location: Left arm, Patient Position: Sitting, Cuff Size: Standard)   Ht 5' 5\" (1.651 m)   Wt 69.1 kg (152 lb 6.4 oz)   Breastfeeding No   BMI 25.36 kg/m²      Physical Exam  Vitals reviewed. Exam conducted with a chaperone present (Noris Lorenzo MA).   Constitutional:       General: She is not in acute distress.     Appearance: Normal appearance. She is well-developed.   HENT:      Head: Normocephalic.   Eyes:      Conjunctiva/sclera: Conjunctivae normal.   Cardiovascular:      Rate and Rhythm: Normal rate.   Pulmonary:      Effort: Pulmonary effort is normal. No respiratory distress.   Chest:      Chest wall: No mass or deformity.   Breasts:     Right: Normal. No mass, nipple discharge, skin change or tenderness.      Left: Normal. No mass, nipple discharge, skin change or tenderness.   Abdominal:      General: Abdomen is flat.      Palpations: Abdomen is soft.      Tenderness: There is no abdominal tenderness.   Genitourinary:     General: Normal vulva.      Exam position: Lithotomy position.      Labia:         Right: No tenderness, lesion or injury.         Left: No tenderness, lesion or injury.       Urethra: No urethral pain, urethral swelling or urethral lesion.      Vagina: Normal. No vaginal discharge, bleeding, lesions or prolapsed vaginal walls.      Cervix: Normal. No cervical motion tenderness, discharge, friability, lesion, erythema or cervical bleeding.      Uterus: Normal. Not deviated and not tender.       Adnexa: Right adnexa normal and left adnexa normal.        Right: No mass, tenderness or fullness.          Left: No mass, tenderness or fullness.     Musculoskeletal:         General: No swelling.   Lymphadenopathy:      Upper Body:      Right upper body: No supraclavicular, axillary or pectoral adenopathy.      Left upper body: No supraclavicular, axillary or " pectoral adenopathy.   Skin:     General: Skin is warm and dry.   Neurological:      Mental Status: She is alert.   Psychiatric:         Mood and Affect: Mood normal.         Behavior: Behavior normal.         Thought Content: Thought content normal.         Judgment: Judgment normal.

## 2025-04-28 NOTE — PSYCH
Virtual Regular Visit Name: Corinne S Kulp      : 1980      MRN: [id number]  Encounter Provider: Veronica Sandoval  Encounter Date: 2025   Encounter department: [facility]  :  Assessment & Plan  NAVI (generalized anxiety disorder)         Mild episode of recurrent major depressive disorder (HCC)         Goals addressed in session: Goal 1 Follow up psychotherapy session.     Behavioral Health Treatment Plan [facility]: Diagnosis and Treatment Plan explained to Corinne, Corinne relates understanding diagnosis and is agreeable to Treatment Plan. Yes     Depression Follow-up Plan Completed: Not applicable     Reason for visit is   Chief Complaint   Patient presents with    Virtual Regular Visit        Recent Visits  No visits were found meeting these conditions.  Showing recent visits within past 7 days and meeting all other requirements  Today's Visits  Date Type Provider Dept   25 Telemedicine Veronica Sandoval  [facility]   Showing today's visits and meeting all other requirements  Future Appointments  No visits were found meeting these conditions.  Showing future appointments within next 150 days and meeting all other requirements     History of Present Illness     HPI    Past Medical History   Past Medical History:   Diagnosis Date    Anxiety     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HCC)      Past Surgical History:   Procedure Laterality Date    APPENDECTOMY      extensive lysis of adhesions, MAYURI's procedure (divisions of MAYURI's bands) detorsion of vulvulus, widening of mesenteric pedicle, cecopeexy, appendectomy       BACK SURGERY Right 2013    SF: L4-L5 hemilaminectomy for discectomy. Use of the microscope for microdissection, Use og 40mg of depo-medrol though thee exiting right L5 nerve root. Onset:13     SECTION      x2    CHOLECYSTECTOMY      FL INJECTION RIGHT SHOULDER (ARTHROGRAM)  2022    GALLBLADDER SURGERY      [facility],  Onset: 2011    LAPAROSCOPIC LYSIS INTESTINAL ADHESIONS      onset: 9/23/16    LUMBAR DISC SURGERY      DC HYSTEROSCOPY BX ENDOMETRIUM&/POLYPC W/WO D&C N/A 1/16/2024    Procedure: DILATATION AND CURETTAGE (D&C) WITH HYSTEROSCOPY, POLYPECTOMY;  Surgeon: Griffin Arriaga MD;  Location: [facility];  Service: Gynecology    DC HYSTEROSCOPY ENDOMETRIAL ABLATION N/A 1/16/2024    Procedure: ABLATION ENDOMETRIAL VIPUL;  Surgeon: Griffin Arriaga MD;  Location: [facility];  Service: Gynecology    DC LAPS ABD PRTM&OMENTUM DX W/WO SPEC BR/WA SPX N/A 9/23/2016    Procedure: LAPAROSCOPY DIAGNOSTIC, EXTENSIVE LYSIS OF ADHESIONS, MAYURI'S PROCEDURE(DIVISION OF MAYURI'S BANDS,DETORSION OF VOLVULUS, WIDENING OF MESENTERIC PEDICLE, CECOPEXY, APPENDECTOMY);  Surgeon: Sudeep Espinoza MD;  Location: [facility];  Service: General    TUBAL LIGATION       Current Outpatient Medications   Medication Instructions    ALPRAZolam (XANAX) 0.25 mg, Oral, Daily PRN    DULoxetine (CYMBALTA) 60 mg, Oral, Daily    lamoTRIgine (LAMICTAL) 75 mg, Oral, Daily    multivitamin (THERAGRAN) TABS 1 tablet, Oral, Daily    Probiotic Product (PROBIOTIC-10 PO) Oral, 1 daily     Allergies   Allergen Reactions    Biaxin [Clarithromycin] GI Intolerance and Other (See Comments)    Sulfa Antibiotics     Sulfasalazine     Venlafaxine GI Intolerance, Vomiting and Other (See Comments)     Category: Allergy;   Category: Allergy;        Objective   There were no vitals taken for this visit.    Video Exam  Physical Exam     Administrative Statements   Encounter provider Veronica Sandoval    The Patient is located at Home and in the following state in which I hold an active license [place].    The patient was identified by name and date of birth. Corinne S Kulp was informed that this is a telemedicine visit and that the visit is being conducted through the depict platform. She agrees to proceed.  My office door was closed. No one else was in the room.  She acknowledged consent  "and understanding of privacy and security of the video platform. The patient has agreed to participate and understands they can discontinue the visit at any time.    I have spent a total time of 50 minutes in caring for this patient on the day of the visit/encounter including Counseling / Coordination of care, not including the time spent for establishing the audio/video connection.    Visit Time       (D) Corinne attended her follow up psychotherapy session today. Corinne reported that since her last session, she has noticed ongoing symptoms. Corinne reported that since her last session, she has noticing stressors and triggers in relation to this. Corinne reported that she received a phone call from [facility] saying that the neuropsychologist that did her testing was out sick, and behind on reports, resulting in Corinne not having her formal neuropsych report back just yet, creating stress and anxiety for her. Discussed and processed this. Corinne reported that her daughter Randi tried out for a travel soccer team in [place], [place]. Corinne reported that there was three tryouts total, and reported that Randi only went to two out of the three tryouts, reporting that Randi didn't go to the last one because it was larry and raining. Corinne reported that she paid for her daughter Randi to have private lessons leading up to the tryouts, during the tryouts, and then after the tryouts. Corinne reported that Randi didn't make the team, and reported that she didn't want to do the private lessons anymore after she learned that she didn't make the team. Corinne reported that she emailed the instructor, saying that Randi didn't want to do the private lessons anymore, and asked for her money back. Corinne reported that she received an e-mail back from the instructor, questioning Corinne,  believing that he was accusing Corinne of, \"buying her way in,\" to get her daughter on the team. Corinne reported that the instructor " provided feedback as to why Randi wasn't selected for the team. Corinne reported that she e-mailed the instructor back, clarifying that the purpose of the lessons was for Randi to gain skill, rather than buy her way onto the team. Corinne described feeling so triggered by this, and processed through this. Corinne reported that she believes that Randi and her friend didn't make the team as a result of past friendship interpersonal relationship conflict with another girl on the team, that's father coaches the team, and processed through this. Corinne reported that her daughter Randi tried out for another team, and reported that she made the team, and described relief in relation to this. Discussed and processed this. Corinne reported that she went into the salon the day of the e-mail from the , and reported that there was a beeping that was going off in the salon, triggering her, creating emotional dysregulation, and overstimulation. Corinne described stressors with going into the salon, and working that day, and processed through this. Corinne reported that she reached out to her  Fredy to vent to him, and reported that he wasn't responding to her messages, triggering her even more. Discussed and processed this. Corinne reported that through self-reflecting, she always through her symptoms were anxiety, and now describes identifying with feeling overstimulated, and emotional dysregulated, and processed through this. 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 Corinne reported that the business owners asked her and her  Fredy if they were comfortable with this, and reported that she said that she was fine with this, as long as they were not selling services that were the same as Corinne. Corinne reported that they agreed to this. Corinne reported  "that some of her co-workers shared with her that they recently went into the [facility] and saw that they do waxing and foot-baths, that Corinne said was similar to her services. Corinne reported that she said something to her  Fredy, who called the owner of the [facility] to talk about it. Corinne reported that it was minimized and dismissed. Corinne reported that then after this, her friend then told her that she saw on instagram that the [facility] is going to be offering pedicures and manicures, which Corinne offers, trigger her. Corinne reported that she sent the wife of the salon owner of the [facility] a message in relation to this, addressing their initial agreement of not offering services that Corinne had. Corinne reported that she didn't respond, and then asked her  Fredy for the  of the [facility]'s telephone number. Corinne reported that in the meantime, the wife responded to her saying, \"I never asked you permission to open my business,\" and then some. Corinne reported that her  Fredy contacted their landlord Norberto, and then Corinne contacted Norberto directly asking for advice and feedback. Corinne reported that the landlord was supportive, validating, and offered feedback. Corinne described feeling emotional triggered, and dysregulated, and processed through this. Corinne and this writer processed this. Discussed ongoing skills. Reviewed limits and boundaries. Modeled effective forms of communication. Provided ongoing psychoeducation.     (A) Corinne presented as alert and oriented x3. Corinne presented with good eye contact. Corinne's speech presented at a normal rate, volume, and rhythm. Corinne denies any symptoms of frank. Corinne denies any evident or immediate risk factors for self-harm, SI, or HI. Corinne's mood presented as depressed, irritable, anxious, and overwhelmed, and her affect appeared to be congruent. Corinne describes symptoms " of obsessive, intrusive, ruminating, and repetitive thoughts. Corinne describes symptoms of anticipatory anxiety, feeling nervous, anxious, and on edge, worrying too much about different things, and trouble relaxing. Corinne describes feeling stressed out and overwhelmed, noticing that she will hyperfixiate, and then shut down, and avoid. Corinne describes emotional dysregulation, reporting irritability, poor frustration tolerance, and becoming easily annoyed. Corinne describes overstimulation. Corinne describes fatigue, and feeling tired and having little energy. Corinne describes lower moods of sadness, and depression. Corinne describes symptoms of grief in relation to symbolic loss.     (P) Corinne plans to intentionally challenge herself to stick with the facts. Corinne plans to consider exploring Zach Nerve skills, through polyvgal theory. Corinne plans to be intentional about demonstrating bogdan towards herself, giving herself credit, identifying her worth, and meeting herself with radical compassion. Corinne plans to implement positive self-talk, positive affirmations, self-affirming statements, and grounding statements. Corinne plans to be intentional about decreasing judgement towards herself. Corinne plans to continue to seek out supportive evidence to challenge negative thoughts, negative thinking traps, cognitive distortions, and harmful core beliefs, with the support of CBT Challenging Negative Thoughts Assessment Questions, CBT Cognitive Restructuring, and DBT [place]icona Mind. Corinne plans to internalize the concept of DBT Dialects, specifically the both/ and concept, allowing for two opposing thoughts, feelings, and emotions to both co-exist and be at true at the same time. Corinne plans to intentionally structure her schedule with balance, routine, and consistency, using DBT Opposite Action Skills, challenging herself to engage in the use of self-care, prioritize her mental health and physical health  needs, take time for herself, be present in the moment, and lean into healthy natural supports. Corinne plans to pay attention to her body, aiming to increase self-awareness in relation to warning signs and triggers throughout her body. Corinne plans to implement self-soothing techniques, sensory related skills, coping skills, grounding techniques, deep breathing techniques, meditation/ mindfulness techniques, distress tolerance skills, and distraction techniques to target ongoing symptoms. Corinne plans to examine pros and cons, along with risks verses benefits in order to make informed decisions, giving herself permission to align choices and decisions with what is in the best interest of her, implementing the concept of Radical Acceptance. Corinne plans to utilize healthy and effective forms of communication, intentionally asking for what she needs, advocating for herself, asserting herself, and targeting interpersonal relationship stressors, reinforcing limits and boundaries, challenging co-dependency, aiming to increase self-awareness in relation to this. Corinne plans to intentionally separate Rational Mind, identifying ration, reason, logic, and facts, and Emotional Mind, identifying thoughts, feelings, and emotions, striking balance between the both, implementing the concept of DBT [place]icona Mind. Corinne plans to reach out for additional support as needed.    04/28/25  Start Time: 1100  Stop Time: 1150  Total Visit Time: 50 minutes

## 2025-05-01 DIAGNOSIS — F41.1 GAD (GENERALIZED ANXIETY DISORDER): ICD-10-CM

## 2025-05-01 NOTE — TELEPHONE ENCOUNTER
Medication: DULoxetine (CYMBALTA) 60 mg delayed release capsule     Dose/Frequency: Take 1 capsule (60 mg total) by mouth daily     Quantity: 90    Pharmacy: [facility] [place]    Office:   [] PCP/Provider -   [x] Speciality/Provider -     Does the patient have enough for 3 days?   [] Yes   [] No - Send as HP to POD     **Please advise if provider is willing to prescribe medicaiton

## 2025-05-02 RX ORDER — DULOXETIN HYDROCHLORIDE 60 MG/1
60 CAPSULE, DELAYED RELEASE ORAL DAILY
Qty: 90 CAPSULE | Refills: 2 | Status: SHIPPED | OUTPATIENT
Start: 2025-05-02

## 2025-05-12 ENCOUNTER — TELEMEDICINE (OUTPATIENT)
Dept: BEHAVIORAL/MENTAL HEALTH CLINIC | Facility: CLINIC | Age: 45
End: 2025-05-12
Payer: COMMERCIAL

## 2025-05-12 ENCOUNTER — TELEMEDICINE (OUTPATIENT)
Dept: PSYCHIATRY | Facility: CLINIC | Age: 45
End: 2025-05-12
Payer: COMMERCIAL

## 2025-05-12 DIAGNOSIS — F41.1 GAD (GENERALIZED ANXIETY DISORDER): ICD-10-CM

## 2025-05-12 DIAGNOSIS — F90.0 ATTENTION DEFICIT HYPERACTIVITY DISORDER (ADHD), PREDOMINANTLY INATTENTIVE TYPE: Primary | ICD-10-CM

## 2025-05-12 DIAGNOSIS — F41.1 GAD (GENERALIZED ANXIETY DISORDER): Chronic | ICD-10-CM

## 2025-05-12 DIAGNOSIS — F33.0 MILD EPISODE OF RECURRENT MAJOR DEPRESSIVE DISORDER (HCC): Primary | ICD-10-CM

## 2025-05-12 DIAGNOSIS — F39 MOOD DISORDER (HCC): ICD-10-CM

## 2025-05-12 PROCEDURE — 90837 PSYTX W PT 60 MINUTES: CPT | Performed by: SOCIAL WORKER

## 2025-05-12 PROCEDURE — 99214 OFFICE O/P EST MOD 30 MIN: CPT | Performed by: NURSE PRACTITIONER

## 2025-05-12 RX ORDER — LAMOTRIGINE 25 MG/1
75 TABLET ORAL DAILY
Qty: 90 TABLET | Refills: 2 | Status: SHIPPED | OUTPATIENT
Start: 2025-05-12

## 2025-05-12 RX ORDER — LISDEXAMFETAMINE DIMESYLATE 10 MG/1
10 CAPSULE ORAL EVERY MORNING
Qty: 30 CAPSULE | Refills: 0 | Status: SHIPPED | OUTPATIENT
Start: 2025-05-12

## 2025-05-12 RX ORDER — ALPRAZOLAM 0.25 MG
0.25 TABLET ORAL DAILY PRN
Qty: 15 TABLET | Refills: 1 | Status: SHIPPED | OUTPATIENT
Start: 2025-05-12

## 2025-05-12 NOTE — PSYCH
"Outpatient Behavioral Health Psychotherapy Treatment Plan    Corinne S Namita  1980     Date of Initial Psychotherapy Assessment: 09/17/2019   Date of Current Treatment Plan: 05/12/25  Treatment Plan Target Date: 11/01/2025  Treatment Plan Expiration Date: 11/11/2025    Diagnosis:   1. Mild episode of recurrent major depressive disorder (HCC)        2. NAVI (generalized anxiety disorder)          Area(s) of Need: Managing Emotions, and Relationships     Long Term Goal 1 (in the client's own words): \"I want to regulate my emotions when triggered.\"     Stage of Change: Action    Target Date for completion: 11/11/2025     Anticipated therapeutic modalities: DBT and CBT      People identified to complete this goal: Corinne and Therapist JUSTIN Mccarthy, ISELAW, CCTP, CDBT       Objective 1: (identify the means of measuring success in meeting the objective):     Corinne plans to be intentional and mindful in the moment, aiming to increase self-awareness in relation to warning signs and triggers throughout her body.     Corinne plans to target ongoing symptoms with deep breathing techniques, meditation/ mindfulness techniques, coping skills, grounding techniques, distress tolerance skills, distraction techniques, self-soothing techniques, and sensory related skills.       Objective 2: (identify the means of measuring success in meeting the objective):     Corinne plans to internalize the concept of DBT Dialects, specifically the both/ and concept, allowing for two opposing two opposing thoughts, feelings, and emotions to both co-exist and be true at the same time.       Long Term Goal 2 (in the client's own words): \"I want to think better about myself.\"     Stage of Change: Action    Target Date for completion: 11/11/2025     Anticipated therapeutic modalities: DBT and CBT      People identified to complete this goal: Corinne and Therapist JUSTIN Mccarthy, ISELAW, CCTP, CDBT       Objective 1: (identify the means " "of measuring success in meeting the objective):     Corinne plans to intentionally identify her worth, give herself credit, meet herself with radical compassion, and extend bogdan towards herself.     Corinne plans to challenge negative thoughts, negative thinking traps, cognitive distortions, and harmful core beliefs with the support of DBT Wise Mind, CBT Cognitive Restructuring, and CBT Challenging Negative Thoughts Assessment Questions.       Objective 2: (identify the means of measuring success in meeting the objective):     Corinne plans to examine pros and cons, along with risks verses benefits in order to make informed decisions, giving herself permission to align choices and decisions with what is in the best interest of her, implementing the concept of DBT Radical Acceptance.      Long Term Goal 3 (in the client's own words): \"I want to prioritize my physical health.\"     Stage of Change: Action    Target Date for completion: 11/11/2025     Anticipated therapeutic modalities: DBT and CBT      People identified to complete this goal: Corinne and Therapist Veronica Sandoval, MSW, LCSW, CCTP, CDBT       Objective 1: (identify the means of measuring success in meeting the objective):     Corinne plans to utilize DBT Opposite Action Skills, intentionally structuring her schedule with balance, routine, and consistency, leaning into healthy natural supports, taking time for herself, engaging in the use of self-care, being present int he moment, and prioritize her mental health and physical health needs.     Corinne plans to implement a healthy sleep hygiene routine.       Objective 2: (identify the means of measuring success in meeting the objective):     Corinne plans to intentionally identify times throughout the week to exercise and engage in physical movement.     Corinne plans to practice the use of mindful eating, giving herself permission to eat when she is hungry, and stop when she is full.      I am " "currently under the care of a [facility] psychiatric provider: yes    My [facility] psychiatric provider is: SAKINA Bruno     I am currently taking psychiatric medications: Yes, as prescribed    I feel that I will be ready for discharge from mental health care when I reach the following (measurable goal/objective): \"When I can do this on my own.\"     For children and adults who have a legal guardian:   Has there been any change to custody orders and/or guardianship status? NA. If yes, attach updated documentation.    I have reviewed my Crisis Plan and have been offered a copy of this plan    Behavioral Health Treatment Plan [facility]: Diagnosis and Treatment Plan explained to Corinne S Kulp Corinne S Kulp acknowledges an understanding of their diagnosis. Corinne S Kulp agrees to this treatment plan.    I have been offered a copy of this Treatment Plan. yes    Virtual Regular Visit Name: Corinne S Kulp      : 1980      MRN: [id number]  Encounter Provider: Veronica Sandoval  Encounter Date: 2025   Encounter department: [facility]  :  Assessment & Plan  Mild episode of recurrent major depressive disorder (HCC)         NAVI (generalized anxiety disorder)         Mild episode of recurrent major depressive disorder (HCC)         NAVI (generalized anxiety disorder)         Goals addressed in session: Goal 1 Follow up psychotherapy session.     Behavioral Health Treatment Plan [facility]: Diagnosis and Treatment Plan explained to Corinne, Corinne relates understanding diagnosis and is agreeable to Treatment Plan. Yes.      Depression Follow-up Plan Completed: Not applicable     Reason for visit is   Chief Complaint   Patient presents with    Virtual Regular Visit     Recent Visits  No visits were found meeting these conditions.  Showing recent visits within past 7 days and meeting all other requirements  Today's Visits  Date Type Provider Dept   25 " Telemedicine Veronica Sandoval [facility]   Showing today's visits and meeting all other requirements  Future Appointments  No visits were found meeting these conditions.  Showing future appointments within next 150 days and meeting all other requirements     History of Present Illness     HPI    Past Medical History   Past Medical History:   Diagnosis Date    Anxiety     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HCC)      Past Surgical History:   Procedure Laterality Date    APPENDECTOMY      extensive lysis of adhesions, MAYURI's procedure (divisions of MAYURI's bands) detorsion of vulvulus, widening of mesenteric pedicle, cecopeexy, appendectomy       BACK SURGERY Right 2013    SF: L4-L5 hemilaminectomy for discectomy. Use of the microscope for microdissection, Use og 40mg of depo-medrol though thee exiting right L5 nerve root. Onset:13     SECTION      x2    CHOLECYSTECTOMY      FL INJECTION RIGHT SHOULDER (ARTHROGRAM)  2022    GALLBLADDER SURGERY      [facility], Onset:     LAPAROSCOPIC LYSIS INTESTINAL ADHESIONS      onset: 15    LUMBAR DISC SURGERY      UT HYSTEROSCOPY BX ENDOMETRIUM&/POLYPC W/WO D&C N/A 2024    Procedure: DILATATION AND CURETTAGE (D&C) WITH HYSTEROSCOPY, POLYPECTOMY;  Surgeon: Griffin Arriaga MD;  Location: [facility] MAIN OR;  Service: Gynecology    UT HYSTEROSCOPY ENDOMETRIAL ABLATION N/A 2024    Procedure: ABLATION ENDOMETRIAL VIPUL;  Surgeon: Griffin Arriaga MD;  Location: [facility] MAIN OR;  Service: Gynecology    UT LAPS ABD PRTM&OMENTUM DX W/WO SPEC BR/WA SPX N/A 2016    Procedure: LAPAROSCOPY DIAGNOSTIC, EXTENSIVE LYSIS OF ADHESIONS, MAYURI'S PROCEDURE(DIVISION OF MAYURI'S BANDS,DETORSION OF VOLVULUS, WIDENING OF MESENTERIC PEDICLE, CECOPEXY, APPENDECTOMY);  Surgeon: Sudeep Espinoza MD;  Location:  MAIN OR;  Service: General    TUBAL LIGATION       Current Outpatient Medications   Medication Instructions    ALPRAZolam (XANAX) 0.25 mg, Oral, Daily PRN     DULoxetine (CYMBALTA) 60 mg, Oral, Daily    fluconazole (DIFLUCAN) 150 mg, Oral, Once as needed, Repeat as needed, no more frequently than once per month    lamoTRIgine (LAMICTAL) 75 mg, Oral, Daily    lisdexamfetamine (VYVANSE) 10 mg, Oral, Every morning    multivitamin (THERAGRAN) TABS 1 tablet, Daily    Probiotic Product (PROBIOTIC-10 PO) Take by mouth 1 daily     Allergies   Allergen Reactions    Biaxin [Clarithromycin] GI Intolerance and Other (See Comments)    Sulfa Antibiotics     Sulfasalazine     Venlafaxine GI Intolerance, Vomiting and Other (See Comments)     Category: Allergy;   Category: Allergy;        Objective   There were no vitals taken for this visit.    Video Exam  Physical Exam     Administrative Statements   Encounter provider Veronica Sandoval    The Patient is located at Home and in the following state in which I hold an active license [place].    The patient was identified by name and date of birth. Corinne S Kulp was informed that this is a telemedicine visit and that the visit is being conducted through the [employer] platform. She agrees to proceed.  My office door was closed. No one else was in the room.  She acknowledged consent and understanding of privacy and security of the video platform. The patient has agreed to participate and understands they can discontinue the visit at any time.    I have spent a total time of 50 minutes in caring for this patient on the day of the visit/encounter including Counseling / Coordination of care, not including the time spent for establishing the audio/video connection.    Visit Time  Start Time: 1300  Stop Time: 1355  Total Visit Time: 55 minutes    (D) Corinne attended her follow up psychotherapy session today. Corinne reported that since her last session, she has noticed ongoing symptoms. Corinne reported that since her last session, she received [facility] neuropsych [facility], that she forwarded to this writer, that was printed,  and scanned to be filed in her medical record. This writer reviewed the report, and discussed this with Corinne directly during session. Corinne describes stressors with reading the report, not understanding it all, and had questions in relation to this. Discussed and processed this. Corinne reported that she met with her psychiatry provider SAKINA Miguel today for a follow up appointment. Corinne reported that she talked to Beena about her symptom presentation, and thoughts on ADHD. Corinne reported that she told her about the (2) panic attacks that she had, and her only taking half of the dose of PRN of xanax, and reported that her provider sent her a message to take the entire PRN of xanax when this happens. Corinne reported that Beena prescribed her vyvanse 10mg to trial. Corinne reported that they are not going to make anymore medication changes right now, to evaluate effectiveness. Corinne describes symptoms of anticipatory anxiety in relation to trying another medication. Discussed and processed this. Corinne reported that she was scheduled to see her PCP Dr. De La Fuente; however, reported that the office called to cancel this appointment as a result of her PCP being out. Corinne described some stressors in relation to this, and processed through this. Corinne and this writer processed this. Discussed ongoing skills. Reviewed limits and boundaries. Modeled effective forms of communication. Provided ongoing psychoeducation.     (A) Corinne presented with good eye contact. Corinne presented as alert and oriented x3. Corinne's speech presented at a normal rate, volume, and rhythm. Corinne denies any symptoms of frank. Corinne denies any evident or immediate risk factors for self-harm, SI, or HI. Corinne's mood presented as anxious, and slightly down, and her affect appeared to be congruent. Corinne describes symptoms of obsessive, intrusive, ruminating, and repetitive thoughts. Corinne describes  emotional dysregulation, reporting irritability, poor frustration tolerance, and becoming easily annoyed. Corinne describes symptoms of anticipatory anxiety, feeling nervous, anxious, and on edge, and worrying too much about different things. Corinne describes hyperfixiation, and describes feeling stressed out and overwhelmed. Corinne describes fatigue, and feeling tired and having little energy. Corinne describes lower moods of sadness, and depression, despite denying this on her PHQ-9 screening. Corinne describes symptoms of grief in relation to symbolic loss.     (P) During session today, Corinne and this writer updated her Treatment Plan. Corinne plans to work on developing a list of questions from her [facility] Testing, to follow up with [facility] regarding her test results, seeking clarification. Corinne plans to challenge herself to utilize DBT Opposite Action Skills to take her new psychiatric medication, and intentionally monitor, explore, and track symptoms, and cycles, gathering supportive evidence and data to assist in decision-making. Corinne plans to intentionally implement the concept of Radical Acceptance, examining pros and cons, along with risks verses benefits in order to make informed decisions, giving herself permission to align choices and decisions with what is in the best interest of her. Corinne plans to utilize DBT Opposite Action Skills, intentionally challenging herself to structure her schedule with balance, routine, and consistency, giving herself permission lean into healthy natural supports, engage in the use of self-care, take time for herself, be present in the moment, and prioritize her mental health and physical health needs. Corinne plans to internalize the concept of DBT Dialects, specifically the both/ and concept, allowing for two opposing thoughts, feelings, and emotions to both co-exist and be true at the same time. Corinne plans to implement positive self-talk,  positive affirmations, self-affirming statements, and grounding statements, intentionally identifying her worth, giving herself credit, meeting herself with radical compassion, and extending bogdan towards herself. Corinne plans to intentionally separate Rational Mind, identifying ration, reason, logic, and facts, along with Emotional Mind, identifying thoughts, feelings, and emotions, striking balance between the both, and implement the concept of DBT [place] Mind. Corinne plans to actively challenge herself to seek out supportive evidence to challenge negative thoughts, negative thinking traps, cognitive distortions, and harmful core beliefs with the support of DBT Wise Mind, CBT Cognitive Restructuring, and CBT Challenging Negative Thoughts Assessment Questions. Corinne plans to utilize healthy and effective forms of communication, intentionally asserting herself, advocating for herself, asking for what she needs, targeting interpersonal relationship stressors, reinforcing limits and boundaries, challenging co-dependency, aiming to break the cycle, and implement healthy patterns, and behaviors. Corinne plans to target ongoing symptoms with grounding techniques, self-soothing techniques, sensory related skills, coping skills, distraction techniques, distress tolerance skills, deep breathing techniques, and meditation/ mindfulness techniques. Corinne plans to identify, associate ,honor, validate, make space for her feelings, and emotions, examining patterns, themes, and behaviors through the lens of co-dependency, journaling to further explore and self-reflect upon this outside of session. Corinne plans to reach out for additional support as needed.     05/12/25  Start Time: 1300  Stop Time: 1355  Total Visit Time: 55 minutes

## 2025-05-12 NOTE — ASSESSMENT & PLAN NOTE
Orders:    lamoTRIgine (LaMICtal) 25 mg tablet; Take 3 tablets (75 mg total) by mouth in the morning  Cymbalta 60 mg daily

## 2025-05-12 NOTE — PSYCH
MEDICATION MANAGEMENT NOTE    Name: Corinne S Kulp      : 1980      MRN: [id number]  Encounter Provider: SAKINA Maier  Encounter Date: 2025   Encounter department: [facility]    Insurance: Payor: BLUE CROSS / Plan: Checkr [facility] / Product Type: Blue O /      Reason for Visit:   Chief Complaint   Patient presents with    ADHD    Anxiety    Medication Management    Follow-up   :  Assessment & Plan  Mood disorder (HCC)    Orders:    lamoTRIgine (LaMICtal) 25 mg tablet; Take 3 tablets (75 mg total) by mouth in the morning  Cymbalta 60 mg daily  NAVI (generalized anxiety disorder)    Orders:    ALPRAZolam (XANAX) 0.25 mg tablet; Take 1 tablet (0.25 mg total) by mouth daily as needed for anxiety    Attention deficit hyperactivity disorder (ADHD), predominantly inattentive type    Orders:    lisdexamfetamine (VYVANSE) 10 MG CAPS capsule; Take 1 capsule (10 mg total) by mouth every morning Max Daily Amount: 10 mg    The patient will continue on:  Start Vyvanse 10 mg daily.  Patient recently had neuropsychiatric testing completed which shows she may or may not have ADHD.  We will trial it and see if it improves her functioning  Cymbalta 60 mg daily  Xanax 0.25 mg, one quarter to half tablet daily as needed for anxiety  Lamictal 75 mg daily  Follow-up with me in 1-2 months or sooner if necessary      Treatment Recommendations:    Educated about diagnosis and treatment modalities. Verbalizes understanding and agreement with the treatment plan.  Discussed self monitoring of symptoms, and symptom monitoring tools.  Discussed medications and if treatment adjustment was needed or desired.  Aware of 24 hour and weekend coverage for urgent situations accessed by calling [facility] main practice number  I am scheduling this patient out for greater than 3 months: No    Medications Risks/Benefits:      Risks, Benefits And Possible Side Effects  Of Medications:    Risks, benefits, and possible side effects of medications explained to Corinne and she (or legal representative) verbalizes understanding and agreement for treatment.    Controlled Medication Discussion:     Corinne has been filling controlled prescriptions on time as prescribed according to [place] Prescription Drug Monitoring Program.      History of Present Illness     CC: Corinne presents today for follow up on 05/22/2025 for treatment of mood disorder, depressive disorder and rule out attention deficit disorder     Corinne presents today tell me she completed neuropsychiatric testing at a facility near [facility].  According to the results of that testing, it did show that she may or may not suffer from ADHD.  Her symptoms include fatigue in the afternoon, scattered thoughts and difficulty staying on task during the day.  Her symptoms also include inability to retain information.  She needs to read and reread a book or information at work to retain it.  Also very easily distracted and difficulty paying attention.  Also reported at least 2 panic attacks during the last several months.  We did discuss treatment and we will trial low-dose Vyvanse at 10 mg daily and she will continue all other treatment.  We will follow-up with each other in 1 to 2 months or sooner if necessary    Med Compliance: yes    Since our last visit, overall symptoms have been fluctuating symptoms.       HPI ROS:     Medication Side Effects: None  Depression: 2 /10 (10 worst)  Anxiety: 4 /10 (10 worst)  Safety concerns (SI, HI, others): None  Sleep: Adequate  Energy: Adequate  Appetite: Adequate  Weight Change: No reports of any weight fluctuations    Corinne denies any side effects from medications unless noted above.    Review Of Systems: A review of systems is obtained and is negative except for the pertinent positives listed in HPI/Subjective above.      Current Rating Scores:     None completed  today.    Areas of Improvement: reviewed in HPI/Subjective Section and reviewed in Assessment and Plan Section      Past Medical History:   Diagnosis Date    Anxiety     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HCC)      Past Surgical History:   Procedure Laterality Date    APPENDECTOMY      extensive lysis of adhesions, MAYURI's procedure (divisions of MAYURI's bands) detorsion of vulvulus, widening of mesenteric pedicle, cecopeexy, appendectomy       BACK SURGERY Right 2013    SF: L4-L5 hemilaminectomy for discectomy. Use of the microscope for microdissection, Use og 40mg of depo-medrol though thee exiting right L5 nerve root. Onset:13     SECTION      x2    CHOLECYSTECTOMY      FL INJECTION RIGHT SHOULDER (ARTHROGRAM)  2022    GALLBLADDER SURGERY      [facility], Onset:     LAPAROSCOPIC LYSIS INTESTINAL ADHESIONS      onset: 15    LUMBAR DISC SURGERY      MO HYSTEROSCOPY BX ENDOMETRIUM&/POLYPC W/WO D&C N/A 2024    Procedure: DILATATION AND CURETTAGE (D&C) WITH HYSTEROSCOPY, POLYPECTOMY;  Surgeon: Griffin Arriaga MD;  Location: [facility];  Service: Gynecology    MO HYSTEROSCOPY ENDOMETRIAL ABLATION N/A 2024    Procedure: ABLATION ENDOMETRIAL VIPUL;  Surgeon: Griffin Arriaga MD;  Location: [facility];  Service: Gynecology    MO LAPS ABD PRTM&OMENTUM DX W/WO SPEC BR/WA SPX N/A 2016    Procedure: LAPAROSCOPY DIAGNOSTIC, EXTENSIVE LYSIS OF ADHESIONS, MAYURI'S PROCEDURE(DIVISION OF MAYURI'S BANDS,DETORSION OF VOLVULUS, WIDENING OF MESENTERIC PEDICLE, CECOPEXY, APPENDECTOMY);  Surgeon: Sudeep Espinoza MD;  Location: [facility];  Service: General    TUBAL LIGATION       Allergies:   Allergies   Allergen Reactions    Biaxin [Clarithromycin] GI Intolerance and Other (See Comments)    Sulfa Antibiotics     Sulfasalazine     Venlafaxine GI Intolerance, Vomiting and Other (See Comments)     Category: Allergy;   Category: Allergy;        Current Outpatient Medications   Medication Instructions    ALPRAZolam  (XANAX) 0.25 mg, Oral, Daily PRN    DULoxetine (CYMBALTA) 60 mg, Oral, Daily    fluconazole (DIFLUCAN) 150 mg, Oral, Once as needed, Repeat as needed, no more frequently than once per month    lamoTRIgine (LAMICTAL) 75 mg, Oral, Daily    lisdexamfetamine (VYVANSE) 10 mg, Oral, Every morning    multivitamin (THERAGRAN) TABS 1 tablet, Daily    Probiotic Product (PROBIOTIC-10 PO) Take by mouth 1 daily        Substance Abuse History:    Tobacco, Alcohol and Drug Use History     Tobacco Use    Smoking status: Former     Current packs/day: 0.00     Average packs/day: 0.3 packs/day for 5.0 years (1.3 ttl pk-yrs)     Types: Cigarettes     Start date: 2004     Quit date: 2009     Years since quittin.3    Smokeless tobacco: Never   Vaping Use    Vaping status: Never Used   Substance Use Topics    Alcohol use: Not Currently    Drug use: No     Alcohol Use: Not At Risk (2021)    AUDIT-C     Frequency of Alcohol Consumption: Monthly or less     Average Number of Drinks: 1 or 2     Frequency of Binge Drinking: Never       Social History:    Social History     Socioeconomic History    Marital status: /Civil Union     Spouse name: Not on file    Number of children: Not on file    Years of education: 12    Highest education level: Not on file   Occupational History    Occupation: [employer]   Other Topics Concern    Not on file   Social History Narrative    Participates in activities inside and outside of home    Lives with family    Supportive and safe    No advance directives        Family Psychiatric History:     Family History   Problem Relation Age of Onset    Mental illness Mother         bipolar/anxiety    Stroke Mother     Heart disease Mother     Depression Mother     Bipolar disorder Mother     Hypertension Father     Stroke Father     No Known Problems Daughter     Scleroderma Maternal Grandmother     Heart attack Maternal Grandfather     No Known Problems Paternal Grandmother     Stroke Paternal  Grandfather     Cancer Paternal Grandfather         believes lung cancer, heavy smoker    Seizures Brother         epilepsy    No Known Problems Paternal Aunt     Substance Abuse Neg Hx     Breast cancer Neg Hx     Ovarian cancer Neg Hx     Uterine cancer Neg Hx     Colon cancer Neg Hx        Medical History Reviewed by provider this encounter:  Tobacco  Allergies  Meds  Problems  Med Hx  Surg Hx  Fam Hx          Objective   There were no vitals taken for this visit.     Mental Status Evaluation:    Appearance age appropriate, casually dressed, dressed appropriately   Behavior cooperative, mildly anxious   Speech normal rate, normal volume, normal pitch, spontaneous   Mood anxious   Affect normal range and intensity, appropriate, reactive   Thought Processes organized, goal directed, linear   Thought Content no overt delusions   Perceptual Disturbances: no auditory hallucinations, no visual hallucinations   Abnormal Thoughts  Risk Potential Suicidal ideation - None  Homicidal ideation - None  Potential for aggression - No   Orientation oriented to person, place, time/date, and situation   Memory recent and remote memory grossly intact   Consciousness alert and awake   Attention Span Concentration Span attention span and concentration are age appropriate   Intellect appears to be of average intelligence   Insight intact   Judgement intact and good   Muscle Strength and  Gait normal muscle strength and normal muscle tone, normal gait and normal balance   Motor activity no abnormal movements   Language no difficulty naming common objects, no difficulty repeating a phrase, no difficulty writing a sentence   Fund of Knowledge adequate knowledge of current events  adequate fund of knowledge regarding past history  adequate fund of knowledge regarding vocabulary        Laboratory Results: I have personally reviewed all pertinent laboratory/tests results    Recent Labs (last 6 months):   Orders Only on 04/10/2025    Component Date Value    White Blood Cell Count 04/21/2025 3.7     Red Blood Cell Count 04/21/2025 3.94     Hemoglobin 04/21/2025 12.0     HCT 04/21/2025 35.8     MCV 04/21/2025 91     MCH 04/21/2025 30.5     MCHC 04/21/2025 33.5     RDW 04/21/2025 11.8     Platelet Count 04/21/2025 216     Total Iron Binding Sells* 04/21/2025 246 (L)     UIBC 04/21/2025 153     Iron, Serum 04/21/2025 93     Iron Saturation 04/21/2025 38     Ferritin 04/21/2025 65    Patient Message on 03/31/2025   Component Date Value    TSH 04/21/2025 1.200     Free t4 04/21/2025 0.98     Glucose, Random 04/21/2025 90     BUN 04/21/2025 12     Creatinine 04/21/2025 0.71     eGFR 04/21/2025 106     SL AMB BUN/CREATININE RA* 04/21/2025 17     Sodium 04/21/2025 140     Potassium 04/21/2025 4.0     Chloride 04/21/2025 104     CO2 04/21/2025 25     CALCIUM 04/21/2025 9.0     Protein, Total 04/21/2025 6.0     Albumin 04/21/2025 4.1     Globulin, Total 04/21/2025 1.9     TOTAL BILIRUBIN 04/21/2025 0.4     Alk Phos Isoenzymes 04/21/2025 48     AST 04/21/2025 22     ALT 04/21/2025 20     Cholesterol, Total 04/21/2025 193     Triglycerides 04/21/2025 63     HDL 04/21/2025 55     VLDL Cholesterol Calcula* 04/21/2025 12     LDL Calculated 04/21/2025 126 (H)     LDl/HDL Ratio 04/21/2025 2.3     Luteinizing Hormone (LH) 04/21/2025 17.6     FSH 04/21/2025 8.6        Suicide/Homicide Risk Assessment:    Risk of Harm to Self:  Based on today's assessment, Corinne presents the following risk of harm to self: none    Risk of Harm to Others:  Based on today's assessment, Corinne presents the following risk of harm to others: none    The following interventions are recommended: Continue medication management. No other intervention changes indicated at this time.    Psychotherapy Provided:     Individual psychotherapy provided: No    Treatment Plan:    Completed and signed during the session: Yes - Treatment Plan done but not signed at time of office visit due to:  "Plan reviewed by video and verbal consent given due to virtual visit.    Goals: Progress towards Treatment Plan goals - Yes, progressing, as evidenced by subjective findings in HPI/Subjective Section and in Assessment and Plan Section    Depression Follow-up Plan Completed: Yes    Note Share:    This note was shared with patient.    Administrative Statements   Administrative Statements   Encounter provider SAKINA Maier    The Patient is located at Home and in the following state in which I hold an active license [place].    The patient was identified by name and date of birth. Corinne S Kulp was informed that this is a telemedicine visit and that the visit is being conducted through the Epic Embedded platform. She agrees to proceed..  My office door was closed. No one else was in the room.  She acknowledged consent and understanding of privacy and security of the video platform. The patient has agreed to participate and understands they can discontinue the visit at any time.    I have spent a total time of 30 minutes in caring for this patient on the day of the visit/encounter including Counseling / Coordination of care, not including the time spent for establishing the audio/video connection.    Visit Time  Visit Start Time: 9:30AM  Visit Stop Time: 10:00AM  Total Visit Duration:  30 minutes    Portions of the record may have been created with voice recognition software. Occasional wrong word or \"sound a like\" substitutions may have occurred due to the inherent limitations of voice recognition software. Read the chart carefully and recognize, using context, where substitutions have occurred.    SAKINA Maier 05/12/25  "

## 2025-05-15 ENCOUNTER — TELEPHONE (OUTPATIENT)
Age: 45
End: 2025-05-15

## 2025-05-15 NOTE — TELEPHONE ENCOUNTER
"Spoke to Corinne - she said she took Vyvanse twice but doesn't want to continue taking it.  She was very nervous to take it in the beginning because the pharmacist told her that being on both medications won't be allowed with insurance and if she gets pulled over she will get a DUI. He was not nice to her about this. This made her very nervous. I apologized to her that she was told this by him. I explained both medications are prescribed to her and she only takes Xanax as needed. She said the Vyvanse made her have negative thoughts like \"I don't want to be here anymore\" and she felt very out of it. This morning she was on the verge of a panic attack and felt really nauseous. She didn't take a xanax because the pharmacist freaked her out. I explained it is prescribed to her a reason and if she feels she needs to take one that is OK. Emotional support provided. She feels better now and is going to stop taking Vyvanse. She will follow up with Beena when she returns to office. Nothing further needed at this time.   "

## 2025-05-15 NOTE — TELEPHONE ENCOUNTER
Patient called and reported that she does not want to take her new medication, because she did not feel right on the night before.

## 2025-05-15 NOTE — TELEPHONE ENCOUNTER
Called Corinne [phone] and left  requesting a call back to gather more information and inform her that Beena is out of office until 5/26.

## 2025-05-15 NOTE — TELEPHONE ENCOUNTER
Patient returning call regarding medication. Successfully warm transferred to nurse line for further assistance.

## 2025-06-02 ENCOUNTER — TELEMEDICINE (OUTPATIENT)
Dept: BEHAVIORAL/MENTAL HEALTH CLINIC | Facility: CLINIC | Age: 45
End: 2025-06-02

## 2025-06-02 DIAGNOSIS — F33.0 MILD EPISODE OF RECURRENT MAJOR DEPRESSIVE DISORDER (HCC): ICD-10-CM

## 2025-06-02 DIAGNOSIS — F41.1 GAD (GENERALIZED ANXIETY DISORDER): Primary | Chronic | ICD-10-CM

## 2025-06-02 PROCEDURE — 90834 PSYTX W PT 45 MINUTES: CPT | Performed by: SOCIAL WORKER

## 2025-06-02 NOTE — PSYCH
Virtual Regular Visit Name: Corinne S Kulp      : 1980      MRN: [id number]  Encounter Provider: Veronica Sandoval  Encounter Date: 2025   Encounter department: [facility]  :  Assessment & Plan  NAVI (generalized anxiety disorder)         Mild episode of recurrent major depressive disorder (HCC)         Goals addressed in session: Goal 1 Follow up psychotherapy session.     Behavioral Health Treatment Plan [facility]: Diagnosis and Treatment Plan explained to Corinne, Corinne relates understanding diagnosis and is agreeable to Treatment Plan. Yes.      Depression Follow-up Plan Completed: Not applicable     Reason for visit is   Chief Complaint   Patient presents with    Virtual Regular Visit      Recent Visits  No visits were found meeting these conditions.  Showing recent visits within past 7 days and meeting all other requirements  Today's Visits  Date Type Provider Dept   25 Telemedicine Veronica Sandoval  [facility]   Showing today's visits and meeting all other requirements  Future Appointments  No visits were found meeting these conditions.  Showing future appointments within next 150 days and meeting all other requirements     History of Present Illness     HPI    Past Medical History   Past Medical History:   Diagnosis Date    Anxiety     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HCC)      Past Surgical History:   Procedure Laterality Date    APPENDECTOMY      extensive lysis of adhesions, MAYURI's procedure (divisions of MAYURI's bands) detorsion of vulvulus, widening of mesenteric pedicle, cecopeexy, appendectomy       BACK SURGERY Right 2013    SF: L4-L5 hemilaminectomy for discectomy. Use of the microscope for microdissection, Use og 40mg of depo-medrol though thee exiting right L5 nerve root. Onset:13     SECTION      x2    CHOLECYSTECTOMY      FL INJECTION RIGHT SHOULDER (ARTHROGRAM)  2022    GALLBLADDER SURGERY      [facility],  Onset: 2011    LAPAROSCOPIC LYSIS INTESTINAL ADHESIONS      onset: 9/23/16    LUMBAR DISC SURGERY      MD HYSTEROSCOPY BX ENDOMETRIUM&/POLYPC W/WO D&C N/A 1/16/2024    Procedure: DILATATION AND CURETTAGE (D&C) WITH HYSTEROSCOPY, POLYPECTOMY;  Surgeon: Griffin Arriaga MD;  Location: [facility] MAIN OR;  Service: Gynecology    MD HYSTEROSCOPY ENDOMETRIAL ABLATION N/A 1/16/2024    Procedure: ABLATION ENDOMETRIAL VIPUL;  Surgeon: Griffin Arriaga MD;  Location: [facility];  Service: Gynecology    MD LAPS ABD PRTM&OMENTUM DX W/WO SPEC BR/WA SPX N/A 9/23/2016    Procedure: LAPAROSCOPY DIAGNOSTIC, EXTENSIVE LYSIS OF ADHESIONS, MAYURI'S PROCEDURE(DIVISION OF MAYURI'S BANDS,DETORSION OF VOLVULUS, WIDENING OF MESENTERIC PEDICLE, CECOPEXY, APPENDECTOMY);  Surgeon: Sudeep Espinoza MD;  Location: [facility];  Service: General    TUBAL LIGATION       Current Outpatient Medications   Medication Instructions    ALPRAZolam (XANAX) 0.25 mg, Oral, Daily PRN    DULoxetine (CYMBALTA) 60 mg, Oral, Daily    fluconazole (DIFLUCAN) 150 mg, Oral, Once as needed, Repeat as needed, no more frequently than once per month    lamoTRIgine (LAMICTAL) 75 mg, Oral, Daily    multivitamin (THERAGRAN) TABS 1 tablet, Daily    Probiotic Product (PROBIOTIC-10 PO) Take by mouth 1 daily     Allergies   Allergen Reactions    Biaxin [Clarithromycin] GI Intolerance and Other (See Comments)    Sulfa Antibiotics     Sulfasalazine     Venlafaxine GI Intolerance, Vomiting and Other (See Comments)     Category: Allergy;   Category: Allergy;      Objective   There were no vitals taken for this visit.    Video Exam  Physical Exam     Administrative Statements   Encounter provider Veronica Sandoval    The Patient is located at Home and in the following state in which I hold an active license [place].    The patient was identified by name and date of birth. Corinne S Kulp was informed that this is a telemedicine visit and that the visit is being conducted through the [employer] [facility]  "platform. She agrees to proceed.  My office door was closed. No one else was in the room.  She acknowledged consent and understanding of privacy and security of the video platform. The patient has agreed to participate and understands they can discontinue the visit at any time.    I have spent a total time of 50 minutes in caring for this patient on the day of the visit/encounter including Counseling / Coordination of care, not including the time spent for establishing the audio/video connection.    Visit Time       (D) Corinne attended her follow up psychotherapy session today. Corinne reported that since her last session, she has noticed ongoing symptoms. Corinne reported that she started taking vyvanse 10mg on Tuesday 05/13/2025. Corinne reported that when she picked up her medication on Monday 05/12/25, and reported that the pharmacist expressed concerns in relation to Corinne being prescribed xanax 0.5mg PRN, and now being prescribed a stimulant with vyvanse 10mg, and that if she were to take these medications and get pulled over driving a car she would get a DUI. Corinne reported that the pharmacist said that she shouldn't be taking these together, and eventually she isn't going to be allowed to take these two medications together. Corinne described feeling triggered by this, and described experiencing elevated levels of anxiety about taking it in general. Corinne reported that despite this, she still tried the medication. Corinne reported that she took the vyvanse 10mg on Tuesday 05/13/25, and Wednesday 05/14/25. Corinne reported that she noted a decrease in anxiety, obsessive, intrusive, ruminating, and repetitive thoughts, and overall as sense of feeling, \"calm.\" Corinne reported that she felt, \"loopy,\" and described, \"feeling out of it,: and not liking the way she felt on this. Corinne described struggling with identifying what was a response from the vyvanse, verses what was triggered by anxiety from what " the pharmacist said. Corinne reported that she reached out to her psychiatry providers office and spoke with the nurse there, providing an update. Corinne reported that she stopped taking the vyvanse on her own. This writer spoke with Corinne by phone Friday 05/16/25 regarding all of this. Corinne reported that she had a follow up psychiatry appointment on 05/27/25, that she initially canceled reporting that she wasn't going to have time to step away for the appointment. Corinne reported that with everything going on, her psychiatry provider still called her to discuss pharmacology. Corinne reported that she decided that she didn't want to trial anything new right now, giving herself a break, and processed through this. Corinne reported that she has been having physical health issues, describing discomfort and pain in her lower neck, shoulders, spine, and lower back. Corinne reported that she had a PCP appointment scheduled to address this; however, the provider needed to reschedule, and is waiting on follow up for this. Corinne reported that she is scheduled for a massage today and is hoping that her provider will be able to work on it. Corinne reported that in the past she went to a chiropractor to help as well; however, reported that she hasn't been there in sometime. Corinne describes stressors and triggers in relation to this, worrying that she is having issues with one of her discs again, where she previously had surgery at. Corinne describes feeling stressed, which she believes is impacting her physical body, and her emotional well-being. Corinne describes working long hours, being on her feet all day doing hair, and worries that this too is having a negative impact on her health. Corinne described a history of struggling with women's health issues, and various specialists, appointments, procedures, and outcomes. Discussed and processed this. Corinne describes feeling stressed out and overwhelmed in relation  "to work, specifically Tuesday's and Wednesday's, having longer days. Corinne reported that she tried to talk to her  Fredy about this and his response was, \"lesser hours means lesser pay,\" triggering her. Corinne reported that on Tuesday's she works 12pm-8:00pm, Wednesday she works 10:00am-8:00pm, Thursday is 10:00am-4:00pm, Friday is 9:00am-1:00pm, sometimes even later, and then every other Saturday 9:00am-2:00pm. Corinne reported that when she looks at changing her schedule she thinks about her clients schedules and needs and then feels triggered, and overwhelmed. Corinne reported that she has been struggling with clients needing to change their appointments at the last minute, reporting that she struggles with responding to clients immediately, even off work hours. 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 Corinne describes struggling with interpersonal relationship stressors between her and her  Fredy, desiring to talk to Fredy and seek out validation, and describes experiencing him as negative, then this triggers her. Corinne describes struggling with shutting down, and avoiding then, triggering negative thoughts, negative thinking traps, cognitive distortions, all stemmed from harmful core beliefs. Corinne describes desiring to been seen by her  Fredy, supported, and validated; however, describes this as not being the case. Discussed and processed this. Corinne reported that her  Fredy is working his full-time job at the [employer], and does side jobs, resulting in him too working longer hours and not being home. Corinne reported that when Fredy is home, she observes him as being tired, stressed out, distracted, and on his phone, triggering her. Discussed and processed. Corinne reported that her, her , and her " "children went away to the [place] for Memorial Day, and processed through this.     Corinne and this writer processed this. Discussed ongoing skills. Reviewed limits and boundaries. Provided ongoing psychoeducation. Modeled effective forms of communication.     (A) Corinne denies any evident or immediate risk factors for self-harm, SI, or HI. Corinne denies any symptoms of frank. Corinne presented with good eye contact. Corinne presented as alert and oriented x3. Corinne's speech presented at a normal rate, volume, and rhythm. Corinne presented as depressed and anxious and her affect appeared to be congruent. Corinne describes symptoms of anticipatory anxiety, feeling nervous, anxious, and on edge, not being able to stop and control worrying, and obsessive, intrusive, ruminating, and repetitive thoughts. Corinne describes symptoms of grief in relation to symbolic loss. Corinne describes feeling stressed out and overwhelmed, describing hyperfixiation, getting stuck, and then shutting down and avoiding. Corinne describes emotional dysregulation, reporting irritability, poor frustration tolerance, and becoming easily annoyed. Corinne describes little interest and pleasure in doing things, describing lower moods of sadness, and depression. Corinne describes fatigue, and feeling tired and having little energy. Corinne describes feeling bad about herself, feeling like she is letting herself, and her family down.     (P) Corinne plans to consider reading the book, \"Its Not Hysteria,\" outside of session, identifying areas that she connects with verses areas that she doesn't connect with, to further explore and process next session. Corinne plans to follow up with her massages, chiropractor, and PCP regarding physical health issues. Corinne plans to continue to work with her psychiatry provider regarding pharmacology options, continuing to monitor, track, and inventory symptoms, cycles, and stressors. Corinne plans to " intentionally structure her schedule with balance, routine, and consistency, using DBT Opposite Action Skills to intentionally lean into healthy natural supports, engage in the use of self-care, take time for herself, be present in the moment, and prioritize her mental health and physical health needs. Corinne plans to challenge herself to stick with the facts, and intentionally separate Rational Mind, identifying ration, reason, logic, and facts, along with Emotional Mind, identifying thoughts, feelings, and emotions, striking balance between the both, implementing the concept of DBT [place] Mind. Corinne plans to challenge negative thoughts, negative thinking traps, cognitive distortions, and harmful core beliefs with the support of DBT Wise Mind, CBT Cognitive Restructuring, and CBT Challenging Negative Thoughts Assessment Questions. Corinne plans to intentionally meet herself with radical compassion, extend bogdan towards herself, identifying her worth, and giving herself credit. Corinne plans to implement positive self-talk, positive affirmations, self-affirming statements, and grounding statements. Corinne plans to challenge herself to internalize the concept of Radical Acceptance. Corinne plans to allow for two opposing thoughts, feelings, and emotions to both co-exist and be true at the same time, internalizing the concept of DBT Dialects. Corinne plans to utilize healthy and effective forms of communication, intentionally asserting herself, advocating for herself, asking for what she needs, and target interpersonal relationship stressors, reinforcing limits and boundaries, challenging co-dependency, aiming to break the cycle, and implement healthy patterns, and behaviors. Corinne plans to actively pay attention to her body, aiming to increase self-awareness in and gain insight in relation to warning signs and triggers throughout her body. Corinne plans to target fluctuating symptoms with meditation/ mindfulness  techniques, deep breathing techniques, grounding techniques, coping skills, sensory related skills, self-soothing techniques, distress tolerance skills, and distraction techniques. Corinne plans to identify, associate, honor, validate, and make space for her feelings, and emotions, examining patterns, themes, and behaviors through the lens of trauma, and co-dependency, journaling outside of session. Corinne plans to reach out for additional support as needed.      06/02/25  Start Time: 1000  Stop Time: 1050  Total Visit Time: 50 minutes

## 2025-06-09 ENCOUNTER — APPOINTMENT (OUTPATIENT)
Dept: RADIOLOGY | Facility: CLINIC | Age: 45
End: 2025-06-09
Payer: COMMERCIAL

## 2025-06-09 ENCOUNTER — OFFICE VISIT (OUTPATIENT)
Dept: FAMILY MEDICINE CLINIC | Facility: HOSPITAL | Age: 45
End: 2025-06-09
Payer: COMMERCIAL

## 2025-06-09 VITALS
SYSTOLIC BLOOD PRESSURE: 112 MMHG | HEIGHT: 65 IN | HEART RATE: 77 BPM | DIASTOLIC BLOOD PRESSURE: 80 MMHG | WEIGHT: 152 LBS | BODY MASS INDEX: 25.33 KG/M2 | OXYGEN SATURATION: 99 %

## 2025-06-09 DIAGNOSIS — F32.1 CURRENT MODERATE EPISODE OF MAJOR DEPRESSIVE DISORDER WITHOUT PRIOR EPISODE (HCC): ICD-10-CM

## 2025-06-09 DIAGNOSIS — M54.2 CERVICAL PAIN (NECK): ICD-10-CM

## 2025-06-09 DIAGNOSIS — Z00.00 ANNUAL PHYSICAL EXAM: Primary | ICD-10-CM

## 2025-06-09 DIAGNOSIS — S46.811A STRAIN OF RIGHT TRAPEZIUS MUSCLE, INITIAL ENCOUNTER: ICD-10-CM

## 2025-06-09 DIAGNOSIS — F41.1 GAD (GENERALIZED ANXIETY DISORDER): ICD-10-CM

## 2025-06-09 PROBLEM — N92.0 MENORRHAGIA WITH REGULAR CYCLE: Status: RESOLVED | Noted: 2023-10-16 | Resolved: 2025-06-09

## 2025-06-09 PROCEDURE — 99214 OFFICE O/P EST MOD 30 MIN: CPT | Performed by: STUDENT IN AN ORGANIZED HEALTH CARE EDUCATION/TRAINING PROGRAM

## 2025-06-09 PROCEDURE — 72050 X-RAY EXAM NECK SPINE 4/5VWS: CPT

## 2025-06-09 PROCEDURE — 99396 PREV VISIT EST AGE 40-64: CPT | Performed by: STUDENT IN AN ORGANIZED HEALTH CARE EDUCATION/TRAINING PROGRAM

## 2025-06-09 NOTE — PROGRESS NOTES
Adult Annual Physical  Name: Corinne S Kulp      : 1980      MRN: [id number]  Encounter Provider: Laura Barr DO  Encounter Date: 2025   Encounter department: [facility] [address]    :  Assessment & Plan  Annual physical exam  Topics as below.   Mammo ordered by GYN.   Mayra UTD  Lab work reviewed. All normal.   Supplement reviewed.        Cervical pain (neck)  Continue with heat, massgae, stretching, chiro, etc.   Declined PT but can add if needed.   HEP given.   Orders:  •  XR spine cervical complete 4 or 5 vw non injury; Future    Strain of right trapezius muscle, initial encounter  Can add thoracic XR if needed. Bordering on T1 region also.   Orders:  •  XR spine cervical complete 4 or 5 vw non injury; Future    NAVI (generalized anxiety disorder)  F/W psychiatrist & therapist closely.   Seen by neuropsych.        Current moderate episode of major depressive disorder without prior episode (HCC)         Preventive Screenings:  - Diabetes Screening: screening up-to-date  - Cholesterol Screening: screening up-to-date   - Hepatitis C screening: screening up-to-date   - Cervical cancer screening: screening up-to-date   - Breast cancer screening: screening up-to-date   - Colon cancer screening: screening up-to-date   - Lung cancer screening: screening not indicated     Immunizations:  - Immunizations due: UTD    Counseling/Anticipatory Guidance:  - Alcohol: discussed moderation in alcohol intake and recommendations for healthy alcohol use.   - Drug use: discussed harms of illicit drug use and how it can negatively impact mental/physical health.   - Tobacco use: discussed harms of tobacco use and management options for quitting.   - Dental health: discussed importance of regular tooth brushing, flossing, and dental visits.   - Sexual health: discussed sexually transmitted diseases, partner selection, use of condoms, avoidance of unintended pregnancy, and contraceptive  alternatives.   - Diet: discussed recommendations for a healthy/well-balanced diet.   - Exercise: the importance of regular exercise/physical activity was discussed. Recommend exercise 3-5 times per week for at least 30 minutes.   - Injury prevention: discussed safety/seat belts, safety helmets, smoke detectors, carbon monoxide detectors, and smoking near bedding or upholstery.          History of Present Illness     Adult Annual Physical:  Patient presents for annual physical. Ongoing episodes of lower neck, upper back pain.  Worse when she is working  Usually tighter on R side, RHD, but hairdresser, so uses both hands a lot.   Was seeing chiro & massages, helps for a little while then comes back.   Occas motrin for it   Getting leg pains too, achey on R leg.   Inner knees are sore.     ADHD concerns with psych - tried 2 days of vyvanse, but didn't like it or how it made her feel.   Stopped it & .     Diet and Physical Activity:  - Diet/Nutrition: well balanced diet and consuming 3-5 servings of fruits/vegetables daily.  - Exercise: strength training exercises, moderate cardiovascular exercise and 5-7 times a week on average. not putting bar on her upper back    General Health:  - Sleep: sleeps well and 7-8 hours of sleep on average.  - Hearing: normal hearing bilateral ears.  - Vision:. sometimes uses readers for close up  - Dental: regular dental visits and brushes teeth twice daily.    /GYN Health:  - Follows with GYN: yes.   - History of STDs: no  - Contraception: tubal ligation. Dr. Arriaga, hx of ablation, very small drops coming with cycle      Wt Readings from Last 3 Encounters:   06/09/25 68.9 kg (152 lb)   04/28/25 69.1 kg (152 lb 6.4 oz)   08/05/24 66.2 kg (146 lb)     Temp Readings from Last 3 Encounters:   01/16/24 (!) 96.8 °F (36 °C)   11/15/23 97.8 °F (36.6 °C)   03/07/22 98 °F (36.7 °C) (Tympanic)     BP Readings from Last 3 Encounters:   06/09/25 112/80   04/28/25 114/66   08/05/24 120/78  "    Pulse Readings from Last 3 Encounters:   06/09/25 77   08/05/24 82   02/23/24 94     Review of Systems   Constitutional:  Negative for chills and fever.   Respiratory:  Negative for cough and shortness of breath.    Cardiovascular:  Negative for chest pain and palpitations.   Gastrointestinal:  Negative for constipation and diarrhea.   Genitourinary:  Negative for dysuria and frequency.        Nocturia x1    Musculoskeletal:  Positive for neck pain.   Neurological:  Negative for dizziness, light-headedness and headaches (rare).     Medications Ordered Prior to Encounter[1]   Social History[2]    Objective   /80   Pulse 77   Ht 5' 5\" (1.651 m)   Wt 68.9 kg (152 lb)   SpO2 99%   BMI 25.29 kg/m²     Physical Exam  Vitals and nursing note reviewed.   Constitutional:       General: She is not in acute distress.     Appearance: Normal appearance. She is normal weight. She is not ill-appearing.   HENT:      Head: Normocephalic and atraumatic.      Right Ear: Tympanic membrane, ear canal and external ear normal. There is no impacted cerumen.      Left Ear: Tympanic membrane, ear canal and external ear normal. There is no impacted cerumen.      Ears:      Comments: Mild sinus TTP     Nose: Nose normal. No congestion or rhinorrhea.      Mouth/Throat:      Mouth: Mucous membranes are moist.      Pharynx: Oropharynx is clear. No oropharyngeal exudate or posterior oropharyngeal erythema.     Eyes:      General: No scleral icterus.        Right eye: No discharge.         Left eye: No discharge.     Neck:      Comments: No thyroid nodules or thyromegaly.  Cardiovascular:      Rate and Rhythm: Normal rate and regular rhythm.      Pulses: Normal pulses.      Heart sounds: Normal heart sounds. No murmur heard.  Pulmonary:      Effort: Pulmonary effort is normal. No respiratory distress.      Breath sounds: Normal breath sounds. No wheezing.   Abdominal:      Tenderness: There is no right CVA tenderness or left CVA " tenderness.     Musculoskeletal:         General: Normal range of motion.      Cervical back: Normal range of motion and neck supple. Tenderness (b/l Trapezii, L superior to R, tight over both, some cervical paraspinal tenderness) present. No rigidity.      Right lower leg: No edema.      Left lower leg: No edema.   Lymphadenopathy:      Cervical: No cervical adenopathy.     Skin:     General: Skin is warm and dry.      Findings: No erythema or rash.     Neurological:      Mental Status: She is alert and oriented to person, place, and time.      Gait: Gait normal.     Psychiatric:         Mood and Affect: Mood normal.         Behavior: Behavior normal.         Thought Content: Thought content normal.         Judgment: Judgment normal.              [1]  Current Outpatient Medications on File Prior to Visit   Medication Sig Dispense Refill   • ALPRAZolam (XANAX) 0.25 mg tablet Take 1 tablet (0.25 mg total) by mouth daily as needed for anxiety 15 tablet 1   • DULoxetine (CYMBALTA) 60 mg delayed release capsule Take 1 capsule (60 mg total) by mouth daily 90 capsule 2   • lamoTRIgine (LaMICtal) 25 mg tablet Take 3 tablets (75 mg total) by mouth in the morning 90 tablet 2   • multivitamin (THERAGRAN) TABS Take 1 tablet by mouth in the morning.     • Probiotic Product (PROBIOTIC-10 PO) Take by mouth 1 daily     • [DISCONTINUED] fluconazole (DIFLUCAN) 150 mg tablet Take 1 tablet (150 mg total) by mouth once as needed (Yeast infection) for up to 3 doses Repeat as needed, no more frequently than once per month (Patient not taking: Reported on 2025) 3 tablet 1     No current facility-administered medications on file prior to visit.   [2]  Social History  Tobacco Use   • Smoking status: Former     Current packs/day: 0.00     Average packs/day: 0.3 packs/day for 5.0 years (1.3 ttl pk-yrs)     Types: Cigarettes     Start date: 2004     Quit date: 2009     Years since quittin.4   • Smokeless tobacco: Never    Vaping Use   • Vaping status: Never Used   Substance and Sexual Activity   • Alcohol use: Not Currently   • Drug use: No   • Sexual activity: Yes     Partners: Male     Birth control/protection: None, Surgical     Comment: no new partner in past year

## 2025-06-11 DIAGNOSIS — F39 MOOD DISORDER (HCC): ICD-10-CM

## 2025-06-11 RX ORDER — LAMOTRIGINE 25 MG/1
75 TABLET ORAL DAILY
Qty: 90 TABLET | Refills: 2 | OUTPATIENT
Start: 2025-06-11

## 2025-06-11 NOTE — TELEPHONE ENCOUNTER
Reason for call:   [x] Refill   [] Prior Auth  [] Other:     Office:   [] PCP/Provider -   [x] Specialty/Provider - Beena Miranda, PSY[facility]IATRIC [facility]RAIG     Medication: Lamictal    Dose/Frequency: 25 mg     Quantity: [facility] [address]    Pharmacy: #[id number]    Local Pharmacy   Does the patient have enough for 3 days?   [x] Yes   [] No - Send as HP to POD    Mail Away Pharmacy   Does the patient have enough for 10 days?   [] Yes   [] No - Send as HP to POD

## 2025-06-16 ENCOUNTER — TELEMEDICINE (OUTPATIENT)
Dept: PSYCHIATRY | Facility: CLINIC | Age: 45
End: 2025-06-16
Payer: COMMERCIAL

## 2025-06-16 DIAGNOSIS — F33.0 MILD EPISODE OF RECURRENT MAJOR DEPRESSIVE DISORDER (HCC): ICD-10-CM

## 2025-06-16 DIAGNOSIS — F41.1 GAD (GENERALIZED ANXIETY DISORDER): Chronic | ICD-10-CM

## 2025-06-16 DIAGNOSIS — F39 MOOD DISORDER (HCC): Primary | ICD-10-CM

## 2025-06-16 PROCEDURE — 99214 OFFICE O/P EST MOD 30 MIN: CPT | Performed by: NURSE PRACTITIONER

## 2025-06-16 NOTE — PSYCH
MEDICATION MANAGEMENT NOTE    Name: Corinne S Kulp      : 1980      MRN: [id number]  Encounter Provider: SAKINA Maier  Encounter Date: 2025   Encounter department: [facility]    Insurance: Payor: BLUE CROSS / Plan: [employer] / Product Type: Blue HMO /      Reason for Visit:   Chief Complaint   Patient presents with    Medication Management    Follow-up   :  Assessment & Plan  Mood disorder (HCC)  Lamictal 75 mg daily       Mild episode of recurrent major depressive disorder (HCC)  Cymbalta 60 mg daily       NAVI (generalized anxiety disorder)  Alprazolam 0.25 mg daily as needed for anxiety       Follow-up with me in 3 months or sooner if necessary    Treatment Recommendations:    Educated about diagnosis and treatment modalities. Verbalizes understanding and agreement with the treatment plan.  Discussed self monitoring of symptoms, and symptom monitoring tools.  Discussed medications and if treatment adjustment was needed or desired.  Aware of 24 hour and weekend coverage for urgent situations accessed by calling [facility] main practice number  I am scheduling this patient out for greater than 3 months: No    Medications Risks/Benefits:      Risks, Benefits And Possible Side Effects Of Medications:    Risks, benefits, and possible side effects of medications explained to Corinne and she (or legal representative) verbalizes understanding and agreement for treatment.    Controlled Medication Discussion:     Corinne has been filling controlled prescriptions on time as prescribed according to [place] Prescription Drug Monitoring Program.      History of Present Illness     CC: Corinne presents today for follow up on 2025 for the treatment of mild depressive disorder, mood disorder and anxiety disorder.     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  She said most of anxiety is work-related.  She feels she needs a better balance between being the manager and owner of her business and trying to accommodate her employees with their request.  Overall, she did say last week was a very busy and demanding week and that could be why she is feeling this way today.  No medication changes made or requested today.  She will continue with her current treatment she will continue with therapy.  Follow-up with me in 3 months or sooner if necessary      Med Compliance: yes    Since our last visit, overall symptoms have been stable.       HPI ROS:     Medication Side Effects: None  Depression: 0 /10 (10 worst)  Anxiety: 3 /10 (10 worst)  Safety concerns (SI, HI, others): None  Sleep: Adequate  Energy: Adequate  Appetite: Adequate  Weight Change: No weight changes or fluctuations    Corinne denies any side effects from medications unless noted above.    Review Of Systems: A review of systems is obtained and is negative except for the pertinent positives listed in HPI/Subjective above.      Current Rating Scores:     None completed today.    Areas of Improvement: reviewed in HPI/Subjective Section and reviewed in Assessment and Plan Section      Past Medical History[1]  Past Surgical History[2]  Allergies: Allergies[3]    Current Outpatient Medications   Medication Instructions    ALPRAZolam (XANAX) 0.25 mg, Oral, Daily PRN    DULoxetine (CYMBALTA) 60 mg, Oral, Daily    lamoTRIgine (LAMICTAL) 75 mg, Oral, Daily    multivitamin (THERAGRAN) TABS 1 tablet, Daily    Probiotic Product (PROBIOTIC-10 PO) Take by mouth 1 daily        Substance Abuse History:    Tobacco, Alcohol and Drug Use History     Tobacco Use    Smoking status: Former     Current packs/day: 0.00     Average packs/day: 0.3 packs/day for 5.0 years (1.3 ttl pk-yrs)     Types: Cigarettes     Start date: 2004     Quit date: 2009     Years since quittin.4    Smokeless tobacco: Never   Vaping  Use    Vaping status: Never Used   Substance Use Topics    Alcohol use: Not Currently    Drug use: No     Alcohol Use: Not At Risk (4/12/2021)    AUDIT-C     Frequency of Alcohol Consumption: Monthly or less     Average Number of Drinks: 1 or 2     Frequency of Binge Drinking: Never       Social History:    Social History     Socioeconomic History    Marital status: /Civil Union     Spouse name: Not on file    Number of children: Not on file    Years of education: 12    Highest education level: Not on file   Occupational History    Occupation: [employer]   Other Topics Concern    Not on file   Social History Narrative    Participates in activities inside and outside of home    Lives with family    Supportive and safe    No advance directives        Family Psychiatric History:     Family History[4]    Medical History Reviewed by provider this encounter:  Tobacco  Allergies  Meds  Problems  Med Hx  Surg Hx  Fam Hx          Objective   There were no vitals taken for this visit.     Mental Status Evaluation:    Appearance age appropriate, casually dressed, dressed appropriately   Behavior cooperative, calm   Speech normal rate, normal volume, normal pitch, spontaneous   Mood euthymic   Affect normal range and intensity, appropriate, reactive   Thought Processes organized, goal directed, linear   Thought Content no overt delusions   Perceptual Disturbances: no auditory hallucinations, no visual hallucinations   Abnormal Thoughts  Risk Potential Suicidal ideation - None  Homicidal ideation - None  Potential for aggression - No   Orientation oriented to person, place, time/date, and situation   Memory recent and remote memory grossly intact   Consciousness alert and awake   Attention Span Concentration Span attention span and concentration are age appropriate   Intellect appears to be of average intelligence   Insight intact and good   Judgement intact and good   Muscle Strength and  Gait normal muscle  strength and normal muscle tone, normal gait and normal balance   Motor activity no abnormal movements   Language no difficulty naming common objects, no difficulty repeating a phrase, no difficulty writing a sentence   Fund of Knowledge adequate knowledge of current events  adequate fund of knowledge regarding past history  adequate fund of knowledge regarding vocabulary        Laboratory Results: I have personally reviewed all pertinent laboratory/tests results    Recent Labs (last 6 months):   Orders Only on 04/10/2025   Component Date Value    White Blood Cell Count 04/21/2025 3.7     Red Blood Cell Count 04/21/2025 3.94     Hemoglobin 04/21/2025 12.0     HCT 04/21/2025 35.8     MCV 04/21/2025 91     MCH 04/21/2025 30.5     MCHC 04/21/2025 33.5     RDW 04/21/2025 11.8     Platelet Count 04/21/2025 216     Total Iron Binding [place]* 04/21/2025 246 (L)     UIBC 04/21/2025 153     Iron, Serum 04/21/2025 93     Iron Saturation 04/21/2025 38     Ferritin 04/21/2025 65    Patient Message on 03/31/2025   Component Date Value    TSH 04/21/2025 1.200     Free t4 04/21/2025 0.98     Glucose, Random 04/21/2025 90     BUN 04/21/2025 12     Creatinine 04/21/2025 0.71     eGFR 04/21/2025 106     SL AMB BUN/CREATININE RA* 04/21/2025 17     Sodium 04/21/2025 140     Potassium 04/21/2025 4.0     Chloride 04/21/2025 104     CO2 04/21/2025 25     CALCIUM 04/21/2025 9.0     Protein, Total 04/21/2025 6.0     Albumin 04/21/2025 4.1     Globulin, Total 04/21/2025 1.9     TOTAL BILIRUBIN 04/21/2025 0.4     Alk Phos Isoenzymes 04/21/2025 48     AST 04/21/2025 22     ALT 04/21/2025 20     Cholesterol, Total 04/21/2025 193     Triglycerides 04/21/2025 63     HDL 04/21/2025 55     VLDL Cholesterol Calcula* 04/21/2025 12     LDL Calculated 04/21/2025 126 (H)     LDl/HDL Ratio 04/21/2025 2.3     Luteinizing Hormone (LH) 04/21/2025 17.6     FSH 04/21/2025 8.6        Suicide/Homicide Risk Assessment:    Risk of Harm to Self:  Based on today's  assessment, Corinne presents the following risk of harm to self: none    Risk of Harm to Others:  Based on today's assessment, Corinne presents the following risk of harm to others: none    The following interventions are recommended: Continue medication management. No other intervention changes indicated at this time.    Psychotherapy Provided:     Individual psychotherapy provided: No    Treatment Plan:    Completed and signed during the session: Yes - Treatment Plan done but not signed at time of office visit due to: Plan reviewed by video and verbal consent given due to virtual visit.    Goals: Progress towards Treatment Plan goals - Yes, progressing, as evidenced by subjective findings in HPI/Subjective Section and in Assessment and Plan Section    Depression Follow-up Plan Completed: Yes    Note Share:    This note was shared with patient.    Administrative Statements   Administrative Statements   Encounter provider SAKINA Maier    The Patient is located at Home and in the following state in which I hold an active license [place].    The patient was identified by name and date of birth. Corinne S Kulp was informed that this is a telemedicine visit and that the visit is being conducted through the Epic Embedded platform. She agrees to proceed..  My office door was closed. No one else was in the room.  She acknowledged consent and understanding of privacy and security of the video platform. The patient has agreed to participate and understands they can discontinue the visit at any time.    I have spent a total time of 30 minutes in caring for this patient on the day of the visit/encounter including Counseling / Coordination of care, not including the time spent for establishing the audio/video connection.    Visit Time  Visit Start Time: 1:00PM  Visit Stop Time: 1:30PM  Total Visit Duration: 30 minutes    Portions of the record may have been created with voice recognition software. Occasional wrong word or  "\"sound a like\" substitutions may have occurred due to the inherent limitations of voice recognition software. Read the chart carefully and recognize, using context, where substitutions have occurred.    SAKINA Maier 24       [1]   Past Medical History:  Diagnosis Date    Anxiety     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HCC)     Panic attack     I have had many iver the years    Panic disorder    [2]   Past Surgical History:  Procedure Laterality Date    APPENDECTOMY      extensive lysis of adhesions, MAYURI's procedure (divisions of MAYURI's bands) detorsion of vulvulus, widening of mesenteric pedicle, cecopeexy, appendectomy       BACK SURGERY Right 2013    SF: L4-L5 hemilaminectomy for discectomy. Use of the microscope for microdissection, Use og 40mg of depo-medrol though thee exiting right L5 nerve root. Onset:12     SECTION      x2    CHOLECYSTECTOMY      FL INJECTION RIGHT SHOULDER (ARTHROGRAM)  2022    GALLBLADDER SURGERY      [facility], Onset:     LAPAROSCOPIC LYSIS INTESTINAL ADHESIONS      onset: 15    LUMBAR DISC SURGERY      NH HYSTEROSCOPY BX ENDOMETRIUM&/POLYPC W/WO D&C N/A 2024    Procedure: DILATATION AND CURETTAGE (D&C) WITH HYSTEROSCOPY, POLYPECTOMY;  Surgeon: Griffin Arriaga MD;  Location: [facility];  Service: Gynecology    NH HYSTEROSCOPY ENDOMETRIAL ABLATION N/A 2024    Procedure: ABLATION ENDOMETRIAL VIPUL;  Surgeon: Griffin Arriaga MD;  Location: [facility];  Service: Gynecology    NH LAPS ABD PRTM&OMENTUM DX W/WO SPEC BR/WA SPX N/A 2016    Procedure: LAPAROSCOPY DIAGNOSTIC, EXTENSIVE LYSIS OF ADHESIONS, MAYURI'S PROCEDURE(DIVISION OF MAYURI'S BANDS,DETORSION OF VOLVULUS, WIDENING OF MESENTERIC PEDICLE, CECOPEXY, APPENDECTOMY);  Surgeon: Sudeep Espnioza MD;  Location: [facility];  Service: General    TUBAL LIGATION     [3]   Allergies  Allergen Reactions    Biaxin [Clarithromycin] GI Intolerance and Other (See Comments)    Sulfa Antibiotics     " Sulfasalazine     Venlafaxine GI Intolerance, Vomiting and Other (See Comments)     Category: Allergy;   Category: Allergy;    [4]   Family History  Problem Relation Name Age of Onset    Mental illness Mother Nora         bipolar/anxiety    Stroke Mother Nora     Heart disease Mother Nora     Depression Mother Nora     Bipolar disorder Mother Nora     Hypertension Father Tod     Stroke Father Tod     No Known Problems Daughter      Scleroderma Maternal Grandmother      Heart attack Maternal Grandfather      No Known Problems Paternal Grandmother      Stroke Paternal Grandfather      Cancer Paternal Grandfather          believes lung cancer, heavy smoker    Seizures Brother          epilepsy    No Known Problems Paternal Aunt      Substance Abuse Neg Hx      Breast cancer Neg Hx      Ovarian cancer Neg Hx      Uterine cancer Neg Hx      Colon cancer Neg Hx

## 2025-06-23 ENCOUNTER — TELEMEDICINE (OUTPATIENT)
Dept: BEHAVIORAL/MENTAL HEALTH CLINIC | Facility: CLINIC | Age: 45
End: 2025-06-23
Payer: COMMERCIAL

## 2025-06-23 ENCOUNTER — TELEPHONE (OUTPATIENT)
Age: 45
End: 2025-06-23

## 2025-06-23 DIAGNOSIS — F41.1 GAD (GENERALIZED ANXIETY DISORDER): Primary | Chronic | ICD-10-CM

## 2025-06-23 DIAGNOSIS — F33.0 MILD EPISODE OF RECURRENT MAJOR DEPRESSIVE DISORDER (HCC): ICD-10-CM

## 2025-06-23 PROCEDURE — 90837 PSYTX W PT 60 MINUTES: CPT | Performed by: SOCIAL WORKER

## 2025-06-23 RX ORDER — LAMOTRIGINE 100 MG/1
TABLET ORAL
Qty: 30 TABLET | Refills: 1 | Status: SHIPPED | OUTPATIENT
Start: 2025-06-23

## 2025-06-23 NOTE — TELEPHONE ENCOUNTER
Patient is calling regarding cancelling an appointment.    Date/Time: 9/15 @10:30am vv.    Reason: med change     Patient was rescheduled: YES [x] NO []  If yes, when was Patient reschedule for: 6/24 @8:30am    Patient requesting call back to reschedule: YES [] NO [x]    Provider has been notified.

## 2025-06-23 NOTE — PSYCH
Virtual Regular Visit Name: Corinne S Kulp      : 1980      MRN: [id number]  Encounter Provider: Veronica Sandoval  Encounter Date: 2025   Encounter department: [facility]  Assessment & Plan  NAVI (generalized anxiety disorder)         Mild episode of recurrent major depressive disorder (HCC)         NAVI (generalized anxiety disorder)         Mild episode of recurrent major depressive disorder (HCC)         Goals addressed in session: Goal 1 Follow up psychotherapy session.     Behavioral Health Treatment Plan [facility]: Diagnosis and Treatment Plan explained to Corinne, Corinne relates understanding diagnosis and is agreeable to Treatment Plan. Yes.      Depression Follow-up Plan Completed: Not applicable     Reason for visit is   Chief Complaint   Patient presents with    Virtual Regular Visit      Recent Visits  No visits were found meeting these conditions.  Showing recent visits within past 7 days and meeting all other requirements  Today's Visits  Date Type Provider Dept   25 Telemedicine Veronica Sandoval  [facility]   Showing today's visits and meeting all other requirements  Future Appointments  No visits were found meeting these conditions.  Showing future appointments within next 150 days and meeting all other requirements     History of Present Illness     HPI    Past Medical History   Past Medical History:   Diagnosis Date    Anxiety     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HCC)     Panic attack     I have had many iver the years    Panic disorder      Past Surgical History:   Procedure Laterality Date    APPENDECTOMY      extensive lysis of adhesions, MAYURI's procedure (divisions of MAYURI's bands) detorsion of vulvulus, widening of mesenteric pedicle, cecopeexy, appendectomy       BACK SURGERY Right 2013    SF: L4-L5 hemilaminectomy for discectomy. Use of the microscope for microdissection, Use og 40mg of depo-medrol though  thee exiting right L5 nerve root. Onset:12     SECTION      x2    CHOLECYSTECTOMY      FL INJECTION RIGHT SHOULDER (ARTHROGRAM)  2022    GALLBLADDER SURGERY      [facility], Onset:     LAPAROSCOPIC LYSIS INTESTINAL ADHESIONS      onset: 15    LUMBAR DISC SURGERY      ID HYSTEROSCOPY BX ENDOMETRIUM&/POLYPC W/WO D&C N/A 2024    Procedure: DILATATION AND CURETTAGE (D&C) WITH HYSTEROSCOPY, POLYPECTOMY;  Surgeon: Griffin Arriaga MD;  Location: [facility];  Service: Gynecology    ID HYSTEROSCOPY ENDOMETRIAL ABLATION N/A 2024    Procedure: ABLATION ENDOMETRIAL VIPUL;  Surgeon: Griffin Arriaga MD;  Location: [facility];  Service: Gynecology    ID LAPS ABD PRTM&OMENTUM DX W/WO SPEC BR/WA SPX N/A 2016    Procedure: LAPAROSCOPY DIAGNOSTIC, EXTENSIVE LYSIS OF ADHESIONS, MAYURI'S PROCEDURE(DIVISION OF MAYURI'S BANDS,DETORSION OF VOLVULUS, WIDENING OF MESENTERIC PEDICLE, CECOPEXY, APPENDECTOMY);  Surgeon: Sudeep Espinoza MD;  Location: [facility];  Service: General    TUBAL LIGATION       Current Outpatient Medications   Medication Instructions    ALPRAZolam (XANAX) 0.25 mg, Oral, Daily PRN    DULoxetine (CYMBALTA) 60 mg, Oral, Daily    lamoTRIgine (LAMICTAL) 75 mg, Oral, Daily    multivitamin (THERAGRAN) TABS 1 tablet, Daily    Probiotic Product (PROBIOTIC-10 PO) Take by mouth 1 daily     Allergies   Allergen Reactions    Biaxin [Clarithromycin] GI Intolerance and Other (See Comments)    Sulfa Antibiotics     Sulfasalazine     Venlafaxine GI Intolerance, Vomiting and Other (See Comments)     Category: Allergy;   Category: Allergy;        Objective   There were no vitals taken for this visit.    Video Exam  Physical Exam     Administrative Statements   Encounter provider Veronica Sandoval    The Patient is located at Home and in the following state in which I hold an active license [place].    The patient was identified by name and date of birth. Corinne S Kulp was informed that this is a  telemedicine visit and that the visit is being conducted through the [employer] platform. She agrees to proceed.  My office door was closed. No one else was in the room.  She acknowledged consent and understanding of privacy and security of the video platform. The patient has agreed to participate and understands they can discontinue the visit at any time.    I have spent a total time of 55 minutes in caring for this patient on the day of the visit/encounter including Counseling / Coordination of care, not including the time spent for establishing the audio/video connection.    Visit Time  Start Time: 1100  Stop Time: 1155  Total Visit Time: 55 minutes    (D) Corinne attended her follow up psychotherapy session today. Corinne reported that since her last session, she has noticed ongoing symptoms.     Corinne reported that today in particular she is struggling with elevated levels of anxiety, describing psychosomatic symptoms with chest pressure, struggling to take a deep breath, etc. Corinne reported that she took a PRN of xanax (1/5 the dose) prior to getting on this session. Discussed and processed this. Corinne reported that she has been watching her (2) nephew's since last Thursday night that are (4) years old, and (5) years old as her brother and sister-in-law were away. Corinne describes feeling stressed out, overwhelmed, hyperfixiated, overstimulated, and feeling emotionally drained and exhausted. Corinne reported that she anticipated that it would be too much; however, felt guilty with the idea of saying no, self-reflecting upon her mother not being here, and her father not being reliable and dependable. Corinne reported that they are scheduled to go back to her brother today, and describes feeling relieved in relation to this. Discussed and processed this. Corinne describes struggling with stressors in relation to work, describing her as being stressed out, overwhelmed, and noticing that she doesn't want to  "be there anymore. Corinne describes feeling resentful going into work, and describes not wanting to go in there, be there, and really doesn't want to interact with other people. Discussed and processed this. Corinne describes struggling with body image, describing herself as feeling triggered when she looks in the mirror, not liking the way that she looks, triggering the way that she feels about herself. Corinne describes believing that she gained weight, despite being intentional about eating healthy and exercising. Discussed and processed this. Corinne described a harmful core belief that her mother, \"didn't love us (referencing her and her children) enough to want to change or do anything about it,\" when she was referencing her mother's mental health and alcoholism. During session, Corinne and this writer used DBT and CBT skills to challenge negative thoughts, negative thinking traps, cognitive distortions, and harmful core beliefs. Corinne describes struggling with grief in relation to the loss of her mother and grandparents, struggling with these thoughts, and feelings more lately. Discussed and processed this. Corinne reported that she had a psychiatry appointment last week with her psychiatry provider Beena. Corinne reported that they didn't make any changes, and reported that in the moment she was okay with this, and now wonders if she made the wrong decision. Corinne describes such anxiety with taking medication in general, let along making medication changes, and describes being hypersensitive to medications, and processed through this. Corinne describes, \"things as being off,\" between her a her  Fredy for about a month now. Corinne describes feeling like her  Frdey doesn't reach out to her, check in with her, describing him as emotionally absent. Corinne reported that her as Fredy haven't been sexually intimate with one another, increasing anxiety. Corinne describes wanting Fredy to be " more emotionally aware, in-tune, and intelligent, and describes feeling frustrated, shutting down, and avoiding when she perceives being met with avoidance, feeling being dismissed and invalidated. Discussed and processed this.  Corinne describes feeling stressed out with her son John, who is contemplating not going back to college at [employer] next semester, working on finding a full-time job. Corinne reported that John's car that they recently helped him purchase has issues that requires thousands of dollars of work, resulting in him looking for a new car. Corinne describes feeling stressed out and overwhelmed in relation to this, and has been deferring to Fredy to help him with this. Corinne describes John as struggling with maturity, describing him as being irresponsible with money and finances, creating anxiety for her. Discussed and processed this. Corinne reported that her daughter Randi is going to be turning 16 years old, and they will need to start looking for a car for her, creating feelings of anxiety and being stressed out and overwhelmed. Discussed and processed this. Corinne reported that she is due to get her period next week, and is wondering if her increased level of symptoms of anxiety and depression are triggered by this. Discussed and processed this. Corinne reported that she is part of the prayer group, and reported that they are doing summer journaling prompts and she hasn't followed through with this, creating stress and anxiety for her. Corinne describes feeling like other than therapy, she doesn't feel like she is supported, seen, and understood, and desires to feel this way with her  Fredy. Corinne and this writer processed this. Provided ongoing psychoeducation. Modeled effective forms of communication. Discussed ongoing skills. Reviewed limits and boundaries.     (A) Corinne describes fluctuating symptoms. Corinne's speech presented at a normal rate, volume, and  rhythm. Corinne presented with good eye contact. Corinne presented as alert and oriented x3. Corinne denies any symptoms of frank. Corinne denies any evident or immediate risk factors for self-harm, SI, or HI. Corinne's mood presented as anxious and depressed and her affect appeared to be congruent, and tearful throughout the session. Corinne describes symptoms of obsessive, intrusive, ruminating, and repetitive thoughts. Corinne describes feeling stressed out, overwhelmed, hyperfixiated, and shutting down and avoiding. Corinne describes feeling overstimulated, describing emotional dysregulation, reporting irritability, poor frustration tolerance, and becoming easily annoyed. Corinne describes psychosomatic symptoms. Corinne describes symptoms of anticipatory anxiety, feeling nervous, anxious, and on edge, not being able to stop and control worrying, and worrying too much about different things. Corinne describes trouble relaxing. Corinne describes lower moods of sadness, and depression. Corinne describes symptoms of grief in relation to symbolic loss, in addition to actual loss. Corinne describes little interest and pleasure in doing things. Corinne describes difficulty focusing, having a hard time concentrating on things, and becoming easily distracted. Corinne describes fatigue, and feeling tired and having little energy.     (P) Corinne plans to consider exploring couples/ marriage counseling for her and her  Fredy. This writer recommended that Corinne reach out for her psychiatry provider SAKINA Bruno, and request an earlier follow up appointment. Corinne plans to go back to prior advice from her psychiatry provider about increasing her lamictal the week leading up to her period, reporting that she hasn't done this out of doubt, fear, and anxiety. Corinne plans to journal to further explore and self-reflect upon what she desires to hear from her  Fredy, and what she needs in moments of  being triggered, aiming to increase self-awareness and gain insight, examining this through the lens of met needs, verses unmet needs. Corinne plans to use DBT Opposite Action Skills to support her in doing this. Corinne plans to intentionally self-reflect upon her desire for connection and emotional intimacy, yet unintentional behavioral avoidance with doing this, resulting in feelings of being isolated, and alone. Corinne plans to utilize healthy and effective forms of communication, intentionally asserting herself, advocating for herself, asking for what she needs, targeting interpersonal relationship conflict, reinforcing limits and boundaries, challenging co-dependency, aiming to break the cycle, and implement healthy patterns, and behaviors. Corinne plans to pay attention to her body, aiming to increase self-awareness and gain insight in relation to warning signs and triggers throughout her body. Corinne plans to target ongoing symptoms with deep breathing techniques, meditation/ mindfulness techniques, coping skills, grounding techniques, self-soothing techniques, sensory related skills, distraction techniques, and distress tolerance skills. Corinne plans to implement positive self-talk, positive affirmations, self-affirming statements, and grounding statements, intentionally identifying her worth, and giving herself credit. Corinne plans to meet herself with radical compassion, extending bogdan towards herself, working on decreasing judgement towards herself. Corinne plans to seek out supportive evidence to challenge negative thoughts, negative thinking traps, cognitive distortions, and harmful core beliefs with the support of DBT Wise Mind, CBT Cognitive Restructuring, and CBT Challenging Negative Thoughts Assessment Questions. Corinne plans to use DBT Opposite Action Skills, intentionally structuring her schedule with balance, routing, and consistency, intentionally leaning into healthy natural supports,  engage in the use of self-care, take time for herself, be present in the moment, and prioritize her mental health and physical health needs. Corinne plans to intentionally internalize the concept of DBT Dialects, specifically the both/ and concept, allowing for two opposing thoughts, feelings, and emotions to both co-exist and be true at the same time. Corinne plans to implement radical acceptance, examining pros and cons, along with risks verses benefits in order to make informed decisions, giving herself permission to align choices and decisions with what is in the best interest of her. Corinne plans to identify, associate, honor, validate, and make space for her feelings, and emotions, examining patterns, themes, and behaviors through the lens of grief in relation to symbolic loss, journaling outside of session to further self-reflect and explore. Corinne plans to intentionally separate Rational Mind, identifying ration, reason, logic, and facts, along with Emotional Mind, identifying thoughts, feelings, and emotions, striking balance between the both, implementing the concept of DBT [place] Mind. Corinne plans to reach out for additional support as needed.     06/23/25  Start Time: 1100  Stop Time: 1155  Total Visit Time: 55 minutes

## 2025-07-07 ENCOUNTER — TELEMEDICINE (OUTPATIENT)
Dept: BEHAVIORAL/MENTAL HEALTH CLINIC | Facility: CLINIC | Age: 45
End: 2025-07-07
Payer: COMMERCIAL

## 2025-07-07 DIAGNOSIS — F33.0 MILD EPISODE OF RECURRENT MAJOR DEPRESSIVE DISORDER (HCC): Primary | ICD-10-CM

## 2025-07-07 DIAGNOSIS — F41.1 GAD (GENERALIZED ANXIETY DISORDER): Chronic | ICD-10-CM

## 2025-07-07 PROCEDURE — 90834 PSYTX W PT 45 MINUTES: CPT | Performed by: SOCIAL WORKER
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
## 2025-07-28 ENCOUNTER — TELEPHONE (OUTPATIENT)
Dept: PSYCHIATRY | Facility: CLINIC | Age: 45
End: 2025-07-28

## 2025-07-28 ENCOUNTER — TELEMEDICINE (OUTPATIENT)
Dept: PSYCHIATRY | Facility: CLINIC | Age: 45
End: 2025-07-28
Payer: COMMERCIAL

## 2025-07-28 ENCOUNTER — TELEPHONE (OUTPATIENT)
Age: 45
End: 2025-07-28

## 2025-07-28 ENCOUNTER — OFFICE VISIT (OUTPATIENT)
Dept: OBGYN CLINIC | Facility: MEDICAL CENTER | Age: 45
End: 2025-07-28
Payer: COMMERCIAL

## 2025-07-28 ENCOUNTER — TELEMEDICINE (OUTPATIENT)
Dept: BEHAVIORAL/MENTAL HEALTH CLINIC | Facility: CLINIC | Age: 45
End: 2025-07-28
Payer: COMMERCIAL

## 2025-07-28 VITALS — WEIGHT: 152 LBS | HEIGHT: 65 IN | BODY MASS INDEX: 25.33 KG/M2

## 2025-07-28 DIAGNOSIS — F39 MOOD DISORDER (HCC): ICD-10-CM

## 2025-07-28 DIAGNOSIS — F41.1 GAD (GENERALIZED ANXIETY DISORDER): Chronic | ICD-10-CM

## 2025-07-28 DIAGNOSIS — M54.2 NECK PAIN: Primary | ICD-10-CM

## 2025-07-28 DIAGNOSIS — F33.0 MILD EPISODE OF RECURRENT MAJOR DEPRESSIVE DISORDER (HCC): Primary | ICD-10-CM

## 2025-07-28 DIAGNOSIS — F41.1 GAD (GENERALIZED ANXIETY DISORDER): Primary | Chronic | ICD-10-CM

## 2025-07-28 DIAGNOSIS — M47.812 ARTHROPATHY OF CERVICAL FACET JOINT: ICD-10-CM

## 2025-07-28 DIAGNOSIS — F33.0 MILD EPISODE OF RECURRENT MAJOR DEPRESSIVE DISORDER (HCC): ICD-10-CM

## 2025-07-28 DIAGNOSIS — M50.30 DEGENERATIVE CERVICAL DISC: ICD-10-CM

## 2025-07-28 PROCEDURE — 99214 OFFICE O/P EST MOD 30 MIN: CPT | Performed by: NURSE PRACTITIONER

## 2025-07-28 PROCEDURE — 90837 PSYTX W PT 60 MINUTES: CPT | Performed by: SOCIAL WORKER

## 2025-07-28 PROCEDURE — 99213 OFFICE O/P EST LOW 20 MIN: CPT | Performed by: EMERGENCY MEDICINE

## 2025-08-05 ENCOUNTER — TELEPHONE (OUTPATIENT)
Age: 45
End: 2025-08-05

## 2025-08-05 DIAGNOSIS — M54.2 CERVICAL PAIN (NECK): Primary | ICD-10-CM

## 2025-08-06 ENCOUNTER — TELEPHONE (OUTPATIENT)
Dept: PSYCHIATRY | Facility: CLINIC | Age: 45
End: 2025-08-06

## 2025-08-18 ENCOUNTER — TELEMEDICINE (OUTPATIENT)
Dept: BEHAVIORAL/MENTAL HEALTH CLINIC | Facility: CLINIC | Age: 45
End: 2025-08-18
Payer: COMMERCIAL

## 2025-08-18 DIAGNOSIS — F33.0 MILD EPISODE OF RECURRENT MAJOR DEPRESSIVE DISORDER (HCC): ICD-10-CM

## 2025-08-18 DIAGNOSIS — F41.1 GAD (GENERALIZED ANXIETY DISORDER): Primary | Chronic | ICD-10-CM

## 2025-08-18 PROCEDURE — 90834 PSYTX W PT 45 MINUTES: CPT | Performed by: SOCIAL WORKER

## 2025-08-19 DIAGNOSIS — F39 MOOD DISORDER (HCC): ICD-10-CM

## 2025-08-19 RX ORDER — LAMOTRIGINE 100 MG/1
100 TABLET ORAL DAILY
Qty: 30 TABLET | Refills: 0 | Status: SHIPPED | OUTPATIENT
Start: 2025-08-19

## (undated) DEVICE — SYRINGE 10ML LL CONTROL TOP

## (undated) DEVICE — GLOVE SRG BIOGEL 7

## (undated) DEVICE — ARTHROSCOPY FLOOR MAT

## (undated) DEVICE — NEEDLE SPINAL 22G X 3.5IN  QUINCKE

## (undated) DEVICE — TISSUE REMOVAL SYSTEM FLUID MANAGEMENT ACCESSORIES: Brand: SYMPHION

## (undated) DEVICE — POV-IOD SOLUTION 4OZ BT

## (undated) DEVICE — PREMIUM DRY TRAY LF: Brand: MEDLINE INDUSTRIES, INC.

## (undated) DEVICE — STRL ALLENTOWN HYSTEROSCOPY PK: Brand: CARDINAL HEALTH

## (undated) DEVICE — GLOVE INDICATOR PI UNDERGLOVE SZ 7.5 BLUE

## (undated) DEVICE — TISSUE REMOVAL SYSTEM RESECTING DEVICE: Brand: SYMPHION

## (undated) DEVICE — STERILE MINERVA DISPOSABLE HANDPIECECONTENTS:(1) ONE SINGLE USE STERILE MINERVA ES DISPOSABLE HANDPIECE (1) ONE SINGLE USE STERILE SYRINGE(1) ONE SINGLE USE STERILE 8MM HEGAR DILATOR(1) ONE SINGLE USE NON-STERILE DESICCANT(1) ONE NON-STERILE HANDPIECE INSTRUCTIONS FOR USE(1)  ONE NON-STERILE DILATOR INSTRUCTIONS FOR USE: Brand: MINERVA SINGLE STERILE DISPOSABLE HANDPIECE

## (undated) DEVICE — MAXI PAD5.51 X 13.78 IN. (14.0 X 35.0 CM)HEAVYCONTOUREDUNSCENTED: Brand: CURITY